# Patient Record
Sex: FEMALE | Race: WHITE | NOT HISPANIC OR LATINO | URBAN - METROPOLITAN AREA
[De-identification: names, ages, dates, MRNs, and addresses within clinical notes are randomized per-mention and may not be internally consistent; named-entity substitution may affect disease eponyms.]

---

## 2017-01-26 ENCOUNTER — COMMUNICATION - HEALTHEAST (OUTPATIENT)
Dept: CARDIOLOGY | Facility: CLINIC | Age: 72
End: 2017-01-26

## 2017-01-26 DIAGNOSIS — E78.5 HLD (HYPERLIPIDEMIA): ICD-10-CM

## 2017-02-22 ENCOUNTER — OFFICE VISIT - HEALTHEAST (OUTPATIENT)
Dept: INTERNAL MEDICINE | Facility: CLINIC | Age: 72
End: 2017-02-22

## 2017-02-22 DIAGNOSIS — M25.552 LATERAL PAIN OF LEFT HIP: ICD-10-CM

## 2017-02-22 DIAGNOSIS — G47.00 INSOMNIA: ICD-10-CM

## 2017-02-22 DIAGNOSIS — R32 INCONTINENCE: ICD-10-CM

## 2017-03-01 ENCOUNTER — AMBULATORY - HEALTHEAST (OUTPATIENT)
Dept: LAB | Facility: CLINIC | Age: 72
End: 2017-03-01

## 2017-03-01 ENCOUNTER — RECORDS - HEALTHEAST (OUTPATIENT)
Dept: GENERAL RADIOLOGY | Facility: CLINIC | Age: 72
End: 2017-03-01

## 2017-03-01 DIAGNOSIS — Z79.52 LONG TERM CURRENT USE OF SYSTEMIC STEROIDS: ICD-10-CM

## 2017-03-01 DIAGNOSIS — Z79.899 ENCOUNTER FOR LONG-TERM (CURRENT) USE OF OTHER MEDICATIONS: ICD-10-CM

## 2017-03-01 DIAGNOSIS — Z94.0 KIDNEY REPLACED BY TRANSPLANT: ICD-10-CM

## 2017-03-01 DIAGNOSIS — Z48.298 AFTERCARE FOLLOWING ORGAN TRANSPLANT: ICD-10-CM

## 2017-03-01 DIAGNOSIS — M25.552 PAIN IN LEFT HIP: ICD-10-CM

## 2017-03-02 ENCOUNTER — COMMUNICATION - HEALTHEAST (OUTPATIENT)
Dept: INTERNAL MEDICINE | Facility: CLINIC | Age: 72
End: 2017-03-02

## 2017-03-02 ENCOUNTER — AMBULATORY - HEALTHEAST (OUTPATIENT)
Dept: INTERNAL MEDICINE | Facility: CLINIC | Age: 72
End: 2017-03-02

## 2017-03-02 DIAGNOSIS — M53.3 SACRO-ILIAC PAIN: ICD-10-CM

## 2017-03-02 DIAGNOSIS — M47.816 DJD (DEGENERATIVE JOINT DISEASE), LUMBAR: ICD-10-CM

## 2017-03-07 ENCOUNTER — HOSPITAL ENCOUNTER (OUTPATIENT)
Dept: PHYSICAL MEDICINE AND REHAB | Facility: CLINIC | Age: 72
Discharge: HOME | End: 2017-03-07
Attending: INTERNAL MEDICINE

## 2017-03-07 DIAGNOSIS — M53.3 SACROILIAC JOINT DYSFUNCTION OF LEFT SIDE: ICD-10-CM

## 2017-03-07 DIAGNOSIS — M48.061 LUMBAR CANAL STENOSIS: ICD-10-CM

## 2017-03-07 DIAGNOSIS — M54.16 CHRONIC LEFT LUMBAR RADICULOPATHY: ICD-10-CM

## 2017-03-07 DIAGNOSIS — M46.1 SACROILIITIS (H): ICD-10-CM

## 2017-03-07 DIAGNOSIS — M54.50 LEFT LOW BACK PAIN: ICD-10-CM

## 2017-03-08 ENCOUNTER — AMBULATORY - HEALTHEAST (OUTPATIENT)
Dept: PODIATRY | Facility: CLINIC | Age: 72
End: 2017-03-08

## 2017-03-08 DIAGNOSIS — E11.9 TYPE 2 DIABETES MELLITUS WITHOUT COMPLICATION, WITH LONG-TERM CURRENT USE OF INSULIN (H): ICD-10-CM

## 2017-03-08 DIAGNOSIS — Z79.4 TYPE 2 DIABETES MELLITUS WITHOUT COMPLICATION, WITH LONG-TERM CURRENT USE OF INSULIN (H): ICD-10-CM

## 2017-03-08 DIAGNOSIS — L60.2 ONYCHAUXIS: ICD-10-CM

## 2017-03-08 DIAGNOSIS — B35.1 NAIL FUNGUS: ICD-10-CM

## 2017-03-10 ENCOUNTER — RECORDS - HEALTHEAST (OUTPATIENT)
Dept: ADMINISTRATIVE | Facility: OTHER | Age: 72
End: 2017-03-10

## 2017-03-29 ENCOUNTER — AMBULATORY - HEALTHEAST (OUTPATIENT)
Dept: CARDIOLOGY | Facility: CLINIC | Age: 72
End: 2017-03-29

## 2017-03-29 DIAGNOSIS — Z95.0 PACEMAKER: ICD-10-CM

## 2017-03-30 ENCOUNTER — OFFICE VISIT - HEALTHEAST (OUTPATIENT)
Dept: PHYSICAL THERAPY | Facility: REHABILITATION | Age: 72
End: 2017-03-30

## 2017-03-30 DIAGNOSIS — M62.81 GENERALIZED MUSCLE WEAKNESS: ICD-10-CM

## 2017-03-30 DIAGNOSIS — M79.18 LEFT BUTTOCK PAIN: ICD-10-CM

## 2017-04-03 ENCOUNTER — COMMUNICATION - HEALTHEAST (OUTPATIENT)
Dept: ADMINISTRATIVE | Facility: CLINIC | Age: 72
End: 2017-04-03

## 2017-04-03 DIAGNOSIS — E11.9 TYPE 2 DIABETES MELLITUS (H): ICD-10-CM

## 2017-04-04 ENCOUNTER — COMMUNICATION - HEALTHEAST (OUTPATIENT)
Dept: INTERNAL MEDICINE | Facility: CLINIC | Age: 72
End: 2017-04-04

## 2017-04-04 ENCOUNTER — AMBULATORY - HEALTHEAST (OUTPATIENT)
Dept: EDUCATION SERVICES | Facility: CLINIC | Age: 72
End: 2017-04-04

## 2017-04-04 DIAGNOSIS — I10 ESSENTIAL HYPERTENSION, BENIGN: ICD-10-CM

## 2017-04-05 ENCOUNTER — COMMUNICATION - HEALTHEAST (OUTPATIENT)
Dept: INTERNAL MEDICINE | Facility: CLINIC | Age: 72
End: 2017-04-05

## 2017-04-05 ENCOUNTER — COMMUNICATION - HEALTHEAST (OUTPATIENT)
Dept: EDUCATION SERVICES | Facility: CLINIC | Age: 72
End: 2017-04-05

## 2017-04-05 ENCOUNTER — OFFICE VISIT - HEALTHEAST (OUTPATIENT)
Dept: INTERNAL MEDICINE | Facility: CLINIC | Age: 72
End: 2017-04-05

## 2017-04-05 DIAGNOSIS — Z79.4 TYPE 2 DIABETES MELLITUS WITH STAGE 3 CHRONIC KIDNEY DISEASE, WITH LONG-TERM CURRENT USE OF INSULIN (H): ICD-10-CM

## 2017-04-05 DIAGNOSIS — Z79.899 POLYPHARMACY: ICD-10-CM

## 2017-04-05 DIAGNOSIS — T68.XXXA HYPOTHERMIA: ICD-10-CM

## 2017-04-05 DIAGNOSIS — N18.30 TYPE 2 DIABETES MELLITUS WITH STAGE 3 CHRONIC KIDNEY DISEASE, WITH LONG-TERM CURRENT USE OF INSULIN (H): ICD-10-CM

## 2017-04-05 DIAGNOSIS — E11.22 TYPE 2 DIABETES MELLITUS WITH STAGE 3 CHRONIC KIDNEY DISEASE, WITH LONG-TERM CURRENT USE OF INSULIN (H): ICD-10-CM

## 2017-04-06 ENCOUNTER — OFFICE VISIT - HEALTHEAST (OUTPATIENT)
Dept: PHYSICAL THERAPY | Facility: REHABILITATION | Age: 72
End: 2017-04-06

## 2017-04-06 DIAGNOSIS — M62.81 GENERALIZED MUSCLE WEAKNESS: ICD-10-CM

## 2017-04-06 DIAGNOSIS — M79.18 LEFT BUTTOCK PAIN: ICD-10-CM

## 2017-04-06 DIAGNOSIS — R29.3 ABNORMAL POSTURE: ICD-10-CM

## 2017-04-14 ENCOUNTER — AMBULATORY - HEALTHEAST (OUTPATIENT)
Dept: EDUCATION SERVICES | Facility: CLINIC | Age: 72
End: 2017-04-14

## 2017-04-17 ENCOUNTER — OFFICE VISIT - HEALTHEAST (OUTPATIENT)
Dept: PHYSICAL THERAPY | Facility: REHABILITATION | Age: 72
End: 2017-04-17

## 2017-04-17 DIAGNOSIS — M79.18 LEFT BUTTOCK PAIN: ICD-10-CM

## 2017-04-17 DIAGNOSIS — R29.3 ABNORMAL POSTURE: ICD-10-CM

## 2017-04-17 DIAGNOSIS — I50.22 CHRONIC SYSTOLIC CONGESTIVE HEART FAILURE (H): ICD-10-CM

## 2017-04-17 DIAGNOSIS — E11.9 TYPE 2 DIABETES MELLITUS (H): ICD-10-CM

## 2017-04-17 DIAGNOSIS — N18.6 END STAGE RENAL DISEASE (H): ICD-10-CM

## 2017-04-17 DIAGNOSIS — I10 ESSENTIAL HYPERTENSION, BENIGN: ICD-10-CM

## 2017-04-17 DIAGNOSIS — M62.81 GENERALIZED MUSCLE WEAKNESS: ICD-10-CM

## 2017-04-18 ENCOUNTER — AMBULATORY - HEALTHEAST (OUTPATIENT)
Dept: INTERNAL MEDICINE | Facility: CLINIC | Age: 72
End: 2017-04-18

## 2017-04-19 ENCOUNTER — OFFICE VISIT - HEALTHEAST (OUTPATIENT)
Dept: NURSING | Facility: CLINIC | Age: 72
End: 2017-04-19

## 2017-04-19 DIAGNOSIS — E78.2 MIXED HYPERLIPIDEMIA: ICD-10-CM

## 2017-04-19 DIAGNOSIS — Z79.4 TYPE 2 DIABETES MELLITUS WITHOUT COMPLICATION, WITH LONG-TERM CURRENT USE OF INSULIN (H): ICD-10-CM

## 2017-04-19 DIAGNOSIS — K21.00 REFLUX ESOPHAGITIS: ICD-10-CM

## 2017-04-19 DIAGNOSIS — Z94.0 KIDNEY REPLACED BY TRANSPLANT: ICD-10-CM

## 2017-04-19 DIAGNOSIS — E11.9 TYPE 2 DIABETES MELLITUS WITHOUT COMPLICATION, WITH LONG-TERM CURRENT USE OF INSULIN (H): ICD-10-CM

## 2017-04-19 DIAGNOSIS — G40.919 INTRACTABLE EPILEPSY WITHOUT STATUS EPILEPTICUS, UNSPECIFIED EPILEPSY TYPE (H): ICD-10-CM

## 2017-04-19 DIAGNOSIS — I10 ESSENTIAL HYPERTENSION WITH GOAL BLOOD PRESSURE LESS THAN 140/90: ICD-10-CM

## 2017-04-19 DIAGNOSIS — F34.1 DYSTHYMIC DISORDER: ICD-10-CM

## 2017-04-24 ENCOUNTER — OFFICE VISIT - HEALTHEAST (OUTPATIENT)
Dept: ENDOCRINOLOGY | Facility: CLINIC | Age: 72
End: 2017-04-24

## 2017-04-24 DIAGNOSIS — E11.9 DIABETES (H): ICD-10-CM

## 2017-04-24 LAB
CREAT SERPL-MCNC: 1.94 MG/DL (ref 0.6–1.1)
GFR SERPL CREATININE-BSD FRML MDRD: 25 ML/MIN/1.73M2
HBA1C MFR BLD: 9.3 % (ref 3.5–6)

## 2017-04-25 ENCOUNTER — AMBULATORY - HEALTHEAST (OUTPATIENT)
Dept: EDUCATION SERVICES | Facility: CLINIC | Age: 72
End: 2017-04-25

## 2017-05-01 ENCOUNTER — COMMUNICATION - HEALTHEAST (OUTPATIENT)
Dept: ENDOCRINOLOGY | Facility: CLINIC | Age: 72
End: 2017-05-01

## 2017-05-02 ENCOUNTER — OFFICE VISIT - HEALTHEAST (OUTPATIENT)
Dept: INTERNAL MEDICINE | Facility: CLINIC | Age: 72
End: 2017-05-02

## 2017-05-02 DIAGNOSIS — Z94.0 KIDNEY REPLACED BY TRANSPLANT: ICD-10-CM

## 2017-05-02 DIAGNOSIS — I10 ESSENTIAL HYPERTENSION: ICD-10-CM

## 2017-05-02 DIAGNOSIS — E78.5 HYPERLIPIDEMIA: ICD-10-CM

## 2017-05-02 DIAGNOSIS — I10 ESSENTIAL HYPERTENSION, BENIGN: ICD-10-CM

## 2017-05-02 DIAGNOSIS — E11.9 TYPE 2 DIABETES MELLITUS (H): ICD-10-CM

## 2017-05-10 ENCOUNTER — COMMUNICATION - HEALTHEAST (OUTPATIENT)
Dept: INTERNAL MEDICINE | Facility: CLINIC | Age: 72
End: 2017-05-10

## 2017-05-10 DIAGNOSIS — E11.9 DIABETES (H): ICD-10-CM

## 2017-05-10 DIAGNOSIS — F39 MOOD DISORDER (H): ICD-10-CM

## 2017-05-24 ENCOUNTER — COMMUNICATION - HEALTHEAST (OUTPATIENT)
Dept: ADMINISTRATIVE | Facility: CLINIC | Age: 72
End: 2017-05-24

## 2017-05-24 DIAGNOSIS — E11.22 TYPE 2 DIABETES MELLITUS WITH STAGE 3 CHRONIC KIDNEY DISEASE, WITH LONG-TERM CURRENT USE OF INSULIN (H): ICD-10-CM

## 2017-05-24 DIAGNOSIS — N18.30 TYPE 2 DIABETES MELLITUS WITH STAGE 3 CHRONIC KIDNEY DISEASE, WITH LONG-TERM CURRENT USE OF INSULIN (H): ICD-10-CM

## 2017-05-24 DIAGNOSIS — Z79.4 TYPE 2 DIABETES MELLITUS WITH STAGE 3 CHRONIC KIDNEY DISEASE, WITH LONG-TERM CURRENT USE OF INSULIN (H): ICD-10-CM

## 2017-05-30 ENCOUNTER — COMMUNICATION - HEALTHEAST (OUTPATIENT)
Dept: EDUCATION SERVICES | Facility: CLINIC | Age: 72
End: 2017-05-30

## 2017-06-05 ENCOUNTER — COMMUNICATION - HEALTHEAST (OUTPATIENT)
Dept: INTERNAL MEDICINE | Facility: CLINIC | Age: 72
End: 2017-06-05

## 2017-06-05 ENCOUNTER — COMMUNICATION - HEALTHEAST (OUTPATIENT)
Dept: EDUCATION SERVICES | Facility: CLINIC | Age: 72
End: 2017-06-05

## 2017-06-05 DIAGNOSIS — G47.00 INSOMNIA: ICD-10-CM

## 2017-06-07 ENCOUNTER — AMBULATORY - HEALTHEAST (OUTPATIENT)
Dept: PODIATRY | Facility: CLINIC | Age: 72
End: 2017-06-07

## 2017-06-07 DIAGNOSIS — E11.9 TYPE 2 DIABETES MELLITUS WITHOUT COMPLICATION, WITH LONG-TERM CURRENT USE OF INSULIN (H): ICD-10-CM

## 2017-06-07 DIAGNOSIS — Z79.4 TYPE 2 DIABETES MELLITUS WITHOUT COMPLICATION, WITH LONG-TERM CURRENT USE OF INSULIN (H): ICD-10-CM

## 2017-06-07 DIAGNOSIS — B35.1 NAIL FUNGUS: ICD-10-CM

## 2017-06-07 DIAGNOSIS — L60.2 ONYCHAUXIS: ICD-10-CM

## 2017-06-22 ENCOUNTER — RECORDS - HEALTHEAST (OUTPATIENT)
Dept: ADMINISTRATIVE | Facility: OTHER | Age: 72
End: 2017-06-22

## 2017-07-01 ENCOUNTER — COMMUNICATION - HEALTHEAST (OUTPATIENT)
Dept: INTERNAL MEDICINE | Facility: CLINIC | Age: 72
End: 2017-07-01

## 2017-07-01 DIAGNOSIS — I10 ESSENTIAL HYPERTENSION, BENIGN: ICD-10-CM

## 2017-07-01 DIAGNOSIS — E78.5 HYPERLIPIDEMIA: ICD-10-CM

## 2017-07-05 ENCOUNTER — AMBULATORY - HEALTHEAST (OUTPATIENT)
Dept: CARDIOLOGY | Facility: CLINIC | Age: 72
End: 2017-07-05

## 2017-07-05 DIAGNOSIS — Z95.0 PACEMAKER: ICD-10-CM

## 2017-07-06 LAB — HCC DEVICE COMMENTS: NORMAL

## 2017-08-23 ENCOUNTER — AMBULATORY - HEALTHEAST (OUTPATIENT)
Dept: PODIATRY | Facility: CLINIC | Age: 72
End: 2017-08-23

## 2017-08-23 DIAGNOSIS — L60.2 ONYCHAUXIS: ICD-10-CM

## 2017-08-23 DIAGNOSIS — B35.1 NAIL FUNGUS: ICD-10-CM

## 2017-08-23 DIAGNOSIS — E11.9 TYPE 2 DIABETES MELLITUS WITHOUT COMPLICATION (H): ICD-10-CM

## 2017-08-23 DIAGNOSIS — L84 TYLOMA: ICD-10-CM

## 2017-08-23 DIAGNOSIS — E11.9 TYPE 2 DIABETES MELLITUS WITHOUT COMPLICATION, WITHOUT LONG-TERM CURRENT USE OF INSULIN (H): ICD-10-CM

## 2017-09-26 ENCOUNTER — RECORDS - HEALTHEAST (OUTPATIENT)
Dept: GENERAL RADIOLOGY | Facility: CLINIC | Age: 72
End: 2017-09-26

## 2017-09-26 ENCOUNTER — OFFICE VISIT - HEALTHEAST (OUTPATIENT)
Dept: INTERNAL MEDICINE | Facility: CLINIC | Age: 72
End: 2017-09-26

## 2017-09-26 DIAGNOSIS — E11.9 TYPE 2 DIABETES MELLITUS (H): ICD-10-CM

## 2017-09-26 DIAGNOSIS — M19.049 HAND ARTHRITIS: ICD-10-CM

## 2017-09-26 DIAGNOSIS — I10 ESSENTIAL HYPERTENSION: ICD-10-CM

## 2017-09-26 DIAGNOSIS — E78.5 HYPERLIPIDEMIA: ICD-10-CM

## 2017-09-26 DIAGNOSIS — M25.511 RIGHT SHOULDER PAIN: ICD-10-CM

## 2017-09-26 DIAGNOSIS — M25.511 PAIN IN RIGHT SHOULDER: ICD-10-CM

## 2017-09-26 LAB
CHOLEST SERPL-MCNC: 193 MG/DL
FASTING STATUS PATIENT QL REPORTED: YES
HBA1C MFR BLD: 10.1 % (ref 3.5–6)
HDLC SERPL-MCNC: 56 MG/DL
LDLC SERPL CALC-MCNC: 78 MG/DL
TRIGL SERPL-MCNC: 297 MG/DL

## 2017-09-27 ENCOUNTER — AMBULATORY - HEALTHEAST (OUTPATIENT)
Dept: INTERNAL MEDICINE | Facility: CLINIC | Age: 72
End: 2017-09-27

## 2017-09-27 DIAGNOSIS — E11.9 TYPE 2 DIABETES MELLITUS (H): ICD-10-CM

## 2017-09-29 ENCOUNTER — COMMUNICATION - HEALTHEAST (OUTPATIENT)
Dept: TELEHEALTH | Facility: CLINIC | Age: 72
End: 2017-09-29

## 2017-10-03 ENCOUNTER — RECORDS - HEALTHEAST (OUTPATIENT)
Dept: ADMINISTRATIVE | Facility: OTHER | Age: 72
End: 2017-10-03

## 2017-10-03 ENCOUNTER — COMMUNICATION - HEALTHEAST (OUTPATIENT)
Dept: ADMINISTRATIVE | Facility: CLINIC | Age: 72
End: 2017-10-03

## 2017-10-03 ENCOUNTER — COMMUNICATION - HEALTHEAST (OUTPATIENT)
Dept: INTERNAL MEDICINE | Facility: CLINIC | Age: 72
End: 2017-10-03

## 2017-10-03 DIAGNOSIS — Z79.4 TYPE 2 DIABETES MELLITUS WITH STAGE 3 CHRONIC KIDNEY DISEASE, WITH LONG-TERM CURRENT USE OF INSULIN (H): ICD-10-CM

## 2017-10-03 DIAGNOSIS — N18.30 TYPE 2 DIABETES MELLITUS WITH STAGE 3 CHRONIC KIDNEY DISEASE, WITH LONG-TERM CURRENT USE OF INSULIN (H): ICD-10-CM

## 2017-10-03 DIAGNOSIS — E11.22 TYPE 2 DIABETES MELLITUS WITH STAGE 3 CHRONIC KIDNEY DISEASE, WITH LONG-TERM CURRENT USE OF INSULIN (H): ICD-10-CM

## 2017-10-03 DIAGNOSIS — T86.10 RENAL TRANSPLANT DISORDER: ICD-10-CM

## 2017-10-04 ENCOUNTER — COMMUNICATION - HEALTHEAST (OUTPATIENT)
Dept: ENDOCRINOLOGY | Facility: CLINIC | Age: 72
End: 2017-10-04

## 2017-10-04 DIAGNOSIS — Z79.4 TYPE 2 DIABETES MELLITUS WITH STAGE 3 CHRONIC KIDNEY DISEASE, WITH LONG-TERM CURRENT USE OF INSULIN (H): ICD-10-CM

## 2017-10-04 DIAGNOSIS — N18.30 TYPE 2 DIABETES MELLITUS WITH STAGE 3 CHRONIC KIDNEY DISEASE, WITH LONG-TERM CURRENT USE OF INSULIN (H): ICD-10-CM

## 2017-10-04 DIAGNOSIS — E11.22 TYPE 2 DIABETES MELLITUS WITH STAGE 3 CHRONIC KIDNEY DISEASE, WITH LONG-TERM CURRENT USE OF INSULIN (H): ICD-10-CM

## 2017-10-10 ENCOUNTER — AMBULATORY - HEALTHEAST (OUTPATIENT)
Dept: CARDIOLOGY | Facility: CLINIC | Age: 72
End: 2017-10-10

## 2017-10-10 DIAGNOSIS — Z95.0 PACEMAKER: ICD-10-CM

## 2017-10-11 ENCOUNTER — OFFICE VISIT - HEALTHEAST (OUTPATIENT)
Dept: NURSING | Facility: CLINIC | Age: 72
End: 2017-10-11

## 2017-10-11 DIAGNOSIS — E11.9 TYPE 2 DIABETES MELLITUS WITHOUT COMPLICATION, WITH LONG-TERM CURRENT USE OF INSULIN (H): ICD-10-CM

## 2017-10-11 DIAGNOSIS — E78.2 MIXED HYPERLIPIDEMIA: ICD-10-CM

## 2017-10-11 DIAGNOSIS — G40.909 NONINTRACTABLE EPILEPSY WITHOUT STATUS EPILEPTICUS, UNSPECIFIED EPILEPSY TYPE (H): ICD-10-CM

## 2017-10-11 DIAGNOSIS — Z79.4 TYPE 2 DIABETES MELLITUS WITHOUT COMPLICATION, WITH LONG-TERM CURRENT USE OF INSULIN (H): ICD-10-CM

## 2017-10-11 DIAGNOSIS — I25.10 ATHEROSCLEROSIS OF CORONARY ARTERY OF NATIVE HEART WITHOUT ANGINA PECTORIS, UNSPECIFIED VESSEL OR LESION TYPE: ICD-10-CM

## 2017-10-11 DIAGNOSIS — F34.1 DYSTHYMIC DISORDER: ICD-10-CM

## 2017-10-11 DIAGNOSIS — Z94.0 KIDNEY REPLACED BY TRANSPLANT: ICD-10-CM

## 2017-10-11 LAB — HCC DEVICE COMMENTS: NORMAL

## 2017-10-20 ENCOUNTER — COMMUNICATION - HEALTHEAST (OUTPATIENT)
Dept: INTERNAL MEDICINE | Facility: CLINIC | Age: 72
End: 2017-10-20

## 2017-10-20 ENCOUNTER — RECORDS - HEALTHEAST (OUTPATIENT)
Dept: ADMINISTRATIVE | Facility: OTHER | Age: 72
End: 2017-10-20

## 2017-10-24 ENCOUNTER — OFFICE VISIT - HEALTHEAST (OUTPATIENT)
Dept: FAMILY MEDICINE | Facility: CLINIC | Age: 72
End: 2017-10-24

## 2017-10-24 DIAGNOSIS — Z01.818 PRE-OP EXAM: ICD-10-CM

## 2017-10-24 DIAGNOSIS — G56.02 CARPAL TUNNEL SYNDROME ON LEFT: ICD-10-CM

## 2017-10-24 LAB
ATRIAL RATE - MUSE: 65 BPM
DIASTOLIC BLOOD PRESSURE - MUSE: NORMAL MMHG
HBA1C MFR BLD: 8.8 % (ref 3.5–6)
INTERPRETATION ECG - MUSE: NORMAL
P AXIS - MUSE: 90 DEGREES
PR INTERVAL - MUSE: 296 MS
QRS DURATION - MUSE: 152 MS
QT - MUSE: 466 MS
QTC - MUSE: 484 MS
R AXIS - MUSE: -72 DEGREES
SYSTOLIC BLOOD PRESSURE - MUSE: NORMAL MMHG
T AXIS - MUSE: 94 DEGREES
VENTRICULAR RATE- MUSE: 65 BPM

## 2017-10-24 ASSESSMENT — MIFFLIN-ST. JEOR: SCORE: 1502.85

## 2017-10-25 ENCOUNTER — COMMUNICATION - HEALTHEAST (OUTPATIENT)
Dept: FAMILY MEDICINE | Facility: CLINIC | Age: 72
End: 2017-10-25

## 2017-10-26 ENCOUNTER — TRANSFERRED RECORDS (OUTPATIENT)
Dept: HEALTH INFORMATION MANAGEMENT | Facility: CLINIC | Age: 72
End: 2017-10-26

## 2017-11-02 ENCOUNTER — RECORDS - HEALTHEAST (OUTPATIENT)
Dept: ADMINISTRATIVE | Facility: OTHER | Age: 72
End: 2017-11-02

## 2017-11-06 ENCOUNTER — RECORDS - HEALTHEAST (OUTPATIENT)
Dept: ADMINISTRATIVE | Facility: OTHER | Age: 72
End: 2017-11-06

## 2017-11-17 ENCOUNTER — RECORDS - HEALTHEAST (OUTPATIENT)
Dept: ADMINISTRATIVE | Facility: OTHER | Age: 72
End: 2017-11-17

## 2017-11-20 ENCOUNTER — COMMUNICATION - HEALTHEAST (OUTPATIENT)
Dept: INTERNAL MEDICINE | Facility: CLINIC | Age: 72
End: 2017-11-20

## 2017-11-21 ENCOUNTER — COMMUNICATION - HEALTHEAST (OUTPATIENT)
Dept: ENDOCRINOLOGY | Facility: CLINIC | Age: 72
End: 2017-11-21

## 2017-11-21 DIAGNOSIS — Z79.4 TYPE 2 DIABETES MELLITUS WITH STAGE 3 CHRONIC KIDNEY DISEASE, WITH LONG-TERM CURRENT USE OF INSULIN (H): ICD-10-CM

## 2017-11-21 DIAGNOSIS — N18.30 TYPE 2 DIABETES MELLITUS WITH STAGE 3 CHRONIC KIDNEY DISEASE, WITH LONG-TERM CURRENT USE OF INSULIN (H): ICD-10-CM

## 2017-11-21 DIAGNOSIS — E11.22 TYPE 2 DIABETES MELLITUS WITH STAGE 3 CHRONIC KIDNEY DISEASE, WITH LONG-TERM CURRENT USE OF INSULIN (H): ICD-10-CM

## 2017-11-28 ENCOUNTER — OFFICE VISIT - HEALTHEAST (OUTPATIENT)
Dept: INTERNAL MEDICINE | Facility: CLINIC | Age: 72
End: 2017-11-28

## 2017-11-28 DIAGNOSIS — E11.8 POORLY CONTROLLED TYPE 2 DIABETES MELLITUS WITH COMPLICATION (H): ICD-10-CM

## 2017-11-28 DIAGNOSIS — Z95.0 CARDIAC PACEMAKER IN SITU: ICD-10-CM

## 2017-11-28 DIAGNOSIS — Z94.0 KIDNEY REPLACED BY TRANSPLANT: ICD-10-CM

## 2017-11-28 DIAGNOSIS — G47.33 OSA ON CPAP: ICD-10-CM

## 2017-11-28 DIAGNOSIS — E11.65 POORLY CONTROLLED TYPE 2 DIABETES MELLITUS WITH COMPLICATION (H): ICD-10-CM

## 2017-11-28 DIAGNOSIS — E78.2 MIXED HYPERLIPIDEMIA: ICD-10-CM

## 2017-11-28 DIAGNOSIS — G56.01 CARPAL TUNNEL SYNDROME OF RIGHT WRIST: ICD-10-CM

## 2017-11-28 LAB — HBA1C MFR BLD: 8.3 % (ref 3.5–6)

## 2017-11-28 ASSESSMENT — MIFFLIN-ST. JEOR: SCORE: 1508.64

## 2017-11-29 ENCOUNTER — COMMUNICATION - HEALTHEAST (OUTPATIENT)
Dept: INTERNAL MEDICINE | Facility: CLINIC | Age: 72
End: 2017-11-29

## 2017-12-06 ENCOUNTER — OFFICE VISIT - HEALTHEAST (OUTPATIENT)
Dept: NURSING | Facility: CLINIC | Age: 72
End: 2017-12-06

## 2017-12-06 DIAGNOSIS — E11.9 TYPE 2 DIABETES MELLITUS WITHOUT COMPLICATION, WITH LONG-TERM CURRENT USE OF INSULIN (H): ICD-10-CM

## 2017-12-06 DIAGNOSIS — Z79.4 TYPE 2 DIABETES MELLITUS WITHOUT COMPLICATION, WITH LONG-TERM CURRENT USE OF INSULIN (H): ICD-10-CM

## 2017-12-06 DIAGNOSIS — G40.909 NONINTRACTABLE EPILEPSY WITHOUT STATUS EPILEPTICUS, UNSPECIFIED EPILEPSY TYPE (H): ICD-10-CM

## 2017-12-06 DIAGNOSIS — I25.10 ATHEROSCLEROSIS OF CORONARY ARTERY OF NATIVE HEART WITHOUT ANGINA PECTORIS, UNSPECIFIED VESSEL OR LESION TYPE: ICD-10-CM

## 2017-12-06 DIAGNOSIS — Z94.0 KIDNEY REPLACED BY TRANSPLANT: ICD-10-CM

## 2017-12-06 DIAGNOSIS — E78.2 MIXED HYPERLIPIDEMIA: ICD-10-CM

## 2017-12-06 DIAGNOSIS — F34.1 DYSTHYMIC DISORDER: ICD-10-CM

## 2017-12-11 ENCOUNTER — RECORDS - HEALTHEAST (OUTPATIENT)
Dept: ADMINISTRATIVE | Facility: OTHER | Age: 72
End: 2017-12-11

## 2017-12-12 ENCOUNTER — RECORDS - HEALTHEAST (OUTPATIENT)
Dept: ADMINISTRATIVE | Facility: OTHER | Age: 72
End: 2017-12-12

## 2017-12-12 LAB
LAB AP CHARGES (HE HISTORICAL CONVERSION): NORMAL
PATH REPORT.COMMENTS IMP SPEC: NORMAL
PATH REPORT.FINAL DX SPEC: NORMAL
PATH REPORT.GROSS SPEC: NORMAL
PATH REPORT.MICROSCOPIC SPEC OTHER STN: NORMAL
PATH REPORT.RELEVANT HX SPEC: NORMAL
RESULT FLAG (HE HISTORICAL CONVERSION): NORMAL

## 2017-12-17 ENCOUNTER — COMMUNICATION - HEALTHEAST (OUTPATIENT)
Dept: INTERNAL MEDICINE | Facility: CLINIC | Age: 72
End: 2017-12-17

## 2017-12-17 DIAGNOSIS — N18.30 TYPE 2 DIABETES MELLITUS WITH STAGE 3 CHRONIC KIDNEY DISEASE, WITH LONG-TERM CURRENT USE OF INSULIN (H): ICD-10-CM

## 2017-12-17 DIAGNOSIS — E11.22 TYPE 2 DIABETES MELLITUS WITH STAGE 3 CHRONIC KIDNEY DISEASE, WITH LONG-TERM CURRENT USE OF INSULIN (H): ICD-10-CM

## 2017-12-17 DIAGNOSIS — Z79.4 TYPE 2 DIABETES MELLITUS WITH STAGE 3 CHRONIC KIDNEY DISEASE, WITH LONG-TERM CURRENT USE OF INSULIN (H): ICD-10-CM

## 2017-12-22 ENCOUNTER — RECORDS - HEALTHEAST (OUTPATIENT)
Dept: ADMINISTRATIVE | Facility: OTHER | Age: 72
End: 2017-12-22

## 2018-01-02 ENCOUNTER — COMMUNICATION - HEALTHEAST (OUTPATIENT)
Dept: EDUCATION SERVICES | Facility: CLINIC | Age: 73
End: 2018-01-02

## 2018-01-02 DIAGNOSIS — N18.30 TYPE 2 DIABETES MELLITUS WITH STAGE 3 CHRONIC KIDNEY DISEASE, WITH LONG-TERM CURRENT USE OF INSULIN (H): ICD-10-CM

## 2018-01-02 DIAGNOSIS — Z79.4 TYPE 2 DIABETES MELLITUS WITH STAGE 3 CHRONIC KIDNEY DISEASE, WITH LONG-TERM CURRENT USE OF INSULIN (H): ICD-10-CM

## 2018-01-02 DIAGNOSIS — E11.22 TYPE 2 DIABETES MELLITUS WITH STAGE 3 CHRONIC KIDNEY DISEASE, WITH LONG-TERM CURRENT USE OF INSULIN (H): ICD-10-CM

## 2018-01-03 ENCOUNTER — OFFICE VISIT - HEALTHEAST (OUTPATIENT)
Dept: INTERNAL MEDICINE | Facility: CLINIC | Age: 73
End: 2018-01-03

## 2018-01-03 DIAGNOSIS — M54.50 LUMBAR PAIN WITH RADIATION DOWN LEFT LEG: ICD-10-CM

## 2018-01-03 DIAGNOSIS — M79.605 LUMBAR PAIN WITH RADIATION DOWN LEFT LEG: ICD-10-CM

## 2018-01-08 ENCOUNTER — OFFICE VISIT - HEALTHEAST (OUTPATIENT)
Dept: ENDOCRINOLOGY | Facility: CLINIC | Age: 73
End: 2018-01-08

## 2018-01-08 DIAGNOSIS — Z79.4 TYPE 2 DIABETES MELLITUS WITHOUT COMPLICATION, WITH LONG-TERM CURRENT USE OF INSULIN (H): ICD-10-CM

## 2018-01-08 DIAGNOSIS — E11.9 TYPE 2 DIABETES MELLITUS WITHOUT COMPLICATION, WITH LONG-TERM CURRENT USE OF INSULIN (H): ICD-10-CM

## 2018-01-08 ASSESSMENT — MIFFLIN-ST. JEOR: SCORE: 1502.74

## 2018-01-10 ENCOUNTER — AMBULATORY - HEALTHEAST (OUTPATIENT)
Dept: PODIATRY | Facility: CLINIC | Age: 73
End: 2018-01-10

## 2018-01-10 ENCOUNTER — COMMUNICATION - HEALTHEAST (OUTPATIENT)
Dept: PHYSICAL MEDICINE AND REHAB | Facility: CLINIC | Age: 73
End: 2018-01-10

## 2018-01-10 DIAGNOSIS — E11.9 TYPE 2 DIABETES MELLITUS WITHOUT COMPLICATION, WITHOUT LONG-TERM CURRENT USE OF INSULIN (H): ICD-10-CM

## 2018-01-10 DIAGNOSIS — L84 TYLOMA: ICD-10-CM

## 2018-01-10 DIAGNOSIS — B35.1 NAIL FUNGUS: ICD-10-CM

## 2018-01-10 DIAGNOSIS — L60.2 ONYCHAUXIS: ICD-10-CM

## 2018-01-12 ENCOUNTER — COMMUNICATION - HEALTHEAST (OUTPATIENT)
Dept: INTERNAL MEDICINE | Facility: CLINIC | Age: 73
End: 2018-01-12

## 2018-01-12 ENCOUNTER — OFFICE VISIT - HEALTHEAST (OUTPATIENT)
Dept: CARDIOLOGY | Facility: CLINIC | Age: 73
End: 2018-01-12

## 2018-01-12 ENCOUNTER — COMMUNICATION - HEALTHEAST (OUTPATIENT)
Dept: TELEHEALTH | Facility: CLINIC | Age: 73
End: 2018-01-12

## 2018-01-12 ENCOUNTER — HOSPITAL ENCOUNTER (OUTPATIENT)
Dept: PHYSICAL MEDICINE AND REHAB | Facility: CLINIC | Age: 73
Discharge: HOME | End: 2018-01-12
Attending: PHYSICIAN ASSISTANT

## 2018-01-12 ENCOUNTER — AMBULATORY - HEALTHEAST (OUTPATIENT)
Dept: CARDIOLOGY | Facility: CLINIC | Age: 73
End: 2018-01-12

## 2018-01-12 DIAGNOSIS — Z95.0 CARDIAC PACEMAKER IN SITU: ICD-10-CM

## 2018-01-12 DIAGNOSIS — M48.062 SPINAL STENOSIS OF LUMBAR REGION WITH NEUROGENIC CLAUDICATION: ICD-10-CM

## 2018-01-12 DIAGNOSIS — M54.50 LUMBALGIA: ICD-10-CM

## 2018-01-12 DIAGNOSIS — I25.10 CORONARY ARTERY DISEASE INVOLVING NATIVE CORONARY ARTERY OF NATIVE HEART WITHOUT ANGINA PECTORIS: ICD-10-CM

## 2018-01-12 DIAGNOSIS — M51.26 LUMBAR DISC HERNIATION: ICD-10-CM

## 2018-01-12 DIAGNOSIS — M79.18 MYOFASCIAL PAIN: ICD-10-CM

## 2018-01-12 DIAGNOSIS — I10 ESSENTIAL HYPERTENSION: ICD-10-CM

## 2018-01-12 DIAGNOSIS — M53.3 SI (SACROILIAC) PAIN: ICD-10-CM

## 2018-01-12 ASSESSMENT — MIFFLIN-ST. JEOR
SCORE: 1485.96
SCORE: 1489.13

## 2018-01-15 LAB — HCC DEVICE COMMENTS: NORMAL

## 2018-01-22 ENCOUNTER — HOSPITAL ENCOUNTER (OUTPATIENT)
Dept: PHYSICAL MEDICINE AND REHAB | Facility: CLINIC | Age: 73
Discharge: HOME | End: 2018-01-22
Attending: PHYSICIAN ASSISTANT

## 2018-01-22 DIAGNOSIS — M48.062 SPINAL STENOSIS OF LUMBAR REGION WITH NEUROGENIC CLAUDICATION: ICD-10-CM

## 2018-01-22 DIAGNOSIS — M53.3 SI (SACROILIAC) PAIN: ICD-10-CM

## 2018-01-22 DIAGNOSIS — M51.26 LUMBAR DISC HERNIATION: ICD-10-CM

## 2018-01-22 DIAGNOSIS — E11.9 TYPE 2 DIABETES MELLITUS WITHOUT COMPLICATION, WITH LONG-TERM CURRENT USE OF INSULIN (H): ICD-10-CM

## 2018-01-22 DIAGNOSIS — M79.18 MYOFASCIAL PAIN: ICD-10-CM

## 2018-01-22 DIAGNOSIS — M54.50 LUMBALGIA: ICD-10-CM

## 2018-01-22 DIAGNOSIS — Z79.4 TYPE 2 DIABETES MELLITUS WITHOUT COMPLICATION, WITH LONG-TERM CURRENT USE OF INSULIN (H): ICD-10-CM

## 2018-02-02 ENCOUNTER — RECORDS - HEALTHEAST (OUTPATIENT)
Dept: ADMINISTRATIVE | Facility: OTHER | Age: 73
End: 2018-02-02

## 2018-02-05 ENCOUNTER — HOSPITAL ENCOUNTER (OUTPATIENT)
Dept: PHYSICAL MEDICINE AND REHAB | Facility: CLINIC | Age: 73
Discharge: HOME | End: 2018-02-05
Attending: PHYSICIAN ASSISTANT

## 2018-02-05 DIAGNOSIS — M51.26 LUMBAR DISC HERNIATION: ICD-10-CM

## 2018-02-05 DIAGNOSIS — M53.3 SI (SACROILIAC) PAIN: ICD-10-CM

## 2018-02-05 DIAGNOSIS — M48.062 SPINAL STENOSIS OF LUMBAR REGION WITH NEUROGENIC CLAUDICATION: ICD-10-CM

## 2018-02-05 DIAGNOSIS — M79.18 MYOFASCIAL PAIN: ICD-10-CM

## 2018-02-05 DIAGNOSIS — M54.50 LUMBALGIA: ICD-10-CM

## 2018-02-05 ASSESSMENT — MIFFLIN-ST. JEOR: SCORE: 1490.49

## 2018-02-22 ENCOUNTER — RECORDS - HEALTHEAST (OUTPATIENT)
Dept: ADMINISTRATIVE | Facility: OTHER | Age: 73
End: 2018-02-22

## 2018-03-01 ENCOUNTER — OFFICE VISIT - HEALTHEAST (OUTPATIENT)
Dept: NEUROSURGERY | Facility: CLINIC | Age: 73
End: 2018-03-01

## 2018-03-01 DIAGNOSIS — M48.062 SPINAL STENOSIS, LUMBAR REGION, WITH NEUROGENIC CLAUDICATION: ICD-10-CM

## 2018-03-01 DIAGNOSIS — G95.9 MYELOPATHY (H): ICD-10-CM

## 2018-03-01 DIAGNOSIS — R32 URINARY INCONTINENCE: ICD-10-CM

## 2018-03-01 ASSESSMENT — MIFFLIN-ST. JEOR: SCORE: 1496.28

## 2018-04-10 ENCOUNTER — AMBULATORY - HEALTHEAST (OUTPATIENT)
Dept: CARDIOLOGY | Facility: CLINIC | Age: 73
End: 2018-04-10

## 2018-04-10 DIAGNOSIS — Z95.0 CARDIAC PACEMAKER IN SITU: ICD-10-CM

## 2018-04-11 ENCOUNTER — COMMUNICATION - HEALTHEAST (OUTPATIENT)
Dept: INTERNAL MEDICINE | Facility: CLINIC | Age: 73
End: 2018-04-11

## 2018-04-11 DIAGNOSIS — Z79.4 TYPE 2 DIABETES MELLITUS WITHOUT COMPLICATION, WITH LONG-TERM CURRENT USE OF INSULIN (H): ICD-10-CM

## 2018-04-11 DIAGNOSIS — E11.9 TYPE 2 DIABETES MELLITUS WITHOUT COMPLICATION, WITH LONG-TERM CURRENT USE OF INSULIN (H): ICD-10-CM

## 2018-04-11 DIAGNOSIS — E78.5 HYPERLIPIDEMIA: ICD-10-CM

## 2018-04-11 DIAGNOSIS — I10 ESSENTIAL HYPERTENSION, BENIGN: ICD-10-CM

## 2018-04-11 LAB — HCC DEVICE COMMENTS: NORMAL

## 2018-04-24 ENCOUNTER — RECORDS - HEALTHEAST (OUTPATIENT)
Dept: ADMINISTRATIVE | Facility: OTHER | Age: 73
End: 2018-04-24

## 2018-04-26 ENCOUNTER — OFFICE VISIT - HEALTHEAST (OUTPATIENT)
Dept: NEUROSURGERY | Facility: CLINIC | Age: 73
End: 2018-04-26

## 2018-04-26 DIAGNOSIS — G95.20 CERVICAL CORD COMPRESSION WITH MYELOPATHY (H): ICD-10-CM

## 2018-04-26 ASSESSMENT — MIFFLIN-ST. JEOR: SCORE: 1494.46

## 2018-04-27 ENCOUNTER — OFFICE VISIT - HEALTHEAST (OUTPATIENT)
Dept: NEUROSURGERY | Facility: CLINIC | Age: 73
End: 2018-04-27

## 2018-04-27 DIAGNOSIS — R53.1 WEAKNESS: ICD-10-CM

## 2018-04-27 ASSESSMENT — MIFFLIN-ST. JEOR: SCORE: 1494.46

## 2018-05-04 ENCOUNTER — RECORDS - HEALTHEAST (OUTPATIENT)
Dept: RADIOLOGY | Facility: CLINIC | Age: 73
End: 2018-05-04

## 2018-05-22 ENCOUNTER — COMMUNICATION - HEALTHEAST (OUTPATIENT)
Dept: INTERNAL MEDICINE | Facility: CLINIC | Age: 73
End: 2018-05-22

## 2018-05-22 DIAGNOSIS — F39 MOOD DISORDER (H): ICD-10-CM

## 2018-06-21 ENCOUNTER — AMBULATORY - HEALTHEAST (OUTPATIENT)
Dept: ENDOCRINOLOGY | Facility: CLINIC | Age: 73
End: 2018-06-21

## 2018-06-21 DIAGNOSIS — Z79.4 TYPE 2 DIABETES MELLITUS WITHOUT COMPLICATION, WITH LONG-TERM CURRENT USE OF INSULIN (H): ICD-10-CM

## 2018-06-21 DIAGNOSIS — E11.9 TYPE 2 DIABETES MELLITUS WITHOUT COMPLICATION, WITH LONG-TERM CURRENT USE OF INSULIN (H): ICD-10-CM

## 2018-07-06 ENCOUNTER — RECORDS - HEALTHEAST (OUTPATIENT)
Dept: ADMINISTRATIVE | Facility: OTHER | Age: 73
End: 2018-07-06

## 2018-07-10 ENCOUNTER — COMMUNICATION - HEALTHEAST (OUTPATIENT)
Dept: ADMINISTRATIVE | Facility: CLINIC | Age: 73
End: 2018-07-10

## 2018-07-12 ENCOUNTER — RECORDS - HEALTHEAST (OUTPATIENT)
Dept: ADMINISTRATIVE | Facility: OTHER | Age: 73
End: 2018-07-12

## 2018-07-13 ENCOUNTER — COMMUNICATION - HEALTHEAST (OUTPATIENT)
Dept: ENDOCRINOLOGY | Facility: CLINIC | Age: 73
End: 2018-07-13

## 2018-07-13 DIAGNOSIS — I10 ESSENTIAL HYPERTENSION, BENIGN: ICD-10-CM

## 2018-07-13 DIAGNOSIS — E78.5 HYPERLIPIDEMIA: ICD-10-CM

## 2018-07-13 DIAGNOSIS — Z79.4 TYPE 2 DIABETES MELLITUS WITHOUT COMPLICATION, WITH LONG-TERM CURRENT USE OF INSULIN (H): ICD-10-CM

## 2018-07-13 DIAGNOSIS — E11.9 TYPE 2 DIABETES MELLITUS WITHOUT COMPLICATION, WITH LONG-TERM CURRENT USE OF INSULIN (H): ICD-10-CM

## 2018-07-16 ENCOUNTER — AMBULATORY - HEALTHEAST (OUTPATIENT)
Dept: CARDIOLOGY | Facility: CLINIC | Age: 73
End: 2018-07-16

## 2018-07-16 DIAGNOSIS — Z95.0 CARDIAC PACEMAKER IN SITU: ICD-10-CM

## 2018-07-17 LAB
HCC DEVICE COMMENTS: NORMAL
HCC DEVICE IMPLANTING PROVIDER: NORMAL
HCC DEVICE MANUFACTURE: NORMAL
HCC DEVICE MODEL: NORMAL
HCC DEVICE SERIAL NUMBER: NORMAL
HCC DEVICE TYPE: NORMAL

## 2018-07-18 ENCOUNTER — OFFICE VISIT - HEALTHEAST (OUTPATIENT)
Dept: INTERNAL MEDICINE | Facility: CLINIC | Age: 73
End: 2018-07-18

## 2018-07-18 ENCOUNTER — COMMUNICATION - HEALTHEAST (OUTPATIENT)
Dept: INTERNAL MEDICINE | Facility: CLINIC | Age: 73
End: 2018-07-18

## 2018-07-18 DIAGNOSIS — G89.29 CHRONIC LEFT-SIDED LOW BACK PAIN WITHOUT SCIATICA: ICD-10-CM

## 2018-07-18 DIAGNOSIS — M54.50 CHRONIC LEFT-SIDED LOW BACK PAIN WITHOUT SCIATICA: ICD-10-CM

## 2018-07-18 DIAGNOSIS — Z94.0 KIDNEY REPLACED BY TRANSPLANT: ICD-10-CM

## 2018-07-18 DIAGNOSIS — I10 ESSENTIAL HYPERTENSION: ICD-10-CM

## 2018-07-18 DIAGNOSIS — E11.9 TYPE 2 DIABETES MELLITUS WITHOUT COMPLICATION, WITH LONG-TERM CURRENT USE OF INSULIN (H): ICD-10-CM

## 2018-07-18 DIAGNOSIS — I50.22 CHRONIC SYSTOLIC CONGESTIVE HEART FAILURE (H): ICD-10-CM

## 2018-07-18 DIAGNOSIS — Z79.4 TYPE 2 DIABETES MELLITUS WITHOUT COMPLICATION, WITH LONG-TERM CURRENT USE OF INSULIN (H): ICD-10-CM

## 2018-07-18 DIAGNOSIS — R27.0 ATAXIA: ICD-10-CM

## 2018-07-18 DIAGNOSIS — Z12.31 VISIT FOR SCREENING MAMMOGRAM: ICD-10-CM

## 2018-07-18 LAB — HBA1C MFR BLD: 6.8 % (ref 3.5–6)

## 2018-07-23 ENCOUNTER — HOSPITAL ENCOUNTER (OUTPATIENT)
Dept: MAMMOGRAPHY | Facility: CLINIC | Age: 73
Discharge: HOME | End: 2018-07-23
Attending: INTERNAL MEDICINE

## 2018-07-23 DIAGNOSIS — Z12.31 VISIT FOR SCREENING MAMMOGRAM: ICD-10-CM

## 2018-07-24 ENCOUNTER — COMMUNICATION - HEALTHEAST (OUTPATIENT)
Dept: INTERNAL MEDICINE | Facility: CLINIC | Age: 73
End: 2018-07-24

## 2018-07-25 ENCOUNTER — AMBULATORY - HEALTHEAST (OUTPATIENT)
Dept: LAB | Facility: CLINIC | Age: 73
End: 2018-07-25

## 2018-07-25 DIAGNOSIS — Z94.0 KIDNEY REPLACED BY TRANSPLANT: ICD-10-CM

## 2018-07-27 ENCOUNTER — AMBULATORY - HEALTHEAST (OUTPATIENT)
Dept: LAB | Facility: CLINIC | Age: 73
End: 2018-07-27

## 2018-07-27 DIAGNOSIS — Z94.0 KIDNEY REPLACED BY TRANSPLANT: ICD-10-CM

## 2018-07-27 LAB
ANION GAP SERPL CALCULATED.3IONS-SCNC: 10 MMOL/L (ref 5–18)
BASOPHILS # BLD AUTO: 0.1 THOU/UL (ref 0–0.2)
BASOPHILS NFR BLD AUTO: 1 % (ref 0–2)
BUN SERPL-MCNC: 25 MG/DL (ref 8–28)
CALCIUM SERPL-MCNC: 9.7 MG/DL (ref 8.5–10.5)
CHLORIDE BLD-SCNC: 110 MMOL/L (ref 98–107)
CO2 SERPL-SCNC: 25 MMOL/L (ref 22–31)
CREAT SERPL-MCNC: 1.72 MG/DL (ref 0.6–1.1)
EOSINOPHIL # BLD AUTO: 0.2 THOU/UL (ref 0–0.4)
EOSINOPHIL NFR BLD AUTO: 2 % (ref 0–6)
ERYTHROCYTE [DISTWIDTH] IN BLOOD BY AUTOMATED COUNT: 13.4 % (ref 11–14.5)
GFR SERPL CREATININE-BSD FRML MDRD: 29 ML/MIN/1.73M2
GLUCOSE BLD-MCNC: 64 MG/DL (ref 70–125)
HCT VFR BLD AUTO: 37.5 % (ref 35–47)
HGB BLD-MCNC: 12.1 G/DL (ref 12–16)
LYMPHOCYTES # BLD AUTO: 0.9 THOU/UL (ref 0.8–4.4)
LYMPHOCYTES NFR BLD AUTO: 10 % (ref 20–40)
MCH RBC QN AUTO: 28.7 PG (ref 27–34)
MCHC RBC AUTO-ENTMCNC: 32.3 G/DL (ref 32–36)
MCV RBC AUTO: 89 FL (ref 80–100)
MONOCYTES # BLD AUTO: 0.9 THOU/UL (ref 0–0.9)
MONOCYTES NFR BLD AUTO: 11 % (ref 2–10)
NEUTROPHILS # BLD AUTO: 6.7 THOU/UL (ref 2–7.7)
NEUTROPHILS NFR BLD AUTO: 76 % (ref 50–70)
PLATELET # BLD AUTO: 246 THOU/UL (ref 140–440)
PMV BLD AUTO: 8.2 FL (ref 7–10)
POTASSIUM BLD-SCNC: 4.4 MMOL/L (ref 3.5–5)
RBC # BLD AUTO: 4.21 MILL/UL (ref 3.8–5.4)
SODIUM SERPL-SCNC: 145 MMOL/L (ref 136–145)
WBC: 8.8 THOU/UL (ref 4–11)

## 2018-07-30 ENCOUNTER — COMMUNICATION - HEALTHEAST (OUTPATIENT)
Dept: INTERNAL MEDICINE | Facility: CLINIC | Age: 73
End: 2018-07-30

## 2018-08-16 ENCOUNTER — RECORDS - HEALTHEAST (OUTPATIENT)
Dept: ADMINISTRATIVE | Facility: OTHER | Age: 73
End: 2018-08-16

## 2018-08-23 ENCOUNTER — COMMUNICATION - HEALTHEAST (OUTPATIENT)
Dept: INTERNAL MEDICINE | Facility: CLINIC | Age: 73
End: 2018-08-23

## 2018-08-29 ENCOUNTER — OFFICE VISIT - HEALTHEAST (OUTPATIENT)
Dept: INTERNAL MEDICINE | Facility: CLINIC | Age: 73
End: 2018-08-29

## 2018-08-29 DIAGNOSIS — G40.909 NONINTRACTABLE EPILEPSY WITHOUT STATUS EPILEPTICUS, UNSPECIFIED EPILEPSY TYPE (H): ICD-10-CM

## 2018-08-29 DIAGNOSIS — E11.65 POORLY CONTROLLED TYPE 2 DIABETES MELLITUS WITH COMPLICATION (H): ICD-10-CM

## 2018-08-29 DIAGNOSIS — G47.33 OSA ON CPAP: ICD-10-CM

## 2018-08-29 DIAGNOSIS — Z94.0 KIDNEY REPLACED BY TRANSPLANT: ICD-10-CM

## 2018-08-29 DIAGNOSIS — E66.01 MORBID OBESITY (H): ICD-10-CM

## 2018-08-29 DIAGNOSIS — M19.90 OSTEOARTHRITIS: ICD-10-CM

## 2018-08-29 DIAGNOSIS — Z00.00 ROUTINE GENERAL MEDICAL EXAMINATION AT A HEALTH CARE FACILITY: ICD-10-CM

## 2018-08-29 DIAGNOSIS — D47.3 ESSENTIAL THROMBOCYTHEMIA (H): ICD-10-CM

## 2018-08-29 DIAGNOSIS — E11.8 POORLY CONTROLLED TYPE 2 DIABETES MELLITUS WITH COMPLICATION (H): ICD-10-CM

## 2018-08-29 LAB
ANION GAP SERPL CALCULATED.3IONS-SCNC: 13 MMOL/L (ref 5–18)
BASOPHILS # BLD AUTO: 0.1 THOU/UL (ref 0–0.2)
BASOPHILS NFR BLD AUTO: 1 % (ref 0–2)
BUN SERPL-MCNC: 27 MG/DL (ref 8–28)
CALCIUM SERPL-MCNC: 9.7 MG/DL (ref 8.5–10.5)
CHLORIDE BLD-SCNC: 105 MMOL/L (ref 98–107)
CHOLEST SERPL-MCNC: 146 MG/DL
CO2 SERPL-SCNC: 25 MMOL/L (ref 22–31)
CREAT SERPL-MCNC: 1.58 MG/DL (ref 0.6–1.1)
CREAT UR-MCNC: 59.1 MG/DL
EOSINOPHIL # BLD AUTO: 0.3 THOU/UL (ref 0–0.4)
EOSINOPHIL NFR BLD AUTO: 2 % (ref 0–6)
ERYTHROCYTE [DISTWIDTH] IN BLOOD BY AUTOMATED COUNT: 13.8 % (ref 11–14.5)
FASTING STATUS PATIENT QL REPORTED: YES
GFR SERPL CREATININE-BSD FRML MDRD: 32 ML/MIN/1.73M2
GLUCOSE BLD-MCNC: 89 MG/DL (ref 70–125)
HCT VFR BLD AUTO: 36.8 % (ref 35–47)
HDLC SERPL-MCNC: 45 MG/DL
HGB BLD-MCNC: 12.3 G/DL (ref 12–16)
LDLC SERPL CALC-MCNC: 72 MG/DL
LYMPHOCYTES # BLD AUTO: 0.7 THOU/UL (ref 0.8–4.4)
LYMPHOCYTES NFR BLD AUTO: 6 % (ref 20–40)
MCH RBC QN AUTO: 29.2 PG (ref 27–34)
MCHC RBC AUTO-ENTMCNC: 33.3 G/DL (ref 32–36)
MCV RBC AUTO: 88 FL (ref 80–100)
MICROALBUMIN UR-MCNC: >50 MG/DL (ref 0–1.99)
MICROALBUMIN/CREAT UR: ABNORMAL MG/G
MONOCYTES # BLD AUTO: 1 THOU/UL (ref 0–0.9)
MONOCYTES NFR BLD AUTO: 8 % (ref 2–10)
NEUTROPHILS # BLD AUTO: 11 THOU/UL (ref 2–7.7)
NEUTROPHILS NFR BLD AUTO: 84 % (ref 50–70)
PLATELET # BLD AUTO: 329 THOU/UL (ref 140–440)
PMV BLD AUTO: 7.7 FL (ref 7–10)
POTASSIUM BLD-SCNC: 4.6 MMOL/L (ref 3.5–5)
RBC # BLD AUTO: 4.2 MILL/UL (ref 3.8–5.4)
SODIUM SERPL-SCNC: 143 MMOL/L (ref 136–145)
TRIGL SERPL-MCNC: 143 MG/DL
WBC: 13.2 THOU/UL (ref 4–11)

## 2018-08-29 ASSESSMENT — MIFFLIN-ST. JEOR: SCORE: 1454.77

## 2018-09-19 ENCOUNTER — OFFICE VISIT - HEALTHEAST (OUTPATIENT)
Dept: PODIATRY | Facility: CLINIC | Age: 73
End: 2018-09-19

## 2018-09-19 DIAGNOSIS — L60.2 ONYCHAUXIS: ICD-10-CM

## 2018-09-19 DIAGNOSIS — B35.1 NAIL FUNGUS: ICD-10-CM

## 2018-09-19 DIAGNOSIS — E11.9 TYPE 2 DIABETES MELLITUS WITHOUT COMPLICATION, WITHOUT LONG-TERM CURRENT USE OF INSULIN (H): ICD-10-CM

## 2018-09-19 DIAGNOSIS — L84 TYLOMA: ICD-10-CM

## 2018-09-24 ENCOUNTER — COMMUNICATION - HEALTHEAST (OUTPATIENT)
Dept: INTERNAL MEDICINE | Facility: CLINIC | Age: 73
End: 2018-09-24

## 2018-09-28 ENCOUNTER — AMBULATORY - HEALTHEAST (OUTPATIENT)
Dept: LAB | Facility: CLINIC | Age: 73
End: 2018-09-28

## 2018-09-28 DIAGNOSIS — Z79.899 ENCOUNTER FOR LONG-TERM (CURRENT) USE OF MEDICATIONS: ICD-10-CM

## 2018-09-28 DIAGNOSIS — Z94.0 KIDNEY REPLACED BY TRANSPLANT: ICD-10-CM

## 2018-10-17 ENCOUNTER — COMMUNICATION - HEALTHEAST (OUTPATIENT)
Dept: INTERNAL MEDICINE | Facility: CLINIC | Age: 73
End: 2018-10-17

## 2018-10-17 ENCOUNTER — COMMUNICATION - HEALTHEAST (OUTPATIENT)
Dept: ENDOCRINOLOGY | Facility: CLINIC | Age: 73
End: 2018-10-17

## 2018-10-17 DIAGNOSIS — E78.5 HYPERLIPIDEMIA: ICD-10-CM

## 2018-10-17 DIAGNOSIS — Z79.4 TYPE 2 DIABETES MELLITUS WITHOUT COMPLICATION, WITH LONG-TERM CURRENT USE OF INSULIN (H): ICD-10-CM

## 2018-10-17 DIAGNOSIS — I10 ESSENTIAL HYPERTENSION, BENIGN: ICD-10-CM

## 2018-10-17 DIAGNOSIS — I10 ESSENTIAL HYPERTENSION: ICD-10-CM

## 2018-10-17 DIAGNOSIS — E11.9 TYPE 2 DIABETES MELLITUS WITHOUT COMPLICATION, WITH LONG-TERM CURRENT USE OF INSULIN (H): ICD-10-CM

## 2018-10-24 ENCOUNTER — AMBULATORY - HEALTHEAST (OUTPATIENT)
Dept: CARDIOLOGY | Facility: CLINIC | Age: 73
End: 2018-10-24

## 2018-10-24 DIAGNOSIS — Z95.0 CARDIAC PACEMAKER IN SITU: ICD-10-CM

## 2018-11-05 ENCOUNTER — OFFICE VISIT - HEALTHEAST (OUTPATIENT)
Dept: FAMILY MEDICINE | Facility: CLINIC | Age: 73
End: 2018-11-05

## 2018-11-05 DIAGNOSIS — L03.116 LEFT LEG CELLULITIS: ICD-10-CM

## 2018-11-05 DIAGNOSIS — E11.9 DIABETES MELLITUS (H): ICD-10-CM

## 2018-11-05 DIAGNOSIS — D84.9 IMMUNOSUPPRESSION (H): ICD-10-CM

## 2018-11-05 DIAGNOSIS — S80.812A LEG ABRASION, LEFT, INITIAL ENCOUNTER: ICD-10-CM

## 2018-11-07 ENCOUNTER — OFFICE VISIT - HEALTHEAST (OUTPATIENT)
Dept: INTERNAL MEDICINE | Facility: CLINIC | Age: 73
End: 2018-11-07

## 2018-11-07 DIAGNOSIS — Z51.89 VISIT FOR WOUND CHECK: ICD-10-CM

## 2018-11-14 ENCOUNTER — OFFICE VISIT - HEALTHEAST (OUTPATIENT)
Dept: INTERNAL MEDICINE | Facility: CLINIC | Age: 73
End: 2018-11-14

## 2018-11-14 ENCOUNTER — COMMUNICATION - HEALTHEAST (OUTPATIENT)
Dept: INTERNAL MEDICINE | Facility: CLINIC | Age: 73
End: 2018-11-14

## 2018-11-14 DIAGNOSIS — I10 ESSENTIAL HYPERTENSION: ICD-10-CM

## 2018-11-14 DIAGNOSIS — Z79.4 TYPE 2 DIABETES MELLITUS WITHOUT COMPLICATION, WITH LONG-TERM CURRENT USE OF INSULIN (H): ICD-10-CM

## 2018-11-14 DIAGNOSIS — E78.2 MIXED HYPERLIPIDEMIA: ICD-10-CM

## 2018-11-14 DIAGNOSIS — L03.116 CELLULITIS OF LEFT LOWER EXTREMITY: ICD-10-CM

## 2018-11-14 DIAGNOSIS — E11.9 TYPE 2 DIABETES MELLITUS WITHOUT COMPLICATION, WITH LONG-TERM CURRENT USE OF INSULIN (H): ICD-10-CM

## 2018-11-14 DIAGNOSIS — I65.23 CAROTID STENOSIS, BILATERAL: ICD-10-CM

## 2018-11-14 LAB
ERYTHROCYTE [SEDIMENTATION RATE] IN BLOOD BY WESTERGREN METHOD: 40 MM/HR (ref 0–20)
HBA1C MFR BLD: 6.6 % (ref 3.5–6)

## 2018-11-14 ASSESSMENT — MIFFLIN-ST. JEOR: SCORE: 1484.71

## 2018-11-16 ENCOUNTER — COMMUNICATION - HEALTHEAST (OUTPATIENT)
Dept: INTERNAL MEDICINE | Facility: CLINIC | Age: 73
End: 2018-11-16

## 2018-11-20 ENCOUNTER — RECORDS - HEALTHEAST (OUTPATIENT)
Dept: VASCULAR ULTRASOUND | Facility: CLINIC | Age: 73
End: 2018-11-20

## 2018-11-20 DIAGNOSIS — I65.23 OCCLUSION AND STENOSIS OF BILATERAL CAROTID ARTERIES: ICD-10-CM

## 2018-11-20 DIAGNOSIS — E78.2 MIXED HYPERLIPIDEMIA: ICD-10-CM

## 2018-11-27 ENCOUNTER — AMBULATORY - HEALTHEAST (OUTPATIENT)
Dept: NURSING | Facility: CLINIC | Age: 73
End: 2018-11-27

## 2018-12-06 ENCOUNTER — COMMUNICATION - HEALTHEAST (OUTPATIENT)
Dept: INTERNAL MEDICINE | Facility: CLINIC | Age: 73
End: 2018-12-06

## 2018-12-06 DIAGNOSIS — E78.5 HYPERLIPIDEMIA: ICD-10-CM

## 2018-12-06 DIAGNOSIS — Z79.4 TYPE 2 DIABETES MELLITUS WITHOUT COMPLICATION, WITH LONG-TERM CURRENT USE OF INSULIN (H): ICD-10-CM

## 2018-12-06 DIAGNOSIS — E11.9 TYPE 2 DIABETES MELLITUS WITHOUT COMPLICATION, WITH LONG-TERM CURRENT USE OF INSULIN (H): ICD-10-CM

## 2018-12-06 DIAGNOSIS — I10 ESSENTIAL HYPERTENSION, BENIGN: ICD-10-CM

## 2018-12-21 ENCOUNTER — COMMUNICATION - HEALTHEAST (OUTPATIENT)
Dept: INTERNAL MEDICINE | Facility: CLINIC | Age: 73
End: 2018-12-21

## 2019-01-01 ENCOUNTER — AMBULATORY - HEALTHEAST (OUTPATIENT)
Dept: CARDIOLOGY | Facility: CLINIC | Age: 74
End: 2019-01-01

## 2019-01-01 ENCOUNTER — AMBULATORY - HEALTHEAST (OUTPATIENT)
Dept: EDUCATION SERVICES | Facility: CLINIC | Age: 74
End: 2019-01-01

## 2019-01-01 ENCOUNTER — COMMUNICATION - HEALTHEAST (OUTPATIENT)
Dept: INTERNAL MEDICINE | Facility: CLINIC | Age: 74
End: 2019-01-01

## 2019-01-01 ENCOUNTER — RECORDS - HEALTHEAST (OUTPATIENT)
Dept: VASCULAR ULTRASOUND | Facility: CLINIC | Age: 74
End: 2019-01-01

## 2019-01-01 ENCOUNTER — COMMUNICATION - HEALTHEAST (OUTPATIENT)
Dept: CARDIOLOGY | Facility: CLINIC | Age: 74
End: 2019-01-01

## 2019-01-01 ENCOUNTER — RECORDS - HEALTHEAST (OUTPATIENT)
Dept: LAB | Facility: HOSPITAL | Age: 74
End: 2019-01-01

## 2019-01-01 ENCOUNTER — OFFICE VISIT - HEALTHEAST (OUTPATIENT)
Dept: CARDIOLOGY | Facility: CLINIC | Age: 74
End: 2019-01-01

## 2019-01-01 ENCOUNTER — OFFICE VISIT - HEALTHEAST (OUTPATIENT)
Dept: INTERNAL MEDICINE | Facility: CLINIC | Age: 74
End: 2019-01-01

## 2019-01-01 ENCOUNTER — AMBULATORY - HEALTHEAST (OUTPATIENT)
Dept: LAB | Facility: CLINIC | Age: 74
End: 2019-01-01

## 2019-01-01 ENCOUNTER — HOSPITAL ENCOUNTER (OUTPATIENT)
Dept: MAMMOGRAPHY | Facility: CLINIC | Age: 74
Discharge: HOME | End: 2019-11-29
Attending: INTERNAL MEDICINE

## 2019-01-01 ENCOUNTER — AMBULATORY - HEALTHEAST (OUTPATIENT)
Dept: ENDOCRINOLOGY | Facility: CLINIC | Age: 74
End: 2019-01-01

## 2019-01-01 ENCOUNTER — OFFICE VISIT - HEALTHEAST (OUTPATIENT)
Dept: VASCULAR SURGERY | Facility: CLINIC | Age: 74
End: 2019-01-01

## 2019-01-01 ENCOUNTER — COMMUNICATION - HEALTHEAST (OUTPATIENT)
Dept: NURSING | Facility: CLINIC | Age: 74
End: 2019-01-01

## 2019-01-01 ENCOUNTER — OFFICE VISIT - HEALTHEAST (OUTPATIENT)
Dept: ENDOCRINOLOGY | Facility: CLINIC | Age: 74
End: 2019-01-01

## 2019-01-01 ENCOUNTER — HOSPITAL ENCOUNTER (OUTPATIENT)
Dept: NEUROLOGY | Facility: HOSPITAL | Age: 74
Discharge: HOME | End: 2019-09-06
Attending: PSYCHIATRY & NEUROLOGY

## 2019-01-01 ENCOUNTER — RECORDS - HEALTHEAST (OUTPATIENT)
Dept: ADMINISTRATIVE | Facility: OTHER | Age: 74
End: 2019-01-01

## 2019-01-01 ENCOUNTER — COMMUNICATION - HEALTHEAST (OUTPATIENT)
Dept: SCHEDULING | Facility: CLINIC | Age: 74
End: 2019-01-01

## 2019-01-01 DIAGNOSIS — I10 ESSENTIAL HYPERTENSION, BENIGN: ICD-10-CM

## 2019-01-01 DIAGNOSIS — I50.32 CHRONIC HEART FAILURE WITH PRESERVED EJECTION FRACTION (H): ICD-10-CM

## 2019-01-01 DIAGNOSIS — Z79.4 TYPE 2 DIABETES MELLITUS WITHOUT COMPLICATION, WITH LONG-TERM CURRENT USE OF INSULIN (H): ICD-10-CM

## 2019-01-01 DIAGNOSIS — E11.9 TYPE 2 DIABETES MELLITUS WITHOUT COMPLICATION, WITH LONG-TERM CURRENT USE OF INSULIN (H): ICD-10-CM

## 2019-01-01 DIAGNOSIS — I10 ESSENTIAL HYPERTENSION: ICD-10-CM

## 2019-01-01 DIAGNOSIS — M25.511 ACUTE PAIN OF RIGHT SHOULDER: ICD-10-CM

## 2019-01-01 DIAGNOSIS — Z94.0 KIDNEY REPLACED BY TRANSPLANT: ICD-10-CM

## 2019-01-01 DIAGNOSIS — I25.84 CORONARY ARTERY DISEASE DUE TO CALCIFIED CORONARY LESION: ICD-10-CM

## 2019-01-01 DIAGNOSIS — F41.9 ANXIETY: ICD-10-CM

## 2019-01-01 DIAGNOSIS — I65.23 CAROTID STENOSIS, BILATERAL: ICD-10-CM

## 2019-01-01 DIAGNOSIS — F34.1 DYSTHYMIC DISORDER: ICD-10-CM

## 2019-01-01 DIAGNOSIS — Z87.81 HISTORY OF RIB FRACTURE: ICD-10-CM

## 2019-01-01 DIAGNOSIS — E11.65 TYPE 2 DIABETES MELLITUS WITH HYPERGLYCEMIA, WITH LONG-TERM CURRENT USE OF INSULIN (H): ICD-10-CM

## 2019-01-01 DIAGNOSIS — Z79.4 TYPE 2 DIABETES MELLITUS WITH HYPERGLYCEMIA, WITH LONG-TERM CURRENT USE OF INSULIN (H): ICD-10-CM

## 2019-01-01 DIAGNOSIS — Z87.898 HISTORY OF SEIZURE: ICD-10-CM

## 2019-01-01 DIAGNOSIS — Z12.31 VISIT FOR SCREENING MAMMOGRAM: ICD-10-CM

## 2019-01-01 DIAGNOSIS — G47.33 OSA ON CPAP: ICD-10-CM

## 2019-01-01 DIAGNOSIS — Z95.0 CARDIAC PACEMAKER IN SITU: ICD-10-CM

## 2019-01-01 DIAGNOSIS — N18.30 CHRONIC RENAL INSUFFICIENCY, STAGE 3 (MODERATE) (H): ICD-10-CM

## 2019-01-01 DIAGNOSIS — G89.29 OTHER CHRONIC PAIN: ICD-10-CM

## 2019-01-01 DIAGNOSIS — R56.9 SEIZURE (H): ICD-10-CM

## 2019-01-01 DIAGNOSIS — I73.9 PERIPHERAL VASCULAR DISEASE (H): ICD-10-CM

## 2019-01-01 DIAGNOSIS — L97.529 DIABETIC ULCER OF TOE OF LEFT FOOT ASSOCIATED WITH TYPE 2 DIABETES MELLITUS, UNSPECIFIED ULCER STAGE (H): ICD-10-CM

## 2019-01-01 DIAGNOSIS — E11.621 DIABETIC ULCER OF TOE OF LEFT FOOT ASSOCIATED WITH TYPE 2 DIABETES MELLITUS, UNSPECIFIED ULCER STAGE (H): ICD-10-CM

## 2019-01-01 DIAGNOSIS — I73.9 PERIPHERAL VASCULAR DISEASE, UNSPECIFIED (H): ICD-10-CM

## 2019-01-01 DIAGNOSIS — I25.10 CORONARY ARTERY DISEASE DUE TO CALCIFIED CORONARY LESION: ICD-10-CM

## 2019-01-01 DIAGNOSIS — E78.5 HYPERLIPIDEMIA: ICD-10-CM

## 2019-01-01 DIAGNOSIS — I50.32 CHRONIC DIASTOLIC CONGESTIVE HEART FAILURE (H): ICD-10-CM

## 2019-01-01 DIAGNOSIS — Z86.73 HISTORY OF STROKE: ICD-10-CM

## 2019-01-01 DIAGNOSIS — I44.2 ATRIOVENTRICULAR BLOCK, COMPLETE (H): ICD-10-CM

## 2019-01-01 DIAGNOSIS — R29.6 FALLS FREQUENTLY: ICD-10-CM

## 2019-01-01 DIAGNOSIS — G40.909 SEIZURE DISORDER (H): ICD-10-CM

## 2019-01-01 LAB
ANION GAP SERPL CALCULATED.3IONS-SCNC: 10 MMOL/L (ref 5–18)
ANION GAP SERPL CALCULATED.3IONS-SCNC: 10 MMOL/L (ref 5–18)
ANION GAP SERPL CALCULATED.3IONS-SCNC: 7 MMOL/L (ref 5–18)
ANION GAP SERPL CALCULATED.3IONS-SCNC: 9 MMOL/L (ref 5–18)
BNP SERPL-MCNC: 1525 PG/ML (ref 0–137)
BUN SERPL-MCNC: 29 MG/DL (ref 8–28)
BUN SERPL-MCNC: 34 MG/DL (ref 8–28)
BUN SERPL-MCNC: 41 MG/DL (ref 8–28)
BUN SERPL-MCNC: 41 MG/DL (ref 8–28)
CALCIUM SERPL-MCNC: 8.8 MG/DL (ref 8.5–10.5)
CALCIUM SERPL-MCNC: 8.8 MG/DL (ref 8.5–10.5)
CALCIUM SERPL-MCNC: 9 MG/DL (ref 8.5–10.5)
CALCIUM SERPL-MCNC: 9 MG/DL (ref 8.5–10.5)
CHLORIDE BLD-SCNC: 106 MMOL/L (ref 98–107)
CHLORIDE BLD-SCNC: 107 MMOL/L (ref 98–107)
CHLORIDE BLD-SCNC: 109 MMOL/L (ref 98–107)
CHLORIDE BLD-SCNC: 110 MMOL/L (ref 98–107)
CO2 SERPL-SCNC: 23 MMOL/L (ref 22–31)
CO2 SERPL-SCNC: 24 MMOL/L (ref 22–31)
CO2 SERPL-SCNC: 25 MMOL/L (ref 22–31)
CO2 SERPL-SCNC: 28 MMOL/L (ref 22–31)
CREAT SERPL-MCNC: 2.3 MG/DL (ref 0.6–1.1)
CREAT SERPL-MCNC: 2.54 MG/DL (ref 0.6–1.1)
CREAT SERPL-MCNC: 2.61 MG/DL (ref 0.6–1.1)
CREAT SERPL-MCNC: 2.96 MG/DL (ref 0.6–1.1)
GFR SERPL CREATININE-BSD FRML MDRD: 15 ML/MIN/1.73M2
GFR SERPL CREATININE-BSD FRML MDRD: 18 ML/MIN/1.73M2
GFR SERPL CREATININE-BSD FRML MDRD: 18 ML/MIN/1.73M2
GFR SERPL CREATININE-BSD FRML MDRD: 21 ML/MIN/1.73M2
GLUCOSE BLD-MCNC: 105 MG/DL (ref 70–125)
GLUCOSE BLD-MCNC: 112 MG/DL (ref 70–125)
GLUCOSE BLD-MCNC: 131 MG/DL (ref 70–125)
GLUCOSE BLD-MCNC: 99 MG/DL (ref 70–125)
HBA1C MFR BLD: 6.5 % (ref 3.5–6)
HCC DEVICE COMMENTS: NORMAL
HCC DEVICE COMMENTS: NORMAL
HCC DEVICE IMPLANTING PROVIDER: NORMAL
HCC DEVICE IMPLANTING PROVIDER: NORMAL
HCC DEVICE MANUFACTURE: NORMAL
HCC DEVICE MANUFACTURE: NORMAL
HCC DEVICE MODEL: NORMAL
HCC DEVICE MODEL: NORMAL
HCC DEVICE SERIAL NUMBER: NORMAL
HCC DEVICE SERIAL NUMBER: NORMAL
HCC DEVICE TYPE: NORMAL
HCC DEVICE TYPE: NORMAL
LDLC SERPL CALC-MCNC: 74 MG/DL
LEVETIRACETAM (KEPPRA): 67.4 UG/ML (ref 6–46)
LEVETIRACETAM (KEPPRA): 73.4 UG/ML (ref 6–46)
METHYLMALONATE SERPL-SCNC: 0.31 UMOL/L (ref 0–0.4)
POTASSIUM BLD-SCNC: 3.8 MMOL/L (ref 3.5–5)
POTASSIUM BLD-SCNC: 4.3 MMOL/L (ref 3.5–5)
POTASSIUM BLD-SCNC: 4.4 MMOL/L (ref 3.5–5)
POTASSIUM BLD-SCNC: 4.5 MMOL/L (ref 3.5–5)
SODIUM SERPL-SCNC: 141 MMOL/L (ref 136–145)
SODIUM SERPL-SCNC: 142 MMOL/L (ref 136–145)
SODIUM SERPL-SCNC: 142 MMOL/L (ref 136–145)
SODIUM SERPL-SCNC: 143 MMOL/L (ref 136–145)
TSH SERPL DL<=0.005 MIU/L-ACNC: 3.57 UIU/ML (ref 0.3–5)
VIT B1 PYROPHOSHATE BLD-SCNC: 41 NMOL/L (ref 70–180)
VIT B12 SERPL-MCNC: >2000 PG/ML (ref 213–816)

## 2019-01-01 ASSESSMENT — MIFFLIN-ST. JEOR
SCORE: 1336.84
SCORE: 1364.05
SCORE: 1327.76
SCORE: 1377.21
SCORE: 1350.44

## 2019-01-02 ENCOUNTER — OFFICE VISIT - HEALTHEAST (OUTPATIENT)
Dept: INTERNAL MEDICINE | Facility: CLINIC | Age: 74
End: 2019-01-02

## 2019-01-02 DIAGNOSIS — Z79.4 TYPE 2 DIABETES MELLITUS WITHOUT COMPLICATION, WITH LONG-TERM CURRENT USE OF INSULIN (H): ICD-10-CM

## 2019-01-02 DIAGNOSIS — N17.9 AKI (ACUTE KIDNEY INJURY) (H): ICD-10-CM

## 2019-01-02 DIAGNOSIS — E11.9 TYPE 2 DIABETES MELLITUS WITHOUT COMPLICATION, WITH LONG-TERM CURRENT USE OF INSULIN (H): ICD-10-CM

## 2019-01-02 DIAGNOSIS — J18.9 COMMUNITY ACQUIRED PNEUMONIA, UNSPECIFIED LATERALITY: ICD-10-CM

## 2019-01-02 DIAGNOSIS — I50.9 CONGESTIVE HEART FAILURE, UNSPECIFIED HF CHRONICITY, UNSPECIFIED HEART FAILURE TYPE (H): ICD-10-CM

## 2019-01-16 ENCOUNTER — OFFICE VISIT - HEALTHEAST (OUTPATIENT)
Dept: PHARMACY | Facility: CLINIC | Age: 74
End: 2019-01-16

## 2019-01-16 ENCOUNTER — AMBULATORY - HEALTHEAST (OUTPATIENT)
Dept: LAB | Facility: CLINIC | Age: 74
End: 2019-01-16

## 2019-01-16 DIAGNOSIS — N17.9 AKI (ACUTE KIDNEY INJURY) (H): ICD-10-CM

## 2019-01-16 DIAGNOSIS — I50.9 CONGESTIVE HEART FAILURE, UNSPECIFIED HF CHRONICITY, UNSPECIFIED HEART FAILURE TYPE (H): ICD-10-CM

## 2019-01-16 DIAGNOSIS — T86.10 RENAL TRANSPLANT DISORDER: ICD-10-CM

## 2019-01-16 DIAGNOSIS — G47.00 INSOMNIA: ICD-10-CM

## 2019-01-16 DIAGNOSIS — G40.909 NONINTRACTABLE EPILEPSY WITHOUT STATUS EPILEPTICUS, UNSPECIFIED EPILEPSY TYPE (H): ICD-10-CM

## 2019-01-16 DIAGNOSIS — Z79.4 TYPE 2 DIABETES MELLITUS WITHOUT COMPLICATION, WITH LONG-TERM CURRENT USE OF INSULIN (H): ICD-10-CM

## 2019-01-16 DIAGNOSIS — F34.1 DYSTHYMIC DISORDER: ICD-10-CM

## 2019-01-16 DIAGNOSIS — F39 MOOD DISORDER (H): ICD-10-CM

## 2019-01-16 DIAGNOSIS — E78.2 MIXED HYPERLIPIDEMIA: ICD-10-CM

## 2019-01-16 DIAGNOSIS — Z94.0 KIDNEY REPLACED BY TRANSPLANT: ICD-10-CM

## 2019-01-16 DIAGNOSIS — E11.9 TYPE 2 DIABETES MELLITUS WITHOUT COMPLICATION, WITH LONG-TERM CURRENT USE OF INSULIN (H): ICD-10-CM

## 2019-01-16 LAB
ANION GAP SERPL CALCULATED.3IONS-SCNC: 15 MMOL/L (ref 5–18)
BUN SERPL-MCNC: 58 MG/DL (ref 8–28)
CALCIUM SERPL-MCNC: 9.6 MG/DL (ref 8.5–10.5)
CHLORIDE BLD-SCNC: 99 MMOL/L (ref 98–107)
CO2 SERPL-SCNC: 23 MMOL/L (ref 22–31)
CREAT SERPL-MCNC: 3.1 MG/DL (ref 0.6–1.1)
GFR SERPL CREATININE-BSD FRML MDRD: 15 ML/MIN/1.73M2
GLUCOSE BLD-MCNC: 192 MG/DL (ref 70–125)
POTASSIUM BLD-SCNC: 4.5 MMOL/L (ref 3.5–5)
SODIUM SERPL-SCNC: 137 MMOL/L (ref 136–145)

## 2019-01-18 ENCOUNTER — OFFICE VISIT - HEALTHEAST (OUTPATIENT)
Dept: INTERNAL MEDICINE | Facility: CLINIC | Age: 74
End: 2019-01-18

## 2019-01-18 DIAGNOSIS — R10.13 EPIGASTRIC PAIN: ICD-10-CM

## 2019-01-21 ENCOUNTER — AMBULATORY - HEALTHEAST (OUTPATIENT)
Dept: LAB | Facility: CLINIC | Age: 74
End: 2019-01-21

## 2019-01-21 DIAGNOSIS — R10.13 EPIGASTRIC PAIN: ICD-10-CM

## 2019-01-22 ENCOUNTER — COMMUNICATION - HEALTHEAST (OUTPATIENT)
Dept: SCHEDULING | Facility: CLINIC | Age: 74
End: 2019-01-22

## 2019-01-22 ASSESSMENT — MIFFLIN-ST. JEOR: SCORE: 1374.94

## 2019-01-23 LAB
H PYLORI AG STL QL IA: NORMAL
REPORT STATUS: NORMAL
SPECIMEN DESCRIPTION: NORMAL

## 2019-01-23 ASSESSMENT — MIFFLIN-ST. JEOR: SCORE: 1374.03

## 2019-01-24 ENCOUNTER — ANESTHESIA - HEALTHEAST (OUTPATIENT)
Dept: SURGERY | Facility: CLINIC | Age: 74
End: 2019-01-24

## 2019-01-24 ASSESSMENT — MIFFLIN-ST. JEOR: SCORE: 1382.25

## 2019-01-25 ENCOUNTER — SURGERY - HEALTHEAST (OUTPATIENT)
Dept: SURGERY | Facility: CLINIC | Age: 74
End: 2019-01-25

## 2019-01-25 ASSESSMENT — MIFFLIN-ST. JEOR: SCORE: 1383.1

## 2019-01-26 ASSESSMENT — MIFFLIN-ST. JEOR: SCORE: 1379.47

## 2019-01-27 ASSESSMENT — MIFFLIN-ST. JEOR: SCORE: 1386.73

## 2019-01-28 ENCOUNTER — AMBULATORY - HEALTHEAST (OUTPATIENT)
Dept: VASCULAR SURGERY | Facility: CLINIC | Age: 74
End: 2019-01-28

## 2019-01-28 DIAGNOSIS — I73.9 PAD (PERIPHERAL ARTERY DISEASE) (H): ICD-10-CM

## 2019-01-28 DIAGNOSIS — E11.621 DIABETIC ULCER OF TOE OF LEFT FOOT ASSOCIATED WITH TYPE 2 DIABETES MELLITUS, UNSPECIFIED ULCER STAGE (H): ICD-10-CM

## 2019-01-28 DIAGNOSIS — L97.529 DIABETIC ULCER OF TOE OF LEFT FOOT ASSOCIATED WITH TYPE 2 DIABETES MELLITUS, UNSPECIFIED ULCER STAGE (H): ICD-10-CM

## 2019-01-28 ASSESSMENT — MIFFLIN-ST. JEOR: SCORE: 1373.12

## 2019-01-29 ASSESSMENT — MIFFLIN-ST. JEOR: SCORE: 1374.03

## 2019-01-30 ENCOUNTER — HOSPITAL ENCOUNTER (OUTPATIENT)
Dept: INTERVENTIONAL RADIOLOGY/VASCULAR | Facility: CLINIC | Age: 74
Discharge: HOME | End: 2019-01-30
Attending: SURGERY | Admitting: SURGERY

## 2019-01-30 DIAGNOSIS — L97.529 DIABETIC ULCER OF TOE OF LEFT FOOT ASSOCIATED WITH TYPE 2 DIABETES MELLITUS, UNSPECIFIED ULCER STAGE (H): ICD-10-CM

## 2019-01-30 DIAGNOSIS — I73.9 PAD (PERIPHERAL ARTERY DISEASE) (H): ICD-10-CM

## 2019-01-30 DIAGNOSIS — E11.621 DIABETIC ULCER OF TOE OF LEFT FOOT ASSOCIATED WITH TYPE 2 DIABETES MELLITUS, UNSPECIFIED ULCER STAGE (H): ICD-10-CM

## 2019-01-31 ASSESSMENT — MIFFLIN-ST. JEOR: SCORE: 1382.28

## 2019-02-01 ENCOUNTER — COMMUNICATION - HEALTHEAST (OUTPATIENT)
Dept: CARE COORDINATION | Facility: CLINIC | Age: 74
End: 2019-02-01

## 2019-02-01 ENCOUNTER — PATIENT OUTREACH (OUTPATIENT)
Dept: CARE COORDINATION | Facility: CLINIC | Age: 74
End: 2019-02-01

## 2019-02-01 ENCOUNTER — AMBULATORY - HEALTHEAST (OUTPATIENT)
Dept: CARDIOLOGY | Facility: CLINIC | Age: 74
End: 2019-02-01

## 2019-02-01 DIAGNOSIS — I50.9 CONGESTIVE HEART FAILURE (H): ICD-10-CM

## 2019-02-01 NOTE — PROGRESS NOTES
Clinic Care Coordination Contact    Clinic Care Coordination Contact  OUTREACH    Clinical Concerns:  Current Medical Concerns:  Doing much better.  Met with MTM and has other upcoming appointments.  Needs to have another surgery.  Not 100%, but doing well.    Patient/Caregiver understanding: No need for care coordination at this time.        Plan: No further outreach. Patient will call clinic if needs arise.    Ingrid Castaneda   [facility]  [email or web address]  [phone]          Clinic Care Coordination Contact    Situation: Patient chart reviewed by care coordinator.    Background: Routed call for post hospital. From chart review:  [facility] [place] Discharge Summary     Admit date: 1/22/2019  Discharge date: 1/31/2019  Patient YOB: 1945              Discharge Diagnoses  Principal Problem:    TIA (transient ischemic attack)     Small mobile echodensity on the posterior mitral valve annulus     Left great toe ulcer     PAD-Left posterior tibial artery is occluded.  Left anterior tibial artery is heavily diseased with multiple areas of tandem high-grade stenoses.        Active Problems:    Benign Essential Hypertension    History of seizure disorder    MARCELA (acute kidney injury) (H)-resolved    Diabetic ulcer of toe of left foot associated with type 2 diabetes mellitus, unspecified ulcer stage (H)    Atherosclerosis of native artery of left lower extremity with ulceration of other part of foot (H)       Assessment: Patient received RN call from [employer].     Plan/Recommendations: CC will call on 2-4-2019 to complete assessment.     Social Shira Walters  [facility]  [email or web address]  [phone]    Clinic Care Coordination Contact    Clinic Care Coordination Contact  OUTREACH    Referral Information:  Referral Source: IP Report    Primary Diagnosis: Cardiovascular - other    Chief Complaint   Patient presents with     Clinic Care Coordination -  Post Hospital     Discharged from [facility] 1/31     Chart Review Please        Universal Utilization: Appropriate utilization.   Working with cardiology, endocrinology, podiatry, vascular, and renal transplant and MTM.     Clinical Concerns:  Current Medical Concerns:  Feeling like her health has taken over her life. This hospitalization scared her and knocked her off her complacency.  Has had her sister with her for a month and she has been helpful. Been attending all her appointments and has podiatrist appointment today. Met with PCP this week.   Current Behavioral Concerns: apprehensive about doing all the treatments like weighing herself, setting up her meds, etc.   Worried that she will need more help  And does not want to move to care center.   Education Provided to patient: Reviewed role of care coordination.    Pain  Pain (GOAL):: No  Health Maintenance Reviewed: Due/Overdue       Medication Management:  Can set up her own medications if she has to.     Functional Status:  Dependent ADLs:: Independent  Dependent IADLs:: Transportation- now her sister has been driving her to [facility] as patient does not drive.  When her sister is at home in [place], she has a friend who drives her.  Her friend is caring for son with mental health issues and may not be available.  She has gotten [employer] certified, but has not used it yet.   Bed or wheelchair confined:: No  Mobility Status: Independent  Fallen 2 or more times in the past year?: No  Any fall with injury in the past year?: No    Living Situation:  Current living arrangement:: I live alone  Type of residence:: Town home- one level.    Diet/Exercise/Sleep:  Diet:: Diabetic diet  Inadequate nutrition (GOAL):: No  Food Insecurity: No  Tube Feeding: No  Exercise:: Currently not exercising  Inadequate activity/exercise (GOAL):: No  Significant changes in sleep pattern (GOAL): No    Transportation:  Transportation concerns (GOAL):: No  Transportation means::  Friend, [employer]     Psychosocial:  Mental health DX:: No  Mental health management concern (GOAL):: No  Informal Support system:: Family     Financial/Insurance:   Financial/Insurance concerns (GOAL):: No  [employer] for Seniors, no financial concerns identified.      Resources and Interventions:  Current Resources:        Supplies used at home:: Wound Care Supplies  Equipment Currently Used at Home: none    Advance Care Plan/Directive  Advanced Care Plans/Directives on file:: No    Referrals Placed: None       Patient/Caregiver understanding: Patient's sister is leaving on Saturday and she has been a great help while she recovers.  Patient's kids work during the day.  Patient feeling like her age is catching up to her and is worried about how things will go. She is trying to accept her new reality.  Agreed to have CC mail access letter and will call next week to see how she is doing after her sister leaves.      Plan: CC to call in one week and patient to call if needs arise prior to next outreach.  Ingrid Castaneda,   [facility]  [email or web address]  [phone]

## 2019-02-01 NOTE — LETTER
[place] CARE COORDINATION  [facility] [address] [place] [place] [address]  February 6, 2019    Odalys Miles  [address] [place] [address]      Dear Odalys,    I am a clinic care coordinator who works with Yen Patel at [phone]. I wanted to thank you for spending the time to talk with me.  I wanted to introduce myself and provide you with my contact information so that you can call me with questions or concerns about your health care. Below is a description of clinic care coordination and how I can further assist you.     The clinic care coordinator is a  who understand the health care system. The goal of clinic care coordination is to help you manage your health and improve access to the [facility] system in the most efficient manner. The  can assist you with financial, behavioral, psychosocial, chemical dependency, counseling, and/or psychiatric resources.    Please feel free to contact me at [phone], with any questions or concerns. We at [facility] are focused on providing you with the highest-quality healthcare experience possible and that all starts with you.     Sincerely,     Ingrid Castaneda

## 2019-02-04 ENCOUNTER — COMMUNICATION - HEALTHEAST (OUTPATIENT)
Dept: PHARMACY | Facility: CLINIC | Age: 74
End: 2019-02-04

## 2019-02-04 DIAGNOSIS — Z79.4 TYPE 2 DIABETES MELLITUS WITHOUT COMPLICATION, WITH LONG-TERM CURRENT USE OF INSULIN (H): ICD-10-CM

## 2019-02-04 DIAGNOSIS — E11.9 TYPE 2 DIABETES MELLITUS WITHOUT COMPLICATION, WITH LONG-TERM CURRENT USE OF INSULIN (H): ICD-10-CM

## 2019-02-04 DIAGNOSIS — G45.9 TIA (TRANSIENT ISCHEMIC ATTACK): ICD-10-CM

## 2019-02-04 DIAGNOSIS — I50.9 CONGESTIVE HEART FAILURE, UNSPECIFIED HF CHRONICITY, UNSPECIFIED HEART FAILURE TYPE (H): ICD-10-CM

## 2019-02-05 ENCOUNTER — OFFICE VISIT - HEALTHEAST (OUTPATIENT)
Dept: INTERNAL MEDICINE | Facility: CLINIC | Age: 74
End: 2019-02-05

## 2019-02-05 ENCOUNTER — OFFICE VISIT - HEALTHEAST (OUTPATIENT)
Dept: INFECTIOUS DISEASES | Facility: CLINIC | Age: 74
End: 2019-02-05

## 2019-02-05 ENCOUNTER — AMBULATORY - HEALTHEAST (OUTPATIENT)
Dept: LAB | Facility: CLINIC | Age: 74
End: 2019-02-05

## 2019-02-05 DIAGNOSIS — R93.1 ABNORMAL ECHOCARDIOGRAM: ICD-10-CM

## 2019-02-05 DIAGNOSIS — I70.245 ATHEROSCLEROSIS OF NATIVE ARTERY OF LEFT LOWER EXTREMITY WITH ULCERATION OF OTHER PART OF FOOT (H): ICD-10-CM

## 2019-02-05 DIAGNOSIS — Z78.0 MENOPAUSE: ICD-10-CM

## 2019-02-05 DIAGNOSIS — D84.9 IMMUNOSUPPRESSION (H): ICD-10-CM

## 2019-02-05 DIAGNOSIS — L97.509 TYPE 2 DIABETES MELLITUS WITH FOOT ULCER, WITH LONG-TERM CURRENT USE OF INSULIN (H): ICD-10-CM

## 2019-02-05 DIAGNOSIS — G45.9 TIA (TRANSIENT ISCHEMIC ATTACK): ICD-10-CM

## 2019-02-05 DIAGNOSIS — E11.621 DIABETIC ULCER OF TOE OF LEFT FOOT ASSOCIATED WITH TYPE 2 DIABETES MELLITUS, UNSPECIFIED ULCER STAGE (H): ICD-10-CM

## 2019-02-05 DIAGNOSIS — L97.529 DIABETIC ULCER OF TOE OF LEFT FOOT ASSOCIATED WITH TYPE 2 DIABETES MELLITUS, UNSPECIFIED ULCER STAGE (H): ICD-10-CM

## 2019-02-05 DIAGNOSIS — Z79.4 TYPE 2 DIABETES MELLITUS WITH FOOT ULCER, WITH LONG-TERM CURRENT USE OF INSULIN (H): ICD-10-CM

## 2019-02-05 DIAGNOSIS — E11.621 TYPE 2 DIABETES MELLITUS WITH FOOT ULCER, WITH LONG-TERM CURRENT USE OF INSULIN (H): ICD-10-CM

## 2019-02-05 DIAGNOSIS — Z94.0 KIDNEY REPLACED BY TRANSPLANT: ICD-10-CM

## 2019-02-05 LAB
ANION GAP SERPL CALCULATED.3IONS-SCNC: 14 MMOL/L (ref 5–18)
BASOPHILS # BLD AUTO: 0 THOU/UL (ref 0–0.2)
BASOPHILS NFR BLD AUTO: 0 % (ref 0–2)
BUN SERPL-MCNC: 53 MG/DL (ref 8–28)
C REACTIVE PROTEIN LHE: 1.2 MG/DL (ref 0–0.8)
CALCIUM SERPL-MCNC: 9 MG/DL (ref 8.5–10.5)
CHLORIDE BLD-SCNC: 103 MMOL/L (ref 98–107)
CO2 SERPL-SCNC: 22 MMOL/L (ref 22–31)
CREAT SERPL-MCNC: 2.78 MG/DL (ref 0.6–1.1)
EOSINOPHIL # BLD AUTO: 0.2 THOU/UL (ref 0–0.4)
EOSINOPHIL NFR BLD AUTO: 2 % (ref 0–6)
ERYTHROCYTE [DISTWIDTH] IN BLOOD BY AUTOMATED COUNT: 15.5 % (ref 11–14.5)
GFR SERPL CREATININE-BSD FRML MDRD: 17 ML/MIN/1.73M2
GLUCOSE BLD-MCNC: 169 MG/DL (ref 70–125)
HBA1C MFR BLD: 6.9 % (ref 3.5–6)
HCT VFR BLD AUTO: 30.3 % (ref 35–47)
HGB BLD-MCNC: 9.9 G/DL (ref 12–16)
LYMPHOCYTES # BLD AUTO: 0.6 THOU/UL (ref 0.8–4.4)
LYMPHOCYTES NFR BLD AUTO: 7 % (ref 20–40)
MCH RBC QN AUTO: 29.6 PG (ref 27–34)
MCHC RBC AUTO-ENTMCNC: 32.9 G/DL (ref 32–36)
MCV RBC AUTO: 90 FL (ref 80–100)
MONOCYTES # BLD AUTO: 0.5 THOU/UL (ref 0–0.9)
MONOCYTES NFR BLD AUTO: 6 % (ref 2–10)
NEUTROPHILS # BLD AUTO: 7.4 THOU/UL (ref 2–7.7)
NEUTROPHILS NFR BLD AUTO: 84 % (ref 50–70)
PLATELET # BLD AUTO: 374 THOU/UL (ref 140–440)
PMV BLD AUTO: 8.1 FL (ref 7–10)
POTASSIUM BLD-SCNC: 4 MMOL/L (ref 3.5–5)
RBC # BLD AUTO: 3.36 MILL/UL (ref 3.8–5.4)
SODIUM SERPL-SCNC: 139 MMOL/L (ref 136–145)
WBC: 8.8 THOU/UL (ref 4–11)

## 2019-02-05 ASSESSMENT — MIFFLIN-ST. JEOR: SCORE: 1384.92

## 2019-02-06 ENCOUNTER — AMBULATORY - HEALTHEAST (OUTPATIENT)
Dept: CARDIOLOGY | Facility: CLINIC | Age: 74
End: 2019-02-06

## 2019-02-06 ENCOUNTER — COMMUNICATION - HEALTHEAST (OUTPATIENT)
Dept: SCHEDULING | Facility: CLINIC | Age: 74
End: 2019-02-06

## 2019-02-06 ENCOUNTER — OFFICE VISIT - HEALTHEAST (OUTPATIENT)
Dept: PODIATRY | Facility: CLINIC | Age: 74
End: 2019-02-06

## 2019-02-06 ENCOUNTER — RECORDS - HEALTHEAST (OUTPATIENT)
Dept: ADMINISTRATIVE | Facility: OTHER | Age: 74
End: 2019-02-06

## 2019-02-06 DIAGNOSIS — L97.529 ULCER OF LEFT GREAT TOE DUE TO DIABETES MELLITUS (H): ICD-10-CM

## 2019-02-06 DIAGNOSIS — B35.1 NAIL FUNGUS: ICD-10-CM

## 2019-02-06 DIAGNOSIS — E11.49 TYPE II OR UNSPECIFIED TYPE DIABETES MELLITUS WITH NEUROLOGICAL MANIFESTATIONS, NOT STATED AS UNCONTROLLED(250.60) (H): ICD-10-CM

## 2019-02-06 DIAGNOSIS — E11.621 ULCER OF LEFT GREAT TOE DUE TO DIABETES MELLITUS (H): ICD-10-CM

## 2019-02-06 DIAGNOSIS — L60.2 ONYCHAUXIS: ICD-10-CM

## 2019-02-06 ASSESSMENT — ACTIVITIES OF DAILY LIVING (ADL): DEPENDENT_IADLS:: TRANSPORTATION

## 2019-02-07 ENCOUNTER — COMMUNICATION - HEALTHEAST (OUTPATIENT)
Dept: CARDIOLOGY | Facility: CLINIC | Age: 74
End: 2019-02-07

## 2019-02-08 ENCOUNTER — COMMUNICATION - HEALTHEAST (OUTPATIENT)
Dept: PODIATRY | Facility: CLINIC | Age: 74
End: 2019-02-08

## 2019-02-10 LAB
AEROBIC BLOOD CULTURE BOTTLE: NO GROWTH
AEROBIC BLOOD CULTURE BOTTLE: NO GROWTH
ANAEROBIC BLOOD CULTURE BOTTLE: NO GROWTH
ANAEROBIC BLOOD CULTURE BOTTLE: NO GROWTH

## 2019-02-11 ENCOUNTER — AMBULATORY - HEALTHEAST (OUTPATIENT)
Dept: CARDIOLOGY | Facility: CLINIC | Age: 74
End: 2019-02-11

## 2019-02-11 DIAGNOSIS — Z95.0 CARDIAC PACEMAKER IN SITU: ICD-10-CM

## 2019-02-11 ASSESSMENT — MIFFLIN-ST. JEOR: SCORE: 1390.14

## 2019-02-13 ENCOUNTER — OFFICE VISIT - HEALTHEAST (OUTPATIENT)
Dept: PHARMACY | Facility: CLINIC | Age: 74
End: 2019-02-13

## 2019-02-13 DIAGNOSIS — E78.2 MIXED HYPERLIPIDEMIA: ICD-10-CM

## 2019-02-13 DIAGNOSIS — G40.909 NONINTRACTABLE EPILEPSY WITHOUT STATUS EPILEPTICUS, UNSPECIFIED EPILEPSY TYPE (H): ICD-10-CM

## 2019-02-13 DIAGNOSIS — Z94.0 KIDNEY REPLACED BY TRANSPLANT: ICD-10-CM

## 2019-02-13 DIAGNOSIS — F34.1 DYSTHYMIC DISORDER: ICD-10-CM

## 2019-02-13 DIAGNOSIS — I50.9 CONGESTIVE HEART FAILURE, UNSPECIFIED HF CHRONICITY, UNSPECIFIED HEART FAILURE TYPE (H): ICD-10-CM

## 2019-02-13 DIAGNOSIS — Z79.4 TYPE 2 DIABETES MELLITUS WITHOUT COMPLICATION, WITH LONG-TERM CURRENT USE OF INSULIN (H): ICD-10-CM

## 2019-02-13 DIAGNOSIS — E11.9 TYPE 2 DIABETES MELLITUS WITHOUT COMPLICATION, WITH LONG-TERM CURRENT USE OF INSULIN (H): ICD-10-CM

## 2019-02-15 ENCOUNTER — COMMUNICATION - HEALTHEAST (OUTPATIENT)
Dept: SCHEDULING | Facility: CLINIC | Age: 74
End: 2019-02-15

## 2019-02-21 ENCOUNTER — COMMUNICATION - HEALTHEAST (OUTPATIENT)
Dept: CARE COORDINATION | Facility: CLINIC | Age: 74
End: 2019-02-21

## 2019-02-22 ENCOUNTER — OFFICE VISIT - HEALTHEAST (OUTPATIENT)
Dept: FAMILY MEDICINE | Facility: CLINIC | Age: 74
End: 2019-02-22

## 2019-02-22 DIAGNOSIS — R10.13 EPIGASTRIC PAIN: ICD-10-CM

## 2019-02-22 LAB
ALBUMIN SERPL-MCNC: 3.3 G/DL (ref 3.5–5)
ALP SERPL-CCNC: 65 U/L (ref 45–120)
ALT SERPL W P-5'-P-CCNC: 10 U/L (ref 0–45)
ANION GAP SERPL CALCULATED.3IONS-SCNC: 12 MMOL/L (ref 5–18)
AST SERPL W P-5'-P-CCNC: 9 U/L (ref 0–40)
BASOPHILS # BLD AUTO: 0 THOU/UL (ref 0–0.2)
BASOPHILS NFR BLD AUTO: 0 % (ref 0–2)
BILIRUB SERPL-MCNC: 0.5 MG/DL (ref 0–1)
BUN SERPL-MCNC: 32 MG/DL (ref 8–28)
CALCIUM SERPL-MCNC: 9.8 MG/DL (ref 8.5–10.5)
CHLORIDE BLD-SCNC: 102 MMOL/L (ref 98–107)
CO2 SERPL-SCNC: 24 MMOL/L (ref 22–31)
CREAT SERPL-MCNC: 2.13 MG/DL (ref 0.6–1.1)
EOSINOPHIL # BLD AUTO: 0.1 THOU/UL (ref 0–0.4)
EOSINOPHIL NFR BLD AUTO: 1 % (ref 0–6)
ERYTHROCYTE [DISTWIDTH] IN BLOOD BY AUTOMATED COUNT: 18.2 % (ref 11–14.5)
GFR SERPL CREATININE-BSD FRML MDRD: 23 ML/MIN/1.73M2
GLUCOSE BLD-MCNC: 335 MG/DL (ref 70–125)
HCT VFR BLD AUTO: 36.1 % (ref 35–47)
HGB BLD-MCNC: 11.4 G/DL (ref 12–16)
LIPASE SERPL-CCNC: 21 U/L (ref 0–52)
LYMPHOCYTES # BLD AUTO: 0.3 THOU/UL (ref 0.8–4.4)
LYMPHOCYTES NFR BLD AUTO: 3 % (ref 20–40)
MCH RBC QN AUTO: 29.2 PG (ref 27–34)
MCHC RBC AUTO-ENTMCNC: 31.6 G/DL (ref 32–36)
MCV RBC AUTO: 92 FL (ref 80–100)
MONOCYTES # BLD AUTO: 0.6 THOU/UL (ref 0–0.9)
MONOCYTES NFR BLD AUTO: 6 % (ref 2–10)
NEUTROPHILS # BLD AUTO: 9.4 THOU/UL (ref 2–7.7)
NEUTROPHILS NFR BLD AUTO: 90 % (ref 50–70)
PLATELET # BLD AUTO: 237 THOU/UL (ref 140–440)
PMV BLD AUTO: 11.3 FL (ref 8.5–12.5)
POTASSIUM BLD-SCNC: 4.6 MMOL/L (ref 3.5–5)
PROT SERPL-MCNC: 6.9 G/DL (ref 6–8)
RBC # BLD AUTO: 3.91 MILL/UL (ref 3.8–5.4)
SODIUM SERPL-SCNC: 138 MMOL/L (ref 136–145)
WBC: 10.4 THOU/UL (ref 4–11)

## 2019-02-26 ENCOUNTER — OFFICE VISIT - HEALTHEAST (OUTPATIENT)
Dept: INTERNAL MEDICINE | Facility: CLINIC | Age: 74
End: 2019-02-26

## 2019-02-26 ENCOUNTER — COMMUNICATION - HEALTHEAST (OUTPATIENT)
Dept: INTERNAL MEDICINE | Facility: CLINIC | Age: 74
End: 2019-02-26

## 2019-02-26 DIAGNOSIS — I70.245 ATHEROSCLEROSIS OF NATIVE ARTERY OF LEFT LOWER EXTREMITY WITH ULCERATION OF OTHER PART OF FOOT (H): ICD-10-CM

## 2019-02-26 DIAGNOSIS — Z79.4 TYPE 2 DIABETES MELLITUS WITH FOOT ULCER, WITH LONG-TERM CURRENT USE OF INSULIN (H): ICD-10-CM

## 2019-02-26 DIAGNOSIS — G47.33 OSA ON CPAP: ICD-10-CM

## 2019-02-26 DIAGNOSIS — Z94.0 KIDNEY REPLACED BY TRANSPLANT: ICD-10-CM

## 2019-02-26 DIAGNOSIS — L97.509 TYPE 2 DIABETES MELLITUS WITH FOOT ULCER, WITH LONG-TERM CURRENT USE OF INSULIN (H): ICD-10-CM

## 2019-02-26 DIAGNOSIS — I65.23 CAROTID STENOSIS, BILATERAL: ICD-10-CM

## 2019-02-26 DIAGNOSIS — E11.621 TYPE 2 DIABETES MELLITUS WITH FOOT ULCER, WITH LONG-TERM CURRENT USE OF INSULIN (H): ICD-10-CM

## 2019-02-26 DIAGNOSIS — R10.13 EPIGASTRIC PAIN: ICD-10-CM

## 2019-02-27 ENCOUNTER — RECORDS - HEALTHEAST (OUTPATIENT)
Dept: ADMINISTRATIVE | Facility: OTHER | Age: 74
End: 2019-02-27

## 2019-03-05 ENCOUNTER — AMBULATORY - HEALTHEAST (OUTPATIENT)
Dept: INTERNAL MEDICINE | Facility: CLINIC | Age: 74
End: 2019-03-05

## 2019-03-06 ENCOUNTER — RECORDS - HEALTHEAST (OUTPATIENT)
Dept: ADMINISTRATIVE | Facility: OTHER | Age: 74
End: 2019-03-06

## 2019-03-06 ENCOUNTER — COMMUNICATION - HEALTHEAST (OUTPATIENT)
Dept: INTERNAL MEDICINE | Facility: CLINIC | Age: 74
End: 2019-03-06

## 2019-03-06 ENCOUNTER — RECORDS - HEALTHEAST (OUTPATIENT)
Dept: HEALTH INFORMATION MANAGEMENT | Facility: CLINIC | Age: 74
End: 2019-03-06

## 2019-03-12 ENCOUNTER — HOSPITAL ENCOUNTER (OUTPATIENT)
Dept: MRI IMAGING | Facility: CLINIC | Age: 74
Discharge: HOME | End: 2019-03-12
Attending: PSYCHIATRY & NEUROLOGY

## 2019-03-12 ENCOUNTER — HOSPITAL ENCOUNTER (OUTPATIENT)
Dept: RADIOLOGY | Facility: CLINIC | Age: 74
Discharge: HOME | End: 2019-03-12
Attending: PSYCHIATRY & NEUROLOGY

## 2019-03-12 DIAGNOSIS — G45.9 BRAIN TIA: ICD-10-CM

## 2019-03-18 ENCOUNTER — OFFICE VISIT - HEALTHEAST (OUTPATIENT)
Dept: PHARMACY | Facility: CLINIC | Age: 74
End: 2019-03-18

## 2019-03-18 DIAGNOSIS — Z79.4 TYPE 2 DIABETES MELLITUS WITHOUT COMPLICATION, WITH LONG-TERM CURRENT USE OF INSULIN (H): ICD-10-CM

## 2019-03-18 DIAGNOSIS — I70.245 ATHEROSCLEROSIS OF NATIVE ARTERY OF LEFT LOWER EXTREMITY WITH ULCERATION OF OTHER PART OF FOOT (H): ICD-10-CM

## 2019-03-18 DIAGNOSIS — G40.909 NONINTRACTABLE EPILEPSY WITHOUT STATUS EPILEPTICUS, UNSPECIFIED EPILEPSY TYPE (H): ICD-10-CM

## 2019-03-18 DIAGNOSIS — F51.01 PRIMARY INSOMNIA: ICD-10-CM

## 2019-03-18 DIAGNOSIS — E11.9 TYPE 2 DIABETES MELLITUS WITHOUT COMPLICATION, WITH LONG-TERM CURRENT USE OF INSULIN (H): ICD-10-CM

## 2019-03-18 DIAGNOSIS — K21.00 REFLUX ESOPHAGITIS: ICD-10-CM

## 2019-03-18 DIAGNOSIS — Z94.0 KIDNEY REPLACED BY TRANSPLANT: ICD-10-CM

## 2019-03-18 DIAGNOSIS — E78.2 MIXED HYPERLIPIDEMIA: ICD-10-CM

## 2019-03-18 DIAGNOSIS — F34.1 DYSTHYMIC DISORDER: ICD-10-CM

## 2019-03-22 ENCOUNTER — OFFICE VISIT - HEALTHEAST (OUTPATIENT)
Dept: CARDIOLOGY | Facility: CLINIC | Age: 74
End: 2019-03-22

## 2019-03-22 DIAGNOSIS — I10 ESSENTIAL HYPERTENSION, BENIGN: ICD-10-CM

## 2019-03-22 DIAGNOSIS — I10 ESSENTIAL HYPERTENSION: ICD-10-CM

## 2019-03-22 DIAGNOSIS — I05.8 MITRAL VALVE MASS: ICD-10-CM

## 2019-03-22 DIAGNOSIS — I25.10 CORONARY ARTERY DISEASE INVOLVING NATIVE CORONARY ARTERY OF NATIVE HEART WITHOUT ANGINA PECTORIS: ICD-10-CM

## 2019-03-22 ASSESSMENT — MIFFLIN-ST. JEOR: SCORE: 1331.17

## 2019-03-26 ENCOUNTER — OFFICE VISIT - HEALTHEAST (OUTPATIENT)
Dept: INTERNAL MEDICINE | Facility: CLINIC | Age: 74
End: 2019-03-26

## 2019-03-26 DIAGNOSIS — Z79.4 TYPE 2 DIABETES MELLITUS WITH FOOT ULCER, WITH LONG-TERM CURRENT USE OF INSULIN (H): ICD-10-CM

## 2019-03-26 DIAGNOSIS — I10 ESSENTIAL HYPERTENSION, BENIGN: ICD-10-CM

## 2019-03-26 DIAGNOSIS — I65.23 CAROTID STENOSIS, BILATERAL: ICD-10-CM

## 2019-03-26 DIAGNOSIS — Z86.73 HISTORY OF STROKE: ICD-10-CM

## 2019-03-26 DIAGNOSIS — I10 ESSENTIAL HYPERTENSION: ICD-10-CM

## 2019-03-26 DIAGNOSIS — K29.70 GASTRITIS WITHOUT BLEEDING, UNSPECIFIED CHRONICITY, UNSPECIFIED GASTRITIS TYPE: ICD-10-CM

## 2019-03-26 DIAGNOSIS — G47.33 OSA ON CPAP: ICD-10-CM

## 2019-03-26 DIAGNOSIS — E11.621 TYPE 2 DIABETES MELLITUS WITH FOOT ULCER, WITH LONG-TERM CURRENT USE OF INSULIN (H): ICD-10-CM

## 2019-03-26 DIAGNOSIS — R26.81 UNSTEADY GAIT: ICD-10-CM

## 2019-03-26 DIAGNOSIS — I70.245 ATHEROSCLEROSIS OF NATIVE ARTERY OF LEFT LOWER EXTREMITY WITH ULCERATION OF OTHER PART OF FOOT (H): ICD-10-CM

## 2019-03-26 DIAGNOSIS — R10.13 EPIGASTRIC PAIN: ICD-10-CM

## 2019-03-26 DIAGNOSIS — L97.509 TYPE 2 DIABETES MELLITUS WITH FOOT ULCER, WITH LONG-TERM CURRENT USE OF INSULIN (H): ICD-10-CM

## 2019-03-26 DIAGNOSIS — Z94.0 KIDNEY REPLACED BY TRANSPLANT: ICD-10-CM

## 2019-03-27 ENCOUNTER — COMMUNICATION - HEALTHEAST (OUTPATIENT)
Dept: INTERNAL MEDICINE | Facility: CLINIC | Age: 74
End: 2019-03-27

## 2019-03-27 DIAGNOSIS — I73.9 PERIPHERAL VASCULAR DISEASE (H): ICD-10-CM

## 2019-03-29 ENCOUNTER — AMBULATORY - HEALTHEAST (OUTPATIENT)
Dept: VASCULAR SURGERY | Facility: CLINIC | Age: 74
End: 2019-03-29

## 2019-03-29 DIAGNOSIS — L97.529 DIABETIC ULCER OF TOE OF LEFT FOOT ASSOCIATED WITH TYPE 2 DIABETES MELLITUS, UNSPECIFIED ULCER STAGE (H): ICD-10-CM

## 2019-03-29 DIAGNOSIS — E11.621 DIABETIC ULCER OF TOE OF LEFT FOOT ASSOCIATED WITH TYPE 2 DIABETES MELLITUS, UNSPECIFIED ULCER STAGE (H): ICD-10-CM

## 2019-03-29 DIAGNOSIS — I73.9 PAD (PERIPHERAL ARTERY DISEASE) (H): ICD-10-CM

## 2019-04-01 ENCOUNTER — OFFICE VISIT - HEALTHEAST (OUTPATIENT)
Dept: PHYSICAL THERAPY | Facility: REHABILITATION | Age: 74
End: 2019-04-01

## 2019-04-01 DIAGNOSIS — R29.898 LEG WEAKNESS, BILATERAL: ICD-10-CM

## 2019-04-01 DIAGNOSIS — R29.898 BILATERAL ARM WEAKNESS: ICD-10-CM

## 2019-04-01 DIAGNOSIS — R26.89 POOR BALANCE: ICD-10-CM

## 2019-04-01 DIAGNOSIS — R53.81 PHYSICAL DECONDITIONING: ICD-10-CM

## 2019-04-05 ENCOUNTER — HOSPITAL ENCOUNTER (OUTPATIENT)
Dept: CARDIOLOGY | Facility: CLINIC | Age: 74
Discharge: HOME | End: 2019-04-05
Attending: INTERNAL MEDICINE

## 2019-04-05 DIAGNOSIS — I05.9 RHEUMATIC MITRAL VALVE DISEASE, UNSPECIFIED: ICD-10-CM

## 2019-04-05 DIAGNOSIS — I05.8 MITRAL VALVE MASS: ICD-10-CM

## 2019-04-05 ASSESSMENT — MIFFLIN-ST. JEOR: SCORE: 1333.44

## 2019-04-08 LAB
BSA FOR ECHO PROCEDURE: 1.97 M2
CV BLOOD PRESSURE: ABNORMAL MMHG
CV ECHO HEIGHT: 62.5 IN
CV ECHO WEIGHT: 195 LBS
DOP CALC MV VTI: 46.8 CM
ECHO EJECTION FRACTION ESTIMATED: 55 %
EJECTION FRACTION: 54 % (ref 55–75)
FRACTIONAL SHORTENING: 32.3 % (ref 28–44)
INTERVENTRICULAR SEPTUM IN END DIASTOLE: 1.64 CM (ref 0.6–0.9)
IVS/PW RATIO: 1.1
LEFT VENTRICLE DIASTOLIC VOLUME INDEX: 82.2 CM3/M2 (ref 29–61)
LEFT VENTRICLE DIASTOLIC VOLUME: 162 CM3 (ref 46–106)
LEFT VENTRICLE HEART RATE: 74 BPM
LEFT VENTRICLE MASS INDEX: 143.9 G/M2
LEFT VENTRICLE SYSTOLIC VOLUME INDEX: 37.6 CM3/M2 (ref 8–24)
LEFT VENTRICLE SYSTOLIC VOLUME: 74 CM3 (ref 14–42)
LEFT VENTRICULAR INTERNAL DIMENSION IN DIASTOLE: 4.37 CM (ref 3.8–5.2)
LEFT VENTRICULAR INTERNAL DIMENSION IN SYSTOLE: 2.96 CM (ref 2.2–3.5)
LEFT VENTRICULAR MASS: 283.6 G
LEFT VENTRICULAR POSTERIOR WALL IN END DIASTOLE: 1.5 CM (ref 0.6–0.9)
MITRAL VALVE E/A RATIO: 1
MITRAL VALVE MEAN INFLOW VELOCITY: 94.3 CM/S
MITRAL VALVE PEAK VELOCITY: 136 CM/S
MV DECELERATION TIME: 222 MS
MV MEAN GRADIENT: 4 MMHG
MV PEAK A VELOCITY: 119 CM/S
MV PEAK E VELOCITY: 116 CM/S
MV PEAK GRADIENT: 7.4 MMHG
NUC REST DIASTOLIC VOLUME INDEX: 3112 LBS
NUC REST SYSTOLIC VOLUME INDEX: 62.5 IN

## 2019-04-12 ENCOUNTER — OFFICE VISIT - HEALTHEAST (OUTPATIENT)
Dept: ENDOCRINOLOGY | Facility: CLINIC | Age: 74
End: 2019-04-12

## 2019-04-12 ENCOUNTER — AMBULATORY - HEALTHEAST (OUTPATIENT)
Dept: ENDOCRINOLOGY | Facility: CLINIC | Age: 74
End: 2019-04-12

## 2019-04-12 DIAGNOSIS — Z79.4 TYPE 2 DIABETES MELLITUS WITH HYPERGLYCEMIA, WITH LONG-TERM CURRENT USE OF INSULIN (H): ICD-10-CM

## 2019-04-12 DIAGNOSIS — Z79.4 TYPE 2 DIABETES MELLITUS WITHOUT COMPLICATION, WITH LONG-TERM CURRENT USE OF INSULIN (H): ICD-10-CM

## 2019-04-12 DIAGNOSIS — E11.9 TYPE 2 DIABETES MELLITUS WITHOUT COMPLICATION, WITH LONG-TERM CURRENT USE OF INSULIN (H): ICD-10-CM

## 2019-04-12 DIAGNOSIS — E11.65 TYPE 2 DIABETES MELLITUS WITH HYPERGLYCEMIA, WITH LONG-TERM CURRENT USE OF INSULIN (H): ICD-10-CM

## 2019-04-12 ASSESSMENT — MIFFLIN-ST. JEOR: SCORE: 1337.97

## 2019-04-15 ENCOUNTER — RECORDS - HEALTHEAST (OUTPATIENT)
Dept: VASCULAR ULTRASOUND | Facility: CLINIC | Age: 74
End: 2019-04-15

## 2019-04-15 ENCOUNTER — COMMUNICATION - HEALTHEAST (OUTPATIENT)
Dept: ENDOCRINOLOGY | Facility: CLINIC | Age: 74
End: 2019-04-15

## 2019-04-15 DIAGNOSIS — E11.621 TYPE 2 DIABETES MELLITUS WITH FOOT ULCER (CODE) (H): ICD-10-CM

## 2019-04-15 DIAGNOSIS — I73.9 PERIPHERAL VASCULAR DISEASE, UNSPECIFIED (H): ICD-10-CM

## 2019-04-15 DIAGNOSIS — L97.529 NON-PRESSURE CHRONIC ULCER OF OTHER PART OF LEFT FOOT WITH UNSPECIFIED SEVERITY (H): ICD-10-CM

## 2019-04-16 ENCOUNTER — OFFICE VISIT - HEALTHEAST (OUTPATIENT)
Dept: INTERNAL MEDICINE | Facility: CLINIC | Age: 74
End: 2019-04-16

## 2019-04-16 DIAGNOSIS — L97.509 TYPE 2 DIABETES MELLITUS WITH FOOT ULCER, WITH LONG-TERM CURRENT USE OF INSULIN (H): ICD-10-CM

## 2019-04-16 DIAGNOSIS — G47.33 OSA ON CPAP: ICD-10-CM

## 2019-04-16 DIAGNOSIS — I73.9 PERIPHERAL VASCULAR DISEASE (H): ICD-10-CM

## 2019-04-16 DIAGNOSIS — I70.245 ATHEROSCLEROSIS OF NATIVE ARTERY OF LEFT LOWER EXTREMITY WITH ULCERATION OF OTHER PART OF FOOT (H): ICD-10-CM

## 2019-04-16 DIAGNOSIS — E11.621 TYPE 2 DIABETES MELLITUS WITH FOOT ULCER, WITH LONG-TERM CURRENT USE OF INSULIN (H): ICD-10-CM

## 2019-04-16 DIAGNOSIS — K29.70 GASTRITIS WITHOUT BLEEDING, UNSPECIFIED CHRONICITY, UNSPECIFIED GASTRITIS TYPE: ICD-10-CM

## 2019-04-16 DIAGNOSIS — Z79.4 TYPE 2 DIABETES MELLITUS WITH FOOT ULCER, WITH LONG-TERM CURRENT USE OF INSULIN (H): ICD-10-CM

## 2019-04-16 DIAGNOSIS — Z94.0 KIDNEY REPLACED BY TRANSPLANT: ICD-10-CM

## 2019-04-17 ENCOUNTER — OFFICE VISIT - HEALTHEAST (OUTPATIENT)
Dept: PHYSICAL THERAPY | Facility: REHABILITATION | Age: 74
End: 2019-04-17

## 2019-04-17 DIAGNOSIS — R53.81 PHYSICAL DECONDITIONING: ICD-10-CM

## 2019-04-17 DIAGNOSIS — R26.89 POOR BALANCE: ICD-10-CM

## 2019-04-17 DIAGNOSIS — R29.898 LEG WEAKNESS, BILATERAL: ICD-10-CM

## 2019-04-17 DIAGNOSIS — R29.898 BILATERAL ARM WEAKNESS: ICD-10-CM

## 2019-04-18 ENCOUNTER — OFFICE VISIT - HEALTHEAST (OUTPATIENT)
Dept: VASCULAR SURGERY | Facility: CLINIC | Age: 74
End: 2019-04-18

## 2019-04-18 DIAGNOSIS — I73.9 PERIPHERAL VASCULAR DISEASE (H): ICD-10-CM

## 2019-04-18 ASSESSMENT — MIFFLIN-ST. JEOR: SCORE: 1331.17

## 2019-04-23 ENCOUNTER — OFFICE VISIT - HEALTHEAST (OUTPATIENT)
Dept: PHYSICAL THERAPY | Facility: REHABILITATION | Age: 74
End: 2019-04-23

## 2019-04-23 DIAGNOSIS — R29.898 LEG WEAKNESS, BILATERAL: ICD-10-CM

## 2019-04-23 DIAGNOSIS — R53.81 PHYSICAL DECONDITIONING: ICD-10-CM

## 2019-04-23 DIAGNOSIS — R26.89 POOR BALANCE: ICD-10-CM

## 2019-04-23 DIAGNOSIS — R29.898 BILATERAL ARM WEAKNESS: ICD-10-CM

## 2019-05-07 ENCOUNTER — OFFICE VISIT - HEALTHEAST (OUTPATIENT)
Dept: PHYSICAL THERAPY | Facility: REHABILITATION | Age: 74
End: 2019-05-07

## 2019-05-07 DIAGNOSIS — R53.81 PHYSICAL DECONDITIONING: ICD-10-CM

## 2019-05-07 DIAGNOSIS — R26.89 POOR BALANCE: ICD-10-CM

## 2019-05-07 DIAGNOSIS — R29.898 LEG WEAKNESS, BILATERAL: ICD-10-CM

## 2019-05-07 DIAGNOSIS — R29.898 BILATERAL ARM WEAKNESS: ICD-10-CM

## 2019-05-14 ENCOUNTER — COMMUNICATION - HEALTHEAST (OUTPATIENT)
Dept: INTERNAL MEDICINE | Facility: CLINIC | Age: 74
End: 2019-05-14

## 2019-05-14 ENCOUNTER — OFFICE VISIT - HEALTHEAST (OUTPATIENT)
Dept: INTERNAL MEDICINE | Facility: CLINIC | Age: 74
End: 2019-05-14

## 2019-05-14 ENCOUNTER — OFFICE VISIT - HEALTHEAST (OUTPATIENT)
Dept: PHYSICAL THERAPY | Facility: REHABILITATION | Age: 74
End: 2019-05-14

## 2019-05-14 DIAGNOSIS — I73.9 PERIPHERAL VASCULAR DISEASE (H): ICD-10-CM

## 2019-05-14 DIAGNOSIS — E11.621 TYPE 2 DIABETES MELLITUS WITH FOOT ULCER, WITH LONG-TERM CURRENT USE OF INSULIN (H): ICD-10-CM

## 2019-05-14 DIAGNOSIS — K29.70 GASTRITIS WITHOUT BLEEDING, UNSPECIFIED CHRONICITY, UNSPECIFIED GASTRITIS TYPE: ICD-10-CM

## 2019-05-14 DIAGNOSIS — I70.245 ATHEROSCLEROSIS OF NATIVE ARTERY OF LEFT LOWER EXTREMITY WITH ULCERATION OF OTHER PART OF FOOT (H): ICD-10-CM

## 2019-05-14 DIAGNOSIS — Z51.81 MEDICATION MONITORING ENCOUNTER: ICD-10-CM

## 2019-05-14 DIAGNOSIS — I10 ESSENTIAL HYPERTENSION: ICD-10-CM

## 2019-05-14 DIAGNOSIS — L97.509 TYPE 2 DIABETES MELLITUS WITH FOOT ULCER, WITH LONG-TERM CURRENT USE OF INSULIN (H): ICD-10-CM

## 2019-05-14 DIAGNOSIS — Z79.4 TYPE 2 DIABETES MELLITUS WITHOUT COMPLICATION, WITH LONG-TERM CURRENT USE OF INSULIN (H): ICD-10-CM

## 2019-05-14 DIAGNOSIS — E11.9 TYPE 2 DIABETES MELLITUS WITHOUT COMPLICATION, WITH LONG-TERM CURRENT USE OF INSULIN (H): ICD-10-CM

## 2019-05-14 DIAGNOSIS — Z79.4 TYPE 2 DIABETES MELLITUS WITH FOOT ULCER, WITH LONG-TERM CURRENT USE OF INSULIN (H): ICD-10-CM

## 2019-05-14 DIAGNOSIS — G47.33 OSA ON CPAP: ICD-10-CM

## 2019-05-14 DIAGNOSIS — R26.89 POOR BALANCE: ICD-10-CM

## 2019-05-14 DIAGNOSIS — R29.898 BILATERAL ARM WEAKNESS: ICD-10-CM

## 2019-05-14 DIAGNOSIS — Z94.0 KIDNEY REPLACED BY TRANSPLANT: ICD-10-CM

## 2019-05-14 DIAGNOSIS — R29.898 LEG WEAKNESS, BILATERAL: ICD-10-CM

## 2019-05-14 DIAGNOSIS — I65.23 CAROTID STENOSIS, BILATERAL: ICD-10-CM

## 2019-05-14 DIAGNOSIS — R53.81 PHYSICAL DECONDITIONING: ICD-10-CM

## 2019-05-14 LAB
ALBUMIN SERPL-MCNC: 3.2 G/DL (ref 3.5–5)
ALP SERPL-CCNC: 64 U/L (ref 45–120)
ALT SERPL W P-5'-P-CCNC: 13 U/L (ref 0–45)
ANION GAP SERPL CALCULATED.3IONS-SCNC: 13 MMOL/L (ref 5–18)
AST SERPL W P-5'-P-CCNC: 12 U/L (ref 0–40)
BILIRUB SERPL-MCNC: 0.3 MG/DL (ref 0–1)
BUN SERPL-MCNC: 52 MG/DL (ref 8–28)
CALCIUM SERPL-MCNC: 9.6 MG/DL (ref 8.5–10.5)
CHLORIDE BLD-SCNC: 100 MMOL/L (ref 98–107)
CHOLEST SERPL-MCNC: 159 MG/DL
CO2 SERPL-SCNC: 27 MMOL/L (ref 22–31)
CREAT SERPL-MCNC: 2.45 MG/DL (ref 0.6–1.1)
FASTING STATUS PATIENT QL REPORTED: YES
GFR SERPL CREATININE-BSD FRML MDRD: 19 ML/MIN/1.73M2
GLUCOSE BLD-MCNC: 165 MG/DL (ref 70–125)
HBA1C MFR BLD: 8.5 % (ref 3.5–6)
HDLC SERPL-MCNC: 48 MG/DL
LDLC SERPL CALC-MCNC: 76 MG/DL
POTASSIUM BLD-SCNC: 3.5 MMOL/L (ref 3.5–5)
PROT SERPL-MCNC: 6 G/DL (ref 6–8)
SODIUM SERPL-SCNC: 140 MMOL/L (ref 136–145)
TRIGL SERPL-MCNC: 175 MG/DL

## 2019-05-15 ENCOUNTER — COMMUNICATION - HEALTHEAST (OUTPATIENT)
Dept: INTERNAL MEDICINE | Facility: CLINIC | Age: 74
End: 2019-05-15

## 2019-05-15 DIAGNOSIS — F39 MOOD DISORDER (H): ICD-10-CM

## 2019-05-21 ENCOUNTER — OFFICE VISIT - HEALTHEAST (OUTPATIENT)
Dept: PHYSICAL THERAPY | Facility: REHABILITATION | Age: 74
End: 2019-05-21

## 2019-05-21 DIAGNOSIS — R29.898 BILATERAL ARM WEAKNESS: ICD-10-CM

## 2019-05-21 DIAGNOSIS — R53.81 PHYSICAL DECONDITIONING: ICD-10-CM

## 2019-05-21 DIAGNOSIS — R26.89 POOR BALANCE: ICD-10-CM

## 2019-05-21 DIAGNOSIS — R29.898 LEG WEAKNESS, BILATERAL: ICD-10-CM

## 2019-05-28 ENCOUNTER — OFFICE VISIT - HEALTHEAST (OUTPATIENT)
Dept: PHYSICAL THERAPY | Facility: REHABILITATION | Age: 74
End: 2019-05-28

## 2019-05-28 ENCOUNTER — OFFICE VISIT - HEALTHEAST (OUTPATIENT)
Dept: INTERNAL MEDICINE | Facility: CLINIC | Age: 74
End: 2019-05-28

## 2019-05-28 DIAGNOSIS — R29.898 LEG WEAKNESS, BILATERAL: ICD-10-CM

## 2019-05-28 DIAGNOSIS — R29.898 BILATERAL ARM WEAKNESS: ICD-10-CM

## 2019-05-28 DIAGNOSIS — R26.89 POOR BALANCE: ICD-10-CM

## 2019-05-28 DIAGNOSIS — E11.9 TYPE 2 DIABETES MELLITUS WITHOUT COMPLICATION, WITH LONG-TERM CURRENT USE OF INSULIN (H): ICD-10-CM

## 2019-05-28 DIAGNOSIS — R53.81 PHYSICAL DECONDITIONING: ICD-10-CM

## 2019-05-28 DIAGNOSIS — Z79.4 TYPE 2 DIABETES MELLITUS WITHOUT COMPLICATION, WITH LONG-TERM CURRENT USE OF INSULIN (H): ICD-10-CM

## 2019-05-29 ENCOUNTER — COMMUNICATION - HEALTHEAST (OUTPATIENT)
Dept: ADMINISTRATIVE | Facility: CLINIC | Age: 74
End: 2019-05-29

## 2019-05-29 ENCOUNTER — OFFICE VISIT - HEALTHEAST (OUTPATIENT)
Dept: PODIATRY | Facility: CLINIC | Age: 74
End: 2019-05-29

## 2019-05-29 DIAGNOSIS — E11.621 DIABETIC ULCER OF LEFT GREAT TOE (H): ICD-10-CM

## 2019-05-29 DIAGNOSIS — L97.529 DIABETIC ULCER OF LEFT GREAT TOE (H): ICD-10-CM

## 2019-05-29 ASSESSMENT — MIFFLIN-ST. JEOR: SCORE: 1362.92

## 2019-06-05 ENCOUNTER — AMBULATORY - HEALTHEAST (OUTPATIENT)
Dept: EDUCATION SERVICES | Facility: CLINIC | Age: 74
End: 2019-06-05

## 2019-06-05 ENCOUNTER — OFFICE VISIT - HEALTHEAST (OUTPATIENT)
Dept: PODIATRY | Facility: CLINIC | Age: 74
End: 2019-06-05

## 2019-06-05 ENCOUNTER — RECORDS - HEALTHEAST (OUTPATIENT)
Dept: ADMINISTRATIVE | Facility: OTHER | Age: 74
End: 2019-06-05

## 2019-06-05 DIAGNOSIS — E11.621 DIABETIC ULCER OF LEFT GREAT TOE (H): ICD-10-CM

## 2019-06-05 DIAGNOSIS — Z79.4 TYPE 2 DIABETES MELLITUS WITHOUT COMPLICATION, WITH LONG-TERM CURRENT USE OF INSULIN (H): ICD-10-CM

## 2019-06-05 DIAGNOSIS — E11.9 TYPE 2 DIABETES MELLITUS WITHOUT COMPLICATION, WITH LONG-TERM CURRENT USE OF INSULIN (H): ICD-10-CM

## 2019-06-05 DIAGNOSIS — L97.529 DIABETIC ULCER OF LEFT GREAT TOE (H): ICD-10-CM

## 2019-06-05 ASSESSMENT — MIFFLIN-ST. JEOR: SCORE: 1367.46

## 2019-06-07 ENCOUNTER — COMMUNICATION - HEALTHEAST (OUTPATIENT)
Dept: ADMINISTRATIVE | Facility: CLINIC | Age: 74
End: 2019-06-07

## 2019-06-07 ENCOUNTER — AMBULATORY - HEALTHEAST (OUTPATIENT)
Dept: CARDIOLOGY | Facility: CLINIC | Age: 74
End: 2019-06-07

## 2019-06-07 DIAGNOSIS — Z95.0 CARDIAC PACEMAKER IN SITU: ICD-10-CM

## 2019-06-08 ENCOUNTER — AMBULATORY - HEALTHEAST (OUTPATIENT)
Dept: PODIATRY | Facility: CLINIC | Age: 74
End: 2019-06-08

## 2019-06-08 DIAGNOSIS — L97.501 DIABETIC ULCER OF FOOT ASSOCIATED WITH DIABETES MELLITUS DUE TO UNDERLYING CONDITION, LIMITED TO BREAKDOWN OF SKIN, UNSPECIFIED LATERALITY, UNSPECIFIED PART OF FOOT (H): ICD-10-CM

## 2019-06-08 DIAGNOSIS — E08.621 DIABETIC ULCER OF FOOT ASSOCIATED WITH DIABETES MELLITUS DUE TO UNDERLYING CONDITION, LIMITED TO BREAKDOWN OF SKIN, UNSPECIFIED LATERALITY, UNSPECIFIED PART OF FOOT (H): ICD-10-CM

## 2019-06-10 ENCOUNTER — COMMUNICATION - HEALTHEAST (OUTPATIENT)
Dept: INTERNAL MEDICINE | Facility: CLINIC | Age: 74
End: 2019-06-10

## 2019-06-14 ENCOUNTER — AMBULATORY - HEALTHEAST (OUTPATIENT)
Dept: INTERNAL MEDICINE | Facility: CLINIC | Age: 74
End: 2019-06-14

## 2019-06-14 DIAGNOSIS — M54.50 LUMBAR BACK PAIN: ICD-10-CM

## 2019-06-19 ENCOUNTER — OFFICE VISIT - HEALTHEAST (OUTPATIENT)
Dept: PODIATRY | Facility: CLINIC | Age: 74
End: 2019-06-19

## 2019-06-19 DIAGNOSIS — E11.621 DIABETIC ULCER OF LEFT GREAT TOE (H): ICD-10-CM

## 2019-06-19 DIAGNOSIS — L97.529 DIABETIC ULCER OF LEFT GREAT TOE (H): ICD-10-CM

## 2019-06-19 ASSESSMENT — MIFFLIN-ST. JEOR: SCORE: 1342.05

## 2019-06-20 ENCOUNTER — AMBULATORY - HEALTHEAST (OUTPATIENT)
Dept: EDUCATION SERVICES | Facility: CLINIC | Age: 74
End: 2019-06-20

## 2019-06-20 DIAGNOSIS — E11.9 TYPE 2 DIABETES MELLITUS WITHOUT COMPLICATION, WITH LONG-TERM CURRENT USE OF INSULIN (H): ICD-10-CM

## 2019-06-20 DIAGNOSIS — Z79.4 TYPE 2 DIABETES MELLITUS WITHOUT COMPLICATION, WITH LONG-TERM CURRENT USE OF INSULIN (H): ICD-10-CM

## 2019-06-25 ENCOUNTER — AMBULATORY - HEALTHEAST (OUTPATIENT)
Dept: CARE COORDINATION | Facility: CLINIC | Age: 74
End: 2019-06-25

## 2019-07-03 ENCOUNTER — AMBULATORY - HEALTHEAST (OUTPATIENT)
Dept: EDUCATION SERVICES | Facility: CLINIC | Age: 74
End: 2019-07-03

## 2019-07-09 ENCOUNTER — COMMUNICATION - HEALTHEAST (OUTPATIENT)
Dept: SCHEDULING | Facility: CLINIC | Age: 74
End: 2019-07-09

## 2020-01-01 ENCOUNTER — COMMUNICATION - HEALTHEAST (OUTPATIENT)
Dept: CARE COORDINATION | Facility: CLINIC | Age: 75
End: 2020-01-01

## 2020-01-01 ENCOUNTER — COMMUNICATION - HEALTHEAST (OUTPATIENT)
Dept: INTERNAL MEDICINE | Facility: CLINIC | Age: 75
End: 2020-01-01

## 2020-01-01 ENCOUNTER — OFFICE VISIT - HEALTHEAST (OUTPATIENT)
Dept: GERIATRICS | Facility: CLINIC | Age: 75
End: 2020-01-01

## 2020-01-01 ENCOUNTER — OFFICE VISIT - HEALTHEAST (OUTPATIENT)
Dept: VASCULAR SURGERY | Facility: CLINIC | Age: 75
End: 2020-01-01

## 2020-01-01 ENCOUNTER — HOME CARE/HOSPICE - HEALTHEAST (OUTPATIENT)
Dept: HOME HEALTH SERVICES | Facility: HOME HEALTH | Age: 75
End: 2020-01-01

## 2020-01-01 ENCOUNTER — RECORDS - HEALTHEAST (OUTPATIENT)
Dept: ADMINISTRATIVE | Facility: OTHER | Age: 75
End: 2020-01-01

## 2020-01-01 ENCOUNTER — COMMUNICATION - HEALTHEAST (OUTPATIENT)
Dept: ENDOCRINOLOGY | Facility: CLINIC | Age: 75
End: 2020-01-01

## 2020-01-01 ENCOUNTER — COMMUNICATION - HEALTHEAST (OUTPATIENT)
Dept: VASCULAR SURGERY | Facility: CLINIC | Age: 75
End: 2020-01-01

## 2020-01-01 ENCOUNTER — AMBULATORY - HEALTHEAST (OUTPATIENT)
Dept: VASCULAR SURGERY | Facility: CLINIC | Age: 75
End: 2020-01-01

## 2020-01-01 ENCOUNTER — SURGERY - HEALTHEAST (OUTPATIENT)
Dept: SURGERY | Facility: CLINIC | Age: 75
End: 2020-01-01

## 2020-01-01 ENCOUNTER — OFFICE VISIT - HEALTHEAST (OUTPATIENT)
Dept: INFECTIOUS DISEASES | Facility: CLINIC | Age: 75
End: 2020-01-01

## 2020-01-01 ENCOUNTER — AMBULATORY - HEALTHEAST (OUTPATIENT)
Dept: INTERVENTIONAL RADIOLOGY/VASCULAR | Facility: CLINIC | Age: 75
End: 2020-01-01

## 2020-01-01 ENCOUNTER — DOCUMENTATION ONLY (OUTPATIENT)
Dept: OTHER | Facility: CLINIC | Age: 75
End: 2020-01-01

## 2020-01-01 ENCOUNTER — COMMUNICATION - HEALTHEAST (OUTPATIENT)
Dept: INFECTIOUS DISEASES | Facility: CLINIC | Age: 75
End: 2020-01-01

## 2020-01-01 ENCOUNTER — COMMUNICATION - HEALTHEAST (OUTPATIENT)
Dept: CARDIOLOGY | Facility: CLINIC | Age: 75
End: 2020-01-01

## 2020-01-01 ENCOUNTER — AMBULATORY - HEALTHEAST (OUTPATIENT)
Dept: GERIATRICS | Facility: CLINIC | Age: 75
End: 2020-01-01

## 2020-01-01 ENCOUNTER — SURGERY - HEALTHEAST (OUTPATIENT)
Dept: SURGERY | Facility: HOSPITAL | Age: 75
End: 2020-01-01

## 2020-01-01 ENCOUNTER — COMMUNICATION - HEALTHEAST (OUTPATIENT)
Dept: SCHEDULING | Facility: CLINIC | Age: 75
End: 2020-01-01

## 2020-01-01 ENCOUNTER — ANESTHESIA - HEALTHEAST (OUTPATIENT)
Dept: SURGERY | Facility: HOSPITAL | Age: 75
End: 2020-01-01

## 2020-01-01 ENCOUNTER — AMBULATORY - HEALTHEAST (OUTPATIENT)
Dept: CARDIOLOGY | Facility: CLINIC | Age: 75
End: 2020-01-01

## 2020-01-01 ENCOUNTER — OFFICE VISIT - HEALTHEAST (OUTPATIENT)
Dept: INTERNAL MEDICINE | Facility: CLINIC | Age: 75
End: 2020-01-01

## 2020-01-01 ENCOUNTER — AMBULATORY - HEALTHEAST (OUTPATIENT)
Dept: OTHER | Facility: CLINIC | Age: 75
End: 2020-01-01

## 2020-01-01 ENCOUNTER — HOSPITAL ENCOUNTER (OUTPATIENT)
Dept: CARDIOLOGY | Facility: CLINIC | Age: 75
Discharge: HOME | End: 2020-01-09
Attending: INTERNAL MEDICINE

## 2020-01-01 ENCOUNTER — COMMUNICATION - HEALTHEAST (OUTPATIENT)
Dept: NURSING | Facility: CLINIC | Age: 75
End: 2020-01-01

## 2020-01-01 ENCOUNTER — COMMUNICATION - HEALTHEAST (OUTPATIENT)
Dept: GERIATRICS | Facility: CLINIC | Age: 75
End: 2020-01-01

## 2020-01-01 ENCOUNTER — RECORDS - HEALTHEAST (OUTPATIENT)
Dept: LAB | Facility: CLINIC | Age: 75
End: 2020-01-01

## 2020-01-01 ENCOUNTER — SURGERY - HEALTHEAST (OUTPATIENT)
Dept: PODIATRY | Facility: CLINIC | Age: 75
End: 2020-01-01

## 2020-01-01 ENCOUNTER — AMBULATORY - HEALTHEAST (OUTPATIENT)
Dept: CARE COORDINATION | Facility: CLINIC | Age: 75
End: 2020-01-01

## 2020-01-01 ENCOUNTER — OFFICE VISIT - HEALTHEAST (OUTPATIENT)
Dept: ENDOCRINOLOGY | Facility: CLINIC | Age: 75
End: 2020-01-01

## 2020-01-01 ENCOUNTER — ANESTHESIA - HEALTHEAST (OUTPATIENT)
Dept: MEDSURG UNIT | Facility: HOSPITAL | Age: 75
End: 2020-01-01

## 2020-01-01 ENCOUNTER — EXTERNAL ORDER RESULTS (OUTPATIENT)
Dept: NURSING | Facility: CLINIC | Age: 75
End: 2020-01-01

## 2020-01-01 ENCOUNTER — HOSPITAL ENCOUNTER (OUTPATIENT)
Dept: CT IMAGING | Facility: CLINIC | Age: 75
Discharge: HOME | End: 2020-06-17
Attending: SURGERY

## 2020-01-01 ENCOUNTER — HOSPITAL ENCOUNTER (OUTPATIENT)
Dept: INTERVENTIONAL RADIOLOGY/VASCULAR | Facility: CLINIC | Age: 75
Discharge: HOME | End: 2020-06-23
Attending: SURGERY | Admitting: SURGERY

## 2020-01-01 ENCOUNTER — OFFICE VISIT - HEALTHEAST (OUTPATIENT)
Dept: CARDIOLOGY | Facility: CLINIC | Age: 75
End: 2020-01-01

## 2020-01-01 ENCOUNTER — ANESTHESIA - HEALTHEAST (OUTPATIENT)
Dept: SURGERY | Facility: CLINIC | Age: 75
End: 2020-01-01

## 2020-01-01 DIAGNOSIS — I50.33 ACUTE ON CHRONIC HEART FAILURE WITH PRESERVED EJECTION FRACTION (H): ICD-10-CM

## 2020-01-01 DIAGNOSIS — L97.519 DIABETIC ULCER OF TOE OF RIGHT FOOT ASSOCIATED WITH TYPE 2 DIABETES MELLITUS, UNSPECIFIED ULCER STAGE (H): ICD-10-CM

## 2020-01-01 DIAGNOSIS — G47.33 OSA ON CPAP: ICD-10-CM

## 2020-01-01 DIAGNOSIS — Z79.4 TYPE 2 DIABETES MELLITUS WITH DIABETIC POLYNEUROPATHY, WITH LONG-TERM CURRENT USE OF INSULIN (H): ICD-10-CM

## 2020-01-01 DIAGNOSIS — Z94.0 KIDNEY REPLACED BY TRANSPLANT: ICD-10-CM

## 2020-01-01 DIAGNOSIS — G40.909 SEIZURE DISORDER (H): ICD-10-CM

## 2020-01-01 DIAGNOSIS — D63.1 ANEMIA DUE TO STAGE 5 CHRONIC KIDNEY DISEASE, NOT ON CHRONIC DIALYSIS (H): ICD-10-CM

## 2020-01-01 DIAGNOSIS — I25.10 CORONARY ARTERY DISEASE DUE TO CALCIFIED CORONARY LESION: ICD-10-CM

## 2020-01-01 DIAGNOSIS — L97.519 ULCER OF RIGHT FOOT, UNSPECIFIED ULCER STAGE (H): ICD-10-CM

## 2020-01-01 DIAGNOSIS — I50.22 CHRONIC SYSTOLIC CONGESTIVE HEART FAILURE (H): ICD-10-CM

## 2020-01-01 DIAGNOSIS — E11.42 TYPE 2 DIABETES MELLITUS WITH DIABETIC POLYNEUROPATHY, WITH LONG-TERM CURRENT USE OF INSULIN (H): ICD-10-CM

## 2020-01-01 DIAGNOSIS — E66.01 MORBID OBESITY (H): ICD-10-CM

## 2020-01-01 DIAGNOSIS — I10 ESSENTIAL HYPERTENSION, BENIGN: ICD-10-CM

## 2020-01-01 DIAGNOSIS — N18.4 ACUTE RENAL FAILURE SUPERIMPOSED ON STAGE 4 CHRONIC KIDNEY DISEASE, UNSPECIFIED ACUTE RENAL FAILURE TYPE (H): ICD-10-CM

## 2020-01-01 DIAGNOSIS — L98.8 FISTULA: ICD-10-CM

## 2020-01-01 DIAGNOSIS — N17.9 ACUTE RENAL FAILURE SUPERIMPOSED ON STAGE 4 CHRONIC KIDNEY DISEASE, UNSPECIFIED ACUTE RENAL FAILURE TYPE (H): ICD-10-CM

## 2020-01-01 DIAGNOSIS — I25.84 CORONARY ARTERY DISEASE DUE TO CALCIFIED CORONARY LESION: ICD-10-CM

## 2020-01-01 DIAGNOSIS — Z94.0 STATUS POST KIDNEY TRANSPLANT: ICD-10-CM

## 2020-01-01 DIAGNOSIS — N18.6 END STAGE RENAL DISEASE (H): ICD-10-CM

## 2020-01-01 DIAGNOSIS — E11.621 DIABETIC ULCER OF TOE OF RIGHT FOOT ASSOCIATED WITH TYPE 2 DIABETES MELLITUS, UNSPECIFIED ULCER STAGE (H): ICD-10-CM

## 2020-01-01 DIAGNOSIS — I10 ESSENTIAL HYPERTENSION: ICD-10-CM

## 2020-01-01 DIAGNOSIS — I21.4 ACUTE NON-Q WAVE NON-ST ELEVATION MYOCARDIAL INFARCTION (NSTEMI) (H): ICD-10-CM

## 2020-01-01 DIAGNOSIS — M1A.00X0 CHRONIC GOUTY ARTHROPATHY: ICD-10-CM

## 2020-01-01 DIAGNOSIS — I73.9 PAD (PERIPHERAL ARTERY DISEASE) (H): ICD-10-CM

## 2020-01-01 DIAGNOSIS — E11.9 TYPE 2 DIABETES MELLITUS (H): ICD-10-CM

## 2020-01-01 DIAGNOSIS — S55.192S: ICD-10-CM

## 2020-01-01 DIAGNOSIS — Z95.0 CARDIAC PACEMAKER IN SITU: ICD-10-CM

## 2020-01-01 DIAGNOSIS — Z51.81 MEDICATION MONITORING ENCOUNTER: ICD-10-CM

## 2020-01-01 DIAGNOSIS — M86.9 OSTEOMYELITIS OF ANKLE AND FOOT (H): ICD-10-CM

## 2020-01-01 DIAGNOSIS — I73.9 PERIPHERAL VASCULAR DISEASE (H): ICD-10-CM

## 2020-01-01 DIAGNOSIS — I65.23 CAROTID STENOSIS, BILATERAL: ICD-10-CM

## 2020-01-01 DIAGNOSIS — N17.0 ACUTE RENAL FAILURE WITH ACUTE TUBULAR NECROSIS SUPERIMPOSED ON STAGE 4 CHRONIC KIDNEY DISEASE (H): ICD-10-CM

## 2020-01-01 DIAGNOSIS — F41.9 ANXIETY: ICD-10-CM

## 2020-01-01 DIAGNOSIS — N18.4 ANEMIA DUE TO STAGE 4 CHRONIC KIDNEY DISEASE (H): ICD-10-CM

## 2020-01-01 DIAGNOSIS — I50.21 ACUTE SYSTOLIC CONGESTIVE HEART FAILURE (H): ICD-10-CM

## 2020-01-01 DIAGNOSIS — Z79.4 TYPE 2 DIABETES MELLITUS WITHOUT COMPLICATION, WITH LONG-TERM CURRENT USE OF INSULIN (H): ICD-10-CM

## 2020-01-01 DIAGNOSIS — N18.4 ACUTE RENAL FAILURE WITH ACUTE TUBULAR NECROSIS SUPERIMPOSED ON STAGE 4 CHRONIC KIDNEY DISEASE (H): ICD-10-CM

## 2020-01-01 DIAGNOSIS — E11.9 TYPE 2 DIABETES MELLITUS WITHOUT COMPLICATION, WITH LONG-TERM CURRENT USE OF INSULIN (H): ICD-10-CM

## 2020-01-01 DIAGNOSIS — I50.31 ACUTE HEART FAILURE WITH PRESERVED EJECTION FRACTION (H): ICD-10-CM

## 2020-01-01 DIAGNOSIS — N18.4 CHRONIC RENAL INSUFFICIENCY, STAGE 4 (SEVERE) (H): ICD-10-CM

## 2020-01-01 DIAGNOSIS — D63.1 ANEMIA DUE TO STAGE 4 CHRONIC KIDNEY DISEASE (H): ICD-10-CM

## 2020-01-01 DIAGNOSIS — N18.5 ANEMIA DUE TO STAGE 5 CHRONIC KIDNEY DISEASE, NOT ON CHRONIC DIALYSIS (H): ICD-10-CM

## 2020-01-01 DIAGNOSIS — I50.32 CHRONIC HEART FAILURE WITH PRESERVED EJECTION FRACTION (H): ICD-10-CM

## 2020-01-01 DIAGNOSIS — Z87.39 HISTORY OF OSTEOMYELITIS: ICD-10-CM

## 2020-01-01 DIAGNOSIS — E11.52 DIABETIC WET GANGRENE OF THE FOOT (H): ICD-10-CM

## 2020-01-01 DIAGNOSIS — I50.1 ACUTE PULMONARY EDEMA WITH CONGESTIVE HEART FAILURE (H): ICD-10-CM

## 2020-01-01 DIAGNOSIS — Z11.59 ENCOUNTER FOR SCREENING FOR OTHER VIRAL DISEASES: ICD-10-CM

## 2020-01-01 DIAGNOSIS — I44.1 2ND DEGREE AV BLOCK: ICD-10-CM

## 2020-01-01 DIAGNOSIS — D50.9 IRON DEFICIENCY ANEMIA, UNSPECIFIED IRON DEFICIENCY ANEMIA TYPE: ICD-10-CM

## 2020-01-01 DIAGNOSIS — N18.30 CHRONIC RENAL INSUFFICIENCY, STAGE 3 (MODERATE) (H): ICD-10-CM

## 2020-01-01 DIAGNOSIS — I96 GANGRENE OF TOE OF RIGHT FOOT (H): ICD-10-CM

## 2020-01-01 DIAGNOSIS — D64.9 ANEMIA, UNSPECIFIED TYPE: ICD-10-CM

## 2020-01-01 LAB
25(OH)D3 SERPL-MCNC: 37.5 NG/ML (ref 30–80)
ACT BLD: 213 SECONDS (ref 105–167)
ACT BLD: 217 SECONDS (ref 105–167)
ALBUMIN SERPL-MCNC: 3.3 G/DL (ref 3.5–5)
ALP SERPL-CCNC: 47 U/L (ref 45–120)
ALT SERPL W P-5'-P-CCNC: <9 U/L (ref 0–45)
ANION GAP SERPL CALCULATED.3IONS-SCNC: 10 MMOL/L (ref 5–18)
ANION GAP SERPL CALCULATED.3IONS-SCNC: 11 MMOL/L (ref 5–18)
ANION GAP SERPL CALCULATED.3IONS-SCNC: 12 MMOL/L (ref 5–18)
ANION GAP SERPL CALCULATED.3IONS-SCNC: 13 MMOL/L (ref 5–18)
ANION GAP SERPL CALCULATED.3IONS-SCNC: 15 MMOL/L (ref 5–18)
ANION GAP SERPL CALCULATED.3IONS-SCNC: 16 MMOL/L (ref 5–18)
AORTIC ROOT: 3.1 CM
AORTIC VALVE MEAN VELOCITY: 137 CM/S
AST SERPL W P-5'-P-CCNC: 10 U/L (ref 0–40)
AV DIMENSIONLESS INDEX VTI: 0.5
AV MEAN GRADIENT: 8 MMHG
AV PEAK GRADIENT: 13.4 MMHG
AV VALVE AREA: 1.6 CM2
AV VELOCITY RATIO: 0.6
BILIRUB SERPL-MCNC: 0.7 MG/DL (ref 0–1)
BSA FOR ECHO PROCEDURE: 1.97 M2
BUN SERPL-MCNC: 113 MG/DL (ref 8–28)
BUN SERPL-MCNC: 56 MG/DL (ref 8–28)
BUN SERPL-MCNC: 61 MG/DL (ref 8–28)
BUN SERPL-MCNC: 62 MG/DL (ref 8–28)
BUN SERPL-MCNC: 82 MG/DL (ref 8–28)
BUN SERPL-MCNC: 96 MG/DL (ref 8–28)
CALCIUM SERPL-MCNC: 8.6 MG/DL (ref 8.5–10.5)
CALCIUM SERPL-MCNC: 8.7 MG/DL (ref 8.5–10.5)
CALCIUM SERPL-MCNC: 8.7 MG/DL (ref 8.5–10.5)
CALCIUM SERPL-MCNC: 8.8 MG/DL (ref 8.5–10.5)
CALCIUM SERPL-MCNC: 9 MG/DL (ref 8.5–10.5)
CALCIUM SERPL-MCNC: 9.1 MG/DL (ref 8.5–10.5)
CHLORIDE BLD-SCNC: 101 MMOL/L (ref 98–107)
CHLORIDE BLD-SCNC: 102 MMOL/L (ref 98–107)
CHLORIDE BLD-SCNC: 102 MMOL/L (ref 98–107)
CHLORIDE BLD-SCNC: 103 MMOL/L (ref 98–107)
CHLORIDE BLD-SCNC: 105 MMOL/L (ref 98–107)
CHLORIDE BLD-SCNC: 96 MMOL/L (ref 98–107)
CO2 SERPL-SCNC: 21 MMOL/L (ref 22–31)
CO2 SERPL-SCNC: 23 MMOL/L (ref 22–31)
CO2 SERPL-SCNC: 27 MMOL/L (ref 22–31)
CO2 SERPL-SCNC: 27 MMOL/L (ref 22–31)
CO2 SERPL-SCNC: 28 MMOL/L (ref 22–31)
CO2 SERPL-SCNC: 28 MMOL/L (ref 22–31)
COVID-19 VIRUS BY PCR (EXTERNAL RESULT): NEGATIVE
CREAT SERPL-MCNC: 2.19 MG/DL (ref 0.6–1.1)
CREAT SERPL-MCNC: 3.16 MG/DL (ref 0.6–1.1)
CREAT SERPL-MCNC: 3.21 MG/DL (ref 0.6–1.1)
CREAT SERPL-MCNC: 3.39 MG/DL (ref 0.6–1.1)
CREAT SERPL-MCNC: 3.4 MG/DL (ref 0.6–1.1)
CREAT SERPL-MCNC: 3.96 MG/DL (ref 0.6–1.1)
CREAT SERPL-MCNC: 4.02 MG/DL (ref 0.6–1.1)
CV BLOOD PRESSURE: ABNORMAL MMHG
CV ECHO HEIGHT: 62 IN
CV ECHO WEIGHT: 195 LBS
DOP CALC AO PEAK VEL: 183 CM/S
DOP CALC AO VTI: 48.3 CM
DOP CALC LVOT AREA: 3.14 CM2
DOP CALC LVOT DIAMETER: 2 CM
DOP CALC LVOT PEAK VEL: 102 CM/S
DOP CALC LVOT STROKE VOLUME: 78.2 CM3
DOP CALC MV VTI: 47.2 CM
DOP CALCLVOT PEAK VEL VTI: 24.9 CM
ECHO EJECTION FRACTION ESTIMATED: 55 %
EJECTION FRACTION: 52 % (ref 55–75)
ERYTHROCYTE [DISTWIDTH] IN BLOOD BY AUTOMATED COUNT: 16.8 % (ref 11–14.5)
ERYTHROCYTE [DISTWIDTH] IN BLOOD BY AUTOMATED COUNT: 16.9 % (ref 11–14.5)
ERYTHROCYTE [DISTWIDTH] IN BLOOD BY AUTOMATED COUNT: 19 % (ref 11–14.5)
FERRITIN SERPL-MCNC: 147 NG/ML (ref 10–130)
FRACTIONAL SHORTENING: 29.3 % (ref 28–44)
GFR SERPL CREATININE-BSD FRML MDRD: 11 ML/MIN/1.73M2
GFR SERPL CREATININE-BSD FRML MDRD: 11 ML/MIN/1.73M2
GFR SERPL CREATININE-BSD FRML MDRD: 13 ML/MIN/1.73M2
GFR SERPL CREATININE-BSD FRML MDRD: 13 ML/MIN/1.73M2
GFR SERPL CREATININE-BSD FRML MDRD: 14 ML/MIN/1.73M2
GFR SERPL CREATININE-BSD FRML MDRD: 14 ML/MIN/1.73M2
GFR SERPL CREATININE-BSD FRML MDRD: 22 ML/MIN/1.73M2
GLUCOSE BLD-MCNC: 118 MG/DL (ref 70–125)
GLUCOSE BLD-MCNC: 119 MG/DL (ref 70–125)
GLUCOSE BLD-MCNC: 127 MG/DL (ref 70–125)
GLUCOSE BLD-MCNC: 167 MG/DL (ref 70–125)
GLUCOSE BLD-MCNC: 84 MG/DL (ref 70–125)
GLUCOSE BLD-MCNC: 96 MG/DL (ref 70–125)
GLUCOSE BLD-MCNC: 97 MG/DL (ref 70–125)
HCC DEVICE COMMENTS: NORMAL
HCC DEVICE IMPLANTING PROVIDER: NORMAL
HCC DEVICE MANUFACTURE: NORMAL
HCC DEVICE MODEL: NORMAL
HCC DEVICE SERIAL NUMBER: NORMAL
HCC DEVICE TYPE: NORMAL
HCT VFR BLD AUTO: 28.3 % (ref 35–47)
HCT VFR BLD AUTO: 29.5 % (ref 35–47)
HCT VFR BLD AUTO: 30.1 % (ref 35–47)
HGB BLD-MCNC: 10 G/DL (ref 12–16)
HGB BLD-MCNC: 8.6 G/DL (ref 12–16)
HGB BLD-MCNC: 8.9 G/DL (ref 12–16)
HGB BLD-MCNC: 9.5 G/DL (ref 12–16)
INR PPP: 1.09 (ref 0.9–1.1)
INTERVENTRICULAR SEPTUM IN END DIASTOLE: 0.76 CM (ref 0.6–0.9)
IRON SATN MFR SERPL: 9 % (ref 20–50)
IRON SERPL-MCNC: 20 UG/DL (ref 42–175)
IVS/PW RATIO: 0.8
LA AREA 1: 25.5 CM2
LA AREA 2: 26.7 CM2
LEFT ATRIUM LENGTH: 6.78 CM
LEFT ATRIUM SIZE: 4.3 CM
LEFT ATRIUM TO AORTIC ROOT RATIO: 1.39 NO UNITS
LEFT ATRIUM VOLUME INDEX: 43.3 ML/M2
LEFT ATRIUM VOLUME: 85.4 ML
LEFT VENTRICLE DIASTOLIC VOLUME INDEX: 89.3 CM3/M2 (ref 29–61)
LEFT VENTRICLE DIASTOLIC VOLUME: 176 CM3 (ref 46–106)
LEFT VENTRICLE MASS INDEX: 91.5 G/M2
LEFT VENTRICLE SYSTOLIC VOLUME INDEX: 43.1 CM3/M2 (ref 8–24)
LEFT VENTRICLE SYSTOLIC VOLUME: 85 CM3 (ref 14–42)
LEFT VENTRICULAR INTERNAL DIMENSION IN DIASTOLE: 5.7 CM (ref 3.8–5.2)
LEFT VENTRICULAR INTERNAL DIMENSION IN SYSTOLE: 4.03 CM (ref 2.2–3.5)
LEFT VENTRICULAR MASS: 180.3 G
LEFT VENTRICULAR OUTFLOW TRACT MEAN GRADIENT: 2 MMHG
LEFT VENTRICULAR OUTFLOW TRACT MEAN VELOCITY: 70.9 CM/S
LEFT VENTRICULAR OUTFLOW TRACT PEAK GRADIENT: 4 MMHG
LEFT VENTRICULAR POSTERIOR WALL IN END DIASTOLE: 0.92 CM (ref 0.6–0.9)
LV STROKE VOLUME INDEX: 39.7 ML/M2
MCH RBC QN AUTO: 28.9 PG (ref 27–34)
MCH RBC QN AUTO: 30.1 PG (ref 27–34)
MCH RBC QN AUTO: 30.8 PG (ref 27–34)
MCHC RBC AUTO-ENTMCNC: 29.6 G/DL (ref 32–36)
MCHC RBC AUTO-ENTMCNC: 30.4 G/DL (ref 32–36)
MCHC RBC AUTO-ENTMCNC: 32.2 G/DL (ref 32–36)
MCV RBC AUTO: 96 FL (ref 80–100)
MCV RBC AUTO: 98 FL (ref 80–100)
MCV RBC AUTO: 99 FL (ref 80–100)
MITRAL VALVE E/A RATIO: 1.1
MITRAL VALVE MEAN INFLOW VELOCITY: 94 CM/S
MITRAL VALVE PEAK VELOCITY: 154 CM/S
MV AREA VTI: 1.66 CM2
MV AVERAGE E/E' RATIO: 24.9 CM/S
MV DECELERATION TIME: 233 MS
MV E'TISSUE VEL-LAT: 5.07 CM/S
MV E'TISSUE VEL-MED: 4.09 CM/S
MV LATERAL E/E' RATIO: 22.5
MV MEAN GRADIENT: 4 MMHG
MV MEDIAL E/E' RATIO: 27.9
MV PEAK A VELOCITY: 103 CM/S
MV PEAK E VELOCITY: 114 CM/S
MV PEAK GRADIENT: 9.5 MMHG
MV VALVE AREA BY CONTINUITY EQUATION: 1.7 CM2
NUC REST DIASTOLIC VOLUME INDEX: 3120 LBS
NUC REST SYSTOLIC VOLUME INDEX: 62 IN
PLATELET # BLD AUTO: 215 THOU/UL (ref 140–440)
PLATELET # BLD AUTO: 235 THOU/UL (ref 140–440)
PLATELET # BLD AUTO: 254 THOU/UL (ref 140–440)
PLATELET # BLD AUTO: 318 THOU/UL (ref 140–440)
PMV BLD AUTO: 11.7 FL (ref 8.5–12.5)
PMV BLD AUTO: 12.1 FL (ref 8.5–12.5)
PMV BLD AUTO: 8.6 FL (ref 7–10)
POTASSIUM BLD-SCNC: 3.8 MMOL/L (ref 3.5–5)
POTASSIUM BLD-SCNC: 3.9 MMOL/L (ref 3.5–5)
POTASSIUM BLD-SCNC: 4.9 MMOL/L (ref 3.5–5)
POTASSIUM BLD-SCNC: 5 MMOL/L (ref 3.5–5)
PROT SERPL-MCNC: 5.8 G/DL (ref 6–8)
RBC # BLD AUTO: 2.86 MILL/UL (ref 3.8–5.4)
RBC # BLD AUTO: 3.08 MILL/UL (ref 3.8–5.4)
RBC # BLD AUTO: 3.08 MILL/UL (ref 3.8–5.4)
SODIUM SERPL-SCNC: 136 MMOL/L (ref 136–145)
SODIUM SERPL-SCNC: 139 MMOL/L (ref 136–145)
SODIUM SERPL-SCNC: 140 MMOL/L (ref 136–145)
SODIUM SERPL-SCNC: 140 MMOL/L (ref 136–145)
SODIUM SERPL-SCNC: 141 MMOL/L (ref 136–145)
SODIUM SERPL-SCNC: 144 MMOL/L (ref 136–145)
TIBC SERPL-MCNC: 219 UG/DL (ref 313–563)
TRANSFERRIN SERPL-MCNC: 175 MG/DL (ref 212–360)
TRICUSPID REGURGITATION PEAK PRESSURE GRADIENT: 31.6 MMHG
TRICUSPID VALVE ANULAR PLANE SYSTOLIC EXCURSION: 2.3 CM
TRICUSPID VALVE PEAK REGURGITANT VELOCITY: 281 CM/S
WBC: 10 THOU/UL (ref 4–11)
WBC: 6.9 THOU/UL (ref 4–11)
WBC: 8.7 THOU/UL (ref 4–11)

## 2020-01-01 ASSESSMENT — MIFFLIN-ST. JEOR
SCORE: 1122.29
SCORE: 1271.97
SCORE: 1108.38
SCORE: 1241.58
SCORE: 1116.85
SCORE: 1244.76
SCORE: 1108.38
SCORE: 1081.92
SCORE: 1109.14
SCORE: 1090.09
SCORE: 1268.8
SCORE: 1246.12
SCORE: 1255.19
SCORE: 1288.3
SCORE: 1122.29
SCORE: 1332.3
SCORE: 1224.8
SCORE: 1288.25
SCORE: 1109.14
SCORE: 1336.84
SCORE: 1132.86
SCORE: 1282.4
SCORE: 1327.76
SCORE: 1368.59
SCORE: 1364.05
SCORE: 1118.66
SCORE: 1114.58
SCORE: 1273.25
SCORE: 1244.76
SCORE: 1249.75
SCORE: 1281.5

## 2020-01-01 ASSESSMENT — PATIENT HEALTH QUESTIONNAIRE - PHQ9: SUM OF ALL RESPONSES TO PHQ QUESTIONS 1-9: 12

## 2020-07-15 ENCOUNTER — COMMUNICATION - HEALTHEAST (OUTPATIENT)
Dept: INTERNAL MEDICINE | Facility: CLINIC | Age: 75
End: 2020-07-15

## 2020-07-17 ENCOUNTER — COMMUNICATION - HEALTHEAST (OUTPATIENT)
Dept: INTERNAL MEDICINE | Facility: CLINIC | Age: 75
End: 2020-07-17

## 2021-05-26 ENCOUNTER — RECORDS - HEALTHEAST (OUTPATIENT)
Dept: ADMINISTRATIVE | Facility: CLINIC | Age: 76
End: 2021-05-26

## 2021-05-26 ASSESSMENT — PATIENT HEALTH QUESTIONNAIRE - PHQ9: SUM OF ALL RESPONSES TO PHQ QUESTIONS 1-9: 12

## 2021-05-27 ENCOUNTER — RECORDS - HEALTHEAST (OUTPATIENT)
Dept: ADMINISTRATIVE | Facility: CLINIC | Age: 76
End: 2021-05-27

## 2021-05-27 VITALS — SYSTOLIC BLOOD PRESSURE: 115 MMHG | DIASTOLIC BLOOD PRESSURE: 53 MMHG

## 2021-05-27 NOTE — PROGRESS NOTES
"[facility] Daily Progress     Patient Name: Odalys Miles  Date: 4/17/2019  Visit #: 2  Referring provider: Mika Ha MD  Visit Diagnosis:     ICD-10-CM    1. Leg weakness, bilateral R29.898    2. Bilateral arm weakness R29.898    3. Poor balance R26.89    4. Physical deconditioning R53.81          Assessment:     HEP/POC compliance is  fair .  Patient demonstrates understanding/independence with home program.  Patient is benefitting from skilled physical therapy and is making steady progress toward functional goals.    Goal Status:  Pt. will demonstrate/verbalize independence in self-management of condition in : 6 weeks  Pt. will be able to walk : for community mobility;with less difficulty;in 6 weeks    Patient will increase : LEFS score;Recinos score;for improved quality of life;in 6 weeks  Pt will: be able to perform ADL's and other tasks with increased stamina and decreased LOB; in 6 weeks       Plan / Patient Education:     Continue with initial plan of care.  Progress with home program as tolerated.    Subjective:     Pain Rating: N/A     Pt reports that she started at the gym and it made her sore and it was too much.      She also was really sick for a week.  She is dealing with trying to eat a more balanced diet to help her feel better.      Pt did the exercises the first week, but less this last week.      Objective:     Pt tolerates the exercises with moderate cueing and adequate rest.    Pt with bilateral LE and UE weakness and decreased stamina   Pt with impaired balance and unsteady gait.        Current Exercises:  Exercise #1: Nu-step Warm-up  Comment #1: next time   Exercise #2: Seated LAQ Bx10 with 5\" hold   Comment #2: Seated H/S curl - orange Bx10   Exercise #3: Sit to Stand - cued and reviewed   Comment #3: Standing March Bx10 (min hand use)  Exercise #4: Standing hip ABD Bx10   Comment #4: Heel/toe raises   Exercise #5: Corner Balance  Comment #5: EC x30\"; Tandem R, L x30\" "   Exercise #6: Bicep curl to overhead press Bx10   Comment #6: Scaption raise to 90 - Bx10       Treatment Today     TREATMENT MINUTES COMMENTS   Evaluation     Self-care/ Home management     Manual therapy     Neuromuscular Re-education 4 See flow sheet    Therapeutic Activity     Therapeutic Exercises 24 See flow sheet    Gait training     Modality__________________                Total 28    Blank areas are intentional and mean the treatment did not include these items.       Lorie Neal, PT   4/17/2019

## 2021-05-27 NOTE — PATIENT INSTRUCTIONS - HE
Start Metamucil or Citrucel 1-2 scoops with glass of water daily.    Continue with regular breakfast.  Avoid low fiber carbohydrate type snacks.    Eat a sandwich or something light every lunch.  Okay to have yogurt.    Continue sucralfate and other medications as you are doing.    Continue with regular low level of exercise, with 5-10 minutes on the treadmill and some very light weights for stretching for 2-3 weeks until you can start increasing her level of activity.  Go to the gym 3 times a week or more, regularity is important with exercise.    Follow-up with me on May 14.

## 2021-05-27 NOTE — PROGRESS NOTES
CONSULT; Mika Ha MD referring; Peripheral vascular disease; USs done on 4/15/19. Poorly healing toe ulcer-LEFT, now a small callus. RF saw inpatient. KK did left angio with CO2 on 1/30/19 -could not heparinize; may need left anterior tibial artery angioplasty. Asa, statin. DM.

## 2021-05-27 NOTE — PROGRESS NOTES
[facility] Certification Request    April 1, 2019      Patient: Odalys Miles  MR Number: [id number]  YOB: 1945  Date of Visit: 4/1/2019      Dear Dr. Ha:   Thank you for this referral.   We are seeing Odalys Miles for Physical Therapy of upper and lower extremity weakness and impaired balance.    Medicare and/or Medicaid requires physician review and approval of the treatment plan. Please review the plan of care and verify that you agree with the therapy plan of care by co-signing this note.      Plan of Care  Authorization / Certification Number of Visits: 20 visits allowed; See cert   Communication with: Referral Source;Patient Caregiver  Patient Related Instruction: Nature of Condition;Treatment plan and rationale;Self Care instruction;Basis of treatment;Body mechanics;Posture;Expected outcome  Times per Week: 2  Number of Weeks: 6  Number of Visits: 12 - PRN   Discharge Planning: Pt will be discharged upon meeting goals or plateau of progress   Therapeutic Exercise: ROM;Stretching;Strengthening  Neuromuscular Reeducation: posture;balance/proprioception;core  Manual Therapy: soft tissue mobilization;myofascial release;joint mobilization;muscle energy  Modalities: cold pack;hot pack  Gait Training: for balance and speed      Goals:  Pt. will demonstrate/verbalize independence in self-management of condition in : 6 weeks  Pt. will be able to walk : for community mobility;with less difficulty;in 6 weeks    Patient will increase : LEFS score;Recinos score;for improved quality of life;in 6 weeks  Pt will: be able to perform ADL's and other tasks with increased stamina and decreased LOB; in 6 weeks         If you have any questions or concerns, please don't hesitate to call.    Sincerely,      Lorie Neal, PT        Physician recommendation:     ___ Follow therapist's recommendation        ___ Modify therapy      *Physician co-signature indicates they certify the need for these  services furnished within this plan and while under their care.      [facility]   Balance Initial Evaluation    Patient Name: Odalys Miles  Date of evaluation: 4/1/2019  Referral Diagnosis: Unsteady gait  Referring provider: Mika Ha MD  Visit Diagnosis:     ICD-10-CM    1. Leg weakness, bilateral R29.898    2. Bilateral arm weakness R29.898    3. Poor balance R26.89    4. Physical deconditioning R53.81        Assessment:      Pt. is appropriate for skilled PT intervention as outlined in the Plan of Care (POC).    Goals:  Pt. will demonstrate/verbalize independence in self-management of condition in : 6 weeks  Pt. will be able to walk : for community mobility;with less difficulty;in 6 weeks    Patient will increase : LEFS score;Recinos score;for improved quality of life;in 6 weeks  Pt will: be able to perform ADL's and other tasks with increased stamina and decreased LOB; in 6 weeks       Patient's expectations/goals are realistic.    Barriers to Learning or Achieving Goals:  No Barriers.       Plan / Patient Instructions:        Plan of Care:   Authorization / Certification Number of Visits: 20 visits allowed; See cert   Communication with: Referral Source;Patient Caregiver  Patient Related Instruction: Nature of Condition;Treatment plan and rationale;Self Care instruction;Basis of treatment;Body mechanics;Posture;Expected outcome  Times per Week: 2  Number of Weeks: 6  Number of Visits: 12 - PRN   Discharge Planning: Pt will be discharged upon meeting goals or plateau of progress   Therapeutic Exercise: ROM;Stretching;Strengthening  Neuromuscular Reeducation: posture;balance/proprioception;core  Manual Therapy: soft tissue mobilization;myofascial release;joint mobilization;muscle energy  Modalities: cold pack;hot pack  Gait Training: for balance and speed      Plan for next visit: Focus on UE and LE strengthening, balance and walking      Subjective:         Social information:   Living  Situation[facility]   Equipment Available: None and walker for wheel with seat   Pt comes in today without walker and reports that it has clothes on it so could not use.     History of Present Illness:    Odalys is a 73 y.o. female who presents to therapy today with complaints of UE and LE weakness with some balance deficits. Date of onset/duration of symptoms is 12/2018. Onset was gradual and was related to 3 hospitalizations in 3 months for heart issues. This occurred in December (8 days), January (4 days), February (8-9 days).  Symptoms are not improving. She denies history of similar symptoms. She describes their previous level of function as limited with medical complications.      Pt reports that she is medically complicated with h/o CHF, kidney transplant, h/o stroke, DM2     Pt reports that she has been having difficulty taking care of herself.     Pt likes to walk, but has not been doing this lately.      Pt reports that she falls a lot and this is related to a dizziness issue and was a prior problem.     Functional limitations are described as occurring with:   ascending and descending stairs or curbs  balance  performing routine daily activities  walking more than 1 block    Housework        Objective:      Note: Items left blank indicates the item was not performed or not indicated at the time of the evaluation.    Patient Outcome Measures :    Lower Extremity Functional Scale (_/80): 35     Scores range from 0-80, where a score of 80 represents maximum function. The minimal clinically important difference is a positive change of 9 points.  Recinos Balance Scale Total (calculated): 39    Balance scores range from 0-56, where a score of 41-56 ='s a low fall risk (independent); 21-40 ='s a medium fall risk (ambulatory with assistance); 0-20 ='s a high fall risk (wheelchair).  No Data Recorded    Balance Examination  1. Leg weakness, bilateral     2. Bilateral arm weakness     3. Poor balance     4. Physical  deconditioning       Involved side: Bilateral  Posture Observation:      General sitting posture is  fair.  Assistive Device:  None and Walker  Gait Observation:   speed and stability       Lower Extremity Strength:  Date: 19     LE strength/5 Right Left Right Left Right Left   Hip Flexion (L1-3) 4- 4-       Hip Extension (L5-S1)         Hip Abduction (L4-5) 4- 4-       Hip Adduction (L2-3) 4- 4-       Hip External Rotation         Hip Internal Rotation         Knee Extension (L3-4) 4 4       Knee Flexion 4 4       Ankle Dorsiflexion (L4-5) 5 5       Great Toe Extension (L5)         Ankle Plantar flexion (S1) 4 4       Abdominals        UE Strength  Date: 19     Shoulder/Elbow Strength (/5)  Manual Muscle Test (MMT) MMT MMT MMT    Right Left Right Left Right Left   Shoulder Flexion 4+ 4+       Supraspinatus 4+ 4+       Shoulder Abduction         Shoulder Extension         Shoulder External Rotation         Shoulder Internal Rotation         Elbow Flexion 4 4       Elbow Extension 4 4       Other:         Other:           Balance Assessment:    APTA sit < > stand:  5 in 30 seconds    Recinos Balance Scale Total (calculated): 39      Treatment Today     TREATMENT MINUTES COMMENTS   Evaluation 30    Self-care/ Home management     Manual therapy     Neuromuscular Re-education 10 See flow sheet    Therapeutic Activity     Therapeutic Exercises 20 Pathology, POC, HEP, etc   See flow sheet    Gait training     Modality__________________                Total 60     Blank areas are intentional and mean the treatment did not include these items.     PT Evaluation Code: (Please list factors)  Patient History/Comorbidities:   Patient Active Problem List   Diagnosis     Essential Thrombocytosis     Osteoarthritis     Thrombophlebitis Of Deep Vessels Of The Lower Extremity     Medullary Sponge Kidney Bilaterally     CULLEN on CPAP     Mixed hyperlipidemia     End Stage Renal Disease     Renal Transplant Recipient      Benign Essential Hypertension     Coronary Artery Stenosis     Chronic Reflux Esophagitis     Chronic Gout     Congestive Heart Failure     Ischemic Stroke     Type 2 diabetes mellitus (H)     Dysthymic Disorder     Diabetic Peripheral Neuropathy     Anemia     Sensorineural Hearing Loss     Hyperlipidemia     Ataxia     Cardiac pacemaker, dual, in situ     Adrenal insufficiency (H)     Nonintractable epilepsy without status epilepticus, unspecified epilepsy type (H)     Morbid obesity (H)     TIA (transient ischemic attack)     Diabetic ulcer of toe of left foot associated with type 2 diabetes mellitus, unspecified ulcer stage (H)     Atherosclerosis of native artery of left lower extremity with ulceration of other part of foot (H)     Examination: UE and LE weakness, decreased stamina, impaired balance and gait   Clinical Presentation: stable and uncomplicated   Clinical Decision Making: low     Patient History/  Comorbidities Examination  (body structures and functions, activity limitations, and/or participation restrictions) Clinical Presentation Clinical Decision Making (Complexity)   No documented Comorbidities or personal factors 1-2 Elements Stable and/or uncomplicated Low   1-2 documented comorbidities or personal factor 3 Elements Evolving clinical presentation with changing characteristics Moderate   3-4 documented comorbidities or personal factors 4 or more Unstable and unpredictable High                Lorie Neal  4/1/2019  11:37 AM

## 2021-05-27 NOTE — TELEPHONE ENCOUNTER
Left message order placed as requested.  Phone number provided to call and schedule appointment with  [facility] [phone].   [facility] will also reach out to patient to assist with scheduling.    I did leave my direct number if any assistance is needed in scheduling [phone]    Mariajose - specialty

## 2021-05-27 NOTE — TELEPHONE ENCOUNTER
Referral Request  Type of referral: Vascular surgery Apt   Who s requesting: Patient and Vascular Surgon - Alison Au MD   Why the request: Patient was recently Hospitalized and referred, Referral must be placed by PCP.   Have you been seen for this request: Yes  Does patient have a preference on a group/provider? [facility]   Okay to leave a detailed message?  Yes    Patient will need help with scheduling and setting up pre- op apt if needed.   Please advise

## 2021-05-27 NOTE — PATIENT INSTRUCTIONS - HE
Reduce dose of aspirin to 81 mg a day.    Continue hydralazine at 4 times a day.  If you have lightheaded dizzy spells, and/or blood pressures less than 110/60, I would have you reduce that to 3 times a day.    You will be contacted by physical therapy to set up an appointment here.    Stop ranitidine, as long as her stomach continues to feel better.    Eat at least 3 regular meals a day.  Eat Cheerios with milk, or healthy breakfast.  Avoid Ritz crackers.    Make an appoint with the vascular clinic to discuss your left leg arterial circulation issue.    Meet with endocrinology next month as planned regarding your diabetes.  If you have any low blood sugars less than 80, contact clinic right away to adjust your insulin.    Continue on sucralfate at this time as you are taking it.  As your stomach continues to improve, and your eating more regular meals during the day, you will likely be able to stop the sucralfate in the future.    See me on May 14 for follow-up appointment, as planned.

## 2021-05-27 NOTE — PROGRESS NOTES
VASCULAR SURGERY OUTPATIENT CONSULT OR VISIT   VASCULAR SURGEON: Alison Au MD    LOCATION:  [facility] [place]    Odalys Miles   Medical Record #:  [id number]  YOB: 1945  Age:  73 y.o.     Date of Service: 4/18/2019    PRIMARY CARE PROVIDER: Mika Ha MD      Reason for consultation: Follow-up of peripheral vascular disease with left first toe wound    IMPRESSION: Patient with a left first toe wound that has gone to essentially completely healed except for a small callus.  Toe pressures appear more than adequate to heal the wound.  Single vessel anterior tibial runoff and significant renal dysfunction which make her high risk for angiointervention.    RECOMMENDATION: Appears to be significantly improved and not warranting taking the risks of intervention.  Return visit in 3 months time to confirm complete healing.  She will make a follow-up herself with Dr. Castro who she knows from previous toe procedures and ensure that she gets optimal foot care.    HPI:  Odalys Miles is a 73 y.o. female who was seen today in follow-up for her left first toe wound.  We had met her in a very complex scenario at [facility].  She had had infection of her toe with a wound, but could only get a diagnostic angiogram to the extraordinary situation of possible bacterial endocarditis that might of caused a septic embolus giving her a TIA.  Neurology had felt that any sort of anticoagulation was completely contraindicated if this was in fact the case and so we could only perform a diagnostic angiogram without heparin.  This demonstrated in-line flow to the foot through an anterior tibial artery, but both posterior tibial and peroneal arteries were occluded.  The wound appeared to be manageable with antibiotics and adequate toe pressures were recognized.  Later on further testing led to infectious disease deciding that she did not in fact have endocarditis or septic emboli.  After that she was  started on antiplatelet therapy.    Her foot wound has essentially gone onto heal with only a small callus on the end of her first toe.  She is no longer on antibiotics and feels that things are progressing nicely.    She is to see Dr. Castro for her toenails but has not seen a podiatrist in quite a long time.  She would like to see him again if he can help with healing of her toe.    No other new health issues    PHH:    Past Medical History:   Diagnosis Date     Adrenal insufficiency (H)      Anemia      Anemia     Created by Conversion      Anxiety      Arthritis      Ataxia 5/12/2015     CAD (coronary artery disease)      Cardiac pacemaker, dual, in situ 12/7/2016    [employer] Revo MRI DOI: 12/15/2011 Dr. Aishwarya Treviño     Chronic Diarrhea Of Unknown Origin     Created by Conversion      Chronic Gout     Created by Conversion  Replacement Utility updated for latest IMO load     Chronic Reflux Esophagitis     Created by Conversion      Congestive Heart Failure     Created by Conversion  Replacement Utility updated for latest IMO load     Coronary Artery Stenosis     Created by Conversion [facility] Annotation: Feb 27 2011 11:37PM - Alina Zapien: s/p CABGx4  1/11  Replacement Utility updated for latest IMO load     CVA (cerebral vascular accident) (H)      Depression      Diabetic Peripheral Neuropathy     Created by Conversion      DM (diabetes mellitus) (H)      Dysthymic disorder     Created by Conversion      End Stage Renal Disease     Created by Conversion      Epilepsy (H)      ESRD (end stage renal disease) (H)      Essential Thrombocytosis     Created by Conversion      History of renal transplant      Hyperlipidemia      Hypertension      Hypertension     Created by Conversion  Replacement Utility updated for latest IMO load     Insomnia     Created by Conversion      Ischemic Stroke     Created by Conversion  Replacement Utility updated for latest IMO load     Medullary Sponge Kidney Bilaterally      Created by Conversion      Mixed hyperlipidemia     Created by Conversion      Morbid obesity (H) 8/29/2018     Neuropathy (H)      Nonintractable epilepsy without status epilepticus, unspecified epilepsy type (H) 8/29/2018     CULLEN on CPAP     Created by Conversion      Osteoarthritis     Created by Conversion  Replacement Utility updated for latest IMO load     Renal Transplant Recipient     Created by Conversion      Sensorineural hearing loss     Created by Conversion  Replacement Utility updated for latest IMO load     Thrombophlebitis Of Deep Vessels Of The Lower Extremity     Created by Conversion      Type 2 diabetes mellitus (H)     Created by Conversion      Vertigo         Past Surgical History:   Procedure Laterality Date     CARDIAC PACEMAKER PLACEMENT       HYSTERECTOMY      Age 28     IR EXTREMITY ANGIOGRAM LEFT  1/30/2019     NEPHRECTOMY TRANSPLANTED ORGAN  06/2011     OH APPENDECTOMY      Description: Appendectomy;  Recorded: 11/13/2007;     OH CABG, VEIN, SINGLE      Description: CABG (CABG);  Proc Date: 01/26/2011;  Comments: x 4     OH INS NEW/RPLC PRM PACEMAKER W/TRANSV ELTRD VENTR      Description: Permanent Pacemaker Placement;  Recorded: 08/01/2012;     OH TOTAL ABDOM HYSTERECTOMY      Description: Total Abdominal Hysterectomy;  Recorded: 11/13/2007;     OH TOTAL KNEE ARTHROPLASTY      Description: Total Knee Arthroplasty;  Recorded: 11/13/2007;       ALLERGIES:  Lisinopril; Mold/mildew; and Latex    MEDS:    Current Outpatient Medications:      alendronate (FOSAMAX) 35 MG tablet, Take 35 mg by mouth once a week. Weekly on Sundays. Take in the morning with a full glass of water, on an empty stomach, and do not take anything else by mouth or lie down for the next 30 min. Sundays   , Disp: , Rfl:      amLODIPine (NORVASC) 10 MG tablet, Take 1 tablet (10 mg) by mouth daily., Disp: 90 tablet, Rfl: 3     aspirin 81 MG EC tablet, Take 1 tablet (81 mg total) by mouth daily., Disp: , Rfl:       "azaTHIOprine (IMURAN) 50 mg tablet, Take 100 mg by mouth bedtime. , Disp: , Rfl:      bumetanide (BUMEX) 2 MG tablet, Take 0.5 tablets (1 mg total) by mouth daily., Disp: 20 tablet, Rfl: 0     carvedilol (COREG) 12.5 MG tablet, Take 1 tablet (12.5 mg) by mouth twice daily with meals., Disp: 180 tablet, Rfl: 3     cholecalciferol, vitamin D3, 2,000 unit Tab, Take 2,000 Units by mouth daily., Disp: , Rfl:      cyanocobalamin, vitamin B-12, 2,500 mcg Tab, Take 2,500 mcg by mouth every other day., Disp: , Rfl:      cycloSPORINE modified (GENGRAF) 25 MG capsule, Take 50 mg by mouth 2 (two) times a day. , Disp: , Rfl:      flash glucose scanning reader (FREESTYLE FELA 14 DAY READER) Misc, Use 1 application As Directed daily., Disp: 1 each, Rfl: 0     flash glucose sensor (FREESTYLE FELA 14 DAY SENSOR) Kit, Use 1 application As Directed every 14 (fourteen) days., Disp: 2 kit, Rfl: 11     hydrALAZINE (APRESOLINE) 25 MG tablet, Take 2 tablets (50 mg total) by mouth 4 (four) times a day., Disp: 270 tablet, Rfl: 2     hydrOXYzine pamoate (VISTARIL) 25 MG capsule, Take 1 capsule (25 mg total) by mouth at bedtime as needed for anxiety or other (insomnia)., Disp: 30 capsule, Rfl: 3     insulin NPH and regular human (NOVOLIN 70-30 FLEXPEN U-100) 100 unit/mL (70-30) pen, Inject 5 units SQ before breakfast and 10 units SQ before dinner. (Patient taking differently: Inject 10 units SQ before breakfast and 12 units SQ at bedtime   ), Disp: 5 adj dose pen, Rfl: 3     levETIRAcetam (KEPPRA) 500 MG tablet, One tablet in the morning. Two tablets in the afternoon (Patient taking differently: One tablet in the morning. Two tablets at bedtime   ), Disp: 270 tablet, Rfl: 3     pantoprazole (PROTONIX) 40 MG tablet, Take 40 mg by mouth 2 (two) times a day., Disp: , Rfl:      pen needle, diabetic (BD ULTRA-FINE TWILA PEN NEEDLES) 32 gauge x 5/32\" Ndle, Use 1 per day., Disp: 200 each, Rfl: 4     predniSONE (DELTASONE) 5 MG tablet, Take 5 mg by " "mouth daily., Disp: , Rfl:      rosuvastatin (CRESTOR) 5 MG tablet, Take 1 tablet (5 mg) by mouth at bedtime., Disp: 90 tablet, Rfl: 3     sucralfate (CARAFATE) 1 gram tablet, Take 1 g by mouth 4 (four) times a day before meals and at bedtime., Disp: , Rfl: 1     traZODone (DESYREL) 50 MG tablet, Take 3 tablets (150 mg total) by mouth at bedtime. (Patient taking differently: Take 100 mg by mouth at bedtime.    ), Disp: 180 tablet, Rfl: 1     venlafaxine (EFFEXOR-XR) 150 MG 24 hr capsule, Take 1 capsule (150 mg) by mouth daily., Disp: 90 capsule, Rfl: 3    SOCIAL HABITS:    Social History     Tobacco Use   Smoking Status Former Smoker     Packs/day: 1.50     Years: 20.00     Pack years: 30.00   Smokeless Tobacco Never Used       Social History     Substance and Sexual Activity   Alcohol Use Yes    Comment: occasional       Social History     Substance and Sexual Activity   Drug Use No       FAMILY HISTORY:    Family History   Problem Relation Age of Onset     Cancer Sister      Diabetes Sister      Breast cancer Sister 62     Cancer Father        REVIEW OF SYSTEMS:    A 12 point ROS was reviewed and except for what is listed in the HPI above, all others are negative    PE:  /62 (Patient Site: Right Arm, Patient Position: Sitting, Cuff Size: Adult Regular)   Pulse 64   Temp 98.5  F (36.9  C) (Oral)   Resp 12   Ht 5' 2.5\" (1.588 m)   Wt 194 lb (88 kg)   LMP 07/23/1974   BMI 34.92 kg/m    Wt Readings from Last 1 Encounters:   04/18/19 194 lb (88 kg)     Body mass index is 34.92 kg/m .    EXAM:  GENERAL: This is a well-developed 73 y.o. female who appears her stated age  EYES: Grossly normal.  MOUTH: Buccal mucosa normal   CARDIAC:  Not assessed  CHEST/LUNG:  Not Assessed  GASTROINTESINAL (ABDOMEN):Not Assessed   MUSCULOSKELETAL: Grossly normal and both lower extremities are intact.  HEME/LYMPH: No lymphedema  NEUROLOGIC: Focally intact, Alert and oriented x 3.   PSYCH: appropriate affect  INTEGUMENT: Left " first toe has a 4 mm x 3 mm callus without surrounding erythema    DIAGNOSTIC STUDIES:     Images:  Us Ankle Brachial Indices    Result Date: 4/15/2019      RESTING ANKLE-BRACHIAL INDICES (GLEN'S)  Indication: Status-post left angiogram. No intervention performed.  Previous: 1/30/19-Angiogram; 1/28/19         Right: mmHg Index   Brachial: 184  Ankle-(PT): 81 0.44 Ankle-(DP): NC -          Digit: 80 0.43              Left: mmHg Index    Brachial: -  Ankle-(PT): 196 1.07 Ankle-(DP): NC -         Digit:  91 0.49 Resting ankle-brachial index of 1.07 on the right. Toe Pressures of 91 mmHg and TBI of 0.49  Resting ankle-brachial index of 0.44 on the left. Toe Pressures of 80 mmHg and TBI of 0.43  WAVEFORMS: The right dorsalis pedis and posterior tibial arteries show monophasic waveforms. The left dorsalis pedis and posterior tibial arteries show monophasic waveforms.  Impression:  Right GLEN is  Normal with an GLEN of 1.07, but non-compressible AT suggestive of vessel calcification and Toe Pressures of 91 mmHg. Left GLEN is Abnormal with an GLEN of 0.44, but non-compressible AT suggestive of vessel calcification and Toe Pressures of 80 mmHg adequate for healing. Alex Santana MD     Us Arterial Leg Left    Result Date: 4/15/2019  Arterial Duplex Ultrasound Lower Extremity Artery Evaluation Indication: Status-post left angiogram. No intervention performed.  Previous: 1/30/19-Angiogram; 1/28/19 Technique: Duplex imaging is performed utilizing gray-scale, two-dimensional images, and color-flow imaging. Doppler waveform analysis and spectral Doppler imaging is also performed. LOWER EXTREMITY ARTERIAL DUPLEX EXAM WITH WAVEFORMS Right Leg:(cm/s) Location: Velocities Waveforms PTA:   0   - BOLA:   95  M DPA:   51  B Waveforms: T=Triphasic, M=Monophasic, B=Biphasic Left Leg:(cm/s) Location: Velocities Waveforms EIA:   148  T CFA:   116  T PFA:   94  T SFA Proximal:   148  B SFA Mid:   120  B SFA Distal:   137  B Popliteal Artery:   48   B PTA:   0   - BOLA:   76  M DPA:   49  M Waveforms: T=Triphasic, M=Monophasic, B=Biphasic Impression: Right Lower Extremity: Occluded right posterior tibial artery, moderate disease in the BOLA as well. Left Lower Extremity: Biphasic flow throughout the SFA and popliteal artery.  Occluded left posterior tibial artery, moderate disease in the BOLA noted. Alex Santana MD Reference: Category Normal 1-19% 20-49% 50-99% Occluded PSV <160 cm/sec without spectral broadening <160 cm/sec with spectral broadening Increased Increased Absent Flow Ratio N/A N/A < 2.0>2.0 N/A Post-Stenotic Turbulence No No No Yes N/A .vc      I personally reviewed the images and my interpretation is that her toe pressures are more than adequate for wound healing.    LABS:      Sodium   Date Value Ref Range Status   02/22/2019 138 136 - 145 mmol/L Final   02/05/2019 139 136 - 145 mmol/L Final   01/31/2019 137 136 - 145 mmol/L Final     Potassium   Date Value Ref Range Status   02/22/2019 4.6 3.5 - 5.0 mmol/L Final   02/05/2019 4.0 3.5 - 5.0 mmol/L Final   01/31/2019 3.8 3.5 - 5.0 mmol/L Final     Chloride   Date Value Ref Range Status   02/22/2019 102 98 - 107 mmol/L Final   02/05/2019 103 98 - 107 mmol/L Final   01/31/2019 101 98 - 107 mmol/L Final     BUN   Date Value Ref Range Status   02/22/2019 32 (H) 8 - 28 mg/dL Final   02/05/2019 53 (H) 8 - 28 mg/dL Final   01/31/2019 52 (H) 8 - 28 mg/dL Final     Creatinine   Date Value Ref Range Status   02/22/2019 2.13 (H) 0.60 - 1.10 mg/dL Final   02/05/2019 2.78 (H) 0.60 - 1.10 mg/dL Final   01/31/2019 2.23 (H) 0.60 - 1.10 mg/dL Final     Hemoglobin   Date Value Ref Range Status   02/22/2019 11.4 (L) 12.0 - 16.0 g/dL Final   02/05/2019 9.9 (L) 12.0 - 16.0 g/dL Final   01/30/2019 10.2 (L) 12.0 - 16.0 g/dL Final     Platelets   Date Value Ref Range Status   02/22/2019 237 140 - 440 thou/uL Final   02/05/2019 374 140 - 440 thou/uL Final   01/30/2019 326 140 - 440 thou/uL Final     BNP   Date Value Ref  Range Status   01/31/2011 952 (H) <110 pg/mL Final     Comment:                    Likely moderate to severe CHF   08/13/2010 882 (H) <107 pg/mL Final     Comment:                    Likely moderate to severe CHF     INR   Date Value Ref Range Status   01/30/2019 1.14 (H) 0.90 - 1.10 Final   01/22/2019 1.09 0.90 - 1.10 Final   04/01/2017 0.96 0.90 - 1.10 Final       Total time spent 25 minutes face to face with patient with more than 50% time spent in counseling and coordination of care.    Alison Au MD  VASCULAR SURGERY

## 2021-05-27 NOTE — PROGRESS NOTES
[facility]  ENDOCRINOLOGY    Diabetes Note 4/15/2019    Odalys Miles, 1945, [id number]          Reason for visit      1. Type 2 diabetes mellitus without complication, with long-term current use of insulin (H)        HPI     Odalys Miles is a very pleasant 73 y.o. old female who presents for follow up.  SUMMARY:  Odalys is here today as a DAVID from Dr Potts for DM 2. She has not been seen in Endo for over a year.  Her current A1c is 6.9 and up a bit from her previous of 6.6.  She has an extensive medical history and reports that she was in Hospital in December, January, and February. She is a Renal Transplant recipient 2/2 Medullary Sponge. She is being treated for CAD with pacer, Epilepsy, Gout, CULLEN with CPAP, PAD and PVD, to name a few. She reports that she has had recent issues with being able to eat (which was worked up by GI with EGD and treatment with Carafate). This enabled a 35 lb weight loss because of not being able to keep anything down.  She reports that this has resolved almost completely, with occasional episodes.  She is using a Freestyle Livier currently, and loves it.  She takes Novolin 70/30, 10 units in the morning and 12 units in the evening.  She is using this insulin because it is cost effective for her. She brings in glucose logs for the last three months.  She is testing consistently 4 times a day.  Her FBS are running between 91 and 158 over the last month.  Prandial readings are between 92 and 241, with the preponderance being in the low to mid 100s.  She mentions that she will be having a balloon angioplasty on her R leg next week for three occlusions.          Blood glucose data:  91 - 241    Past Medical History     Patient Active Problem List   Diagnosis     Essential Thrombocytosis     Osteoarthritis     Thrombophlebitis Of Deep Vessels Of The Lower Extremity     Medullary Sponge Kidney Bilaterally     CULLEN on CPAP     Mixed hyperlipidemia     End Stage Renal Disease     Renal  Transplant Recipient     Benign Essential Hypertension     Coronary Artery Stenosis     Chronic Reflux Esophagitis     Chronic Gout     (HFpEF) heart failure with preserved ejection fraction (H)     Ischemic Stroke     Type 2 diabetes mellitus (H)     Dysthymic Disorder     Diabetic Peripheral Neuropathy     Anemia     Sensorineural Hearing Loss     Hyperlipidemia     Ataxia     Cardiac pacemaker, dual, in situ     Adrenal insufficiency (H)     Nonintractable epilepsy without status epilepticus, unspecified epilepsy type (H)     Morbid obesity (H)     TIA (transient ischemic attack)     Diabetic ulcer of toe of left foot associated with type 2 diabetes mellitus, unspecified ulcer stage (H)     Atherosclerosis of native artery of left lower extremity with ulceration of other part of foot (H)     Arteriosclerotic vascular disease     Bacteremia     Chronic diarrhea     Closed displaced fracture of surgical neck of left humerus     Disorder of stomach     Diverticular disease of colon     Immunosuppressive management encounter following kidney transplant     Iron deficiency anemia     Major depressive disorder, recurrent episode, in partial or unspecified remission     Noninfectious gastroenteritis     Pain in joint, lower leg     Polyp of duodenum     PTSD (post-traumatic stress disorder)     Seizure disorder (H)        Family History       family history includes Breast cancer (age of onset: 62) in her sister; Cancer in her father and sister; Diabetes in her sister.    Social History      reports that she has quit smoking. She has a 30.00 pack-year smoking history. She has never used smokeless tobacco. She reports that she drinks alcohol. She reports that she does not use drugs.      Review of Systems     Patient has no polyuria or polydipsia, no chest pain, dyspnea or TIA's, no numbness, tingling or pain in extremities  Remainder negative except as noted in HPI.    Vital Signs     /76 (Patient Site: Right Arm,  "Patient Position: Sitting, Cuff Size: Adult Regular)   Pulse 78   Ht 5' 2.5\" (1.588 m)   Wt 195 lb 8 oz (88.7 kg)   LMP 07/23/1974   BMI 35.19 kg/m    Wt Readings from Last 3 Encounters:   04/12/19 195 lb 8 oz (88.7 kg)   04/05/19 194 lb 8 oz (88.2 kg)   03/26/19 194 lb 8 oz (88.2 kg)       Physical Exam     Constitutional:  Well developed, Well nourished, bright purple hair and a nose piercing  HENT:  Normocephalic,   Neck: Thyroid normal, No lymph nodes, Supple  Eyes:  PERRL, Conjunctiva pink  Respiratory:  Normal breath sounds, No respiratory distress  Cardiovascular:  Normal heart rate, Normal rhythm, significant murmur  GI:  Bowel sounds normal, Soft, No tenderness  Musculoskeletal:  No gross deformity or lesions, poor dorsalis pedis pulse R, absent L  Skin: No acanthosis nigricans, lipoatrophy or lipodystrophy  Neurologic:  Alert & oriented x 3, nonfocal  Psychiatric:  Affect, Mood, Insight appropriate  Diabetic foot exam: bilateral neuropathy, healed Ulcer to R great toe/callus in place    Assessment     1. Type 2 diabetes mellitus without complication, with long-term current use of insulin (H)        Plan     Odalys's control is currently quite good and no need for a change in her insulin dosages at present.  She will continue and f/u with me in 6 months, sooner if necessary. Time spent with pt today: 30 min with >50% spent in counseling and coordination of care.        [facility]  4/15/2019  10:06 AM      Lab Results     Hemoglobin A1c   Date Value Ref Range Status   02/05/2019 6.9 (H) 3.5 - 6.0 % Final   11/14/2018 6.6 (H) 3.5 - 6.0 % Final   07/18/2018 6.8 (H) 3.5 - 6.0 % Final   11/28/2017 8.3 (H) 3.5 - 6.0 % Final   10/24/2017 8.8 (H) 3.5 - 6.0 % Final     Creatinine   Date Value Ref Range Status   02/22/2019 2.13 (H) 0.60 - 1.10 mg/dL Final   02/05/2019 2.78 (H) 0.60 - 1.10 mg/dL Final   01/31/2019 2.23 (H) 0.60 - 1.10 mg/dL Final     Microalbumin, Random Urine   Date Value " Ref Range Status   08/29/2018 >50.00 (H) 0.00 - 1.99 mg/dL Final       Cholesterol   Date Value Ref Range Status   01/23/2019 129 <=199 mg/dL Final     HDL Cholesterol   Date Value Ref Range Status   01/23/2019 43 (L) >=50 mg/dL Final     LDL Calculated   Date Value Ref Range Status   01/23/2019 48 <=129 mg/dL Final     Triglycerides   Date Value Ref Range Status   01/23/2019 189 (H) <=149 mg/dL Final       Lab Results   Component Value Date    ALT 10 02/22/2019    AST 9 02/22/2019    ALKPHOS 65 02/22/2019    BILITOT 0.5 02/22/2019         Current Medications     Outpatient Medications Prior to Visit   Medication Sig Dispense Refill     alendronate (FOSAMAX) 35 MG tablet Take 35 mg by mouth once a week. Weekly on Sundays. Take in the morning with a full glass of water, on an empty stomach, and do not take anything else by mouth or lie down for the next 30 min.  Sundays             amLODIPine (NORVASC) 10 MG tablet Take 1 tablet (10 mg) by mouth daily. 90 tablet 3     aspirin 81 MG EC tablet Take 1 tablet (81 mg total) by mouth daily.       azaTHIOprine (IMURAN) 50 mg tablet Take 100 mg by mouth bedtime.        bumetanide (BUMEX) 2 MG tablet Take 0.5 tablets (1 mg total) by mouth daily. 20 tablet 0     carvedilol (COREG) 12.5 MG tablet Take 1 tablet (12.5 mg) by mouth twice daily with meals. 180 tablet 3     cholecalciferol, vitamin D3, 2,000 unit Tab Take 2,000 Units by mouth daily.       cyanocobalamin, vitamin B-12, 2,500 mcg Tab Take 2,500 mcg by mouth every other day.       cycloSPORINE modified (GENGRAF) 25 MG capsule Take 50 mg by mouth 2 (two) times a day.        flash glucose scanning reader (FREESTYLE FELA 14 DAY READER) Misc Use 1 application As Directed daily. 1 each 0     flash glucose sensor (FREESTYLE FELA 14 DAY SENSOR) Kit Use 1 application As Directed every 14 (fourteen) days. 2 kit 11     hydrALAZINE (APRESOLINE) 25 MG tablet Take 2 tablets (50 mg total) by mouth 4 (four) times a day. 270 tablet  "2     hydrOXYzine pamoate (VISTARIL) 25 MG capsule Take 1 capsule (25 mg total) by mouth at bedtime as needed for anxiety or other (insomnia). 30 capsule 3     insulin NPH and regular human (NOVOLIN 70-30 FLEXPEN U-100) 100 unit/mL (70-30) pen Inject 5 units SQ before breakfast and 10 units SQ before dinner. (Patient taking differently: Inject 10 units SQ before breakfast and 12 units SQ at bedtime      ) 5 adj dose pen 3     levETIRAcetam (KEPPRA) 500 MG tablet One tablet in the morning. Two tablets in the afternoon (Patient taking differently: One tablet in the morning. Two tablets at bedtime      ) 270 tablet 3     pantoprazole (PROTONIX) 40 MG tablet Take 40 mg by mouth 2 (two) times a day.       pen needle, diabetic (O[employer] ULTRA-FINE TWILA PEN NEEDLES) 32 gauge x 5/32\" Ndle Use 1 per day. 200 each 4     predniSONE (DELTASONE) 5 MG tablet Take 5 mg by mouth daily.       rosuvastatin (CRESTOR) 5 MG tablet Take 1 tablet (5 mg) by mouth at bedtime. 90 tablet 3     sucralfate (CARAFATE) 1 gram tablet Take 1 g by mouth 4 (four) times a day before meals and at bedtime.  1     traZODone (DESYREL) 50 MG tablet Take 3 tablets (150 mg total) by mouth at bedtime. (Patient taking differently: Take 100 mg by mouth at bedtime.       ) 180 tablet 1     venlafaxine (EFFEXOR-XR) 150 MG 24 hr capsule Take 1 capsule (150 mg) by mouth daily. 90 capsule 3     No facility-administered medications prior to visit.            "

## 2021-05-27 NOTE — PROGRESS NOTES
[facility] clinic Follow Up Note    Odalys Miles   73 y.o. female    Date of Visit: 4/16/2019    Chief Complaint   Patient presents with     Abdominal Pain     Epigastric pain getting worse since December.     Emesis     One episode two weeks ago with diarrhea and abdominal pain.     Back Pain     Increased with exercise.     Subjective, extensive review of previous records including labs from March 29.  I also reviewed the GLEN ultrasound test from April 15.  Odalys is here with her friend, [place].    Patient is here for following up on her gastritis and intermittent dyspepsia symptoms.    Also discussion of her chronic low back pain.    She has chronic obesity and inactivity.  Her friend started having her go to [employer] once.  She felt good on that day and did the treadmill and some light weights, but felt her back get more stiff over the next week as she did not go back to the gym.  No radicular type pain.  She is planning to go back to the gym now to do a lighter level of exercise.    She does not take NSAIDs or other pain medication.    Status post renal transplant with renal insufficiency and creatinine stable at 2.2 on March 29.    She has peripheral vascular disease and yesterday GLEN did show 0.44 on the left blood pressure of 80 stated likely adequate blood flow for healing.  The right was 1.07.  She sees the vascular clinic on April 18.  Her left toe ulcer continues to heal slowly, still a callus.    No falls.  No seizures on Keppra.    Previous left occipital and left cerebellar stroke.    No chest pain or chest pressure.  Coronary bypass in 2011.  Negative stress test December 2018.  Ejection fraction 55%.  Saw cardiology last month and no changes.    Bilateral carotid artery stenosis January 2019 ultrasound.  50-69%.  No high-grade intracerebral stenosis seen on MRA March 2019.    Still compliant with CPAP for sleep apnea.    Still on Crestor, does not tolerate higher doses.  LDL 65 and  HDL 60 4 August 2018.    Quit smoking in 1984.    She denies orthostasis or lightheaded dizzy spells or increased edema.  She was having borderline lower blood pressures but denies orthostasis now.  Still on hydralazine 4 times a day, also on Bumex Coreg and amlodipine the same.    Last month she did reduce the aspirin down to 81 mg a day.    March 2019 EGD did show moderate gastritis but normal duodenum.  No ulcer and negative H. pylori.    She has a past history of not eating all day and then eating carbohydrate rich foods in the evening with causing intermittent nausea and occasional emesis of food.  She had an episode yesterday where she did not eat all day except for some chips and snacks and a piece of cake in the afternoon.  She then ate some macaroni and cheese in the evening and felt nauseated, that passed fairly quickly.    She has been trying to eat breakfast more often including toast or a bagel with peanut butter in the morning    She still taking the Carafate at least 3 times a day.    She would consider eating yogurt more often.  She is trying to eat a sandwich for lunch.  Her friend [place] is trying to get her to eat more regularly.    She was feeling better with the gastritis symptoms until last week she had a more severe episode but she admits to not eating regularly still.    No chest pain or exertional nausea reported.    Chronic depression still stable on Effexor and trazodone.    I did review the diabetes note from endocrinology from April 12.  Continues on same 7030 insulin with 10 units in the morning and 12 units in the evening.  Hemoglobin A1c was 6.9% in February.  Average blood sugars are around 180 now.  No low blood sugars.  Her continuous glucometer is working.    She does have moderate foot neuropathy.  Endocrinology will see her again in October.    I did review the cardiology note from March.  January echo showed a mild mitral valve density but was not felt to be a vegetation by  cardiology.  No fever or infectious symptoms.    PMHx:    Past Medical History:   Diagnosis Date     Adrenal insufficiency (H)      Anemia      Anemia     Created by Conversion      Anxiety      Arthritis      Ataxia 5/12/2015     CAD (coronary artery disease)      Cardiac pacemaker, dual, in situ 12/7/2016    [employer] Revo MRI DOI: 12/15/2011 Dr. Aishwarya Treviño     Chronic Diarrhea Of Unknown Origin     Created by Conversion      Chronic Gout     Created by Conversion  Replacement Utility updated for latest IMO load     Chronic Reflux Esophagitis     Created by Conversion      Congestive Heart Failure     Created by Conversion  Replacement Utility updated for latest IMO load     Coronary Artery Stenosis     Created by Conversion [facility] Annotation: Feb 27 2011 11:37PM - Alina Zapien: s/p CABGx4  1/11  Replacement Utility updated for latest IMO load     CVA (cerebral vascular accident) (H)      Depression      Diabetic Peripheral Neuropathy     Created by Conversion      DM (diabetes mellitus) (H)      Dysthymic disorder     Created by Conversion      End Stage Renal Disease     Created by Conversion      Epilepsy (H)      ESRD (end stage renal disease) (H)      Essential Thrombocytosis     Created by Conversion      History of renal transplant      Hyperlipidemia      Hypertension      Hypertension     Created by Conversion  Replacement Utility updated for latest IMO load     Insomnia     Created by Conversion      Ischemic Stroke     Created by Conversion  Replacement Utility updated for latest IMO load     Medullary Sponge Kidney Bilaterally     Created by Conversion      Mixed hyperlipidemia     Created by Conversion      Morbid obesity (H) 8/29/2018     Neuropathy (H)      Nonintractable epilepsy without status epilepticus, unspecified epilepsy type (H) 8/29/2018     CULLEN on CPAP     Created by Conversion      Osteoarthritis     Created by Conversion  Replacement Utility updated for latest IMO load      Renal Transplant Recipient     Created by Conversion      Sensorineural hearing loss     Created by Conversion  Replacement Utility updated for latest IMO load     Thrombophlebitis Of Deep Vessels Of The Lower Extremity     Created by Conversion      Type 2 diabetes mellitus (H)     Created by Conversion      Vertigo      PSHx:    Past Surgical History:   Procedure Laterality Date     CARDIAC PACEMAKER PLACEMENT       HYSTERECTOMY      Age 28     IR EXTREMITY ANGIOGRAM LEFT  1/30/2019     NEPHRECTOMY TRANSPLANTED ORGAN  06/2011     MA APPENDECTOMY      Description: Appendectomy;  Recorded: 11/13/2007;     MA CABG, VEIN, SINGLE      Description: CABG (CABG);  Proc Date: 01/26/2011;  Comments: x 4     MA INS NEW/RPLC PRM PACEMAKER W/TRANSV ELTRD VENTR      Description: Permanent Pacemaker Placement;  Recorded: 08/01/2012;     MA TOTAL ABDOM HYSTERECTOMY      Description: Total Abdominal Hysterectomy;  Recorded: 11/13/2007;     MA TOTAL KNEE ARTHROPLASTY      Description: Total Knee Arthroplasty;  Recorded: 11/13/2007;     Immunizations:   Immunization History   Administered Date(s) Administered     DT (pediatric) 01/01/1992     Influenza [id number], 01/25/2010     Influenza high dose, seasonal 10/12/2015, 09/28/2016, 09/26/2017, 08/29/2018     Influenza, inj, historic,unspecified 10/23/2007, 10/27/2008, 11/09/2009     Pneumo Conj 13-V (2010&after) 10/12/2015     Pneumo Polysac 23-V 10/01/1996, 10/27/2008     Td,adult,historic,unspecified 08/08/2002     Tdap 06/30/2016, 11/05/2018     ZOSTER, LIVE 07/03/2012       ROS A comprehensive review of systems was performed and was otherwise negative    Medications, allergies, and problem list were reviewed and updated    Exam  /58 (Patient Site: Right Arm, Patient Position: Sitting, Cuff Size: Adult Large)   Pulse 60   Resp 16   Wt 194 lb 12.8 oz (88.4 kg)   LMP 07/23/1974   BMI 35.06 kg/m    Alert and oriented.  No jaundice.  Able to clamp on the exam table.  Abdomen  with some mild epigastric tenderness but no guarding or rebound.  No right upper quadrant tenderness.  Heart is regular with 1 out of 6 systolic murmur.  Lungs are clear.  No ankle edema.    Assessment/Plan  1. Gastritis without bleeding, unspecified chronicity, unspecified gastritis type  Improved but still intermittent symptoms.  I stressed the importance of regular healthy meals with higher fiber diet and avoid simple carbohydrates without fiber.    Continue on the Carafate and Protonix twice daily.    I did recommend she start Metamucil or Citrucel on a daily basis now.    Continue to improve her diet and eat on a more regular basis.    Follow-up if symptoms worsen.    Continue on the lower dose aspirin 81 mg because of the gastritis seen on EGD last month.    2. Peripheral vascular disease (H)  Severe.  Recent GLEN reviewed.  Sees vascular clinic on April 18.    3. Type 2 diabetes mellitus with foot ulcer, with long-term current use of insulin (H)  Controlled, borderline high blood sugars.  Will be increasing her activity and continues to improve diet.    Follow up with endocrinology in October.    Plan to recheck hemoglobin A1c this summer.    Continue on current 70/30 insulin.  Continuous glucometer.  Report any low blood sugars immediately.    4. Atherosclerosis of native artery of left lower extremity with ulceration of other part of foot (H)  Currently asymptomatic.  Continue medical management.  She does not tolerate higher doses of Crestor, currently 5 mg a day.  Cholesterol well controlled in August.    Aspirin 81 mg    History of stroke with carotid artery stenosis, same medical management    5. Renal Transplant Recipient  Managed by transplant team.  Renal insufficiency stable March 29 labs    6. CULLEN on CPAP  Compliant with CPAP.  Continue to work on weight loss.    Chronic low back pain with DJD and deconditioning with obesity.    I stressed the importance of going to the gym on a regular basis but to  not overstrained herself.  We discussed starting with a gentle treadmill for 5-10 minutes and working up from there.  Very light weights with stretching but do not overstrained.    Depression with anxiety, Effexor and trazodone.  Stable.    Sees neurology for seizure management.  No recent recurrence.  Continue Keppra.    Return in 1 month (on 5/14/2019) for Recheck.   Patient Instructions   Start Metamucil or Citrucel 1-2 scoops with glass of water daily.    Continue with regular breakfast.  Avoid low fiber carbohydrate type snacks.    Eat a sandwich or something light every lunch.  Okay to have yogurt.    Continue sucralfate and other medications as you are doing.    Continue with regular low level of exercise, with 5-10 minutes on the treadmill and some very light weights for stretching for 2-3 weeks until you can start increasing her level of activity.  Go to the gym 3 times a week or more, regularity is important with exercise.    Follow-up with me on May 14.    Mika Ha MD  I spent a total time with patient of over 25 minutes and over 50% coord care.  Time all face to face.      Current Outpatient Medications   Medication Sig Dispense Refill     alendronate (FOSAMAX) 35 MG tablet Take 35 mg by mouth once a week. Weekly on Sundays. Take in the morning with a full glass of water, on an empty stomach, and do not take anything else by mouth or lie down for the next 30 min.  Sundays             amLODIPine (NORVASC) 10 MG tablet Take 1 tablet (10 mg) by mouth daily. 90 tablet 3     aspirin 81 MG EC tablet Take 1 tablet (81 mg total) by mouth daily.       azaTHIOprine (IMURAN) 50 mg tablet Take 100 mg by mouth bedtime.        bumetanide (BUMEX) 2 MG tablet Take 0.5 tablets (1 mg total) by mouth daily. 20 tablet 0     carvedilol (COREG) 12.5 MG tablet Take 1 tablet (12.5 mg) by mouth twice daily with meals. 180 tablet 3     cholecalciferol, vitamin D3, 2,000 unit Tab Take 2,000 Units by mouth daily.        "cyanocobalamin, vitamin B-12, 2,500 mcg Tab Take 2,500 mcg by mouth every other day.       cycloSPORINE modified (GENGRAF) 25 MG capsule Take 50 mg by mouth 2 (two) times a day.        flash glucose scanning reader (FREESTYLE FELA 14 DAY READER) Misc Use 1 application As Directed daily. 1 each 0     flash glucose sensor (FREESTYLE FELA 14 DAY SENSOR) Kit Use 1 application As Directed every 14 (fourteen) days. 2 kit 11     hydrOXYzine pamoate (VISTARIL) 25 MG capsule Take 1 capsule (25 mg total) by mouth at bedtime as needed for anxiety or other (insomnia). 30 capsule 3     insulin NPH and regular human (NOVOLIN 70-30 FLEXPEN U-100) 100 unit/mL (70-30) pen Inject 5 units SQ before breakfast and 10 units SQ before dinner. (Patient taking differently: Inject 10 units SQ before breakfast and 12 units SQ at bedtime      ) 5 adj dose pen 3     levETIRAcetam (KEPPRA) 500 MG tablet One tablet in the morning. Two tablets in the afternoon (Patient taking differently: One tablet in the morning. Two tablets at bedtime      ) 270 tablet 3     pantoprazole (PROTONIX) 40 MG tablet Take 40 mg by mouth 2 (two) times a day.       pen needle, diabetic (BD ULTRA-FINE TWILA PEN NEEDLES) 32 gauge x 5/32\" Ndle Use 1 per day. 200 each 4     predniSONE (DELTASONE) 5 MG tablet Take 5 mg by mouth daily.       rosuvastatin (CRESTOR) 5 MG tablet Take 1 tablet (5 mg) by mouth at bedtime. 90 tablet 3     sucralfate (CARAFATE) 1 gram tablet Take 1 g by mouth 4 (four) times a day before meals and at bedtime.  1     traZODone (DESYREL) 50 MG tablet Take 3 tablets (150 mg total) by mouth at bedtime. (Patient taking differently: Take 100 mg by mouth at bedtime.       ) 180 tablet 1     venlafaxine (EFFEXOR-XR) 150 MG 24 hr capsule Take 1 capsule (150 mg) by mouth daily. 90 capsule 3     hydrALAZINE (APRESOLINE) 25 MG tablet Take 2 tablets (50 mg total) by mouth 4 (four) times a day. (Patient not taking: Reported on 4/16/2019      ) 270 tablet 2 "     No current facility-administered medications for this visit.      Allergies   Allergen Reactions     Lisinopril Cough     Resolved after discontinuation     Mold/Mildew      Latex Rash     Social History     Tobacco Use     Smoking status: Former Smoker     Packs/day: 1.50     Years: 20.00     Pack years: 30.00     Smokeless tobacco: Never Used   Substance Use Topics     Alcohol use: Yes     Comment: occasional     Drug use: No

## 2021-05-27 NOTE — PROGRESS NOTES
[facility] clinic Follow Up Note    Odalys Miles   73 y.o. female    Date of Visit: 3/26/2019    Chief Complaint   Patient presents with     Follow-up     Continue discussion of medical issues from last visit on 02/26/19.     Establish Care     Subjective  Odalys is here with her friend, [place].    Patient is here to establish care and review multiple medical issues.    She had also recently seen me in February for significant epigastric pain consistent with gastritis.    She had been eating only one meal a day complaining of epigastric discomfort and nausea with weight loss.  She had been less active, significant deconditioning and global weakness over the winter.    She underwent an EGD earlier this month that showed moderate gastritis.  There was a hyperplastic gastric polyp.  Normal duodenum.  No ulcer.  H. pylori was negative.    She was started on Carafate and increase to 3 times a day.  Patient did confirm that she is careful about taking that at least an hour away from her other medications.    She started to try to eat something for breakfast.  Currently she is just eating crackers, however.    She did not start the Metamucil.    She is on Protonix twice daily and Zantac once a day now.    Her GI symptoms have improved considerably, now just mild dyspepsia and nausea.  She is eating much better.  Weight stabilized.    No blood in stool or melena.  No constipation or diarrhea.    She did speak with the pharmacist about her diabetes.  She is now on 7030 insulin 10 units in the morning and 12 units in the evening.  February 2019 hemoglobin A1c 6.9%.  She denies any low blood sugars.    She will be seeing endocrinology on April 12.  She is having problems with her continuous glucometer but is planning to set that up again.  Her blood sugar this morning was 128.  Blood sugars generally between 110 and 190.  No blood sugars above 200 in the last week.    He does have a slow healing ulcer on her left toe.   She saw vascular clinic in February but did not proceed with stenting at that time.  Angiogram did show severe multivessel disease to the left leg.  There is no longer an ulcer just a callus now, but slow healing.  She has an appointment to see them soon.  No leg claudication.    She does have some moderate foot neuropathy.  No ulcers on the bottom of her feet.    Coronary artery disease with a bypass in 2011.  December 2018 nuclear stress test negative for ischemia and ejection fraction 55%.  Saw cardiology on March 22.  Her blood pressure was borderline low at that time and he had discussed going down on hydralazine to 3 times a day instead of 4 times a day.  Apparently patient did not remember that and still is taking it 4 times a day.    She checks her blood pressure daily and blood pressures are running 117/ 7/59.  She denies any orthostasis type dizziness.    Still on amlodipine 10 mg a day, Bumex 1 mg a day, carvedilol 12.5 mg twice daily, in addition to the hydralazine 50 mg 4 times daily.    No lower extremity edema.    Patient is status post renal transplant.  End-stage renal failure secondary to medullary sponge kidney.  She is on Imuran, cyclosporine and daily prednisone.  Will be seeing her transplant team soon.    Chronic renal insufficiency with a stable creatinine last month of 2.1.  Normal liver test last month.  Hemoglobin stable 11.4.    Quit smoking in 1984.  No new cough.  No swallowing difficulty.    Sleep apnea, compliant with CPAP.    No palpitations or history of arrhythmia.    He does have a past history of stroke.  I reviewed the MRI of her brain from earlier this month with patient.  Old left occipital stroke and old left cerebellar stroke.  No acute infarct.    She did see neurology recently.  She continues on the same Keppra.  No recent seizure in the past 4 years.    She does have mild to moderate left internal carotid artery stenosis seen on the MRA.  Previous carotid ultrasound  in January this year did show bilateral 50-69% carotid artery stenosis.  There is no high-grade intra-cerebral arterial stenosis on MRA, just mild irregularities in the arteries.    Patient is on a full dose aspirin 325 mg a day.  Patient does wish to reduce the dose of aspirin down to 81 mg a day because of her gastritis.    Patient continues on Crestor 5 mg a day.  She has not tolerated higher doses in the past.    August 2018 LDL 65.  HDL 64.    January 2019 OBDULIO showed a questionable mitral valve vegetation.  Saw cardiology earlier this month.  Infectious disease previously had not thought that patient had infectious endocarditis.  Previous blood cultures were negative.  There is been no fever or infectious symptoms recently.  Cardiology did see patient earlier this month and mentioned consideration for repeat echo this fall.    Compliant with CPAP for sleep apnea.    PMHx:    Past Medical History:   Diagnosis Date     Adrenal insufficiency (H)      Anemia      Anemia     Created by Conversion      Anxiety      Arthritis      Ataxia 5/12/2015     CAD (coronary artery disease)      Cardiac pacemaker, dual, in situ 12/7/2016    [employer] Revo MRI DOI: 12/15/2011 Dr. Aishwarya Treviño     Chronic Diarrhea Of Unknown Origin     Created by Conversion      Chronic Gout     Created by Conversion  Replacement Utility updated for latest IMO load     Chronic Reflux Esophagitis     Created by Conversion      Congestive Heart Failure     Created by Conversion  Replacement Utility updated for latest IMO load     Coronary Artery Stenosis     Created by Conversion [facility] Annotation: Feb 27 2011 11:37PM - Alina Zapien: s/p CABGx4  1/11  Replacement Utility updated for latest IMO load     CVA (cerebral vascular accident) (H)      Depression      Diabetic Peripheral Neuropathy     Created by Conversion      DM (diabetes mellitus) (H)      Dysthymic disorder     Created by Conversion      End Stage Renal Disease     Created  by Conversion      Epilepsy (H)      ESRD (end stage renal disease) (H)      Essential Thrombocytosis     Created by Conversion      History of renal transplant      Hyperlipidemia      Hypertension      Hypertension     Created by Conversion  Replacement Utility updated for latest IMO load     Insomnia     Created by Conversion      Ischemic Stroke     Created by Conversion  Replacement Utility updated for latest IMO load     Medullary Sponge Kidney Bilaterally     Created by Conversion      Mixed hyperlipidemia     Created by Conversion      Morbid obesity (H) 8/29/2018     Neuropathy (H)      Nonintractable epilepsy without status epilepticus, unspecified epilepsy type (H) 8/29/2018     CULLEN on CPAP     Created by Conversion      Osteoarthritis     Created by Conversion  Replacement Utility updated for latest IMO load     Renal Transplant Recipient     Created by Conversion      Sensorineural hearing loss     Created by Conversion  Replacement Utility updated for latest IMO load     Thrombophlebitis Of Deep Vessels Of The Lower Extremity     Created by Conversion      Type 2 diabetes mellitus (H)     Created by Conversion      Vertigo      PSHx:    Past Surgical History:   Procedure Laterality Date     CARDIAC PACEMAKER PLACEMENT       HYSTERECTOMY      Age 28     IR EXTREMITY ANGIOGRAM LEFT  1/30/2019     NEPHRECTOMY TRANSPLANTED ORGAN  06/2011     CO APPENDECTOMY      Description: Appendectomy;  Recorded: 11/13/2007;     CO CABG, VEIN, SINGLE      Description: CABG (CABG);  Proc Date: 01/26/2011;  Comments: x 4     CO INS NEW/RPLC PRM PACEMAKER W/TRANSV ELTRD VENTR      Description: Permanent Pacemaker Placement;  Recorded: 08/01/2012;     CO TOTAL ABDOM HYSTERECTOMY      Description: Total Abdominal Hysterectomy;  Recorded: 11/13/2007;     CO TOTAL KNEE ARTHROPLASTY      Description: Total Knee Arthroplasty;  Recorded: 11/13/2007;     Immunizations:   Immunization History   Administered Date(s) Administered      DT (pediatric) 01/01/1992     Influenza [id number], 01/25/2010     Influenza high dose, seasonal 10/12/2015, 09/28/2016, 09/26/2017, 08/29/2018     Influenza, inj, historic,unspecified 10/23/2007, 10/27/2008, 11/09/2009     Pneumo Conj 13-V (2010&after) 10/12/2015     Pneumo Polysac 23-V 10/01/1996, 10/27/2008     Td,adult,historic,unspecified 08/08/2002     Tdap 06/30/2016, 11/05/2018     ZOSTER, LIVE 07/03/2012       ROS A comprehensive review of systems was performed and was otherwise negative    Medications, allergies, and problem list were reviewed and updated    Exam  /60 (Patient Site: Right Arm, Patient Position: Sitting, Cuff Size: Adult Regular)   Pulse 60   Resp 16   Wt 194 lb 8 oz (88.2 kg)   LMP 07/23/1974   BMI 35.01 kg/m    Alert and oriented x3.  Moderate overweight.  Mildly unsteady gait but was able to clamp on the exam table.  Pupils and irises equal and reactive and no jaundice.  Some mild postnasal drip pharyngitis.  No thrush or diffuse pharyngitis.  Remaining teeth in adequate condition.  No JVD.  No cervical or supraclavicular adenopathy.  Lungs are clear to auscultation, no wheezing or crackles.  Just mildly reduced respiratory excursion with her obesity and small stature.    Heart is regular with a slight 1 out of 6 to 2 out of 6 systolic murmur at the right upper sternal border but otherwise I did not hear a murmur.  No gallop or rub.    No ankle edema.    Abdomen shows some very minimal epigastric tenderness but otherwise nontender.  Moderately overweight.    Assessment/Plan  1. Gastritis without bleeding, unspecified chronicity, unspecified gastritis type  Significant improvement.  EGD consistent with moderate gastritis, likely from bile reflux and not eating meals on a regular basis.    Continue the Protonix 40 mg twice daily.  With her symptoms better, she can discontinue the Zantac.    Continue Carafate 3 times a day at this time.  Take at least 1 hour apart from other  medications.  As her symptoms improve with time, plan to wean off of the Carafate or use as needed.    I stressed the importance of regular eating and not skipping meals.    2. Type 2 diabetes mellitus with foot ulcer, with long-term current use of insulin (H)  Improving blood sugars.  Hemoglobin A1c well controlled last month.  Some fluctuations in her blood sugars with not eating regular meals, tends to eat a large meal in the evening.    She is been working with a nutritionist on her insulin dosing and meal planning.    Patient states she seen diabetic educator and does not want to speak with them again about meal planning.    Sees endocrinology on April 12.    Continue the 7030 insulin 10 units in the morning and 12 units in the evening.  Patient was told of any low blood sugars occur, to contact clinic immediately for insulin dose adjustment.    I did discuss improvements in breakfast food items, instead of the crackers she plans to eat Cheerios with milk.  She declined referral to diabetic educator to discuss diet.    3. Renal Transplant Recipient  Management per her renal transplant team.    Creatinine was stable on check last month  Patient is on Fosamax 35 mg a week for osteoporosis prevention with her chronic prednisone.    4. Atherosclerosis of native artery of left lower extremity with ulceration of other part of foot (H)  Asymptomatic since bypass in 2011.    With her recent gastritis issue patient does wish to lower her dose of aspirin.  I did discuss that lower dose of aspirin may not reduce risk of stroke and heart attack as much as a full dose of aspirin.  About a low-dose of aspirin may reduce her gastritis symptoms.    Patient does choose to lower the dose of aspirin to 81 mg a day.  - aspirin 81 MG EC tablet; Take 1 tablet (81 mg total) by mouth daily.    She does not tolerate more than 5 mg of Crestor, but cholesterol was well controlled previously.    Increase regular walking activity.  Patient  plans to start some physical therapy with her recent deconditioning.    6-month follow-up with cardiology plan.    Patient did have a questionable mitral valve finding, with concern over vegetation at the time.  No evidence of endocarditis.  Cardiology did see patient earlier this month, possibly consider repeat cardiac echo this fall.    5. CULLEN on CPAP  Compliant with CPAP    6. Hypertension  Well-controlled on current medications.  Patient prefers to continue the hydralazine at 4 times a day.  See patient instructions, in case of low blood pressures can go to 3 times a day.    7. Carotid stenosis, bilateral  Medical management same as her coronary artery disease above.    Carotid artery stenosis appears stable on MRA that was done earlier this month.  Consider recheck carotid ultrasound in 1 year.    Peripheral vascular disease, severe in the left leg with poorly healing toe ulcer, now a small callus.  I did examine the foot today, there is no secondary infection it looks like the thickened area of skin is going to fall off.  It is slowly healing and normally.  She is going to follow-up in the vascular clinic with consideration for stenting of the lower extremity.    8. Epigastric pain  Significantly better.  Consistent with gastritis, plan as above    9. Benign Essential Hypertension  As above  - hydrALAZINE (APRESOLINE) 25 MG tablet; Take 2 tablets (50 mg total) by mouth 4 (four) times a day.  Dispense: 270 tablet; Refill: 2    10. Unsteady gait  Global deconditioning.  - Ambulatory referral to Adult PT- Internal    11. History of stroke  See MRI findings which were reviewed with patient, as above.  - Ambulatory referral to Adult PT- Internal    History of seizure but no recurrence for over 4 years.  Just saw neurology recently.  Continue on current Keppra.    History of depression, controlled and stable on Effexor and trazodone.  She was given hydroxyzine to use at night.    Return in 7 weeks (on 5/14/2019) for  Recheck.   Patient Instructions   Reduce dose of aspirin to 81 mg a day.    Continue hydralazine at 4 times a day.  If you have lightheaded dizzy spells, and/or blood pressures less than 110/60, I would have you reduce that to 3 times a day.    You will be contacted by physical therapy to set up an appointment here.    Stop ranitidine, as long as her stomach continues to feel better.    Eat at least 3 regular meals a day.  Eat Cheerios with milk, or healthy breakfast.  Avoid Ritz crackers.    Make an appoint with the vascular clinic to discuss your left leg arterial circulation issue.    Meet with endocrinology next month as planned regarding your diabetes.  If you have any low blood sugars less than 80, contact clinic right away to adjust your insulin.    Continue on sucralfate at this time as you are taking it.  As your stomach continues to improve, and your eating more regular meals during the day, you will likely be able to stop the sucralfate in the future.    See me on May 14 for follow-up appointment, as planned.    Mika Ha MD  I spent a total time with patient of over 40 minutes and over 50% coord care.  Time all face to face.  Extensive review of medical record.  Review of multiple radiologic testing and previous lab work.    Current Outpatient Medications   Medication Sig Dispense Refill     alendronate (FOSAMAX) 35 MG tablet Take 35 mg by mouth once a week. Weekly on Sundays. Take in the morning with a full glass of water, on an empty stomach, and do not take anything else by mouth or lie down for the next 30 min.  Sundays             amLODIPine (NORVASC) 10 MG tablet Take 1 tablet (10 mg) by mouth daily. 90 tablet 3     aspirin 81 MG EC tablet Take 1 tablet (81 mg total) by mouth daily.       azaTHIOprine (IMURAN) 50 mg tablet Take 100 mg by mouth bedtime.        bumetanide (BUMEX) 2 MG tablet Take 0.5 tablets (1 mg total) by mouth daily. 20 tablet 0     carvedilol (COREG) 12.5 MG tablet Take 1  "tablet (12.5 mg) by mouth twice daily with meals. 180 tablet 3     cholecalciferol, vitamin D3, 2,000 unit Tab Take 2,000 Units by mouth daily.       cyanocobalamin, vitamin B-12, 2,500 mcg Tab Take 2,500 mcg by mouth every other day.       cycloSPORINE modified (GENGRAF) 25 MG capsule Take 50 mg by mouth 2 (two) times a day.        flash glucose scanning reader (FREESTYLE FELA 14 DAY READER) Misc Use 1 application As Directed daily. 1 each 0     flash glucose sensor (FREESTYLE FELA 14 DAY SENSOR) Kit Use 1 application As Directed every 14 (fourteen) days. 2 kit 11     hydrALAZINE (APRESOLINE) 25 MG tablet Take 2 tablets (50 mg total) by mouth 4 (four) times a day. 270 tablet 2     hydrOXYzine pamoate (VISTARIL) 25 MG capsule Take 1 capsule (25 mg total) by mouth at bedtime as needed for anxiety or other (insomnia). 30 capsule 3     insulin NPH and regular human (NOVOLIN 70-30 FLEXPEN U-100) 100 unit/mL (70-30) pen Inject 5 units SQ before breakfast and 10 units SQ before dinner. 5 adj dose pen 3     levETIRAcetam (KEPPRA) 500 MG tablet One tablet in the morning. Two tablets in the afternoon (Patient taking differently: One tablet in the morning. Two tablets at bedtime      ) 270 tablet 3     pantoprazole (PROTONIX) 40 MG tablet Take 40 mg by mouth 2 (two) times a day.       pen needle, diabetic (BD ULTRA-FINE TWILA PEN NEEDLES) 32 gauge x 5/32\" Ndle Use 1 per day. 200 each 4     predniSONE (DELTASONE) 5 MG tablet Take 5 mg by mouth daily.       rosuvastatin (CRESTOR) 5 MG tablet Take 1 tablet (5 mg) by mouth at bedtime. 90 tablet 3     sucralfate (CARAFATE) 1 gram tablet Take 1 g by mouth 4 (four) times a day before meals and at bedtime.  1     traZODone (DESYREL) 50 MG tablet Take 3 tablets (150 mg total) by mouth at bedtime. (Patient taking differently: Take 100 mg by mouth at bedtime.       ) 180 tablet 1     venlafaxine (EFFEXOR-XR) 150 MG 24 hr capsule Take 1 capsule (150 mg) by mouth daily. 90 capsule 3 "     No current facility-administered medications for this visit.      Allergies   Allergen Reactions     Lisinopril Cough     Resolved after discontinuation     Mold/Mildew      Latex Rash     Social History     Tobacco Use     Smoking status: Former Smoker     Packs/day: 1.50     Years: 20.00     Pack years: 30.00     Smokeless tobacco: Never Used   Substance Use Topics     Alcohol use: Yes     Comment: occasional     Drug use: No

## 2021-05-28 ENCOUNTER — RECORDS - HEALTHEAST (OUTPATIENT)
Dept: ADMINISTRATIVE | Facility: CLINIC | Age: 76
End: 2021-05-28

## 2021-05-28 NOTE — PROGRESS NOTES
[facility] clinic Follow Up Note    Odalys Miles   73 y.o. female    Date of Visit: 5/14/2019    Chief Complaint   Patient presents with     Diabetes     Fasting - Concerns regarding high blood sugars     Subjective  Odalys is here with her friend, [place].  Here for discussion of diabetes with significant increase in her blood sugars since 1 May.    Also follow-up on multiple medical problems.    Extensive past medical history outlined in my April 16, 2019 note.    Status post renal transplant with chronic renal insufficiency and stable creatinine at 2.3 in March.    She is on Imuran, Gengraf and 5 mg of prednisone.  Stable dose of prednisone.  She has not changed her immunosuppression.  She has her appointment with the transplant team coming up later this month.    No lower extremity edema.    She has been having problems with recent gastritis and had an EGD in March with moderate gastritis.  Negative H. pylori and no ulcer.  That is significantly better with improved diet.  On Protonix twice daily and Carafate 2-3 times a day.  She is trying to eat breakfast more eating better meals.    She is also going to [employer] 3 times a week and using the recumbent bike and treadmill and doing some stretching.  She is improving with her exertional ability there.    No chest pain or chest pressure with exercise.  Past history of coronary bypass in 2011.  December 2018 stress test negative with ejection fraction 55%.  Saw cardiology in March, no changes in routine follow-up at that time.    Continues on low-dose aspirin and Crestor 5 mg.  She has not tolerated higher doses of Crestor.  But in January her cholesterol is well controlled with an LDL of 48 and HDL 43.    Hypertension has been well controlled and no orthostasis.  Continues on same medication.    No recurrent seizures on Keppra.  Status post left occipital and left cerebellar stroke.  She follows with neurology for her seizure disorder.    New issue  with hyperglycemia since early in the month.  I reviewed her blood sugars in detail and they were quite well controlled in April, running in the low to mid 100s.  A fairly sharp transition point in early May where her blood sugar started going up into the 1  range.    She states this was the time where she got a new prescription from her pharmacy with her 70/30 insulin pens.  She states they were the same pens and she did not change the dosing.  She still on 10 units in the morning and 12 units with supper.    She is still using her continuous glucometer.  Her hemoglobin A1c in February was 6.9%.    I did review the endocrinology note from April.  Plan was for a 3-month follow-up, but apparently with scheduling issues she does not have an appointment until October.    He did double check her continuous glucometer with fingersticks, fingersticks are running somewhat lower but still high.    She does have slow healing wound on her left first toe, that is almost healed except for a small scab.  She saw the vascular clinic, I reviewed the note today from April 18, given a 3-month follow-up with wound care clinic, no surgery planned.  Has an appointment with podiatry on May 29.    I did review the GLEN from April 2019 with left leg 0.44.  Right leg 1.07.  Blood flow was felt to be adequate on the left leg and no surgery planned.    She quit smoking back in 1984.    Compliant with CPAP, that still working well.    Chronic anxiety and depression well-controlled with Effexor  mg a day plus trazodone.  PHQ 9 today with a score of 3.    PMHx:    Past Medical History:   Diagnosis Date     Adrenal insufficiency (H)      Anemia      Anemia     Created by Conversion      Anxiety      Arthritis      Ataxia 5/12/2015     CAD (coronary artery disease)      Cardiac pacemaker, dual, in situ 12/7/2016    [employer] Revo MRI DOI: 12/15/2011 Dr. Aishwarya Trveiño     Chronic Diarrhea Of Unknown Origin     Created by Conversion       Chronic Gout     Created by Conversion  Replacement Utility updated for latest IMO load     Chronic Reflux Esophagitis     Created by Conversion      Congestive Heart Failure     Created by Conversion  Replacement Utility updated for latest IMO load     Coronary Artery Stenosis     Created by Conversion [facility] Annotation: Feb 27 2011 11:37PM - Alina Zapien: s/p CABGx4  1/11  Replacement Utility updated for latest IMO load     CVA (cerebral vascular accident) (H)      Depression      Diabetic Peripheral Neuropathy     Created by Conversion      DM (diabetes mellitus) (H)      Dysthymic disorder     Created by Conversion      End Stage Renal Disease     Created by Conversion      Epilepsy (H)      ESRD (end stage renal disease) (H)      Essential Thrombocytosis     Created by Conversion      History of renal transplant      Hyperlipidemia      Hypertension      Hypertension     Created by Conversion  Replacement Utility updated for latest IMO load     Insomnia     Created by Conversion      Ischemic Stroke     Created by Conversion  Replacement Utility updated for latest IMO load     Medullary Sponge Kidney Bilaterally     Created by Conversion      Mixed hyperlipidemia     Created by Conversion      Morbid obesity (H) 8/29/2018     Neuropathy (H)      Nonintractable epilepsy without status epilepticus, unspecified epilepsy type (H) 8/29/2018     CULLEN on CPAP     Created by Conversion      Osteoarthritis     Created by Conversion  Replacement Utility updated for latest IMO load     Renal Transplant Recipient     Created by Conversion      Sensorineural hearing loss     Created by Conversion  Replacement Utility updated for latest IMO load     Thrombophlebitis Of Deep Vessels Of The Lower Extremity     Created by Conversion      Type 2 diabetes mellitus (H)     Created by Conversion      Vertigo      PSHx:    Past Surgical History:   Procedure Laterality Date     CARDIAC PACEMAKER PLACEMENT       HYSTERECTOMY       Age 28     IR EXTREMITY ANGIOGRAM LEFT  1/30/2019     NEPHRECTOMY TRANSPLANTED ORGAN  06/2011     MD APPENDECTOMY      Description: Appendectomy;  Recorded: 11/13/2007;     MD CABG, VEIN, SINGLE      Description: CABG (CABG);  Proc Date: 01/26/2011;  Comments: x 4     MD INS NEW/RPLC PRM PACEMAKER W/TRANSV ELTRD VENTR      Description: Permanent Pacemaker Placement;  Recorded: 08/01/2012;     MD TOTAL ABDOM HYSTERECTOMY      Description: Total Abdominal Hysterectomy;  Recorded: 11/13/2007;     MD TOTAL KNEE ARTHROPLASTY      Description: Total Knee Arthroplasty;  Recorded: 11/13/2007;     Immunizations:   Immunization History   Administered Date(s) Administered     DT (pediatric) 01/01/1992     Influenza [id number], 01/25/2010     Influenza high dose, seasonal 10/12/2015, 09/28/2016, 09/26/2017, 08/29/2018     Influenza, inj, historic,unspecified 10/23/2007, 10/27/2008, 11/09/2009     Pneumo Conj 13-V (2010&after) 10/12/2015     Pneumo Polysac 23-V 10/01/1996, 10/27/2008     Td,adult,historic,unspecified 08/08/2002     Tdap 06/30/2016, 11/05/2018     ZOSTER, LIVE 07/03/2012       ROS A comprehensive review of systems was performed and was otherwise negative    Medications, allergies, and problem list were reviewed and updated    Exam  /60 (Patient Site: Right Arm, Patient Position: Sitting, Cuff Size: Adult Large)   Pulse 64   Wt 192 lb 4.8 oz (87.2 kg)   LMP 07/23/1974   BMI 34.61 kg/m    She appears well.  Alert and oriented x3 in no jaundice.  Lungs are clear.  Heart is regular without murmur.  No ankle edema.  Abdomen is nontender.  I did examine the injection sites and I did not feel any hard scar or keloid that she is been injecting into.    Assessment/Plan  1. Type 2 diabetes mellitus with foot ulcer, with long-term current use of insulin (H)  Unclear etiology for sudden significant increase in blood sugars.  She denies changes in diet and exercise, if anything she is doing more exercise  recently.    She did change pens at that time, I told her to return home to double check that she has the correct pen.  She can use a different pen out of the box.    Increase insulin dose slightly to 12 units in the morning and 14 units with supper.    See the diabetic educator as soon as possible to review her glucometer and diet.    Recheck hemoglobin A1c today.    I emphasized importance of rotating her injection sites for insulin.    She has a follow-up appoint with endocrinology in October.    She denies any change in her prednisone or immune suppression medications.  - Ambulatory referral to Diabetes Education Program (CDE)  - Glycosylated Hemoglobin A1c    2. Gastritis without bleeding, unspecified chronicity, unspecified gastritis type  Significantly better with improved diet.  Continue Protonix twice daily and Carafate.  EGD in March.    We had discussed taking a daily Citrucel or sugar-free Metamucil in the long run to replace Carafate, in the past.  We did not get a chance to discuss this today.    3. Peripheral vascular disease (H)  Toe ulcer is slowly healing.  Continue medical management.  Surgical intervention not planned.  Follow-up with the vascular clinic July 18    4. Atherosclerosis of native artery of left lower extremity with ulceration of other part of foot (H)  Asymptomatic.  Continue medical management with aspirin and Crestor.  She has not tolerated higher doses of Crestor, but her cholesterol levels were well controlled in January.  Rechecking today.  - Lipid [place]    5. Carotid stenosis, bilateral, with history of stroke  Asymptomatic and medical management as above    6. Renal Transplant Recipient  Follow-up with her renal transplant team in May.  Immunosuppression managed by the renal transplant team.    7. CULLEN on CPAP  Compliant with CPAP    8. Hypertension  Borderline high systolic.  Continue on current medications at this time.  Follow-up with transplant team later this  month.    9. Medication monitoring encounter    - Comprehensive Metabolic Panel    10. Type 2 diabetes mellitus without complication, with long-term current use of insulin (H)  As above  - insulin NPH and regular human (NOVOLIN 70-30 FLEXPEN U-100) 100 unit/mL (70-30) pen; Inject 12 units SQ before breakfast and 14 units SQ before dinner.  Dispense: 5 adj dose pen; Refill: 3    History of seizure disorder on Keppra, routine follow-up with neurology    History of dysthymia, controlled on Effexor and trazodone.  PHQ 9 score of 3.    Return in about 2 weeks (around 5/28/2019) for Recheck.   Patient Instructions   Because of your increased blood sugars, see the diabetic educator soon to review your glucometer and insulin dosing.    Rotate the sites where you inject insulin to avoid injecting in the same place every time.    Increase your insulin slightly to 12 units in the morning and 14 units with supper.    Switch to a different insulin pen at this time.    Continue regular walking exercise.    Follow-up in 2 to 3 weeks with the nurse practitioner.  Follow-up with me in July.    Mika Ha MD        Current Outpatient Medications   Medication Sig Dispense Refill     alendronate (FOSAMAX) 35 MG tablet Take 35 mg by mouth once a week. Weekly on Sundays. Take in the morning with a full glass of water, on an empty stomach, and do not take anything else by mouth or lie down for the next 30 min.  Sundays             amLODIPine (NORVASC) 10 MG tablet Take 1 tablet (10 mg) by mouth daily. 90 tablet 3     aspirin 81 MG EC tablet Take 1 tablet (81 mg total) by mouth daily.       azaTHIOprine (IMURAN) 50 mg tablet Take 100 mg by mouth bedtime.        bumetanide (BUMEX) 2 MG tablet Take 0.5 tablets (1 mg total) by mouth daily. 20 tablet 0     cholecalciferol, vitamin D3, 2,000 unit Tab Take 2,000 Units by mouth daily.       cyanocobalamin, vitamin B-12, 2,500 mcg Tab Take 2,500 mcg by mouth every other day.       cycloSPORINE  "modified (GENGRAF) 25 MG capsule Take 50 mg by mouth 2 (two) times a day.        flash glucose scanning reader (FREESTYLE FELA 14 DAY READER) Misc Use 1 application As Directed daily. 1 each 0     flash glucose sensor (FREESTYLE FELA 14 DAY SENSOR) Kit Use 1 application As Directed every 14 (fourteen) days. 2 kit 11     hydrALAZINE (APRESOLINE) 25 MG tablet Take 2 tablets (50 mg total) by mouth 4 (four) times a day. 270 tablet 2     hydrOXYzine pamoate (VISTARIL) 25 MG capsule Take 1 capsule (25 mg total) by mouth at bedtime as needed for anxiety or other (insomnia). 30 capsule 3     insulin NPH and regular human (NOVOLIN 70-30 FLEXPEN U-100) 100 unit/mL (70-30) pen Inject 12 units SQ before breakfast and 14 units SQ before dinner. 5 adj dose pen 3     levETIRAcetam (KEPPRA) 500 MG tablet One tablet in the morning. Two tablets in the afternoon (Patient taking differently: One tablet in the morning. Two tablets at bedtime      ) 270 tablet 3     pantoprazole (PROTONIX) 40 MG tablet Take 40 mg by mouth 2 (two) times a day.       pen needle, diabetic (BD ULTRA-FINE TWILA PEN NEEDLES) 32 gauge x 5/32\" Ndle Use 1 per day. 200 each 4     predniSONE (DELTASONE) 5 MG tablet Take 5 mg by mouth daily.       rosuvastatin (CRESTOR) 5 MG tablet Take 1 tablet (5 mg) by mouth at bedtime. 90 tablet 3     sucralfate (CARAFATE) 1 gram tablet Take 1 g by mouth 4 (four) times a day before meals and at bedtime.  1     traZODone (DESYREL) 50 MG tablet Take 3 tablets (150 mg total) by mouth at bedtime. (Patient taking differently: Take 100 mg by mouth at bedtime.       ) 180 tablet 1     venlafaxine (EFFEXOR-XR) 150 MG 24 hr capsule Take 1 capsule (150 mg) by mouth daily. 90 capsule 3     carvedilol (COREG) 12.5 MG tablet Take 1 tablet (12.5 mg) by mouth twice daily with meals. 180 tablet 3     No current facility-administered medications for this visit.      Allergies   Allergen Reactions     Lisinopril Cough     Resolved after " discontinuation     Mold/Mildew      Latex Rash     Social History     Tobacco Use     Smoking status: Former Smoker     Packs/day: 1.50     Years: 20.00     Pack years: 30.00     Smokeless tobacco: Never Used   Substance Use Topics     Alcohol use: Yes     Comment: occasional     Drug use: No

## 2021-05-28 NOTE — PROGRESS NOTES
[facility] Daily Progress     Patient Name: Odalys Miles  Date: 4/23/2019  Visit #: 3  Referring provider: Mika Ha MD  Visit Diagnosis:     ICD-10-CM    1. Leg weakness, bilateral R29.898    2. Bilateral arm weakness R29.898    3. Poor balance R26.89    4. Physical deconditioning R53.81          Assessment:     Pt challenged and fatigued with exercise tasks today.  Pt able to recover with rest.      HEP/POC compliance is  fair .  Patient demonstrates understanding/independence with home program.  Patient is benefitting from skilled physical therapy and is making steady progress toward functional goals.    Goal Status:  Pt. will demonstrate/verbalize independence in self-management of condition in : 6 weeks  Pt. will be able to walk : for community mobility;with less difficulty;in 6 weeks    Patient will increase : LEFS score;Recinos score;for improved quality of life;in 6 weeks  Pt will: be able to perform ADL's and other tasks with increased stamina and decreased LOB; in 6 weeks       Plan / Patient Education:     Continue with initial plan of care.  Progress with home program as tolerated.    Subjective:     Pain Rating: N/A     Pt reports that she has continued to go to the gym and she is doing the treadmill for 10 minutes and another bike type of machine for 8 minutes as well as her PT exercises.  She is feeling pretty sore from all of this activity.        Feeling off balanced lately.       Objective:     Pt tolerates the exercises with moderate cueing and adequate rest.    Pt with bilateral LE and UE weakness and decreased stamina.  This is being addressed in her HEP.      Pt with impaired balance and unsteady gait.  CGA - Min A with balance tasks today.        Current Exercises:  Exercise #1: Nu-step Warm-up  Comment #1: x5 min RL: 3-4   Exercise #2: Seated LAQ   Comment #2: Seated H/S curl - orange Bx15   Exercise #3: Sit to Stand (sitting on airex in chair) x 12   Comment #3: Standing  "March   Exercise #4: Standing hip ABD - seated with orange band Bx15   Comment #4: Heel/toe raises Bx20  Exercise #5: Corner Balance  Comment #5: EC x30\"; Tandem R, L x30\"; airex x30\"; line walk - fwd tandem, back walk, side steps, high knees 2x20 feet   Exercise #6: Bicep curl to overhead press Bx15 - stick with 1#  Comment #6: Scaption raise to 90 - Bx15 - stick with 1#      Treatment Today     TREATMENT MINUTES COMMENTS   Evaluation     Self-care/ Home management     Manual therapy     Neuromuscular Re-education 12 See flow sheet    Therapeutic Activity     Therapeutic Exercises 18 See flow sheet    Gait training     Modality__________________                Total 30    Blank areas are intentional and mean the treatment did not include these items.       Lorie Neal, PT   4/23/2019    "

## 2021-05-28 NOTE — PROGRESS NOTES
[facility] Daily Progress     Patient Name: Odalys Miles  Date: 5/14/2019  Visit #: 5  Referring provider: Mika Ha MD  Visit Diagnosis:     ICD-10-CM    1. Leg weakness, bilateral R29.898    2. Bilateral arm weakness R29.898    3. Poor balance R26.89    4. Physical deconditioning R53.81          Assessment:     Pt progressing well with skilled PT and demonstrates increasing strength, stamina, and balance.  PT still with some deficits due to long term deconditioning. Pt is demonstrating steady improvements.      HEP/POC compliance is  good .  Patient demonstrates understanding/independence with home program.  Patient is benefitting from skilled physical therapy and is making steady progress toward functional goals.    Goal Status:  Pt. will demonstrate/verbalize independence in self-management of condition in : 6 weeks;Progressing toward  Pt. will be able to walk : for community mobility;with less difficulty;in 6 weeks;Progressing toward    Patient will increase : LEFS score;Recinos score;for improved quality of life;in 6 weeks;Progressing toward  Pt will: be able to perform ADL's and other tasks with increased stamina and decreased LOB; in 6 weeks; Progressing toward       Plan / Patient Education:     Continue with initial plan of care.  Progress with home program as tolerated.    Subjective:     Pain Rating: N/A     Pt reports that she is now up to walking on the treadmill for 11 minutes and doing the bike for 11 minutes.   This is an improvement from before.      She feels like sometimes she feels better and stronger and other times she really feels her deficits.         Feeling off balanced lately. although her dizziness has been a long term thing.      She also has chronic low back pain that continues to be sore this past week.       Pt reports that she is consistent with her HEP exercises and she goes to the gym     Objective:     Pt tolerates the exercises with moderate cueing and adequate  "rest.    Pt slowly improving her bilateral LE and UE strength and increasing her stamina.  This is being addressed in her HEP.      Pt with improving balance and gait.  CGA  with balance tasks today.    Current Exercises:  Exercise #1: Nu-step Warm-up  Comment #1: x4 min RL: 5  Exercise #2: Seated LAQ Bx10 with 10\" hold   Comment #2: Seated H/S curl - orange   Exercise #3: Sit to Stand - slightly raised table 2x10   Comment #3: Standing March - orange Bx15   Exercise #4: Standing hip ABD - seated with orange band Bx15   Comment #4: Heel/toe raises   Exercise #5: Corner Balance  Comment #5: Airex: ball bounce and catch x90\" each; ball pass R, L up/down x10 each; NBOS x30\";  EX x30\"; tandem R, L x30\"  Exercise #6: Bicep curl to overhead press B 2x10- 3#B  Comment #6: Scaption raise to 90 - B 2x15 - stick with 1#      Treatment Today     TREATMENT MINUTES COMMENTS   Evaluation     Self-care/ Home management     Manual therapy     Neuromuscular Re-education 8 See flow sheet    Therapeutic Activity     Therapeutic Exercises 22 See flow sheet    Gait training     Modality__________________                Total 30    Blank areas are intentional and mean the treatment did not include these items.       Lorie Neal, PT   5/14/2019  "

## 2021-05-28 NOTE — PATIENT INSTRUCTIONS - HE
Because of your increased blood sugars, see the diabetic educator soon to review your glucometer and insulin dosing.    Rotate the sites where you inject insulin to avoid injecting in the same place every time.    Increase your insulin slightly to 12 units in the morning and 14 units with supper.    Switch to a different insulin pen at this time.    Continue regular walking exercise.    Follow-up in 2 to 3 weeks with the nurse practitioner.  Follow-up with me in July.

## 2021-05-28 NOTE — TELEPHONE ENCOUNTER
Refill Approved    Rx renewed per Medication Renewal Policy. Medication was last renewed on 1/16/19.    Essie Melton, [facility] Triage/Med Refill 5/16/2019     Requested Prescriptions   Pending Prescriptions Disp Refills     venlafaxine (EFFEXOR-XR) 150 MG 24 hr capsule [Pharmacy Med Name: Venlafaxine HCl  MG Oral Capsule Extended Release 24 Hour] 90 capsule 3     Sig: Take 1 capsule (150 mg) by mouth daily.       Venlafaxine/Desvenlafaxine Refill Protocol Passed - 5/15/2019  3:36 PM        Passed - LFT or AST or ALT in last year     Albumin   Date Value Ref Range Status   05/14/2019 3.2 (L) 3.5 - 5.0 g/dL Final     Bilirubin, Total   Date Value Ref Range Status   05/14/2019 0.3 0.0 - 1.0 mg/dL Final     Bilirubin, Direct   Date Value Ref Range Status   05/12/2015 0.3 <=0.5 mg/dL Final     Alkaline Phosphatase   Date Value Ref Range Status   05/14/2019 64 45 - 120 U/L Final     AST   Date Value Ref Range Status   05/14/2019 12 0 - 40 U/L Final     ALT   Date Value Ref Range Status   05/14/2019 13 0 - 45 U/L Final     Protein, Total   Date Value Ref Range Status   05/14/2019 6.0 6.0 - 8.0 g/dL Final                Passed - Fasting lipid cascade in last year     Cholesterol   Date Value Ref Range Status   05/14/2019 159 <=199 mg/dL Final     Triglycerides   Date Value Ref Range Status   05/14/2019 175 (H) <=149 mg/dL Final     HDL Cholesterol   Date Value Ref Range Status   05/14/2019 48 (L) >=50 mg/dL Final     LDL Calculated   Date Value Ref Range Status   05/14/2019 76 <=129 mg/dL Final     Patient Fasting > 8hrs?   Date Value Ref Range Status   05/14/2019 Yes  Final             Passed - PCP or prescribing provider visit in last year     Last office visit with prescriber/PCP: 2/5/2019 Jamila Lechuga MD OR same dept: 5/14/2019 Mika Ha MD OR same specialty: 5/14/2019 Mika Ha MD  Last physical: 8/29/2018 Last MTM visit: Visit date not found   Next visit within 3 mo: Visit date not found   Next physical within 3 mo: Visit date not found  Prescriber OR PCP: Jamila Lechuga MD  Last diagnosis associated with med order: 1. Mood disorder (H)  - venlafaxine (EFFEXOR-XR) 150 MG 24 hr capsule [Pharmacy Med Name: Venlafaxine HCl  MG Oral Capsule Extended Release 24 Hour]; Take 1 capsule (150 mg) by mouth daily.  Dispense: 90 capsule; Refill: 3    If protocol passes may refill for 12 months if within 3 months of last provider visit (or a total of 15 months).             Passed - Blood Pressure in last year     BP Readings from Last 1 Encounters:   05/14/19 150/60

## 2021-05-28 NOTE — PROGRESS NOTES
[facility] Daily Progress     Patient Name: Odalys Miles  Date: 5/7/2019  Visit #: 4  Referring provider: Mika Ha MD  Visit Diagnosis:     ICD-10-CM    1. Leg weakness, bilateral R29.898    2. Bilateral arm weakness R29.898    3. Poor balance R26.89    4. Physical deconditioning R53.81          Assessment:     Pt progressing well with skilled PT and demonstrates increasing strength, stamina, and balance.  PT still with some deficits due to long term deconditioning. Pt is demonstrating steady improvements.      HEP/POC compliance is  good .  Patient demonstrates understanding/independence with home program.  Patient is benefitting from skilled physical therapy and is making steady progress toward functional goals.    Goal Status:  Pt. will demonstrate/verbalize independence in self-management of condition in : 6 weeks;Progressing toward  Pt. will be able to walk : for community mobility;with less difficulty;in 6 weeks;Progressing toward    Patient will increase : LEFS score;Recinos score;for improved quality of life;in 6 weeks;Progressing toward  Pt will: be able to perform ADL's and other tasks with increased stamina and decreased LOB; in 6 weeks; Progressing toward       Plan / Patient Education:     Continue with initial plan of care.  Progress with home program as tolerated.    Subjective:     Pain Rating: N/A     Pt reports that she typically is pretty fatigues for approximately one hour after her PT exercises.  Pt continues to go to the gym 3x per week.  Pt has been consistent with her HEP exercises.       Feeling off balanced lately. although her dizziness has been a long term thing.  She also has chronic low back pain that is sore today.       Objective:     Pt tolerates the exercises with moderate cueing and adequate rest.    Pt slowly improving her bilateral LE and UE strength and increasing her stamina.  This is being addressed in her HEP.      Pt with improving balance and gait.  CGA   "with balance tasks today.    Current Exercises:  Exercise #1: Nu-step Warm-up  Comment #1: x4 min RL: 5  Exercise #2: Seated LAQ Bx10 with 10\" hold   Comment #2: Seated H/S curl - orange   Exercise #3: Sit to Stand - slightly raised table 2x10   Comment #3: Standing March - orange Bx15   Exercise #4: Standing hip ABD - seated with orange band Bx15   Comment #4: Heel/toe raises   Exercise #5: Corner Balance  Comment #5: Airex: ball bounce and catch x90\" each; ball pass R, L up/down x10 each; NBOS x30\";  EX x30\"; tandem R, L x30\"  Exercise #6: Bicep curl to overhead press B 2x10- 3#B  Comment #6: Scaption raise to 90 - B 2x15 - stick with 1#      Treatment Today     TREATMENT MINUTES COMMENTS   Evaluation     Self-care/ Home management     Manual therapy     Neuromuscular Re-education 12 See flow sheet    Therapeutic Activity     Therapeutic Exercises 18 See flow sheet    Gait training     Modality__________________                Total 30    Blank areas are intentional and mean the treatment did not include these items.       Lorie Neal, PT   5/7/2019  "

## 2021-05-29 ENCOUNTER — RECORDS - HEALTHEAST (OUTPATIENT)
Dept: ADMINISTRATIVE | Facility: CLINIC | Age: 76
End: 2021-05-29

## 2021-05-29 NOTE — TELEPHONE ENCOUNTER
Patient is calling about the second round of Keflex, writer sees from encounter states she should take another round of this, however the RX was not placed.  If she should take another round please send to [facility] pharmacy in [place].

## 2021-05-29 NOTE — PROGRESS NOTES
[facility] Discharge Summary     Patient Name: Odalys Miles  Date: 5/28/2019  Visit #: 8   Referring provider: Mika Ha MD  Visit Diagnosis:     ICD-10-CM    1. Leg weakness, bilateral R29.898    2. Bilateral arm weakness R29.898    3. Poor balance R26.89    4. Physical deconditioning R53.81          Assessment:     HEP/POC compliance is  good .  Patient demonstrates understanding/independence with home program.  Patient is ready for independent sx management and HEP.      Goal Status:  Pt. will demonstrate/verbalize independence in self-management of condition in : 6 weeks;Met  Pt. will be able to walk : for community mobility;with less difficulty;in 6 weeks;Met    Patient will increase : LEFS score;Recinos score;for improved quality of life;in 6 weeks;Met  Pt will: be able to perform ADL's and other tasks with increased stamina and decreased LOB; in 6 weeks; Met      Plan / Patient Education:     Discharge to independent HEP.      Subjective:     Pain Rating: N/A     Pt reports that she is feeling improvements in her strength and balance.  She reports that overall she is feeling much better.      Pt reports that she is now up to walking on the treadmill for 12 minutes and doing the bike for 12 minutes.   This is an improvement from before and she feels more confident in her walking and stability.      Pt reports that she is consistent with her HEP exercises and she goes to the gym.  Pt notes that she is getting less sore from this.      Pt reports that she is feeling up to 75% better and improved.      Objective:     Pt has progressed well with increased strength, balance, stamina and function.      Lower Extremity Strength:  Date: 04/01/19 05/28/19    LE strength/5 Right Left Right Left   Hip Flexion (L1-3) 4- 4-  4+  4+   Hip Extension (L5-S1)           Hip Abduction (L4-5) 4- 4- 5   5   Hip Adduction (L2-3) 4- 4-  5  5   Hip External Rotation           Hip Internal Rotation           Knee  Extension (L3-4) 4 4  5  5   Knee Flexion 4 4  4+  4+   Ankle Dorsiflexion (L4-5) 5 5  5  5   Great Toe Extension (L5)           Ankle Plantar flexion (S1) 4 4  5 5    Abdominals          UE Strength  Date: 04/01/19 05/28/19    Shoulder/Elbow Strength (/5)  Manual Muscle Test (MMT) MMT MMT    Right Left Right Left   Shoulder Flexion 4+ 4+  5  5   Supraspinatus 4+ 4+  5  5   Shoulder Abduction      5  5   Shoulder Extension           Shoulder External Rotation           Shoulder Internal Rotation           Elbow Flexion 4 4  5  5   Elbow Extension 4 4  5  5   Other:           Other:             Balance:  Recinos Balance Scale Total (calculated): 50  Eval score was 39/56     APTA score: 11  Eval score was 5 in 30 seconds       Current Exercises:  Exercise #1: Nu-step Warm-up  Comment #1: x3 min RL: 5  Exercise #2: Seated LAQ   Comment #2: Seated H/S curl - standing Bx15   Exercise #3: Sit to Stand x30 sec = 11 reps   Comment #3: Standing March -- seated Bx15   Exercise #4: Standing hip ABD   Comment #4: Heel/toe raises; supine LTR Bx10; supine bridge Bx15  Exercise #5: Balance:  Comment #5: Recinos Assessment x10 min   Exercise #6: Bicep curl to overhead press   Comment #6: Scaption raise to 90       Treatment Today     TREATMENT MINUTES COMMENTS   Evaluation     Self-care/ Home management     Manual therapy     Neuromuscular Re-education 15 See flow sheet   Reassessment of balance    Therapeutic Activity     Therapeutic Exercises 15 See flow sheet   MMT, reassessment of HEP etc.    Gait training     Modality__________________                Total 30    Blank areas are intentional and mean the treatment did not include these items.       Lorie Neal, PT   5/28/2019

## 2021-05-29 NOTE — PROGRESS NOTES
"Assessment: Odalys is here with her friend [place] today for diabetes education.  Stated she was in and out of the hospital over 3 months.  Of time earlier this year and ever since then her blood sugars have been \"out of whack\".  Stated she feels like they are starting to get back to normal over the last month or so.  She did have an A1c increase from 6.9% in February to 8.5% in May.    She is currently taking Novolin 70/30 insulin 13 units in the morning when she wakes up and 15 units before supper.  Stated she has been rotating her sites since her last discussion with Dr. Ha.  She does use the same meal for both injections daily and we talked about using a new needle for every injection.  She did not bring her pen with her today, will bring to next appointment and we will review administration technique.    She is using a freestyle av sensor system.  Stated she does have trouble getting her sensors on occasionally and does use her One Touch Verio meter as needed.  Her av was downloaded today, last 14 days of data was reviewed.  Over the last 14 days her average has been 167.  The highest time of her day is between 2 PM and 4 PM when her average blood sugar over the last 14 days was 211.  The lowest time of her day is between midnight and 2 AM when her average blood sugar over the last 14 days with 142.  Her target range is set at .  She is in target 46% of the time, and above target 54% of the time.  Lowest blood sugar reading noted to be approximately 100.  She does not have much variation day-to-day.  She does have a definite pattern, her blood sugars are pretty consistent me in the 140s to 150s until about 2:00 in the afternoon when her blood sugars rise above 200.    Odalys does go to the health club to 4 times a week.  She also spends much of her day out going to doctor's appointments and running errands with her friends do.    Odalys does not eat breakfast.  Stated she feels nauseous in the morning. "  She drinks ice water or unsweetened ice tea until about noon.  Stated she does eat lunch and dinner most days, some days she does skip lunch and snack instead.  Although Odalys states she likes salad, stated salad usually causes her to have stomach upset and diarrhea for 2 to 3 days afterward.  Dinner consists of a meat of some type with a starch and vegetable daily.  Her vegetable some days is peas or corn.    All additional questions and concerns addressed.    Plan: Odalys's blood sugars are still above target.  Will increase her morning dose to 15 units.  Also increase her second dose of insulin to 17 units.  Given the increase in her blood sugars approximately 2:00 in the afternoon she will try to take her second dose at this time.  She will follow-up with me in 2 weeks.    Subjective and Objective:      Odalys Miles is referred by Dr. Radha Ha for Diabetes Education.     Lab Results   Component Value Date    HGBA1C 8.5 (H) 05/14/2019       Follow up:   CDE (certified diabetic educator)      Education:     Monitoring   Meter (per above goals): Assessed and Discussed  Monitoring: Assessed and Discussed  BG goals: Assessed and Discussed    Nutrition Management  Nutrition Management: Assessed and Discussed  Weight: Assessed and Discussed  Portions/Balance: Assessed and Discussed  Carb ID/Count: Not addressed  Label Reading: Not addressed  Heart Healthy Fats: Assessed and Discussed  Menu Planning: Assessed and Discussed  Dining Out: Assessed and Discussed  Physical Activity: Assessed and Discussed  Medications: Assessed and Discussed  Orals: Assessed  Injected Medications: Assessed and Discussed   Storage/Exp:Assessed and Discussed   Site Rotation: Assessed and Discussed   Sites Assessed: no    Diabetes Disease Process: Assessed and Discussed    Acute Complications: Prevent, Detect, Treat:  Hypoglycemia: Assessed and Discussed  Hyperglycemia: Assessed and Discussed  Sick Days: Not addressed  Driving: Not  addressed    Chronic Complications  Foot Care:Not addressed  Skin Care: Not addressed  Eye: Not addressed  ABC: Not addressed  Teeth:Not addressed  Goal Setting and Problem Solving: Assessed and Discussed  Barriers: Assessed and Discussed  Psychosocial Adjustments: Assessed and Discussed      Time spent with the patient: 60 minutes for diabetes education and counseling.   Previous Education: yes  Visit Type:DSMT  Hours Remaining: DSMT 1 and MNT 2      Maira Freeman  6/5/2019

## 2021-05-29 NOTE — PROGRESS NOTES
[facility] Daily Progress     Patient Name: Odalys Miles  Date: 5/21/2019  Visit #: 7  Referring provider: Mika Ha MD  Visit Diagnosis:     ICD-10-CM    1. Leg weakness, bilateral R29.898    2. Bilateral arm weakness R29.898    3. Poor balance R26.89    4. Physical deconditioning R53.81          Assessment:     Pt progressing well with skilled PT and demonstrates increasing strength, stamina, and balance.  PT still with some deficits due to long term deconditioning. Pt is demonstrating steady improvements.      HEP/POC compliance is  good .  Patient demonstrates understanding/independence with home program.  Patient is benefitting from skilled physical therapy and is making steady progress toward functional goals.    Goal Status:  Pt. will demonstrate/verbalize independence in self-management of condition in : 6 weeks;Progressing toward  Pt. will be able to walk : for community mobility;with less difficulty;in 6 weeks;Progressing toward    Patient will increase : LEFS score;Recinos score;for improved quality of life;in 6 weeks;Progressing toward  Pt will: be able to perform ADL's and other tasks with increased stamina and decreased LOB; in 6 weeks; Progressing toward       Plan / Patient Education:     Continue with initial plan of care.  Progress with home program as tolerated.     Continue x 1 more and then independence.     Subjective:     Pain Rating: N/A     Pt reports that she is feeling improvements in her strength and balance.  People are noticing that the patient is walking better.     Pt reports that she is now up to walking on the treadmill for 12 minutes and doing the bike for 12 minutes.   This is an improvement from before.      Pt reports that she is consistent with her HEP exercises and she goes to the gym.  Pt notes that she is getting less sore from this.      Objective:     Pt tolerates the exercises with moderate cueing and adequate rest.    Pt slowly improving her bilateral  "LE and UE strength and increasing her stamina.  This is being addressed in her HEP.      Pt with improving balance and gait.  CGA- min A with balance tasks today.  Pt is able to tolerate higher level balance tasks, but tends to lose her balance backwards.     Current Exercises:  Exercise #1: Nu-step Warm-up  Comment #1: x5 min RL: 5  Exercise #2: Seated LAQ   Comment #2: Seated H/S curl - orange   Exercise #3: Sit to Stand - slightly raised table 2x10   Comment #3: Standing March -- SLR flex R, L x15 in supine   Exercise #4: Standing hip ABD - supine with orange band Bx20   Comment #4: Heel/toe raises; supine LTR Bx10; supine bridge Bx15  Exercise #5: Corner Balance  Comment #5: line: fwd tandem, back, crossovers, cones R, L, B  - 2x30 feet each; NBOS EC x60\"; tandem R, L x30\"   Exercise #6: Bicep curl to overhead press; chest press - supine with cane +5# Bx20; tricep ext with stock +5# Bx20   Comment #6: Scaption raise to 90 - B 2x15 - stick with 1#      Treatment Today     TREATMENT MINUTES COMMENTS   Evaluation     Self-care/ Home management     Manual therapy     Neuromuscular Re-education 15 See flow sheet    Therapeutic Activity     Therapeutic Exercises 15 See flow sheet    Gait training     Modality__________________                Total 30    Blank areas are intentional and mean the treatment did not include these items.       Lorie Neal, PT   5/21/2019  "

## 2021-05-29 NOTE — PROGRESS NOTES
FOOT AND ANKLE SURGERY/PODIATRY Progress Note        ASSESSMENT:   Diabetic ulcer left great toe        HPI: Odalys Miles was seen again today for continued evaluation and treatment of a small wound on the tip of her left great toe.  She has been cleaning the wound with Betadine solution and applying topical antibiotic.  She completed a full course of Keflex.  Patient stated she has not noticed any increase in redness, pain, drainage of bleeding.  .      Past Medical History:   Diagnosis Date     Adrenal insufficiency (H)      Anemia      Anemia     Created by Conversion      Anxiety      Arthritis      Ataxia 5/12/2015     CAD (coronary artery disease)      Cardiac pacemaker, dual, in situ 12/7/2016    [employer]tronic Revo MRI DOI: 12/15/2011 Dr. Aishwarya Treviño     Chronic Diarrhea Of Unknown Origin     Created by Conversion      Chronic Gout     Created by Conversion  Replacement Utility updated for latest IMO load     Chronic Reflux Esophagitis     Created by Conversion      Congestive Heart Failure     Created by Conversion  Replacement Utility updated for latest IMO load     Coronary Artery Stenosis     Created by Conversion [facility] Annotation: Feb 27 2011 11:37PM - Alina Zapien: s/p CABGx4  1/11  Replacement Utility updated for latest IMO load     CVA (cerebral vascular accident) (H)      Depression      Diabetic Peripheral Neuropathy     Created by Conversion      DM (diabetes mellitus) (H)      Dysthymic disorder     Created by Conversion      End Stage Renal Disease     Created by Conversion      Epilepsy (H)      ESRD (end stage renal disease) (H)      Essential Thrombocytosis     Created by Conversion      History of renal transplant      Hyperlipidemia      Hypertension      Hypertension     Created by Conversion  Replacement Utility updated for latest IMO load     Insomnia     Created by Conversion      Ischemic Stroke     Created by Conversion  Replacement Utility updated for latest IMO load      Medullary Sponge Kidney Bilaterally     Created by Conversion      Mixed hyperlipidemia     Created by Conversion      Morbid obesity (H) 8/29/2018     Neuropathy (H)      Nonintractable epilepsy without status epilepticus, unspecified epilepsy type (H) 8/29/2018     CULLEN on CPAP     Created by Conversion      Osteoarthritis     Created by Conversion  Replacement Utility updated for latest IMO load     Renal Transplant Recipient     Created by Conversion      Sensorineural hearing loss     Created by Conversion  Replacement Utility updated for latest IMO load     Thrombophlebitis Of Deep Vessels Of The Lower Extremity     Created by Conversion      Type 2 diabetes mellitus (H)     Created by Conversion      Vertigo        Past Surgical History:   Procedure Laterality Date     CARDIAC PACEMAKER PLACEMENT       HYSTERECTOMY      Age 28     IR EXTREMITY ANGIOGRAM LEFT  1/30/2019     NEPHRECTOMY TRANSPLANTED ORGAN  06/2011     MD APPENDECTOMY      Description: Appendectomy;  Recorded: 11/13/2007;     MD CABG, VEIN, SINGLE      Description: CABG (CABG);  Proc Date: 01/26/2011;  Comments: x 4     MD INS NEW/RPLC PRM PACEMAKER W/TRANSV ELTRD VENTR      Description: Permanent Pacemaker Placement;  Recorded: 08/01/2012;     MD TOTAL ABDOM HYSTERECTOMY      Description: Total Abdominal Hysterectomy;  Recorded: 11/13/2007;     MD TOTAL KNEE ARTHROPLASTY      Description: Total Knee Arthroplasty;  Recorded: 11/13/2007;       Allergies   Allergen Reactions     Lisinopril Cough     Resolved after discontinuation     Mold/Mildew      Latex Rash         Current Outpatient Medications:      alendronate (FOSAMAX) 35 MG tablet, Take 35 mg by mouth once a week. Weekly on Sundays. Take in the morning with a full glass of water, on an empty stomach, and do not take anything else by mouth or lie down for the next 30 min. Sundays   , Disp: , Rfl:      amLODIPine (NORVASC) 10 MG tablet, Take 1 tablet (10 mg) by mouth daily., Disp: 90 tablet,  Rfl: 3     aspirin 81 MG EC tablet, Take 1 tablet (81 mg total) by mouth daily., Disp: , Rfl:      azaTHIOprine (IMURAN) 50 mg tablet, Take 100 mg by mouth bedtime. , Disp: , Rfl:      bumetanide (BUMEX) 2 MG tablet, Take 0.5 tablets (1 mg total) by mouth daily., Disp: 20 tablet, Rfl: 0     carvedilol (COREG) 12.5 MG tablet, Take 1 tablet (12.5 mg) by mouth twice daily with meals., Disp: 180 tablet, Rfl: 3     cephalexin (KEFLEX) 500 MG capsule, Take 1 capsule (500 mg total) by mouth 2 (two) times a day for 10 days., Disp: 20 capsule, Rfl: 0     cephalexin (KEFLEX) 500 MG capsule, Take 500 mg by mouth., Disp: , Rfl:      cholecalciferol, vitamin D3, 2,000 unit Tab, Take 2,000 Units by mouth daily., Disp: , Rfl:      cyanocobalamin, vitamin B-12, 2,500 mcg Tab, Take 2,500 mcg by mouth every other day., Disp: , Rfl:      cycloSPORINE modified (GENGRAF) 25 MG capsule, Take 50 mg by mouth 2 (two) times a day. , Disp: , Rfl:      flash glucose scanning reader (FREESTYLE FELA 14 DAY READER) Misc, Use 1 application As Directed daily., Disp: 1 each, Rfl: 0     flash glucose sensor (FREESTYLE FELA 14 DAY SENSOR) Kit, Use 1 application As Directed every 14 (fourteen) days., Disp: 2 kit, Rfl: 11     hydrALAZINE (APRESOLINE) 25 MG tablet, Take 2 tablets (50 mg total) by mouth 4 (four) times a day., Disp: 270 tablet, Rfl: 2     hydrOXYzine pamoate (VISTARIL) 25 MG capsule, Take 1 capsule (25 mg total) by mouth at bedtime as needed for anxiety or other (insomnia)., Disp: 30 capsule, Rfl: 3     insulin NPH and regular human (NOVOLIN 70-30 FLEXPEN U-100) 100 unit/mL (70-30) pen, Inject 12 units SQ before breakfast and 14 units SQ before dinner., Disp: 5 adj dose pen, Rfl: 3     levETIRAcetam (KEPPRA) 500 MG tablet, One tablet in the morning. Two tablets in the afternoon (Patient taking differently: One tablet in the morning. Two tablets at bedtime   ), Disp: 270 tablet, Rfl: 3     pantoprazole (PROTONIX) 40 MG tablet, Take 40 mg  "by mouth 2 (two) times a day., Disp: , Rfl:      pen needle, diabetic (BD ULTRA-FINE TWILA PEN NEEDLES) 32 gauge x 5/32\" Ndle, Use 1 per day., Disp: 200 each, Rfl: 4     predniSONE (DELTASONE) 5 MG tablet, Take 5 mg by mouth daily., Disp: , Rfl:      rosuvastatin (CRESTOR) 5 MG tablet, Take 1 tablet (5 mg) by mouth at bedtime., Disp: 90 tablet, Rfl: 3     sucralfate (CARAFATE) 1 gram tablet, Take 1 g by mouth 4 (four) times a day before meals and at bedtime., Disp: , Rfl: 1     traZODone (DESYREL) 50 MG tablet, Take 3 tablets (150 mg total) by mouth at bedtime. (Patient taking differently: Take 100 mg by mouth at bedtime.    ), Disp: 180 tablet, Rfl: 1     venlafaxine (EFFEXOR-XR) 150 MG 24 hr capsule, Take 1 capsule (150 mg) by mouth daily., Disp: 90 capsule, Rfl: 3     venlafaxine (EFFEXOR-XR) 150 MG 24 hr capsule, Take 1 capsule (150 mg) by mouth daily., Disp: 90 capsule, Rfl: 3    Family History   Problem Relation Age of Onset     Cancer Sister      Diabetes Sister      Breast cancer Sister 62     Cancer Father        Social History     Socioeconomic History     Marital status:      Spouse name: Not on file     Number of children: Not on file     Years of education: Not on file     Highest education level: Not on file   Occupational History     Not on file   Social Needs     Financial resource strain: Not on file     Food insecurity:     Worry: Not on file     Inability: Not on file     Transportation needs:     Medical: Not on file     Non-medical: Not on file   Tobacco Use     Smoking status: Former Smoker     Packs/day: 1.50     Years: 20.00     Pack years: 30.00     Smokeless tobacco: Never Used   Substance and Sexual Activity     Alcohol use: Yes     Comment: occasional     Drug use: No     Sexual activity: Not on file   Lifestyle     Physical activity:     Days per week: Not on file     Minutes per session: Not on file     Stress: Not on file   Relationships     Social connections:     Talks on " phone: Not on file     Gets together: Not on file     Attends Islam service: Not on file     Active member of club or organization: Not on file     Attends meetings of clubs or organizations: Not on file     Relationship status: Not on file     Intimate partner violence:     Fear of current or ex partner: Not on file     Emotionally abused: Not on file     Physically abused: Not on file     Forced sexual activity: Not on file   Other Topics Concern     Not on file   Social History Narrative     Not on file       10 point Review of Systems is negative     Vitals:    06/05/19 1028   Pulse: 71   SpO2: 93%       BMI= Body mass index is 36.36 kg/m .    OBJECTIVE:  General appearance: Patient is alert and fully cooperative with history & exam.  No sign of distress is noted during the visit.  Vascular: Dorsalis pedis and posterior tibial pulses are palpable. There is no pedal hair growth bilaterally.  CFT < 3 sec from anterior tibial surface to distal digits bilaterally. There is no appreciable edema noted.  Dermatologic: There is a small round necrotic lesion at the distal tip of the left great toe.  Mild serosanguineous drainage noted.  Mild erythema noted.  No edema no cellulitis noted.  Turgor and texture are within normal limits. No coloration or temperature changes.  Neurologic: All epicritic and proprioceptive sensations are grossly diminished bilaterally.  Musculoskeletal: All active and passive ankle, subtalar, midtarsal, and 1st MPJ range of motion are grossly intact without pain or crepitus, with the exception of none. Manual muscle strength is within normal limits bilaterally. All dorsiflexors, plantarflexors, invertors, evertors are intact bilaterally.  No tenderness present to left great toe on palpation.  No tenderness to left great toe with range of motion. Calf is soft/non-tender without warmth/induration        Imaging:     No results found.         TREATMENT:  Debrided the ulcer left great toe and  applied a sterile dressing.  I recommended the patient clean the wound daily with Betadine solution apply bacitracin and cover the area with a bandage.  She is to return to clinic in 2 weeks for follow-up visit.  The patient was placed on Keflex 500 once again.  I informed the patient that if she does not respond I will recommend an x-ray and a possible MRI to rule out osteomyelitis.  The patient was placed in a postop shoe today.            Rakan Castro; GIANCARLO  [facility] Foot & Ankle Surgery/Podiatry

## 2021-05-29 NOTE — PROGRESS NOTES
FOOT AND ANKLE SURGERY/PODIATRY Progress Note        ASSESSMENT:   Diabetic ulcer left great toe    HPI: Odalys Miles was seen again today complaining of a small wound on the tip of her left great toe.  She has had this problem for several weeks.  She stated she injured the toe several weeks ago.  At that time she has had a small opening at the tip of the toe.  She initially had some redness as well.  She has some significant drainage but this has slightly improved.  She has very little discomfort due to neuropathy.    Past Medical History:   Diagnosis Date     Adrenal insufficiency (H)      Anemia      Anemia     Created by Conversion      Anxiety      Arthritis      Ataxia 5/12/2015     CAD (coronary artery disease)      Cardiac pacemaker, dual, in situ 12/7/2016    Medtronic Revo MRI DOI: 12/15/2011 Dr. Aishwarya Treviño     Chronic Diarrhea Of Unknown Origin     Created by Conversion      Chronic Gout     Created by Conversion  Replacement Utility updated for latest IMO load     Chronic Reflux Esophagitis     Created by Conversion      Congestive Heart Failure     Created by Conversion  Replacement Utility updated for latest IMO load     Coronary Artery Stenosis     Created by Conversion [facility] Annotation: Feb 27 2011 11:37PM - Alina Zapien: s/p CABGx4  1/11  Replacement Utility updated for latest IMO load     CVA (cerebral vascular accident) (H)      Depression      Diabetic Peripheral Neuropathy     Created by Conversion      DM (diabetes mellitus) (H)      Dysthymic disorder     Created by Conversion      End Stage Renal Disease     Created by Conversion      Epilepsy (H)      ESRD (end stage renal disease) (H)      Essential Thrombocytosis     Created by Conversion      History of renal transplant      Hyperlipidemia      Hypertension      Hypertension     Created by Conversion  Replacement Utility updated for latest IMO load     Insomnia     Created by Conversion      Ischemic Stroke     Created by  Conversion  Replacement Utility updated for latest IMO load     Medullary Sponge Kidney Bilaterally     Created by Conversion      Mixed hyperlipidemia     Created by Conversion      Morbid obesity (H) 8/29/2018     Neuropathy (H)      Nonintractable epilepsy without status epilepticus, unspecified epilepsy type (H) 8/29/2018     CULLEN on CPAP     Created by Conversion      Osteoarthritis     Created by Conversion  Replacement Utility updated for latest IMO load     Renal Transplant Recipient     Created by Conversion      Sensorineural hearing loss     Created by Conversion  Replacement Utility updated for latest IMO load     Thrombophlebitis Of Deep Vessels Of The Lower Extremity     Created by Conversion      Type 2 diabetes mellitus (H)     Created by Conversion      Vertigo        Past Surgical History:   Procedure Laterality Date     CARDIAC PACEMAKER PLACEMENT       HYSTERECTOMY      Age 28     IR EXTREMITY ANGIOGRAM LEFT  1/30/2019     NEPHRECTOMY TRANSPLANTED ORGAN  06/2011     RI APPENDECTOMY      Description: Appendectomy;  Recorded: 11/13/2007;     RI CABG, VEIN, SINGLE      Description: CABG (CABG);  Proc Date: 01/26/2011;  Comments: x 4     RI INS NEW/RPLC PRM PACEMAKER W/TRANSV ELTRD VENTR      Description: Permanent Pacemaker Placement;  Recorded: 08/01/2012;     RI TOTAL ABDOM HYSTERECTOMY      Description: Total Abdominal Hysterectomy;  Recorded: 11/13/2007;     RI TOTAL KNEE ARTHROPLASTY      Description: Total Knee Arthroplasty;  Recorded: 11/13/2007;       Allergies   Allergen Reactions     Lisinopril Cough     Resolved after discontinuation     Mold/Mildew      Latex Rash         Current Outpatient Medications:      alendronate (FOSAMAX) 35 MG tablet, Take 35 mg by mouth once a week. Weekly on Sundays. Take in the morning with a full glass of water, on an empty stomach, and do not take anything else by mouth or lie down for the next 30 min. Sundays   , Disp: , Rfl:      amLODIPine (NORVASC) 10 MG  "tablet, Take 1 tablet (10 mg) by mouth daily., Disp: 90 tablet, Rfl: 3     aspirin 81 MG EC tablet, Take 1 tablet (81 mg total) by mouth daily., Disp: , Rfl:      azaTHIOprine (IMURAN) 50 mg tablet, Take 100 mg by mouth bedtime. , Disp: , Rfl:      bumetanide (BUMEX) 2 MG tablet, Take 0.5 tablets (1 mg total) by mouth daily., Disp: 20 tablet, Rfl: 0     carvedilol (COREG) 12.5 MG tablet, Take 1 tablet (12.5 mg) by mouth twice daily with meals., Disp: 180 tablet, Rfl: 3     cholecalciferol, vitamin D3, 2,000 unit Tab, Take 2,000 Units by mouth daily., Disp: , Rfl:      cyanocobalamin, vitamin B-12, 2,500 mcg Tab, Take 2,500 mcg by mouth every other day., Disp: , Rfl:      cycloSPORINE modified (GENGRAF) 25 MG capsule, Take 50 mg by mouth 2 (two) times a day. , Disp: , Rfl:      flash glucose scanning reader (FREESTYLE FELA 14 DAY READER) Misc, Use 1 application As Directed daily., Disp: 1 each, Rfl: 0     flash glucose sensor (FREESTYLE FELA 14 DAY SENSOR) Kit, Use 1 application As Directed every 14 (fourteen) days., Disp: 2 kit, Rfl: 11     hydrALAZINE (APRESOLINE) 25 MG tablet, Take 2 tablets (50 mg total) by mouth 4 (four) times a day., Disp: 270 tablet, Rfl: 2     hydrOXYzine pamoate (VISTARIL) 25 MG capsule, Take 1 capsule (25 mg total) by mouth at bedtime as needed for anxiety or other (insomnia)., Disp: 30 capsule, Rfl: 3     insulin NPH and regular human (NOVOLIN 70-30 FLEXPEN U-100) 100 unit/mL (70-30) pen, Inject 12 units SQ before breakfast and 14 units SQ before dinner., Disp: 5 adj dose pen, Rfl: 3     levETIRAcetam (KEPPRA) 500 MG tablet, One tablet in the morning. Two tablets in the afternoon (Patient taking differently: One tablet in the morning. Two tablets at bedtime   ), Disp: 270 tablet, Rfl: 3     pantoprazole (PROTONIX) 40 MG tablet, Take 40 mg by mouth 2 (two) times a day., Disp: , Rfl:      pen needle, diabetic (BD ULTRA-FINE TWILA PEN NEEDLES) 32 gauge x 5/32\" Ndle, Use 1 per day., Disp: 200 " each, Rfl: 4     predniSONE (DELTASONE) 5 MG tablet, Take 5 mg by mouth daily., Disp: , Rfl:      rosuvastatin (CRESTOR) 5 MG tablet, Take 1 tablet (5 mg) by mouth at bedtime., Disp: 90 tablet, Rfl: 3     sucralfate (CARAFATE) 1 gram tablet, Take 1 g by mouth 4 (four) times a day before meals and at bedtime., Disp: , Rfl: 1     traZODone (DESYREL) 50 MG tablet, Take 3 tablets (150 mg total) by mouth at bedtime. (Patient taking differently: Take 100 mg by mouth at bedtime.    ), Disp: 180 tablet, Rfl: 1     venlafaxine (EFFEXOR-XR) 150 MG 24 hr capsule, Take 1 capsule (150 mg) by mouth daily., Disp: 90 capsule, Rfl: 3     venlafaxine (EFFEXOR-XR) 150 MG 24 hr capsule, Take 1 capsule (150 mg) by mouth daily., Disp: 90 capsule, Rfl: 3    Family History   Problem Relation Age of Onset     Cancer Sister      Diabetes Sister      Breast cancer Sister 62     Cancer Father        Social History     Socioeconomic History     Marital status:      Spouse name: Not on file     Number of children: Not on file     Years of education: Not on file     Highest education level: Not on file   Occupational History     Not on file   Social Needs     Financial resource strain: Not on file     Food insecurity:     Worry: Not on file     Inability: Not on file     Transportation needs:     Medical: Not on file     Non-medical: Not on file   Tobacco Use     Smoking status: Former Smoker     Packs/day: 1.50     Years: 20.00     Pack years: 30.00     Smokeless tobacco: Never Used   Substance and Sexual Activity     Alcohol use: Yes     Comment: occasional     Drug use: No     Sexual activity: Not on file   Lifestyle     Physical activity:     Days per week: Not on file     Minutes per session: Not on file     Stress: Not on file   Relationships     Social connections:     Talks on phone: Not on file     Gets together: Not on file     Attends [employer] service: Not on file     Active member of club or organization: Not on file      Attends meetings of clubs or organizations: Not on file     Relationship status: Not on file     Intimate partner violence:     Fear of current or ex partner: Not on file     Emotionally abused: Not on file     Physically abused: Not on file     Forced sexual activity: Not on file   Other Topics Concern     Not on file   Social History Narrative     Not on file       10 point Review of Systems is negative       Vitals:    05/29/19 1008   BP: 169/82   Pulse: 69   Temp: 98.5  F (36.9  C)       BMI= Body mass index is 36.18 kg/m .    OBJECTIVE:  General appearance: Patient is alert and fully cooperative with history & exam.  No sign of distress is noted during the visit.  Vascular: Dorsalis pedis and posterior tibial pulses are palpable. There is no pedal hair growth bilaterally.  CFT < 3 sec from anterior tibial surface to distal digits bilaterally. There is no appreciable edema noted.  Dermatologic: There is a small round necrotic lesion at the distal tip of the left great toe.  Mild serosanguineous drainage noted.  Mild erythema noted.  No edema no cellulitis noted.  Turgor and texture are within normal limits. No coloration or temperature changes.  Neurologic: All epicritic and proprioceptive sensations are grossly diminished bilaterally.  Musculoskeletal: All active and passive ankle, subtalar, midtarsal, and 1st MPJ range of motion are grossly intact without pain or crepitus, with the exception of none. Manual muscle strength is within normal limits bilaterally. All dorsiflexors, plantarflexors, invertors, evertors are intact bilaterally.  No tenderness present to left great toe on palpation.  No tenderness to left great toe with range of motion. Calf is soft/non-tender without warmth/induration    Imaging:     No results found.         TREATMENT:  Debrided the ulcer left great toe and applied a sterile dressing.  I recommended the patient clean the wound daily with Betadine solution apply bacitracin and cover  the area with a bandage.  She is to return to clinic in 1 week for follow-up visit.  The patient was placed on Keflex 500 mg 1 tab twice daily x10 days.        Rakan Castro; GIANCARLO  [facility] Foot & Ankle Surgery/Podiatry

## 2021-05-29 NOTE — PATIENT INSTRUCTIONS - HE
Increase morning insulin dose to 13 units.  Increase evening dose to 15 units.  Blood sugars should never be less than 80.    Bring freestyle av sensor data to diabetic educator appointment next week.    Follow-up with Dr. Callejas for scheduled visit in July.

## 2021-05-29 NOTE — TELEPHONE ENCOUNTER
Left a message for the patient to call back or check her [employer] account to be informed of the following:    The patient had an elevated BP of 169/82 at her podiatry appointment on 05/29/19. Please let the patient know that we would like her to return to have her blood pressure rechecked this month. Please offer an appointment with the nurse practitioner, Rakan العلي. (Note: Dr. Ha out of the office this month. No changes to scheduled appoint with Dr. Ha in July.)    Sania [facility]  12:58 PM  6/10/2019

## 2021-05-29 NOTE — PROGRESS NOTES
Assessment: Odalys is here alone today for her follow-up after insulin adjustments.  Stated she is doing well taking her insulin at 2:00 in the afternoon rather than before supper.  Stated she took this late only 1 day in the last 2 weeks.    She is currently taking 15 units of NovoLog 70/30 in the morning and 17 units about 2 PM.    She is wearing a freestyle av sensor and this was downloaded today and compared to her reports from 2 weeks ago.  Her overall readings have improved.  Her average glucose has come down from 167-151 with her time in target improved from 46% 2 weeks ago to 52%.  She does have 4% of her time below target over the last 2 weeks stated she thinks she knows why she had a lower blood sugar, thinks it is because she ate breakfast in the morning.    She is is still concerned about how high her fasting readings are in the morning.  States she currently is having readings 150-170 when she would like to see more numbers below 130.  After reviewing how her next insulin works, decided to increase the dose she is taking at 2 PM from 17 units to 19 units.  This hopefully will decrease her fasting blood sugars in the morning as well as her readings between 2 and 4 PM when she tends to trend high.    Plan: Odalys will make the adjustments to her insulin dose as noted above and follow-up with me in 2 weeks.  Her appointment has already been scheduled.    Subjective and Objective:      Odalys Miles is referred by Eren العلي for Diabetes Education.     Lab Results   Component Value Date    HGBA1C 8.5 (H) 05/14/2019       Goals        Lifestyle      Medication      New Goals of 04/14/17:    Take toujeo or novolin N at the correct prescribed times.  Take novolog or novolin R before meals or for correction of BGs.          Nutrition (pt-stated)      04/14/17:    She will start to cut down on carbs in her meals so she can use less insulin and lose weight.            Follow up:   CDE (certified diabetic  educator)      Education:     Monitoring   Meter (per above goals): Assessed and Discussed  Monitoring: Assessed and Discussed  BG goals: Assessed and Discussed    Nutrition Management  Nutrition Management: Assessed and Discussed  Weight: Assessed and Discussed  Portions/Balance: Assessed and Discussed  Carb ID/Count: Assessed and Discussed  Label Reading: Assessed and Discussed  Heart Healthy Fats: Assessed and Discussed  Menu Planning: Assessed and Discussed  Dining Out: Assessed and Discussed  Physical Activity: Not addressed  Medications: Assessed and Discussed  Orals: Assessed  Injected Medications: Assessed and Discussed   Storage/Exp:Assessed and Discussed   Site Rotation: Assessed and Discussed   Sites Assessed: no    Diabetes Disease Process: Assessed and Discussed    Acute Complications: Prevent, Detect, Treat:  Hypoglycemia: Assessed and Discussed  Hyperglycemia: Assessed and Discussed  Sick Days: Assessed and Discussed  Driving: Not addressed    Chronic Complications  Foot Care:Not addressed  Skin Care: Not addressed  Eye: Not addressed  ABC: Not addressed  Teeth:Not addressed  Goal Setting and Problem Solving: Assessed and Discussed  Barriers: Assessed and Discussed  Psychosocial Adjustments: Assessed and Discussed      Time spent with the patient: 30 minutes for diabetes education and counseling.   Previous Education: yes  Visit Type:DSMT  Hours Remaining: DSMT 0.5 and MNT 2      Maira Freeman  6/20/2019

## 2021-05-29 NOTE — PROGRESS NOTES
FOOT AND ANKLE SURGERY/PODIATRY Progress Note        ASSESSMENT:   Diabetic ulcer left great toe        HPI: Odalys Miles was seen again today for continued evaluation and treatment of a small wound on the tip of her left great toe.  She has been cleaning the wound with Betadine solution and applying topical antibiotic.  She completed a full course of Keflex.  Patient stated she has not noticed any increase in redness, pain, drainage of bleeding.          .      Past Medical History:   Diagnosis Date     Adrenal insufficiency (H)      Anemia      Anemia     Created by Conversion      Anxiety      Arthritis      Ataxia 5/12/2015     CAD (coronary artery disease)      Cardiac pacemaker, dual, in situ 12/7/2016    [employer] Revo MRI DOI: 12/15/2011 Dr. Aishwarya Treviño     Chronic Diarrhea Of Unknown Origin     Created by Conversion      Chronic Gout     Created by Conversion  Replacement Utility updated for latest IMO load     Chronic Reflux Esophagitis     Created by Conversion      Congestive Heart Failure     Created by Conversion  Replacement Utility updated for latest IMO load     Coronary Artery Stenosis     Created by Conversion [facility] Annotation: Feb 27 2011 11:37PM - Alina Zapien: s/p CABGx4  1/11  Replacement Utility updated for latest IMO load     CVA (cerebral vascular accident) (H)      Depression      Diabetic Peripheral Neuropathy     Created by Conversion      DM (diabetes mellitus) (H)      Dysthymic disorder     Created by Conversion      End Stage Renal Disease     Created by Conversion      Epilepsy (H)      ESRD (end stage renal disease) (H)      Essential Thrombocytosis     Created by Conversion      History of renal transplant      Hyperlipidemia      Hypertension      Hypertension     Created by Conversion  Replacement Utility updated for latest IMO load     Insomnia     Created by Conversion      Ischemic Stroke     Created by Conversion  Replacement Utility updated for latest IMO  load     Medullary Sponge Kidney Bilaterally     Created by Conversion      Mixed hyperlipidemia     Created by Conversion      Morbid obesity (H) 8/29/2018     Neuropathy (H)      Nonintractable epilepsy without status epilepticus, unspecified epilepsy type (H) 8/29/2018     CULLEN on CPAP     Created by Conversion      Osteoarthritis     Created by Conversion  Replacement Utility updated for latest IMO load     Renal Transplant Recipient     Created by Conversion      Sensorineural hearing loss     Created by Conversion  Replacement Utility updated for latest IMO load     Thrombophlebitis Of Deep Vessels Of The Lower Extremity     Created by Conversion      Type 2 diabetes mellitus (H)     Created by Conversion      Vertigo        Past Surgical History:   Procedure Laterality Date     CARDIAC PACEMAKER PLACEMENT       HYSTERECTOMY      Age 28     IR EXTREMITY ANGIOGRAM LEFT  1/30/2019     NEPHRECTOMY TRANSPLANTED ORGAN  06/2011     WA APPENDECTOMY      Description: Appendectomy;  Recorded: 11/13/2007;     WA CABG, VEIN, SINGLE      Description: CABG (CABG);  Proc Date: 01/26/2011;  Comments: x 4     WA INS NEW/RPLC PRM PACEMAKER W/TRANSV ELTRD VENTR      Description: Permanent Pacemaker Placement;  Recorded: 08/01/2012;     WA TOTAL ABDOM HYSTERECTOMY      Description: Total Abdominal Hysterectomy;  Recorded: 11/13/2007;     WA TOTAL KNEE ARTHROPLASTY      Description: Total Knee Arthroplasty;  Recorded: 11/13/2007;       Allergies   Allergen Reactions     Lisinopril Cough     Resolved after discontinuation     Mold/Mildew      Latex Rash         Current Outpatient Medications:      alendronate (FOSAMAX) 35 MG tablet, Take 35 mg by mouth once a week. Weekly on Sundays. Take in the morning with a full glass of water, on an empty stomach, and do not take anything else by mouth or lie down for the next 30 min. Sundays   , Disp: , Rfl:      amLODIPine (NORVASC) 10 MG tablet, Take 1 tablet (10 mg) by mouth daily., Disp:  "90 tablet, Rfl: 3     aspirin 81 MG EC tablet, Take 1 tablet (81 mg total) by mouth daily., Disp: , Rfl:      azaTHIOprine (IMURAN) 50 mg tablet, Take 100 mg by mouth bedtime. , Disp: , Rfl:      bumetanide (BUMEX) 2 MG tablet, Take 0.5 tablets (1 mg total) by mouth daily., Disp: 20 tablet, Rfl: 0     carvedilol (COREG) 12.5 MG tablet, Take 1 tablet (12.5 mg) by mouth twice daily with meals., Disp: 180 tablet, Rfl: 3     cholecalciferol, vitamin D3, 2,000 unit Tab, Take 2,000 Units by mouth daily., Disp: , Rfl:      cyanocobalamin, vitamin B-12, 2,500 mcg Tab, Take 2,500 mcg by mouth every other day., Disp: , Rfl:      cycloSPORINE modified (GENGRAF) 25 MG capsule, Take 50 mg by mouth 2 (two) times a day. , Disp: , Rfl:      flash glucose scanning reader (FREESTYLE FELA 14 DAY READER) Misc, Use 1 application As Directed daily., Disp: 1 each, Rfl: 0     flash glucose sensor (FREESTYLE FELA 14 DAY SENSOR) Kit, Use 1 application As Directed every 14 (fourteen) days., Disp: 2 kit, Rfl: 11     hydrALAZINE (APRESOLINE) 25 MG tablet, Take 2 tablets (50 mg total) by mouth 4 (four) times a day., Disp: 270 tablet, Rfl: 2     hydrOXYzine pamoate (VISTARIL) 25 MG capsule, Take 1 capsule (25 mg total) by mouth at bedtime as needed for anxiety or other (insomnia)., Disp: 30 capsule, Rfl: 3     insulin NPH and regular human (NOVOLIN 70-30 FLEXPEN U-100) 100 unit/mL (70-30) pen, Inject 15 units SQ before breakfast and 17 units SQ about 2pm., Disp: 5 adj dose pen, Rfl: 3     levETIRAcetam (KEPPRA) 500 MG tablet, One tablet in the morning. Two tablets in the afternoon (Patient taking differently: One tablet in the morning. Two tablets at bedtime   ), Disp: 270 tablet, Rfl: 3     pantoprazole (PROTONIX) 40 MG tablet, Take 40 mg by mouth 2 (two) times a day., Disp: , Rfl:      pen needle, diabetic (BD ULTRA-FINE TWILA PEN NEEDLES) 32 gauge x 5/32\" Ndle, Use 1 per day., Disp: 200 each, Rfl: 4     predniSONE (DELTASONE) 5 MG tablet, " Take 5 mg by mouth daily., Disp: , Rfl:      rosuvastatin (CRESTOR) 5 MG tablet, Take 1 tablet (5 mg) by mouth at bedtime., Disp: 90 tablet, Rfl: 3     sucralfate (CARAFATE) 1 gram tablet, Take 1 g by mouth 4 (four) times a day before meals and at bedtime., Disp: , Rfl: 1     traZODone (DESYREL) 50 MG tablet, Take 3 tablets (150 mg total) by mouth at bedtime. (Patient taking differently: Take 100 mg by mouth at bedtime.    ), Disp: 180 tablet, Rfl: 1     venlafaxine (EFFEXOR-XR) 150 MG 24 hr capsule, Take 1 capsule (150 mg) by mouth daily., Disp: 90 capsule, Rfl: 3     venlafaxine (EFFEXOR-XR) 150 MG 24 hr capsule, Take 1 capsule (150 mg) by mouth daily., Disp: 90 capsule, Rfl: 3    Family History   Problem Relation Age of Onset     Cancer Sister      Diabetes Sister      Breast cancer Sister 62     Cancer Father        Social History     Socioeconomic History     Marital status:      Spouse name: Not on file     Number of children: Not on file     Years of education: Not on file     Highest education level: Not on file   Occupational History     Not on file   Social Needs     Financial resource strain: Not on file     Food insecurity:     Worry: Not on file     Inability: Not on file     Transportation needs:     Medical: Not on file     Non-medical: Not on file   Tobacco Use     Smoking status: Former Smoker     Packs/day: 1.50     Years: 20.00     Pack years: 30.00     Smokeless tobacco: Never Used   Substance and Sexual Activity     Alcohol use: Yes     Comment: occasional     Drug use: No     Sexual activity: Not on file   Lifestyle     Physical activity:     Days per week: Not on file     Minutes per session: Not on file     Stress: Not on file   Relationships     Social connections:     Talks on phone: Not on file     Gets together: Not on file     Attends Jewish service: Not on file     Active member of club or organization: Not on file     Attends meetings of clubs or organizations: Not on file      Relationship status: Not on file     Intimate partner violence:     Fear of current or ex partner: Not on file     Emotionally abused: Not on file     Physically abused: Not on file     Forced sexual activity: Not on file   Other Topics Concern     Not on file   Social History Narrative     Not on file       10 point Review of Systems is negative     Vitals:    06/19/19 1049   BP: 130/76   Pulse: 74   Resp: 20   Temp: 98.3  F (36.8  C)       BMI= Body mass index is 35.35 kg/m .    OBJECTIVE:  General appearance: Patient is alert and fully cooperative with history & exam.  No sign of distress is noted during the visit.  Vascular: Dorsalis pedis and posterior tibial pulses are palpable. There is no pedal hair growth bilaterally.  CFT < 3 sec from anterior tibial surface to distal digits bilaterally. There is no appreciable edema noted.  Dermatologic: There is a small round well-healed lesion at the distal tip of the left great toe.  Mild serosanguineous drainage noted.  Mild erythema noted.  No edema no cellulitis noted.  Turgor and texture are within normal limits. No coloration or temperature changes.  Neurologic: All epicritic and proprioceptive sensations are grossly diminished bilaterally.  Musculoskeletal: All active and passive ankle, subtalar, midtarsal, and 1st MPJ range of motion are grossly intact without pain or crepitus, with the exception of none. Manual muscle strength is within normal limits bilaterally. All dorsiflexors, plantarflexors, invertors, evertors are intact bilaterally.  No tenderness present to left great toe on palpation.  No tenderness to left great toe with range of motion. Calf is soft/non-tender without warmth/induration        Imaging:     No results found.         TREATMENT:  I informed the patient she is well healed and may now begin to return to normal activities.  She is to return to the clinic as needed.        Rakan Castro; GIANCARLO  [facility] Foot & Ankle Surgery/Podiatry

## 2021-05-29 NOTE — PROGRESS NOTES
Clinic Note    Assessment:     Assessment and Plan:  1. Type 2 diabetes mellitus without complication, with long-term current use of insulin (H)  2 weeks ago, she had her 70/30 insulin dose increased from 10 units to 12 units in the morning, and 12 units to 14 units in the evening.  We reviewed her log of blood sugars this morning and they are dramatically improved.  I think that there is still some room for improvement and so I will increase her insulin dose to 13 units in the morning, and 15 units in the evening.  She will follow-up with the diabetes educator next week for recheck of her freestyle av sensor data.       Patient Instructions   Increase morning insulin dose to 13 units.  Increase evening dose to 15 units.  Blood sugars should never be less than 80.    Bring freestyle av sensor data to diabetic educator appointment next week.    Follow-up with Dr. Callejas for scheduled visit in July.    Return in about 2 months (around 7/28/2019).         Subjective:      Patient comes to the clinic for follow-up of her type 2 diabetes.    She was seen by Dr. Mika Ha on 5/14, approximately 2 weeks ago.  At that time, she reported having acutely elevated blood sugars; some in the 180-300 range.  She was using 70/30 insulin pens, injecting 10 units in the morning, and 12 units with supper.    Dr. Callejas had her increase her insulin to 12 units in the morning, and 14 units in the evening.  She was told to follow-up with me in 2 weeks for review of her blood sugars.    Today, patient states that she is tolerated the increase well.  No hypoglycemic events.  She has not made any significant changes in her diet.  She brings a log with her today.  Most of these measurements are between 140 and 180 while fasting in the morning.  There are not any postprandials listed.  Evening sugars seem to be within a similar range.  There are a few scattered measurements in the 230s to 240s range.  Overall, there has been some  significant improvement.    She has follow-up scheduled with diabetes educator in 1 week to review freestyle av sensor data.    The following portions of the patient's history were reviewed and updated as appropriate: Allergies, medications, problem list, prior note.     Review of Systems:    Review is otherwise negative except for what is mentioned above.     Social Hx:    Social History     Tobacco Use   Smoking Status Former Smoker     Packs/day: 1.50     Years: 20.00     Pack years: 30.00   Smokeless Tobacco Never Used         Objective:     Vitals:    05/28/19 1129   BP: 112/62   Pulse: 66   SpO2: 96%   Weight: 201 lb 1.6 oz (91.2 kg)       Exam:    General: No apparent distress. Calm. Alert and Oriented X3. Pt behavior is appropriate.      Patient Active Problem List   Diagnosis     Essential Thrombocytosis     Osteoarthritis     Thrombophlebitis Of Deep Vessels Of The Lower Extremity     Medullary Sponge Kidney Bilaterally     CULLEN on CPAP     Mixed hyperlipidemia     End Stage Renal Disease     Renal Transplant Recipient     Benign Essential Hypertension     Coronary Artery Stenosis     Chronic Reflux Esophagitis     Chronic Gout     (HFpEF) heart failure with preserved ejection fraction (H)     Ischemic Stroke     Type 2 diabetes mellitus (H)     Dysthymic Disorder     Diabetic Peripheral Neuropathy     Anemia     Sensorineural Hearing Loss     Hyperlipidemia     Ataxia     Cardiac pacemaker, dual, in situ     Adrenal insufficiency (H)     Nonintractable epilepsy without status epilepticus, unspecified epilepsy type (H)     Morbid obesity (H)     TIA (transient ischemic attack)     Diabetic ulcer of toe of left foot associated with type 2 diabetes mellitus, unspecified ulcer stage (H)     Atherosclerosis of native artery of left lower extremity with ulceration of other part of foot (H)     Arteriosclerotic vascular disease     Bacteremia     Chronic diarrhea     Closed displaced fracture of surgical neck  of left humerus     Disorder of stomach     Diverticular disease of colon     Immunosuppressive management encounter following kidney transplant     Iron deficiency anemia     Major depressive disorder, recurrent episode, in partial or unspecified remission     Noninfectious gastroenteritis     Pain in joint, lower leg     Polyp of duodenum     PTSD (post-traumatic stress disorder)     Seizure disorder (H)     Current Outpatient Medications   Medication Sig Dispense Refill     alendronate (FOSAMAX) 35 MG tablet Take 35 mg by mouth once a week. Weekly on Sundays. Take in the morning with a full glass of water, on an empty stomach, and do not take anything else by mouth or lie down for the next 30 min.  Sundays             amLODIPine (NORVASC) 10 MG tablet Take 1 tablet (10 mg) by mouth daily. 90 tablet 3     aspirin 81 MG EC tablet Take 1 tablet (81 mg total) by mouth daily.       azaTHIOprine (IMURAN) 50 mg tablet Take 100 mg by mouth bedtime.        bumetanide (BUMEX) 2 MG tablet Take 0.5 tablets (1 mg total) by mouth daily. 20 tablet 0     carvedilol (COREG) 12.5 MG tablet Take 1 tablet (12.5 mg) by mouth twice daily with meals. 180 tablet 3     cholecalciferol, vitamin D3, 2,000 unit Tab Take 2,000 Units by mouth daily.       cyanocobalamin, vitamin B-12, 2,500 mcg Tab Take 2,500 mcg by mouth every other day.       cycloSPORINE modified (GENGRAF) 25 MG capsule Take 50 mg by mouth 2 (two) times a day.        flash glucose scanning reader (FREESTYLE FELA 14 DAY READER) Misc Use 1 application As Directed daily. 1 each 0     flash glucose sensor (FREESTYLE FELA 14 DAY SENSOR) Kit Use 1 application As Directed every 14 (fourteen) days. 2 kit 11     hydrALAZINE (APRESOLINE) 25 MG tablet Take 2 tablets (50 mg total) by mouth 4 (four) times a day. 270 tablet 2     hydrOXYzine pamoate (VISTARIL) 25 MG capsule Take 1 capsule (25 mg total) by mouth at bedtime as needed for anxiety or other (insomnia). 30 capsule 3      "insulin NPH and regular human (NOVOLIN 70-30 FLEXPEN U-100) 100 unit/mL (70-30) pen Inject 12 units SQ before breakfast and 14 units SQ before dinner. 5 adj dose pen 3     levETIRAcetam (KEPPRA) 500 MG tablet One tablet in the morning. Two tablets in the afternoon (Patient taking differently: One tablet in the morning. Two tablets at bedtime      ) 270 tablet 3     pantoprazole (PROTONIX) 40 MG tablet Take 40 mg by mouth 2 (two) times a day.       pen needle, diabetic (BD ULTRA-FINE TWILA PEN NEEDLES) 32 gauge x 5/32\" Ndle Use 1 per day. 200 each 4     predniSONE (DELTASONE) 5 MG tablet Take 5 mg by mouth daily.       rosuvastatin (CRESTOR) 5 MG tablet Take 1 tablet (5 mg) by mouth at bedtime. 90 tablet 3     sucralfate (CARAFATE) 1 gram tablet Take 1 g by mouth 4 (four) times a day before meals and at bedtime.  1     traZODone (DESYREL) 50 MG tablet Take 3 tablets (150 mg total) by mouth at bedtime. (Patient taking differently: Take 100 mg by mouth at bedtime.       ) 180 tablet 1     venlafaxine (EFFEXOR-XR) 150 MG 24 hr capsule Take 1 capsule (150 mg) by mouth daily. 90 capsule 3     venlafaxine (EFFEXOR-XR) 150 MG 24 hr capsule Take 1 capsule (150 mg) by mouth daily. 90 capsule 3     No current facility-administered medications for this visit.            Rakan العلي (Rob), LUISANA    5/28/2019           "

## 2021-05-30 ENCOUNTER — RECORDS - HEALTHEAST (OUTPATIENT)
Dept: ADMINISTRATIVE | Facility: CLINIC | Age: 76
End: 2021-05-30

## 2021-05-30 VITALS — WEIGHT: 229.5 LBS | BODY MASS INDEX: 41.98 KG/M2

## 2021-05-30 VITALS — WEIGHT: 234 LBS | BODY MASS INDEX: 42.8 KG/M2

## 2021-05-30 VITALS — WEIGHT: 231.19 LBS | BODY MASS INDEX: 42.28 KG/M2

## 2021-05-30 VITALS — BODY MASS INDEX: 42.46 KG/M2 | WEIGHT: 232.13 LBS

## 2021-05-30 VITALS — BODY MASS INDEX: 43.53 KG/M2 | WEIGHT: 238 LBS

## 2021-05-30 VITALS — WEIGHT: 236 LBS | BODY MASS INDEX: 43.16 KG/M2

## 2021-05-30 VITALS — WEIGHT: 237 LBS | BODY MASS INDEX: 43.35 KG/M2

## 2021-05-30 NOTE — TELEPHONE ENCOUNTER
CC:  Rib pain x 1 week        > Fell from standing about a week ago    > Carpeting over cement slab    > Not getting better   > Yes bruising noted   > No shortness of breath but is painful to breathe   > Pain can be as high as a 8 or 9 at times       A/P:   > Triaged to ED for eval per protocol      Yes - has someone that can drive her          Brandon GRAHAM Whiting RN   Triage and Medication Refills        Reason for Disposition    SEVERE chest pain    Protocols used: CHEST INJURY-A-AH

## 2021-05-30 NOTE — PROGRESS NOTES
[facility] clinic Follow Up Note    Odalys Miles   73 y.o. female    Date of Visit: 7/30/2019    Chief Complaint   Patient presents with     Follow-up     Diabetes recheck     Subjective  Odalys is here with her friend, [place].    Patient is here for following up on diabetes, as well as a recent fall with right rib fractures.    Status post renal transplant on immunosuppression including prednisone 5 mg a day.  She gets seen regularly by her transplant team and I did review the labs that were done recently on July 9 with a creatinine down to 1.89, lower than her usual baseline creatinine around 2.  Potassium was normal at 4.1.  On July 17 her hemoglobin was coming up at 11.9.    I did review the ER note from July 9, patient had fallen on July 1 and suffered right chest wall pain.  Rib fractures on the seventh eighth and ninth were noted.  A small right pleural effusion.    Patient had run out of her Bumex at that time, but was given a 20-day refill and restarted the Bumex 1 mg a day.    She is not currently on any pain medication and she denies significant pain from the rib pain.  Denies increasing shortness of breath.    She stopped going to [employer] 3 times a week but does plan to return to walking exercise.    Denies lightheaded dizzy spells, also on hydralazine and amlodipine and carvedilol for hypertension.  Blood pressures usually running in the 120s over 60s for her at home.    No fevers or new cough.    I did review the chest CT scan from July 7, 2019 and there was 2 nodules of 2 and 3 mm.  No previous lung nodule work-up.  Last chest CT scan was 2012, but I do not have report available.    She is compliant with CPAP.  Denies worsening daytime sleepiness.    No flare of her chronic anxiety and still on Effexor and trazodone.    She is still on the Fosamax for osteoporosis.    Past history of left occipital left cerebellar strokes.  She does have moderate bilateral carotid artery stenosis of  50-69% on January 2019 carotid ultrasound.    No new neurologic event.    No recurrent seizures.  She is on Keppra, she has not seen a neurologist in over 2 years.    Coronary artery disease with bypass in 2011.  December 2018- stress test.  Saw cardiology in March of this year no changes with 1 year follow-up.    Cholesterol is well controlled in May with an LDL of 76 and a 38.  On Crestor 5 mg.  She has not tolerated higher doses of Crestor.    On aspirin 81 mg a day.    No blood in stool or melena.    She does have history of gastritis, seen on EGD in March 2019.  But no H. pylori or ulcer.  Protonix twice daily and Carafate does continue.    Diabetes type 2 was not controlled in May.  Unclear if she was injecting into calluses.  She had increase in blood sugars after changing to a new 7030 pen.    She did see the diabetic educator.  She has been increasing her insulin dosing.  It was 10 units / 12 units, increase gradually and now is 15 units in the morning and 17 units with supper.    She had one low blood sugar episode 3 weeks ago when she skipped a meal but otherwise is not having low blood sugars.    Blood sugars with her freestyle av are generally 85-1 59.    She had a past history of a left first toe ulcer but that is healed except for a callus, she was seen in wound care clinic, I did review the note on July 18.  Follow-up as needed.    I did review the GLEN from April 2019 that showed a decreased GLEN of 0.44 in her left leg with flow was felt to be adequate for healing, and it has healed.    I did review cardiac echo from January 2019, essentially normal echo.  Normal ejection fraction.    PMHx:    Past Medical History:   Diagnosis Date     Adrenal insufficiency (H)      Anemia      Anemia     Created by Conversion      Anxiety      Arthritis      Ataxia 5/12/2015     CAD (coronary artery disease)      Cardiac pacemaker, dual, in situ 12/7/2016    [employer] Revo MRI DOI: 12/15/2011 Dr. Aishwarya Treviño      Chronic Diarrhea Of Unknown Origin     Created by Conversion      Chronic Gout     Created by Conversion  Replacement Utility updated for latest IMO load     Chronic Reflux Esophagitis     Created by Conversion      Congestive Heart Failure     Created by Conversion  Replacement Utility updated for latest IMO load     Coronary Artery Stenosis     Created by Conversion [facility] Annotation: Feb 27 2011 11:37PM - Alina Zapien: s/p CABGx4  1/11  Replacement Utility updated for latest IMO load     CVA (cerebral vascular accident) (H)      Depression      Diabetic Peripheral Neuropathy     Created by Conversion      DM (diabetes mellitus) (H)      Dysthymic disorder     Created by Conversion      End Stage Renal Disease     Created by Conversion      Epilepsy (H)      ESRD (end stage renal disease) (H)      Essential Thrombocytosis     Created by Conversion      History of renal transplant      Hyperlipidemia      Hypertension      Hypertension     Created by Conversion  Replacement Utility updated for latest IMO load     Insomnia     Created by Conversion      Ischemic Stroke     Created by Conversion  Replacement Utility updated for latest IMO load     Medullary Sponge Kidney Bilaterally     Created by Conversion      Mixed hyperlipidemia     Created by Conversion      Morbid obesity (H) 8/29/2018     Neuropathy      Nonintractable epilepsy without status epilepticus, unspecified epilepsy type (H) 8/29/2018     CULLEN on CPAP     Created by Conversion      Osteoarthritis     Created by Conversion  Replacement Utility updated for latest IMO load     Renal Transplant Recipient     Created by Conversion      Sensorineural hearing loss     Created by Conversion  Replacement Utility updated for latest IMO load     Thrombophlebitis Of Deep Vessels Of The Lower Extremity     Created by Conversion      Type 2 diabetes mellitus (H)     Created by Conversion      Vertigo      PSHx:    Past Surgical History:   Procedure  Laterality Date     CARDIAC PACEMAKER PLACEMENT       HYSTERECTOMY      Age 28     IR EXTREMITY ANGIOGRAM LEFT  1/30/2019     NEPHRECTOMY TRANSPLANTED ORGAN  06/2011     TX APPENDECTOMY      Description: Appendectomy;  Recorded: 11/13/2007;     TX CABG, VEIN, SINGLE      Description: CABG (CABG);  Proc Date: 01/26/2011;  Comments: x 4     TX INS NEW/RPLC PRM PACEMAKER W/TRANSV ELTRD VENTR      Description: Permanent Pacemaker Placement;  Recorded: 08/01/2012;     TX TOTAL ABDOM HYSTERECTOMY      Description: Total Abdominal Hysterectomy;  Recorded: 11/13/2007;     TX TOTAL KNEE ARTHROPLASTY      Description: Total Knee Arthroplasty;  Recorded: 11/13/2007;     Immunizations:   Immunization History   Administered Date(s) Administered     DT (pediatric) 01/01/1992     Influenza [id number], 01/25/2010     Influenza high dose, seasonal 10/12/2015, 09/28/2016, 09/26/2017, 08/29/2018     Influenza, inj, historic,unspecified 10/23/2007, 10/27/2008, 11/09/2009     Pneumo Conj 13-V (2010&after) 10/12/2015     Pneumo Polysac 23-V 10/01/1996, 10/27/2008     Td,adult,historic,unspecified 08/08/2002     Tdap 06/30/2016, 11/05/2018     ZOSTER, LIVE 07/03/2012       ROS A comprehensive review of systems was performed and was otherwise negative    Medications, allergies, and problem list were reviewed and updated    Exam  /60 (Patient Site: Right Arm, Patient Position: Sitting, Cuff Size: Adult Large)   Pulse 88   Wt 197 lb (89.4 kg)   LMP 07/23/1974   BMI 36.03 kg/m    PHQ 9 score of 3, she feels well.  Alert and oriented with good mood and affect.  No evidence of head trauma.  No jaundice.  Lungs are clear to auscultation with good respiratory excursion and no dullness.  No crepitus.  Minimal discomfort on her right chest wall.  Heart is regular, no murmur.  Abdomen obese but nontender.  No ankle edema.    Blood pressure rechecked by me was 124/60    Assessment/Plan  1. Type 2 diabetes mellitus without complication, with  long-term current use of insulin (H)  Improved control, now appears adequately controlled.  Just one rare low blood sugar.    Patient was told to contact clinic immediately if further low blood sugars.    Follow-up with diabetic educator next month.  Follow-up with me in September.  Plan hemoglobin A1c at next visit.    Has endocrinology appointment on October 18    2. Peripheral vascular disease (H)  Left toe ulcer healed.  Patient was reminded to walk on a daily basis.    3. Renal Transplant Recipient  Follow-up next month with her renal transplant team as planned.    She was given a Bumex, but she was told to manage that diuretic through her transplant team.    Imuran Gengraf and prednisone to managed by her transplant team.  - bumetanide (BUMEX) 1 MG tablet; Take 1 tablet (1 mg total) by mouth daily.  Dispense: 90 tablet; Refill: 1    4. CULLEN on CPAP  Compliant with CPAP    5. History of stroke  With history of bilateral carotid artery stenosis.  Crestor and aspirin.    6. Carotid stenosis, bilateral  As above    7. Coronary artery disease due to calcified coronary lesion  A symptom medic.  As above.  Does not tolerate higher doses of statins.    8. History of seizure  No recurrence and will continue on current Keppra.  Refer back to her neurologist, should have yearly follow-up with neurology.  - Ambulatory referral to Neurology    9. History of rib fracture  Healing well.  With her osteoporosis, I will have her hold the Fosamax for 1 month to improve fracture remodeling.  Restart Fosamax after 1 month    10. Anxiety  Controlled.  Continue Effexor and trazodone    11. Benign Essential Hypertension  Controlled  - bumetanide (BUMEX) 1 MG tablet; Take 1 tablet (1 mg total) by mouth daily.  Dispense: 90 tablet; Refill: 1    12. Chronic renal insufficiency, stage 3 (moderate) (H)  Stable.  Manage kidney labs and medication management through transplant clinic  - bumetanide (BUMEX) 1 MG tablet; Take 1 tablet (1 mg  total) by mouth daily.  Dispense: 90 tablet; Refill: 1    Pulmonary nodule, this was discussed with patient.  I suspect benign finding but with immunosuppression I would recommend a 6 to 12-month follow-up CT scan.  She quit smoking in 1984.    History of gastritis, well controlled on Protonix and Carafate currently.    Return in about 2 months (around 9/30/2019) for Recheck.   Patient Instructions   Continue on current medications.  Your Bumex has been refilled at same dose.  Discussed long-term plan for that prescription with your kidney transplant team.    Continue on current insulin.  If you have any further low blood sugars, contact clinic to discuss reducing insulin dosing.    Do not take alendronate for 1 month, then restarted.  This will allow your ribs to heal better.    Plan a repeat chest CT scan in 6 to 12 months to follow-up on the lung nodules.    Resume regular walking type exercise.    Follow-up in 2 months.    A referral has been placed to neurology, for long-term follow-up plan on your seizure medication.    Mika Ha MD        Current Outpatient Medications   Medication Sig Dispense Refill     alendronate (FOSAMAX) 35 MG tablet Take 35 mg by mouth once a week. Weekly on Sundays. Take in the morning with a full glass of water, on an empty stomach, and do not take anything else by mouth or lie down for the next 30 min.  Sundays             amLODIPine (NORVASC) 10 MG tablet Take 1 tablet (10 mg) by mouth daily. 90 tablet 3     aspirin 81 MG EC tablet Take 1 tablet (81 mg total) by mouth daily.       azaTHIOprine (IMURAN) 50 mg tablet Take 100 mg by mouth bedtime.        bumetanide (BUMEX) 1 MG tablet Take 1 tablet (1 mg total) by mouth daily. 90 tablet 1     carvedilol (COREG) 12.5 MG tablet Take 1 tablet (12.5 mg) by mouth twice daily with meals. 180 tablet 3     cholecalciferol, vitamin D3, 2,000 unit Tab Take 2,000 Units by mouth daily.       cyanocobalamin, vitamin B-12, 2,500 mcg Tab Take  "2,500 mcg by mouth every other day.       cycloSPORINE modified (GENGRAF) 25 MG capsule Take 50 mg by mouth 2 (two) times a day.        flash glucose scanning reader (FREESTYLE FELA 14 DAY READER) Misc Use 1 application As Directed daily. 1 each 0     flash glucose sensor (FREESTYLE FELA 14 DAY SENSOR) Kit Use 1 application As Directed every 14 (fourteen) days. 2 kit 11     hydrOXYzine pamoate (VISTARIL) 25 MG capsule Take 1 capsule (25 mg total) by mouth at bedtime as needed for anxiety or other (insomnia). 30 capsule 3     insulin NPH and regular human (NOVOLIN 70-30 FLEXPEN U-100) 100 unit/mL (70-30) pen Inject 15 Units under the skin daily before breakfast.       insulin NPH and regular human (NOVOLIN 70-30 FLEXPEN U-100) 100 unit/mL (70-30) pen Inject 17 Units under the skin every evening.       levETIRAcetam (KEPPRA) 500 MG tablet Take 500 mg by mouth daily. Take in the morning.       levETIRAcetam (KEPPRA) 500 MG tablet Take 1,000 mg by mouth at bedtime.       pantoprazole (PROTONIX) 40 MG tablet Take 40 mg by mouth 2 (two) times a day.       pen needle, diabetic (BD ULTRA-FINE TWILA PEN NEEDLES) 32 gauge x 5/32\" Ndle Use 1 per day. 200 each 4     predniSONE (DELTASONE) 5 MG tablet Take 5 mg by mouth daily.       rosuvastatin (CRESTOR) 5 MG tablet Take 1 tablet (5 mg) by mouth at bedtime. 90 tablet 3     traZODone (DESYREL) 50 MG tablet Take 100 mg by mouth at bedtime.       venlafaxine (EFFEXOR-XR) 150 MG 24 hr capsule Take 1 capsule (150 mg) by mouth daily. 90 capsule 3     No current facility-administered medications for this visit.      Allergies   Allergen Reactions     Lisinopril Cough     Resolved after discontinuation     Mold/Mildew      Latex Rash     Social History     Tobacco Use     Smoking status: Former Smoker     Packs/day: 1.50     Years: 20.00     Pack years: 30.00     Smokeless tobacco: Never Used   Substance Use Topics     Alcohol use: Yes     Comment: occasional     Drug use: No "

## 2021-05-30 NOTE — PROGRESS NOTES
Pt on the [employer] program has not completed a survey in several months. Due to lack of participation, she will be dis-enrolled.    Gisel Bustillo RN Care Manager, Population Health

## 2021-05-30 NOTE — PROGRESS NOTES
3 month f/u peripheral vascular disease with left first toe wound. Wound appears to be healed over now, pt stated it has been for the last month. US done prior to appointment.

## 2021-05-30 NOTE — PATIENT INSTRUCTIONS - HE
Continue on current medications.  Your Bumex has been refilled at same dose.  Discussed long-term plan for that prescription with your kidney transplant team.    Continue on current insulin.  If you have any further low blood sugars, contact clinic to discuss reducing insulin dosing.    Do not take alendronate for 1 month, then restarted.  This will allow your ribs to heal better.    Plan a repeat chest CT scan in 6 to 12 months to follow-up on the lung nodules.    Resume regular walking type exercise.    Follow-up in 2 months.    A referral has been placed to neurology, for long-term follow-up plan on your seizure medication.

## 2021-05-30 NOTE — PATIENT INSTRUCTIONS - HE
Your wound is healed. Continue to monitor area and contact clinic if area shows signs of reopening or you have other concerns related to your wound [phone].

## 2021-05-30 NOTE — PROGRESS NOTES
VASCULAR SURGERY OUTPATIENT CONSULT OR VISIT   VASCULAR SURGEON: Alison Au MD    LOCATION:  [facility]    Odalys Miles   Medical Record #:  [id number]  YOB: 1945  Age:  73 y.o.     Date of Service: 7/18/2019    PRIMARY CARE PROVIDER: Mika Ha MD      Reason for consultation: Evaluation for left first toe wound    IMPRESSION: Patient with complex presentation in the hospital with concomittent stroke and first toe wound.  On her last visit she was completely healed and had toe pressures of 90.  I wanted her to come back just to make sure that things stayed stable.  They have.  No foot wound and really doing very well overall.  Toe pressures remain in the normal range bilaterally    RECOMMENDATION: Doing very well from a peripheral vascular standpoint.  Return visit on a as needed basis only.    HPI:  Odalys Miles is a 73 y.o. female who was seen today in follow-up for her peripheral vascular supply and left first toe wound.  I saw her last in clinic 3 months ago and at that time her foot wound had essentially completely healed up.  Toe pressures have been normal.  I felt that angiointervention which would be high risk was not warranted and wanted to see her back in 3 months time.  She was seen and released from podiatry care by Dr. Castro.  Doing very well from a foot standpoint without recurrence of wounds.  Overall doing very well from the stroke standpoint as well.    On statin and aspirin therapy.    No other new health issues.    PHH:    Past Medical History:   Diagnosis Date     Adrenal insufficiency (H)      Anemia      Anemia     Created by Conversion      Anxiety      Arthritis      Ataxia 5/12/2015     CAD (coronary artery disease)      Cardiac pacemaker, dual, in situ 12/7/2016    [employer] Revo MRI DOI: 12/15/2011 Dr. Aishwarya Treviño     Chronic Diarrhea Of Unknown Origin     Created by Conversion      Chronic Gout     Created by Conversion  Replacement Utility  updated for latest IMO load     Chronic Reflux Esophagitis     Created by Conversion      Congestive Heart Failure     Created by Conversion  Replacement Utility updated for latest IMO load     Coronary Artery Stenosis     Created by Conversion [facility] Annotation: Feb 27 2011 11:37PM - Alina Zapien: s/p CABGx4  1/11  Replacement Utility updated for latest IMO load     CVA (cerebral vascular accident) (H)      Depression      Diabetic Peripheral Neuropathy     Created by Conversion      DM (diabetes mellitus) (H)      Dysthymic disorder     Created by Conversion      End Stage Renal Disease     Created by Conversion      Epilepsy (H)      ESRD (end stage renal disease) (H)      Essential Thrombocytosis     Created by Conversion      History of renal transplant      Hyperlipidemia      Hypertension      Hypertension     Created by Conversion  Replacement Utility updated for latest IMO load     Insomnia     Created by Conversion      Ischemic Stroke     Created by Conversion  Replacement Utility updated for latest IMO load     Medullary Sponge Kidney Bilaterally     Created by Conversion      Mixed hyperlipidemia     Created by Conversion      Morbid obesity (H) 8/29/2018     Neuropathy      Nonintractable epilepsy without status epilepticus, unspecified epilepsy type (H) 8/29/2018     CULLEN on CPAP     Created by Conversion      Osteoarthritis     Created by Conversion  Replacement Utility updated for latest IMO load     Renal Transplant Recipient     Created by Conversion      Sensorineural hearing loss     Created by Conversion  Replacement Utility updated for latest IMO load     Thrombophlebitis Of Deep Vessels Of The Lower Extremity     Created by Conversion      Type 2 diabetes mellitus (H)     Created by Conversion      Vertigo         Past Surgical History:   Procedure Laterality Date     CARDIAC PACEMAKER PLACEMENT       HYSTERECTOMY      Age 28     IR EXTREMITY ANGIOGRAM LEFT  1/30/2019      NEPHRECTOMY TRANSPLANTED ORGAN  06/2011     SC APPENDECTOMY      Description: Appendectomy;  Recorded: 11/13/2007;     SC CABG, VEIN, SINGLE      Description: CABG (CABG);  Proc Date: 01/26/2011;  Comments: x 4     SC INS NEW/RPLC PRM PACEMAKER W/TRANSV ELTRD VENTR      Description: Permanent Pacemaker Placement;  Recorded: 08/01/2012;     SC TOTAL ABDOM HYSTERECTOMY      Description: Total Abdominal Hysterectomy;  Recorded: 11/13/2007;     SC TOTAL KNEE ARTHROPLASTY      Description: Total Knee Arthroplasty;  Recorded: 11/13/2007;       ALLERGIES:  Lisinopril; Mold/mildew; and Latex    MEDS:    Current Outpatient Medications:      alendronate (FOSAMAX) 35 MG tablet, Take 35 mg by mouth once a week. Weekly on Sundays. Take in the morning with a full glass of water, on an empty stomach, and do not take anything else by mouth or lie down for the next 30 min. Sundays   , Disp: , Rfl:      amLODIPine (NORVASC) 10 MG tablet, Take 1 tablet (10 mg) by mouth daily., Disp: 90 tablet, Rfl: 3     aspirin 81 MG EC tablet, Take 1 tablet (81 mg total) by mouth daily., Disp: , Rfl:      azaTHIOprine (IMURAN) 50 mg tablet, Take 100 mg by mouth bedtime. , Disp: , Rfl:      bumetanide (BUMEX) 2 MG tablet, Take 0.5 tablets (1 mg total) by mouth daily., Disp: 20 tablet, Rfl: 0     carvedilol (COREG) 12.5 MG tablet, Take 1 tablet (12.5 mg) by mouth twice daily with meals., Disp: 180 tablet, Rfl: 3     cholecalciferol, vitamin D3, 2,000 unit Tab, Take 2,000 Units by mouth daily., Disp: , Rfl:      cyanocobalamin, vitamin B-12, 2,500 mcg Tab, Take 2,500 mcg by mouth every other day., Disp: , Rfl:      cycloSPORINE modified (GENGRAF) 25 MG capsule, Take 50 mg by mouth 2 (two) times a day. , Disp: , Rfl:      flash glucose scanning reader (FREESTYLE FELA 14 DAY READER) Misc, Use 1 application As Directed daily., Disp: 1 each, Rfl: 0     flash glucose sensor (FREESTYLE FELA 14 DAY SENSOR) Kit, Use 1 application As Directed every 14  "(fourteen) days., Disp: 2 kit, Rfl: 11     furosemide (LASIX) 40 MG tablet, Take 1 tablet (40 mg total) by mouth daily., Disp: 7 tablet, Rfl: 0     hydrOXYzine pamoate (VISTARIL) 25 MG capsule, Take 1 capsule (25 mg total) by mouth at bedtime as needed for anxiety or other (insomnia)., Disp: 30 capsule, Rfl: 3     insulin NPH and regular human (NOVOLIN 70-30 FLEXPEN U-100) 100 unit/mL (70-30) pen, Inject 15 Units under the skin daily before breakfast., Disp: , Rfl:      insulin NPH and regular human (NOVOLIN 70-30 FLEXPEN U-100) 100 unit/mL (70-30) pen, Inject 17 Units under the skin every evening., Disp: , Rfl:      levETIRAcetam (KEPPRA) 500 MG tablet, Take 500 mg by mouth daily. Take in the morning., Disp: , Rfl:      levETIRAcetam (KEPPRA) 500 MG tablet, Take 1,000 mg by mouth at bedtime., Disp: , Rfl:      pantoprazole (PROTONIX) 40 MG tablet, Take 40 mg by mouth 2 (two) times a day., Disp: , Rfl:      pen needle, diabetic (BD ULTRA-FINE TWILA PEN NEEDLES) 32 gauge x 5/32\" Ndle, Use 1 per day., Disp: 200 each, Rfl: 4     predniSONE (DELTASONE) 5 MG tablet, Take 5 mg by mouth daily., Disp: , Rfl:      rosuvastatin (CRESTOR) 5 MG tablet, Take 1 tablet (5 mg) by mouth at bedtime., Disp: 90 tablet, Rfl: 3     traZODone (DESYREL) 50 MG tablet, Take 100 mg by mouth at bedtime., Disp: , Rfl:      venlafaxine (EFFEXOR-XR) 150 MG 24 hr capsule, Take 1 capsule (150 mg) by mouth daily., Disp: 90 capsule, Rfl: 3    SOCIAL HABITS:    Social History     Tobacco Use   Smoking Status Former Smoker     Packs/day: 1.50     Years: 20.00     Pack years: 30.00   Smokeless Tobacco Never Used       Social History     Substance and Sexual Activity   Alcohol Use Yes    Comment: occasional       Social History     Substance and Sexual Activity   Drug Use No       FAMILY HISTORY:    Family History   Problem Relation Age of Onset     Cancer Sister      Diabetes Sister      Breast cancer Sister 62     Cancer Father        REVIEW OF SYSTEMS: " "   A 12 point ROS was reviewed and except for what is listed in the HPI above, all others are negative    PE:  /74   Pulse 68   Temp 97.8  F (36.6  C) (Oral)   Resp 16   Ht 5' 2\" (1.575 m)   Wt 197 lb (89.4 kg)   LMP 07/23/1974   BMI 36.03 kg/m    Wt Readings from Last 1 Encounters:   07/18/19 197 lb (89.4 kg)     Body mass index is 36.03 kg/m .    EXAM:  GENERAL: This is a well-developed 73 y.o. female who appears her stated age  EYES: Grossly normal.  MOUTH: Buccal mucosa normal   MUSCULOSKELETAL: Grossly normal and both lower extremities are intact.  HEME/LYMPH: No lymphedema  NEUROLOGIC: Focally intact, Alert and oriented x 3.   PSYCH: appropriate affect  INTEGUMENT: No open lesions or ulcers.  Left first toe has a 1 mm x 1 mm callus. no open wounds.        DIAGNOSTIC STUDIES:     Images:  Xr Chest 2 Views    Result Date: 7/9/2019  EXAM: XR CHEST 2 VIEWS LOCATION: [facility] DATE/TIME: 7/9/2019 2:36 PM INDICATION: mechanical fall, right-sided rib pain x 1 week COMPARISON: 03/12/2019 FINDINGS: There is trace blunting right costophrenic angle that is new and may represent a minimal right pleural effusion. No pneumothorax. Exam otherwise stable with lungs clear and borderline cardiomegaly. Previous CABG. Scoliosis and osteopenia. Old left humeral head fracture.    Ct Chest Without Contrast    Result Date: 7/9/2019  EXAM: CT CHEST WO CONTRAST LOCATION: [facility] DATE/TIME: 7/9/2019 3:06 PM INDICATION: recent fall, right rib pain, new pleural effusion. COMPARISON: None. TECHNIQUE: Helical images were obtained through the chest. Multiplanar reformats were obtained. Dose reduction techniques were used. CONTRAST: None. FINDINGS: LUNGS AND PLEURA: Right middle lobe 3 and 2 mm nodules (series 3, images 62 and 81). Right middle lobe 3 mm subpleural nodule apices 73). Trace right pleural effusion. No pneumothorax. MEDIASTINUM: No adenopathy or hematoma. LIMITED UPPER ABDOMEN: Pancreatic and " renal atrophy. Visceral atherosclerosis. MUSCULOSKELETAL: Right seventh, eighth and ninth lateral rib nondisplaced fractures. Mild right chest wall contusion. Sternal wires. Thoracolumbar scoliosis.     CONCLUSION: 1.  Trace right pleural effusion. 2.  Nondisplaced right seventh eighth and ninth rib fractures. 3.  Mild right chest wall contusion. 4.  Right middle lobe 2 and 3 mm nodules. Recommend follow-up per Fleischner Society guidelines. REFERENCE: Guidelines for Management of Incidental Pulmonary Nodules Detected on CT Images: From the Fleischner Society 2017. Guidelines apply to incidental nodules in patients who are 35 years or older. Guidelines do not apply to lung cancer screening, patients with immunosuppression, or patients with known primary cancer. MULTIPLE NODULES Nodule size <6 mm Low-risk patients: No follow-up needed. High-risk patients: Optional follow-up at 12 months.       I personally reviewed the images and my interpretation is that her ABIs, are noncompressible but her toe pressures are completely normal bilaterally.    LABS:      Sodium   Date Value Ref Range Status   07/09/2019 142 136 - 145 mmol/L Final   05/14/2019 140 136 - 145 mmol/L Final   02/22/2019 138 136 - 145 mmol/L Final     Potassium   Date Value Ref Range Status   07/09/2019 4.3 3.5 - 5.0 mmol/L Final   05/14/2019 3.5 3.5 - 5.0 mmol/L Final   02/22/2019 4.6 3.5 - 5.0 mmol/L Final     Chloride   Date Value Ref Range Status   07/09/2019 108 (H) 98 - 107 mmol/L Final   05/14/2019 100 98 - 107 mmol/L Final   02/22/2019 102 98 - 107 mmol/L Final     BUN   Date Value Ref Range Status   07/09/2019 35 (H) 8 - 28 mg/dL Final   05/14/2019 52 (H) 8 - 28 mg/dL Final   02/22/2019 32 (H) 8 - 28 mg/dL Final     Creatinine   Date Value Ref Range Status   07/09/2019 1.94 (H) 0.60 - 1.10 mg/dL Final   05/14/2019 2.45 (H) 0.60 - 1.10 mg/dL Final   02/22/2019 2.13 (H) 0.60 - 1.10 mg/dL Final     Hemoglobin   Date Value Ref Range Status    07/09/2019 11.0 (L) 12.0 - 16.0 g/dL Final   02/22/2019 11.4 (L) 12.0 - 16.0 g/dL Final   02/05/2019 9.9 (L) 12.0 - 16.0 g/dL Final     Platelets   Date Value Ref Range Status   07/09/2019 278 140 - 440 thou/uL Final   02/22/2019 237 140 - 440 thou/uL Final   02/05/2019 374 140 - 440 thou/uL Final     BNP   Date Value Ref Range Status   01/31/2011 952 (H) <110 pg/mL Final     Comment:                    Likely moderate to severe CHF   08/13/2010 882 (H) <107 pg/mL Final     Comment:                    Likely moderate to severe CHF     INR   Date Value Ref Range Status   01/30/2019 1.14 (H) 0.90 - 1.10 Final   01/22/2019 1.09 0.90 - 1.10 Final   04/01/2017 0.96 0.90 - 1.10 Final         Alison Au MD  VASCULAR SURGERY

## 2021-05-31 ENCOUNTER — RECORDS - HEALTHEAST (OUTPATIENT)
Dept: ADMINISTRATIVE | Facility: CLINIC | Age: 76
End: 2021-05-31

## 2021-05-31 VITALS — WEIGHT: 227 LBS | BODY MASS INDEX: 41.19 KG/M2

## 2021-05-31 VITALS — WEIGHT: 229 LBS | HEIGHT: 62 IN | BODY MASS INDEX: 42.14 KG/M2

## 2021-05-31 VITALS — BODY MASS INDEX: 41.7 KG/M2 | WEIGHT: 228 LBS

## 2021-05-31 VITALS — HEIGHT: 62 IN | WEIGHT: 232.7 LBS | BODY MASS INDEX: 42.82 KG/M2

## 2021-05-31 VITALS — HEIGHT: 62 IN | WEIGHT: 230 LBS | BODY MASS INDEX: 42.33 KG/M2

## 2021-05-31 VITALS — BODY MASS INDEX: 42.99 KG/M2 | WEIGHT: 233.6 LBS | HEIGHT: 62 IN

## 2021-05-31 VITALS — HEIGHT: 62 IN | WEIGHT: 229.7 LBS | BODY MASS INDEX: 42.27 KG/M2

## 2021-05-31 VITALS — WEIGHT: 230.4 LBS | HEIGHT: 63 IN | BODY MASS INDEX: 40.82 KG/M2

## 2021-05-31 VITALS — WEIGHT: 234 LBS | BODY MASS INDEX: 43.06 KG/M2 | HEIGHT: 62 IN

## 2021-05-31 NOTE — PROGRESS NOTES
Assessment: Odalys is here alone today for her follow-up on Diabetes.    She is currently taking Novolin 70/30 15 units between 8-9am and 17 units between 5:30-6p.  She switched the time of her second dose from 2pm and this has improved her fasting readings.  They are now in a range she is happy with.    Odalys uses the [employer] system and this was downloaded today and the last 14 days of data was reviewed.  Average glucose-140  Time in range ()-58%  Time below-6%  She had 3 episodes of low glucose, which we reviewed.  She had 2 in one afternoon.  She had cheerios for lunch, with no protein and had a difficult time rebounding.  Discussed the role protein plays in glucose control.  There was 3 days when her glucose was higher than usual.  Stated she was at a retreat that Thursday-Sunday making cards and was not making the best choices.    All additional questions and concerns addressed today.      Plan: Odalys will continue with her current regimen, it is working well for her.  Follow-up for repeat A1c and call if having hypoglycemia regularly.  She is out of education hours for this year, recommended she follow-up as needed.    Subjective and Objective:      Odalys Miles is referred by Dr. Radha aH for Diabetes Education.     Lab Results   Component Value Date    HGBA1C 8.5 (H) 05/14/2019     Follow up:   Primary care visit      Education:     Monitoring   Meter (per above goals): Assessed and Discussed  Monitoring: Assessed and Discussed  BG goals: Assessed and Discussed    Nutrition Management  Nutrition Management: Assessed and Discussed  Weight: Assessed and Discussed  Portions/Balance: Assessed and Discussed  Carb ID/Count: Not addressed  Label Reading: Not addressed  Heart Healthy Fats: Not addressed  Menu Planning: Assessed and Discussed  Dining Out: Assessed and Discussed  Physical Activity: Assessed and Discussed  Medications: Assessed and Discussed  Orals: Assessed and Discussed  Injected  Medications: Assessed and Discussed   Storage/Exp:Assessed and Discussed   Site Rotation: Assessed and Discussed   Sites Assessed: no    Diabetes Disease Process: Assessed and Discussed    Acute Complications: Prevent, Detect, Treat:  Hypoglycemia: Assessed and Discussed  Hyperglycemia: Assessed and Discussed  Sick Days: Not addressed  Driving: Not addressed    Chronic Complications  Foot Care:Not addressed  Skin Care: Not addressed  Eye: Not addressed  ABC: Not addressed  Teeth:Not addressed  Goal Setting and Problem Solving: Assessed and Discussed  Barriers: Assessed and Discussed  Psychosocial Adjustments: Assessed and Discussed      Time spent with the patient: 30 minutes for diabetes education and counseling.   Previous Education: yes  Visit Type:DSMT  Hours Remaining: DSMT 0 and MNT 2      Maira Freeman  8/7/2019

## 2021-06-01 VITALS
WEIGHT: 230 LBS | BODY MASS INDEX: 40.75 KG/M2 | WEIGHT: 230 LBS | HEIGHT: 63 IN | HEIGHT: 63 IN | BODY MASS INDEX: 40.75 KG/M2

## 2021-06-01 VITALS — HEIGHT: 62 IN | WEIGHT: 223 LBS | BODY MASS INDEX: 41.04 KG/M2

## 2021-06-01 VITALS — WEIGHT: 228.6 LBS | BODY MASS INDEX: 41.15 KG/M2

## 2021-06-01 NOTE — TELEPHONE ENCOUNTER
Refill Approved    Rx renewed per Medication Renewal Policy. Medication was last renewed on 10/18/18.    Nighat Akbar, [facility] Triage/Med Refill 9/13/2019     Requested Prescriptions   Pending Prescriptions Disp Refills     rosuvastatin (CRESTOR) 5 MG tablet 90 tablet 3     Sig: Take 1 tablet (5 mg) by mouth at bedtime.       Statins Refill Protocol (Hmg CoA Reductase Inhibitors) Passed - 9/13/2019 10:08 PM        Passed - PCP or prescribing provider visit in past 12 months      Last office visit with prescriber/PCP: 7/30/2019 Mika Ha MD OR same dept: 7/30/2019 Mika Ha MD OR same specialty: 7/30/2019 Mika Ha MD  Last physical: Visit date not found Last MTM visit: Visit date not found   Next visit within 3 mo: Visit date not found  Next physical within 3 mo: Visit date not found  Prescriber OR PCP: Mika Ha MD  Last diagnosis associated with med order: 1. Hyperlipidemia  - rosuvastatin (CRESTOR) 5 MG tablet; Take 1 tablet (5 mg) by mouth at bedtime.  Dispense: 90 tablet; Refill: 3    2. Benign Essential Hypertension  - rosuvastatin (CRESTOR) 5 MG tablet; Take 1 tablet (5 mg) by mouth at bedtime.  Dispense: 90 tablet; Refill: 3    3. Type 2 diabetes mellitus without complication, with long-term current use of insulin (H)  - rosuvastatin (CRESTOR) 5 MG tablet; Take 1 tablet (5 mg) by mouth at bedtime.  Dispense: 90 tablet; Refill: 3    If protocol passes may refill for 12 months if within 3 months of last provider visit (or a total of 15 months).

## 2021-06-01 NOTE — PROGRESS NOTES
[facility] clinic Follow Up Note    Odalys Miles   74 y.o. female    Date of Visit: 10/2/2019    Chief Complaint   Patient presents with     Follow-up     Diabetes     Subjective  Odalys is here with her friend [place].    Patient is a renal transplant patient, sees regularly at the transplant clinic.  On Imuran, Gengraf and 5 mg of prednisone.    Renal insufficiency has been stable with a creatinine of 1.9 on August 17, 2019 labs which were reviewed by me today.  Potassium level normal and hemoglobin level story goal at 11.7.  TSH normal and magnesium level normal.    She is seen next week by the transplant clinic.    She has not had any fever or chills or new infectious type symptoms.    No new cough or shortness of breath.    July 2019 chest CT scan did show a 2 mm and 3 mm nodule which was new.    She had fallen in July and had a right seventh and eighth rib fractures, those are all healed no pain now.    She quit smoking in 1984.    She denies any increasing shortness of breath.    Diabetes type 2 has been controlled in the past.  In May of this year hemoglobin A1c was up to 8.5%, but previous to that was 6.9%.    She is increased her insulin, now on 15 units in the morning and 17 units with supper of her 70/30 insulin.    She sees endocrinology in October 18.  She states she has a lab appointment prior to that planned.    She does have the freestyle av.  She denies any low blood sugar events.    She states her blood sugars are averaging 130-140 with highs up to 160.  No low blood sugars.    No foot sores.  Previous left great toe ulcer healed.  No claudication, history of peripheral vascular disease with GLEN of 0.44 in the left in April.    Bilateral 50-69% carotid artery stenosis seen in January 2019 ultrasound.  Medical management as planned.  History of left cerebellar stroke and left occipital stroke in the past.    She does not tolerate higher doses of Crestor but continues on 5 mg.  She is on a  "low-dose aspirin 81 mg.  No epigastric pain or bleeding issues.    Coronary artery disease with a bypass in 2011.  December 2018 stress test negative.  Seen by cardiology in March and no changes given a 1 year follow-up.    May 2019 labs reviewed with LDL 76 and HDL 48.    History of seizure disorder and I did review the EEG she just had last month.  There was still some sharp activity in the left temporal lobe seen.  I did review the head CT scan from August 2019 which was no changes.    I did review the lab work from September 6 with a Keppra level high at 67.    He does have some moderate sedation and sleepiness at times.    She still has not made an appoint with neurology for follow-up.    Still on Keppra 500 mg in the morning and thousand milligrams in the evening.    No recent recurrent seizures.    Chronic anxiety and insomnia, lifelong.  Takes Effexor.  He states that stable.    Obstructive sleep apnea, but compliant with CPAP, but still does not sleep well despite that with chronic fatigue.    No palpitations or history of atrial fibrillation.    No new falls since July.    No swallowing difficulty or new GI upset.  She takes Protonix.  On Fosamax.  EGD in March 2019 with history of gastritis.    She does have a new complaint of a small \"fatty tumor\" that she has in her perineal region that has some discomfort when she sits at times.  Denies that it it is a cyst or infection.  Denies any redness.  Is been a chronic issue and she states is been removed in the past.  She states she is talked with her transplant team about this in the past but is been recommended not to have surgery.    She does have chronic urinary incontinence and does wear adult diapers.    PMHx:    Past Medical History:   Diagnosis Date     Adrenal insufficiency (H)      Anemia      Anemia     Created by Conversion      Anxiety      Arthritis      Ataxia 5/12/2015     CAD (coronary artery disease)      Cardiac pacemaker, dual, in situ " 12/7/2016    [employer] Revo MRI DOI: 12/15/2011 Dr. Aishwarya Treviño     Chronic Diarrhea Of Unknown Origin     Created by Conversion      Chronic Gout     Created by Conversion  Replacement Utility updated for latest IMO load     Chronic Reflux Esophagitis     Created by Conversion      Congestive Heart Failure     Created by Conversion  Replacement Utility updated for latest IMO load     Coronary Artery Stenosis     Created by Conversion [facility] Annotation: Feb 27 2011 11:37PM - Alina Zapien: s/p CABGx4  1/11  Replacement Utility updated for latest IMO load     CVA (cerebral vascular accident) (H)      Depression      Diabetic Peripheral Neuropathy     Created by Conversion      DM (diabetes mellitus) (H)      Dysthymic disorder     Created by Conversion      End Stage Renal Disease     Created by Conversion      Epilepsy (H)      ESRD (end stage renal disease) (H)      Essential Thrombocytosis     Created by Conversion      History of renal transplant      Hyperlipidemia      Hypertension      Hypertension     Created by Conversion  Replacement Utility updated for latest IMO load     Insomnia     Created by Conversion      Ischemic Stroke     Created by Conversion  Replacement Utility updated for latest IMO load     Medullary Sponge Kidney Bilaterally     Created by Conversion      Mixed hyperlipidemia     Created by Conversion      Morbid obesity (H) 8/29/2018     Neuropathy      Nonintractable epilepsy without status epilepticus, unspecified epilepsy type (H) 8/29/2018     CULLEN on CPAP     Created by Conversion      Osteoarthritis     Created by Conversion  Replacement Utility updated for latest IMO load     Renal Transplant Recipient     Created by Conversion      Sensorineural hearing loss     Created by Conversion  Replacement Utility updated for latest IMO load     Thrombophlebitis Of Deep Vessels Of The Lower Extremity     Created by Conversion      Type 2 diabetes mellitus (H)     Created by  Conversion      Vertigo      PSHx:    Past Surgical History:   Procedure Laterality Date     CARDIAC PACEMAKER PLACEMENT       HYSTERECTOMY      Age 28     IR EXTREMITY ANGIOGRAM LEFT  1/30/2019     NEPHRECTOMY TRANSPLANTED ORGAN  06/2011     NH APPENDECTOMY      Description: Appendectomy;  Recorded: 11/13/2007;     NH CABG, VEIN, SINGLE      Description: CABG (CABG);  Proc Date: 01/26/2011;  Comments: x 4     NH INS NEW/RPLC PRM PACEMAKER W/TRANSV ELTRD VENTR      Description: Permanent Pacemaker Placement;  Recorded: 08/01/2012;     NH TOTAL ABDOM HYSTERECTOMY      Description: Total Abdominal Hysterectomy;  Recorded: 11/13/2007;     NH TOTAL KNEE ARTHROPLASTY      Description: Total Knee Arthroplasty;  Recorded: 11/13/2007;     Immunizations:   Immunization History   Administered Date(s) Administered     DT (pediatric) 01/01/1992     Influenza [id number], 01/25/2010     Influenza high dose,seasonal,PF, 65+ yrs 10/12/2015, 09/28/2016, 09/26/2017, 08/29/2018     Influenza, inj, historic,unspecified 10/23/2007, 10/27/2008, 11/09/2009     Pneumo Conj 13-V (2010&after) 10/12/2015     Pneumo Polysac 23-V 10/01/1996, 10/27/2008     Td,adult,historic,unspecified 08/08/2002     Tdap 06/30/2016, 11/05/2018     ZOSTER, LIVE 07/03/2012       ROS A comprehensive review of systems was performed and was otherwise negative    Medications, allergies, and problem list were reviewed and updated    Exam  /62 (Patient Site: Right Arm, Patient Position: Sitting, Cuff Size: Adult Large)   Pulse 64   Wt 201 lb (91.2 kg)   LMP 07/23/1974   BMI 36.76 kg/m    Moderately obese.  Alert and oriented x3.  Able to clamp on the exam table.  Lungs are clear to auscultation without crackles or wheezing.  Heart is regular she does have a 2 out of 6 systolic murmur right upper sternal border, I did not notice that last time the patient states she has been told she has a murmur.    I did review the echo from April 2019 and there was a mobile  echodensity on the mitral valve, but noted to be no change compared to January 2019 echo.  Moderate mitral annular calcification.  No aortic stenosis noted.  Ejection fraction 55%.    Abdomen is without significant tenderness, no hepatomegaly.  No ankle edema.  No new skin rash.    Assessment/Plan  1. Type 2 diabetes mellitus without complication, with long-term current use of insulin (H)  Improved control, now appears well controlled and no hypoglycemia.    Continue current insulin.  She was told to contact clinic immediately if any low blood sugars.    See endocrinology in October 18 as planned.  Hemoglobin A1c and additional labs will be ordered by endocrinology, per their orders.    Need to document her most recent diabetic eye exam.    2. Renal Transplant Recipient  Stable.  Moderate renal insufficiency.  Managed by the renal transplant team.  Continue immunosuppression, managed by the renal transplant team.  Has an appointment next week with a renal transplant team.    Flu shot next week after evaluation by renal transplant team.    3. CULLEN on CPAP  Compliant with CPAP    History of stroke.    4. History of seizure  She did have sharp activity in her left temporal lobe on EEG.  She did have a high Keppra level.  No recurrent seizure.  Moderate sedation.  Had CT scan stable in August.    Patient was reminded to make appoint with her neurology to discuss her seizure medication treatment plan.  Continue on current Keppra at this time.    5. Carotid stenosis, bilateral  Asymptomatic, but has had a history of stroke.  Continue medical management with Crestor and aspirin.  Does not tolerate higher doses of Crestor.    Peripheral vascular disease with history of toe ulcer, no recurrent ulcer.  No leg claudication.  Continue medical management.    6. Coronary artery disease due to calcified coronary lesion  Medical management as above.  Asymptomatic.    History of chronic anxiety, stable.  Continue  "Effexor.    Essential hypertension, controlled with amlodipine 10 mg a day, carvedilol 12.5 mg twice daily and Bumex 1 mg a day.    Heart murmur, suggestive of the mitral valve calcification flow murmur.  No infectious symptoms and it was noted to be stable on the April echo.  No further follow-up on that plan.    Lung nodules noted on July 2019 chest CT scan.  There were small nodules.  Distant history of smoking, but on immunosuppression.    She can discuss timing of repeat chest CT scan with her transplant team in clinic next week.    Consider a repeat chest CT scan in 6 to 12 months, sooner if any new cough or infectious symptoms, given her immunosuppression.    History of gastritis, controlled on Protonix.    Takes Fosamax with her chronic prednisone use.    Has mammogram scheduled later this month    Description of a \"fatty tumor\" in her perineal region that causes some discomfort when sitting.  He states there is no infection concern.  She did not want to have it examined today.  I gave her strong warnings on getting it evaluated right away if there is any increasing pain or redness or infectious signs or drainage from that area.  She would not be a good candidate for any surgical procedure.  It is fairly mildly symptomatic and she states relatively stable.  She can wear padding when she sits.  Avoid irritation in that area.    Return in about 6 months (around 4/2/2020) for Annual physical.   Patient Instructions   See neurology for your seizure medication management.  Your Keppra level was high but there were some positive findings on your EEG.  Discussed your seizure treatment plan with neurology at appointment later this year.  Continue on current dose of Keppra at this time.    Get your flu shot at the transplant clinic.    Endocrinology appointment later this month as planned, with hemoglobin A1c as ordered by your endocrinologist.    Follow-up with me as needed, or in 6 months for an adult wellness " visit physical.        Mika aH MD        Current Outpatient Medications   Medication Sig Dispense Refill     alendronate (FOSAMAX) 35 MG tablet Take 35 mg by mouth once a week. Weekly on Sundays. Take in the morning with a full glass of water, on an empty stomach, and do not take anything else by mouth or lie down for the next 30 min.  Sundays             amLODIPine (NORVASC) 10 MG tablet Take 1 tablet (10 mg) by mouth daily. 90 tablet 3     aspirin 81 MG EC tablet Take 1 tablet (81 mg total) by mouth daily.       azaTHIOprine (IMURAN) 50 mg tablet Take 100 mg by mouth bedtime.        bumetanide (BUMEX) 1 MG tablet Take 1 tablet (1 mg total) by mouth daily. 90 tablet 1     carvedilol (COREG) 12.5 MG tablet Take 1 tablet (12.5 mg) by mouth twice daily with meals. 180 tablet 3     cholecalciferol, vitamin D3, 2,000 unit Tab Take 2,000 Units by mouth daily.       cyanocobalamin, vitamin B-12, 2,500 mcg Tab Take 2,500 mcg by mouth every other day.       cycloSPORINE modified (GENGRAF) 25 MG capsule Take 50 mg by mouth 2 (two) times a day.        flash glucose scanning reader (FREESTYLE FELA 14 DAY READER) Misc Use 1 application As Directed daily. 1 each 0     flash glucose sensor (FREESTYLE FELA 14 DAY SENSOR) Kit Use 1 application As Directed every 14 (fourteen) days. 2 kit 11     hydrOXYzine pamoate (VISTARIL) 25 MG capsule Take 1 capsule (25 mg total) by mouth at bedtime as needed for anxiety or other (insomnia). 30 capsule 3     insulin NPH and regular human (NOVOLIN 70-30 FLEXPEN U-100) 100 unit/mL (70-30) pen Inject 15 Units under the skin daily before breakfast.       insulin NPH and regular human (NOVOLIN 70-30 FLEXPEN U-100) 100 unit/mL (70-30) pen Inject 17 Units under the skin every evening.       levETIRAcetam (KEPPRA) 500 MG tablet Take 500 mg by mouth daily. Take in the morning.       levETIRAcetam (KEPPRA) 500 MG tablet Take 1,000 mg by mouth at bedtime.       meclizine (ANTIVERT) 25 mg tablet  "Take 1 tablet (25 mg total) by mouth 3 (three) times a day as needed for dizziness. 30 tablet 0     pantoprazole (PROTONIX) 40 MG tablet Take 40 mg by mouth 2 (two) times a day.       pen needle, diabetic (BD ULTRA-FINE TWILA PEN NEEDLES) 32 gauge x 5/32\" Ndle Use 1 per day. 200 each 4     predniSONE (DELTASONE) 5 MG tablet Take 5 mg by mouth daily.       rosuvastatin (CRESTOR) 5 MG tablet Take 1 tablet (5 mg) by mouth at bedtime. 90 tablet 3     traZODone (DESYREL) 50 MG tablet Take 100 mg by mouth at bedtime.       venlafaxine (EFFEXOR-XR) 150 MG 24 hr capsule Take 1 capsule (150 mg) by mouth daily. 90 capsule 3     No current facility-administered medications for this visit.      Allergies   Allergen Reactions     Lisinopril Cough     Resolved after discontinuation     Mold/Mildew      Latex Rash     Social History     Tobacco Use     Smoking status: Former Smoker     Packs/day: 1.50     Years: 20.00     Pack years: 30.00     Smokeless tobacco: Never Used   Substance Use Topics     Alcohol use: Yes     Comment: occasional     Drug use: No           "

## 2021-06-01 NOTE — PROGRESS NOTES
Bedside EEG was performed that included photic stimulation; hyperventilation was deferred. The patient was awake and asleep throughout the recording.  EEG #.  EEG [id number] system was used for this recording.

## 2021-06-01 NOTE — PATIENT INSTRUCTIONS - HE
See neurology for your seizure medication management.  Your Keppra level was high but there were some positive findings on your EEG.  Discussed your seizure treatment plan with neurology at appointment later this year.  Continue on current dose of Keppra at this time.    Get your flu shot at the transplant clinic.    Endocrinology appointment later this month as planned, with hemoglobin A1c as ordered by your endocrinologist.    Follow-up with me as needed, or in 6 months for an adult wellness visit physical.

## 2021-06-01 NOTE — TELEPHONE ENCOUNTER
Refill Approved    Rx renewed per Medication Renewal Policy. Medication was last renewed on 10/18/18.    Nighat Akbar, [facility] Triage/Med Refill 9/13/2019     Requested Prescriptions   Pending Prescriptions Disp Refills     carvedilol (COREG) 12.5 MG tablet 180 tablet 3     Sig: Take 1 tablet (12.5 mg) by mouth twice daily with meals.       Beta-Blockers Refill Protocol Passed - 9/13/2019  9:43 PM        Passed - PCP or prescribing provider visit in past 12 months or next 3 months     Last office visit with prescriber/PCP: 7/30/2019 Mika Ha MD OR same dept: 7/30/2019 iMka Ha MD OR same specialty: 7/30/2019 Mika Ha MD  Last physical: Visit date not found Last MTM visit: Visit date not found   Next visit within 3 mo: Visit date not found  Next physical within 3 mo: Visit date not found  Prescriber OR PCP: Mika Ha MD  Last diagnosis associated with med order: 1. Hypertension  - carvedilol (COREG) 12.5 MG tablet; Take 1 tablet (12.5 mg) by mouth twice daily with meals.  Dispense: 180 tablet; Refill: 3    If protocol passes may refill for 12 months if within 3 months of last provider visit (or a total of 15 months).             Passed - Blood pressure filed in past 12 months     BP Readings from Last 1 Encounters:   08/17/19 166/90

## 2021-06-02 ENCOUNTER — RECORDS - HEALTHEAST (OUTPATIENT)
Dept: ADMINISTRATIVE | Facility: CLINIC | Age: 76
End: 2021-06-02

## 2021-06-02 VITALS — WEIGHT: 202.4 LBS | BODY MASS INDEX: 36.43 KG/M2

## 2021-06-02 VITALS — HEIGHT: 63 IN | BODY MASS INDEX: 35.79 KG/M2 | WEIGHT: 202 LBS

## 2021-06-02 VITALS — HEIGHT: 63 IN | BODY MASS INDEX: 34.46 KG/M2 | WEIGHT: 194.5 LBS

## 2021-06-02 VITALS — WEIGHT: 192.3 LBS | BODY MASS INDEX: 34.61 KG/M2

## 2021-06-02 VITALS — WEIGHT: 201.1 LBS | BODY MASS INDEX: 36.2 KG/M2

## 2021-06-02 VITALS — HEIGHT: 62 IN | WEIGHT: 207.6 LBS | BODY MASS INDEX: 38.2 KG/M2

## 2021-06-02 VITALS — WEIGHT: 194 LBS | BODY MASS INDEX: 34.38 KG/M2 | HEIGHT: 63 IN

## 2021-06-02 VITALS — HEIGHT: 63 IN | WEIGHT: 195.5 LBS | BODY MASS INDEX: 34.64 KG/M2

## 2021-06-02 VITALS — HEIGHT: 62 IN | BODY MASS INDEX: 38.09 KG/M2 | WEIGHT: 207.02 LBS

## 2021-06-02 VITALS — BODY MASS INDEX: 38.65 KG/M2 | WEIGHT: 211.3 LBS

## 2021-06-02 VITALS — BODY MASS INDEX: 42.25 KG/M2 | HEIGHT: 62 IN | WEIGHT: 229.6 LBS

## 2021-06-02 VITALS — BODY MASS INDEX: 37.7 KG/M2 | WEIGHT: 206.1 LBS

## 2021-06-02 VITALS — BODY MASS INDEX: 36.68 KG/M2 | HEIGHT: 63 IN | WEIGHT: 207 LBS

## 2021-06-02 VITALS — WEIGHT: 194.8 LBS | BODY MASS INDEX: 35.06 KG/M2

## 2021-06-02 VITALS — BODY MASS INDEX: 35.28 KG/M2 | WEIGHT: 196 LBS

## 2021-06-02 VITALS — HEIGHT: 63 IN | WEIGHT: 201 LBS | BODY MASS INDEX: 35.61 KG/M2

## 2021-06-02 VITALS — WEIGHT: 193 LBS | BODY MASS INDEX: 34.74 KG/M2

## 2021-06-02 VITALS — BODY MASS INDEX: 41.9 KG/M2 | WEIGHT: 229.1 LBS

## 2021-06-02 VITALS — BODY MASS INDEX: 37.92 KG/M2 | WEIGHT: 207.3 LBS

## 2021-06-02 VITALS — BODY MASS INDEX: 35.01 KG/M2 | WEIGHT: 194.5 LBS

## 2021-06-02 VITALS — WEIGHT: 229.1 LBS | BODY MASS INDEX: 41.9 KG/M2

## 2021-06-02 VITALS — WEIGHT: 205.7 LBS | BODY MASS INDEX: 37.62 KG/M2

## 2021-06-02 NOTE — PROGRESS NOTES
[facility]  ENDOCRINOLOGY    Diabetes Note 10/21/2019    Odalys Miles, 1945, [id number]          Reason for visit      1. Type 2 diabetes mellitus without complication, with long-term current use of insulin (H)        HPI     Odalys Miles is a very pleasant 74 y.o. old female who presents for follow up.  SUMMARY:  Odalys returns today in f/u for DM 2.  Her current A1c is 6.5 and down considerably from her last at 8.5.  She is feeling well and is very happy about her work. She has been a diabetic for 20 years.  She continues using Novolin 70/30 insulin mix. She is taking 15 units in the morning and 17 units in the evening. She reports no hypoglycemia as a regular event, but will occasionally have readings in the 80s.  She also reports very occasional readings above 200.  She is currently using the Freestyle Livier.       Past Medical History     Patient Active Problem List   Diagnosis     Essential Thrombocytosis     Osteoarthritis     Thrombophlebitis Of Deep Vessels Of The Lower Extremity     Medullary Sponge Kidney Bilaterally     CULLEN on CPAP     Mixed hyperlipidemia     End Stage Renal Disease     Renal Transplant Recipient     Benign Essential Hypertension     Coronary artery disease due to calcified coronary lesion     Chronic Reflux Esophagitis     Chronic Gout     (HFpEF) heart failure with preserved ejection fraction (H)     Ischemic Stroke     Type 2 diabetes mellitus (H)     Dysthymic Disorder     Diabetic Peripheral Neuropathy     Anemia     Sensorineural Hearing Loss     Hyperlipidemia     Ataxia     Cardiac pacemaker, dual, in situ     Adrenal insufficiency (H)     Nonintractable epilepsy without status epilepticus, unspecified epilepsy type (H)     Morbid obesity (H)     TIA (transient ischemic attack)     Atherosclerosis of native artery of left lower extremity with ulceration of other part of foot (H)     Arteriosclerotic vascular disease     Bacteremia     Chronic diarrhea     Closed  "displaced fracture of surgical neck of left humerus     Disorder of stomach     Diverticular disease of colon     Immunosuppressive management encounter following kidney transplant     Iron deficiency anemia     Major depressive disorder, recurrent episode, in partial or unspecified remission     Noninfectious gastroenteritis     Pain in joint, lower leg     Polyp of duodenum     PTSD (post-traumatic stress disorder)     Seizure disorder (H)     History of seizure     Anxiety        Family History       family history includes Breast cancer (age of onset: 62) in her sister; Cancer in her father and sister; Diabetes in her sister.    Social History      reports that she has quit smoking. She has a 30.00 pack-year smoking history. She has never used smokeless tobacco. She reports current alcohol use. She reports that she does not use drugs.      Review of Systems     Patient has no polyuria or polydipsia, no chest pain, dyspnea or TIA's, no numbness, tingling or pain in extremities  Remainder negative except as noted in HPI.    Vital Signs     /68 (Patient Site: Right Arm, Patient Position: Sitting, Cuff Size: Adult Regular)   Pulse 78   Ht 5' 2\" (1.575 m)   Wt 205 lb 14.4 oz (93.4 kg)   LMP 07/23/1974   BMI 37.66 kg/m    Wt Readings from Last 3 Encounters:   10/18/19 205 lb 14.4 oz (93.4 kg)   10/02/19 201 lb (91.2 kg)   08/17/19 197 lb (89.4 kg)       Physical Exam     Constitutional:  Well developed, Well nourished  HENT:  Normocephalic,   Neck: Thyroid normal, No lymph nodes, Supple  Eyes:  PERRL, Conjunctiva pink  Respiratory:  Normal breath sounds, No respiratory distress  Cardiovascular:  Normal heart rate, Normal rhythm, No murmurs  GI:  Bowel sounds normal, Soft, No tenderness  Musculoskeletal:  No gross deformity or lesions, normal dorsalis pedis pulses  Skin: No acanthosis nigricans, lipoatrophy or lipodystrophy  Neurologic:  Alert & oriented x 3, nonfocal  Psychiatric:  Affect, Mood, Insight " appropriate  Diabetic foot exam: no ulcers, charcot's or high risk calluses, unable to feel monofilament         Assessment     1. Type 2 diabetes mellitus without complication, with long-term current use of insulin (H)        Plan     Pt's control has improved remarkably. She is paying close attention to her numbers and how what she eats affects them. She will continue on current dosages and will f/u with me in 6 months. Time spent with pt today: 25 min with >50% spent in counseling and coordination of care.        [facility]  10/21/2019  3:03 PM      Lab Results     Hemoglobin A1c   Date Value Ref Range Status   10/11/2019 6.5 (H) 3.5 - 6.0 % Final   05/14/2019 8.5 (H) 3.5 - 6.0 % Final   02/05/2019 6.9 (H) 3.5 - 6.0 % Final   11/14/2018 6.6 (H) 3.5 - 6.0 % Final   07/18/2018 6.8 (H) 3.5 - 6.0 % Final     Creatinine   Date Value Ref Range Status   10/11/2019 2.30 (H) 0.60 - 1.10 mg/dL Final   08/17/2019 1.96 (H) 0.60 - 1.10 mg/dL Final   07/09/2019 1.94 (H) 0.60 - 1.10 mg/dL Final     Microalbumin, Random Urine   Date Value Ref Range Status   08/29/2018 >50.00 (H) 0.00 - 1.99 mg/dL Final       Cholesterol   Date Value Ref Range Status   05/14/2019 159 <=199 mg/dL Final     HDL Cholesterol   Date Value Ref Range Status   05/14/2019 48 (L) >=50 mg/dL Final     LDL Calculated   Date Value Ref Range Status   05/14/2019 76 <=129 mg/dL Final     Triglycerides   Date Value Ref Range Status   05/14/2019 175 (H) <=149 mg/dL Final       Lab Results   Component Value Date    ALT 13 05/14/2019    AST 12 05/14/2019    ALKPHOS 64 05/14/2019    BILITOT 0.3 05/14/2019         Current Medications     Outpatient Medications Prior to Visit   Medication Sig Dispense Refill     alendronate (FOSAMAX) 35 MG tablet Take 35 mg by mouth once a week. Weekly on Sundays. Take in the morning with a full glass of water, on an empty stomach, and do not take anything else by mouth or lie down for the next 30  "min.  Sundays             amLODIPine (NORVASC) 10 MG tablet Take 1 tablet (10 mg) by mouth daily. 90 tablet 3     aspirin 81 MG EC tablet Take 1 tablet (81 mg total) by mouth daily.       azaTHIOprine (IMURAN) 50 mg tablet Take 100 mg by mouth bedtime.        bumetanide (BUMEX) 1 MG tablet Take 1 tablet (1 mg total) by mouth daily. 90 tablet 1     carvedilol (COREG) 12.5 MG tablet Take 1 tablet (12.5 mg) by mouth twice daily with meals. 180 tablet 3     cholecalciferol, vitamin D3, 2,000 unit Tab Take 2,000 Units by mouth daily.       cyanocobalamin, vitamin B-12, 2,500 mcg Tab Take 2,500 mcg by mouth every other day.       cycloSPORINE modified (GENGRAF) 25 MG capsule Take 50 mg by mouth 2 (two) times a day.        flash glucose scanning reader ([employer]STYLE FELA 14 DAY READER) Misc Use 1 application As Directed daily. 1 each 0     flash glucose sensor (FREESTYLE FELA 14 DAY SENSOR) Kit Use 1 application As Directed every 14 (fourteen) days. 2 kit 11     hydrOXYzine pamoate (VISTARIL) 25 MG capsule Take 1 capsule (25 mg total) by mouth at bedtime as needed for anxiety or other (insomnia). 30 capsule 3     insulin NPH and regular human (NOVOLIN 70-30 FLEXPEN U-100) 100 unit/mL (70-30) pen Inject under the skin daily before breakfast. Inject 15 units with breakfast; 17 units at dinnertime             levETIRAcetam (KEPPRA) 500 MG tablet Take 500 mg by mouth daily. Take 1 tab in am; 1 tab in pm             pantoprazole (PROTONIX) 40 MG tablet Take 40 mg by mouth 2 (two) times a day.       pen needle, diabetic (BD ULTRA-FINE TWILA PEN NEEDLES) 32 gauge x 5/32\" Ndle Use 1 per day. 200 each 4     predniSONE (DELTASONE) 5 MG tablet Take 5 mg by mouth daily.       rosuvastatin (CRESTOR) 5 MG tablet Take 1 tablet (5 mg) by mouth at bedtime. 90 tablet 3     traZODone (DESYREL) 50 MG tablet Take 100 mg by mouth at bedtime.       venlafaxine (EFFEXOR-XR) 150 MG 24 hr capsule Take 1 capsule (150 mg) by mouth daily. 90 capsule 3 "     insulin NPH and regular human (NOVOLIN 70-30 FLEXPEN U-100) 100 unit/mL (70-30) pen Inject 17 Units under the skin every evening.       insulin NPH and regular human (NOVOLIN 70-30 FLEXPEN U-100) 100 unit/mL (70-30) pen Take 15 units for breakfast and 17 units before supper. 15 mL 3     levETIRAcetam (KEPPRA) 500 MG tablet Take 1,000 mg by mouth at bedtime.       meclizine (ANTIVERT) 25 mg tablet Take 1 tablet (25 mg total) by mouth 3 (three) times a day as needed for dizziness. 30 tablet 0     No facility-administered medications prior to visit.

## 2021-06-02 NOTE — TELEPHONE ENCOUNTER
Message left informing patient that she needs to go to the emergency room now, and to please call back to confirm that she will do so and that symptoms have not worsened.       Sania Kay, [facility]  2:21 PM  10/28/2019

## 2021-06-02 NOTE — TELEPHONE ENCOUNTER
RN cannot approve Refill Request    RN can NOT refill this medication Protocol failed and NO refill given.         Essie Melton, [facility] Triage/Med Refill 10/3/2019    Requested Prescriptions   Pending Prescriptions Disp Refills     NOVOLIN 70-30 FLEXPEN U-100 100 unit/mL (70-30) pen [Pharmacy Med Name: [facility]ION NOVOLIN 70/30 FLEXPEN]  3     Sig: INJECT 5 UNITS SUBCUTANEOUSLY BEFORE BREAKFAST AND 10 UNITS BEFORE DINNER       Insulin/GLP-1 Refill Protocol Failed - 10/2/2019  5:27 PM        Failed - Microalbumin in last year     Microalbumin, Random Urine   Date Value Ref Range Status   08/29/2018 >50.00 (H) 0.00 - 1.99 mg/dL Final                  Passed - Visit with PCP or prescribing provider visit in last 6 months     Last office visit with prescriber/PCP: Visit date not found OR same dept: 10/2/2019 Mika Ha MD OR same specialty: 10/2/2019 Mika Ha MD Last physical: Visit date not found Last MTM visit: Visit date not found     Next appt within 3 mo: Visit date not found  Next physical within 3 mo: Visit date not found  Prescriber OR PCP: Jamila Lechuga MD  Last diagnosis associated with med order: 1. Type 2 diabetes mellitus without complication, with long-term current use of insulin (H)  - NOVOLIN 70-30 FLEXPEN U-100 100 unit/mL (70-30) pen [Pharmacy Med Name: [facility] NOVOLIN 70/30 FLEXPEN]; INJECT 5 UNITS SUBCUTANEOUSLY BEFORE BREAKFAST AND 10 UNITS BEFORE DINNER; Refill: 3    If protocol passes may refill for 6 months if within 3 months of last provider visit (or a total of 9 months).              Passed - A1C in last 6 months     Hemoglobin A1c   Date Value Ref Range Status   05/14/2019 8.5 (H) 3.5 - 6.0 % Final               Passed - Blood pressure in last year     BP Readings from Last 1 Encounters:   10/02/19 124/62             Passed - Creatinine done in last year     Creatinine   Date Value Ref Range Status   08/17/2019 1.96 (H) 0.60 - 1.10 mg/dL Final

## 2021-06-02 NOTE — PATIENT INSTRUCTIONS - HE
Odalys Miles,    It was a pleasure to see you today at the [facility].     My recommendations after this visit include:    See dr Escamilla or CHF NP in 2 weeks  bumex daily    RICARDO Way MD, FACC, MATT

## 2021-06-02 NOTE — TELEPHONE ENCOUNTER
"Patient calling today. She states she just came back from traveling on a short gettaway, and she states she has increased shortness of breath with her CHF, along with fluid retention. She reports   Wheezing slightly, and states, \"I look as if I'm 9 months pregnant\".   Patient reports, no resriction of diet, or fluid.  She reports, with any activity, \"I get extremely shortness of breath\"    Luzmaria Becerra RN  [facility] Triage/refill nurse        Reason for Disposition    Wheezing started suddenly after medicine, an allergic food, or bee sting    Slow, shallow and weak breathing    Difficulty breathing    Wheezing is present    Protocols used: BREATHING DIFFICULTY-A-OH, COUGH-A-OH      "

## 2021-06-02 NOTE — TELEPHONE ENCOUNTER
Patient needs to go to emergency room now.  She is a renal transplant patient and has coronary artery disease.

## 2021-06-03 ENCOUNTER — RECORDS - HEALTHEAST (OUTPATIENT)
Dept: ADMINISTRATIVE | Facility: CLINIC | Age: 76
End: 2021-06-03

## 2021-06-03 VITALS
DIASTOLIC BLOOD PRESSURE: 66 MMHG | SYSTOLIC BLOOD PRESSURE: 142 MMHG | RESPIRATION RATE: 16 BRPM | BODY MASS INDEX: 36.8 KG/M2 | WEIGHT: 200 LBS | HEART RATE: 64 BPM | HEIGHT: 62 IN

## 2021-06-03 VITALS
SYSTOLIC BLOOD PRESSURE: 122 MMHG | WEIGHT: 204 LBS | DIASTOLIC BLOOD PRESSURE: 70 MMHG | BODY MASS INDEX: 37.31 KG/M2 | HEART RATE: 66 BPM

## 2021-06-03 VITALS
HEIGHT: 62 IN | HEART RATE: 78 BPM | SYSTOLIC BLOOD PRESSURE: 118 MMHG | BODY MASS INDEX: 37.89 KG/M2 | DIASTOLIC BLOOD PRESSURE: 68 MMHG | WEIGHT: 205.9 LBS

## 2021-06-03 VITALS
HEART RATE: 64 BPM | WEIGHT: 201 LBS | BODY MASS INDEX: 36.76 KG/M2 | SYSTOLIC BLOOD PRESSURE: 124 MMHG | DIASTOLIC BLOOD PRESSURE: 62 MMHG

## 2021-06-03 VITALS — BODY MASS INDEX: 34.8 KG/M2 | HEIGHT: 63 IN | WEIGHT: 196.4 LBS

## 2021-06-03 VITALS
BODY MASS INDEX: 37.36 KG/M2 | SYSTOLIC BLOOD PRESSURE: 144 MMHG | RESPIRATION RATE: 16 BRPM | WEIGHT: 203 LBS | HEART RATE: 62 BPM | DIASTOLIC BLOOD PRESSURE: 60 MMHG | HEIGHT: 62 IN

## 2021-06-03 VITALS
HEART RATE: 60 BPM | WEIGHT: 195 LBS | RESPIRATION RATE: 16 BRPM | SYSTOLIC BLOOD PRESSURE: 148 MMHG | BODY MASS INDEX: 35.88 KG/M2 | HEIGHT: 62 IN | DIASTOLIC BLOOD PRESSURE: 76 MMHG

## 2021-06-03 VITALS — WEIGHT: 199.4 LBS | BODY MASS INDEX: 35.89 KG/M2

## 2021-06-03 VITALS — WEIGHT: 197 LBS | BODY MASS INDEX: 36.03 KG/M2

## 2021-06-03 VITALS — WEIGHT: 199.5 LBS | BODY MASS INDEX: 36.49 KG/M2

## 2021-06-03 VITALS — WEIGHT: 197 LBS | BODY MASS INDEX: 36.25 KG/M2 | HEIGHT: 62 IN

## 2021-06-03 VITALS — WEIGHT: 194 LBS | BODY MASS INDEX: 34.92 KG/M2

## 2021-06-03 NOTE — PROGRESS NOTES
Patient and her friend, [place], seen in clinic for HF education s/p recent ED visit 10/28/19.  Reviewed HF Binder that includes the  HF Sx Awareness & Action plan  handout and  A Stronger Pump  booklet and Weight log booklet highlighting :  __X_patient s type of heart failure _X__Na management in diet  __X_importance of daily wts  _X__Fluid Guidelines, if applicable  __X_medication review and importance of compliance     Instructed patient and her friend, [place],  in signs and sx of heart failure, reiterated when to call clinic - reviewed HF hotline # [phone] and after hours call # [phone].  Majority of time was spent reviewing: how to start a low sodium diet. [place] is very supportive and willing to help with reading labels and grocery shopping for low sodium food options.  Patient verbalized understanding of HF discussion.  Plan for f/u with continued HF education reviewed with Char in 3 months and with Dr. Way in 6 weeks.

## 2021-06-03 NOTE — PATIENT INSTRUCTIONS - HE
Odalys Miles,    It was a pleasure to see you today at [facility].     My recommendations after this visit include:  - Please follow up with Dr Escamilla in 6 weeks  - Please follow up with Char Negron in 3 months  - I have checked labs and will contact you with results on MyChart  - I have increased your carvedilol to 18.75 mg twice a day (1.5 tablets) twice a day. Please call the clinic if you have any increased lightheadedness or dizziness with this medication change ([phone]).       Char Negron CNP      What is the [facility] Failure [facility]?     The [facility] Program is a heart failure specialty clinic within Heart Care.  You will work with your cardiologist, nurse practitioner, and nurses to carefully adjust medications and learn how to live with heart failure.  The Heart Failure Program will help you:      Better understand your chronic heart condition    Feel better and avoid hospital stays    Monitoring for Symptoms      Call the Heart Failure Phone Line ([phone]) if you have any of these symptoms:     Increased shortness of breath/shortness of breath at rest    Waking up at night with difficulty breathing    Unable to lie down for sleep due to symptoms or needing to sit upright for sleep    Weight gain of 2 pounds a day for 2 days in a row OR 5 pounds in 1 week    Increased swelling in your ankles or legs    Dizziness or lightheadedness    Medications       Take your medications as prescribed    Bring all your medications in their original bottles to every appointment    Avoid non-steroidal anti-inflammatory medications (Advil, Aleve, Ibuprofen, Naprosyn, Naproxen, Celebrex)    Do not stop taking your medications or begin taking over-the-counter or herbal medications without first talking to your doctor or nurse practitioner    Diet and Lifestyle       Limit sodium/salt to 2000 mg daily   o Read food labels for sodium content  o Do  not add salt when cooking or add salt at the table    Weigh yourself every day and record in your daily weight log   o Call if you gain 2 pounds a day for 2 days in a row OR 5 pounds in 1 week  o Bring daily weight log to every appointment    Stay active, pace yourself, listen to your body, and rest when tired    Elevate your legs if they are swollen. Ask about using compression/support stockings    Stop smoking    Lose weight if you are overweight    Avoid drinking alcohol or limit amount    Stay updated on your immunizations including flu and pneumonia vaccines

## 2021-06-03 NOTE — TELEPHONE ENCOUNTER
Rec'd call from patient on HF # stating her wt is up approx 3# from Friday, has noted increased RUFFIN and abd distention - patient unable to report specific wts [place].  Patient denied increased Na intake over the weekend, dizziness, MICHAEL, CP - confirmed patient takes Bumex 1mg daily and had increased Coreg to 18.75mg two times a day per Char's 11-12-19 recommendations.    Informed patient that update would be forwarded to Char GIBSON CNP for recommendations - patient verblaized understanding and confirmed f/u with Dr. Escamilla on 12-27-19.    Please review patient update - any new orders?  mg

## 2021-06-03 NOTE — TELEPHONE ENCOUNTER
Please increase bumex to 1 mg in the morning and 0.5 mg in the afternoon for 3 days then continue 1 mg daily thereafter. If no improvements please have her call us back. Thank you

## 2021-06-03 NOTE — TELEPHONE ENCOUNTER
Medication Question or Clarification  Who is calling: [place] [facility] [place]  What medication are you calling about? (include dose and sig)   FREESTYLE FELA 14 DAY SENSOR) Kit  1 application, Miscellaneous, Every 14 days         Summary: Use 1 application As Directed every 14 (fourteen) days., Starting Wed 1/16/2019, Normal        Who prescribed the medication?: Dr Jamila Lechuga  What is your question/concern?: The pharmacy states patient is completely out of supply.  Also can the quantity be changed to 6 kits which is 90 day supply?  Pharmacy: [facility] to leave a detailed message?: No  Site CMT - Please call the pharmacy to obtain any additional needed information.

## 2021-06-03 NOTE — TELEPHONE ENCOUNTER
Phone call to patient - informed her of Char's recommendations - patient verbalized understanding understanding that she should take an additional 0.5mg (half tab) of Bumex in pm x 3 days and call if sx do not improve.  mg

## 2021-06-03 NOTE — TELEPHONE ENCOUNTER
Medication Request  Medication name:   levETIRAcetam (KEPPRA) 500 MG tablet        Sig - Route: Take 500 mg by mouth daily. Take 1 tab in am; 1 tab in pm    - Oral    Class: Historical Med        Pharmacy Name and Location: [facility]  Reason for request: Fax request received from pharmacy states: the sig they have states take one tablet in the morning and two tablets in the afternoon.  Please clarify and send new Rx to pharmacy.    Okay to leave a detailed message: yes

## 2021-06-04 VITALS
RESPIRATION RATE: 18 BRPM | TEMPERATURE: 97 F | BODY MASS INDEX: 37.79 KG/M2 | HEART RATE: 64 BPM | SYSTOLIC BLOOD PRESSURE: 167 MMHG | OXYGEN SATURATION: 90 % | DIASTOLIC BLOOD PRESSURE: 67 MMHG | WEIGHT: 200 LBS

## 2021-06-04 VITALS
WEIGHT: 197 LBS | SYSTOLIC BLOOD PRESSURE: 146 MMHG | OXYGEN SATURATION: 93 % | BODY MASS INDEX: 33.63 KG/M2 | HEIGHT: 64 IN | DIASTOLIC BLOOD PRESSURE: 53 MMHG | RESPIRATION RATE: 20 BRPM | HEART RATE: 72 BPM | TEMPERATURE: 98 F

## 2021-06-04 VITALS — WEIGHT: 152.03 LBS | HEIGHT: 62 IN | BODY MASS INDEX: 27.98 KG/M2

## 2021-06-04 VITALS
OXYGEN SATURATION: 91 % | TEMPERATURE: 97 F | BODY MASS INDEX: 32.61 KG/M2 | RESPIRATION RATE: 16 BRPM | DIASTOLIC BLOOD PRESSURE: 68 MMHG | HEART RATE: 63 BPM | WEIGHT: 190 LBS | SYSTOLIC BLOOD PRESSURE: 128 MMHG

## 2021-06-04 VITALS
DIASTOLIC BLOOD PRESSURE: 46 MMHG | WEIGHT: 183.6 LBS | HEART RATE: 64 BPM | OXYGEN SATURATION: 97 % | SYSTOLIC BLOOD PRESSURE: 74 MMHG | BODY MASS INDEX: 31.51 KG/M2

## 2021-06-04 VITALS
OXYGEN SATURATION: 92 % | BODY MASS INDEX: 33.46 KG/M2 | HEART RATE: 69 BPM | HEIGHT: 64 IN | TEMPERATURE: 98 F | RESPIRATION RATE: 18 BRPM | WEIGHT: 196 LBS | DIASTOLIC BLOOD PRESSURE: 72 MMHG | SYSTOLIC BLOOD PRESSURE: 136 MMHG

## 2021-06-04 VITALS
HEART RATE: 76 BPM | TEMPERATURE: 98.1 F | DIASTOLIC BLOOD PRESSURE: 50 MMHG | BODY MASS INDEX: 28.17 KG/M2 | WEIGHT: 154 LBS | SYSTOLIC BLOOD PRESSURE: 105 MMHG

## 2021-06-04 VITALS — BODY MASS INDEX: 32.72 KG/M2 | HEIGHT: 62 IN | WEIGHT: 177.8 LBS

## 2021-06-04 VITALS
SYSTOLIC BLOOD PRESSURE: 106 MMHG | WEIGHT: 186.2 LBS | DIASTOLIC BLOOD PRESSURE: 44 MMHG | BODY MASS INDEX: 31.96 KG/M2 | HEART RATE: 60 BPM

## 2021-06-04 VITALS — BODY MASS INDEX: 35.88 KG/M2 | HEIGHT: 62 IN | WEIGHT: 195 LBS

## 2021-06-04 VITALS
DIASTOLIC BLOOD PRESSURE: 60 MMHG | HEART RATE: 68 BPM | RESPIRATION RATE: 16 BRPM | SYSTOLIC BLOOD PRESSURE: 126 MMHG | BODY MASS INDEX: 36.25 KG/M2 | WEIGHT: 197 LBS | HEIGHT: 62 IN

## 2021-06-04 VITALS
HEART RATE: 61 BPM | OXYGEN SATURATION: 90 % | DIASTOLIC BLOOD PRESSURE: 56 MMHG | SYSTOLIC BLOOD PRESSURE: 144 MMHG | RESPIRATION RATE: 20 BRPM | HEIGHT: 64 IN | TEMPERATURE: 97 F | WEIGHT: 189 LBS | BODY MASS INDEX: 32.27 KG/M2

## 2021-06-04 VITALS — HEIGHT: 61 IN | BODY MASS INDEX: 28.77 KG/M2 | WEIGHT: 152.4 LBS

## 2021-06-04 NOTE — PROGRESS NOTES
Clinic Note    Assessment:     Assessment and Plan:    1. Dysthymic disorder  Her PHQ 9 is 18 today.  She wants to hold off on increasing her venlafaxine.  We will re-enroll her in counseling/therapy.  Referral is in.  She will follow-up with her PCP for recheck.    2. Other chronic pain  She has chronic musculoskeletal pain.  Using Tylenol as needed.  She wants to see a specialist at the pain clinic for more comprehensive evaluation and treatment plan.  She has done physical therapy in the past.    3. Acute pain of right shoulder  She fell 3 weeks ago with her right arm stretched out in front of her.  She has had pain in her right shoulder that seems to be worsening.  She cannot raise her right arm above 90 degrees.  Referral to orthopedics is in.  I told her she may need an MRI.  I also told her that she is not a good surgical candidate.    4. Falls frequently  We had a kristina discussion about assisted living today given her recent issues with falls.  She told me that she is falling almost 3 times per week on average.  Referral to care guide is in to help her discussed resources available to her in this regard.  - Ambulatory referral to Care Management (Primary Care)       Patient Instructions   Referral to counseling services is in.  I recommend [facility].  Please call [phone].    If your depression remains a problem, we could consider increasing your venlafaxine medication.    Referral to pain clinic is in for your chronic musculoskeletal pain.  Call [phone].    Referral to orthopedics is in for your right shoulder pain.  Call [facility] at [phone].  With your history of trauma and worsening pain, I suspect that you may need an MRI.  The provider will discuss this with you, if warranted.    Referral to care management is in regarding your frequent falls and struggles with safety at home.  It sounds like you may need to start considering assisted living type facility.  Care management will  help you with resources.  Check with your daughter as well.    Follow-up with Dr. Mika Ha in February for review of these issues in more.                  Return in about 2 months (around 2/16/2020).         Subjective:      Patient comes to clinic today for a variety of issues.    Depression: She uses venlafaxine 150 mg daily for dysthymia.  She had been seeing a counselor for many years but had to discontinue that service due to insurance issues.  She has a variety of comorbidities, which will be outlined in this note-these contribute to her overall feelings of sadness and hopelessness.  She denies suicidal or homicidal ideation but says that she has had thoughts of hurting herself in the distant past.  No reported history of suicide attempts.  She is feeling bad about herself, and says that her symptoms have been especially bad over the last few months.  Probably seasonal in nature.  She has been struggling at home in regards to recent falls.  She is falling more often.  She is falling about 3 times per week.  She does not feel especially safe at home, and feels bad that she has to ask for so much help.  Her PHQ 9 is 18 today.    Chronic musculoskeletal pain: She had a fall 3 weeks ago in which she injured her right shoulder.  She lost her balance and fell backwards into a wall with her right arm stretched out in front of her.  She has a hard time lifting the right arm past 90 degrees and says that the pain is getting worse.  She has aches/pains all over her body, with chronic low back pain, and chronic lower extremity joint pain.  She walks with a walker.  Uses Tylenol as needed.  Stays away from NSAIDs.  She is inquiring about referral to the pain clinic for comprehensive evaluation and treatment plan.    The following portions of the patient's history were reviewed and updated as appropriate: Allergies, medications, problem list, prior note.     Review of Systems:    Review is otherwise negative except for  what is mentioned above.     Social Hx:    Social History     Tobacco Use   Smoking Status Former Smoker     Packs/day: 1.50     Years: 20.00     Pack years: 30.00   Smokeless Tobacco Never Used         Objective:     Vitals:    12/16/19 1624   BP: 122/70   Pulse: 66   Weight: 204 lb (92.5 kg)       Exam:    General: Mild distress.  Tearful. Alert and Oriented X3. Pt behavior is appropriate.  Patient is accompanied by 2 friends.  Walks with a walker.  She has an EEG in place on her head  Musculoskeletal: She has pain with palpation to her right shoulder joint globally with inability to raise it past 90 degrees.  Neurologic: Interactive, alert, no focal findings, CNs intact.   Skin: Warm, dry. Normal hair pattern. Free of lesions. Normal skin turgor.       Patient Active Problem List   Diagnosis     Essential Thrombocytosis     Osteoarthritis     Thrombophlebitis Of Deep Vessels Of The Lower Extremity     Medullary Sponge Kidney Bilaterally     CULLEN on CPAP     Mixed hyperlipidemia     End Stage Renal Disease     Renal Transplant Recipient     Benign Essential Hypertension     Coronary artery disease due to calcified coronary lesion     Chronic Reflux Esophagitis     Chronic Gout     (HFpEF) heart failure with preserved ejection fraction (H)     Ischemic Stroke     Type 2 diabetes mellitus (H)     Dysthymic Disorder     Diabetic Peripheral Neuropathy     Anemia     Sensorineural Hearing Loss     Hyperlipidemia     Ataxia     Cardiac pacemaker, dual, in situ     Adrenal insufficiency (H)     Nonintractable epilepsy without status epilepticus, unspecified epilepsy type (H)     Morbid obesity (H)     TIA (transient ischemic attack)     Atherosclerosis of native artery of left lower extremity with ulceration of other part of foot (H)     Arteriosclerotic vascular disease     Bacteremia     Chronic diarrhea     Closed displaced fracture of surgical neck of left humerus     Disorder of stomach     Diverticular disease of  colon     Immunosuppressive management encounter following kidney transplant     Iron deficiency anemia     Major depressive disorder, recurrent episode, in partial or unspecified remission     Noninfectious gastroenteritis     Pain in joint, lower leg     Polyp of duodenum     PTSD (post-traumatic stress disorder)     Seizure disorder (H)     History of seizure     Anxiety     Current Outpatient Medications   Medication Sig Dispense Refill     alendronate (FOSAMAX) 35 MG tablet Take 35 mg by mouth once a week. Weekly on Sundays. Take in the morning with a full glass of water, on an empty stomach, and do not take anything else by mouth or lie down for the next 30 min.  Sundays             amLODIPine (NORVASC) 10 MG tablet Take 1 tablet (10 mg total) by mouth daily. 90 tablet 3     aspirin 81 MG EC tablet Take 1 tablet (81 mg total) by mouth daily.       azaTHIOprine (IMURAN) 50 mg tablet Take 100 mg by mouth bedtime.        bumetanide (BUMEX) 1 MG tablet Take 0.5 tablets (0.5 mg total) by mouth daily. 90 tablet 1     carvedilol (COREG) 12.5 MG tablet Take 1.5 tablets (18.75 mg total) by mouth 2 (two) times a day with meals. 180 tablet 3     cholecalciferol, vitamin D3, 2,000 unit Tab Take 2,000 Units by mouth daily.       cyanocobalamin, vitamin B-12, 2,500 mcg Tab Take 2,500 mcg by mouth every other day.       cycloSPORINE modified (GENGRAF) 25 MG capsule Take 50 mg by mouth 2 (two) times a day.        flash glucose scanning reader (FREESTYLE FELA 14 DAY READER) Misc Use 1 application As Directed daily. 1 each 0     flash glucose sensor (FREESTYLE FELA 14 DAY SENSOR) Kit Use 1 application As Directed every 14 (fourteen) days. 6 kit 3     hydrOXYzine pamoate (VISTARIL) 25 MG capsule Take 1 capsule (25 mg total) by mouth at bedtime as needed for anxiety or other (insomnia). 30 capsule 3     insulin NPH and regular human (NOVOLIN 70-30 FLEXPEN U-100) 100 unit/mL (70-30) pen Inject under the skin daily before  "breakfast. Inject 15 units with breakfast; 17 units at dinnertime             levETIRAcetam (KEPPRA) 500 MG tablet Take 1 tablet (500 mg total) by mouth daily. Take 1 tab in am; 2 tab in pm 90 tablet 4     pantoprazole (PROTONIX) 40 MG tablet Take 40 mg by mouth 2 (two) times a day.       pen needle, diabetic (BD ULTRA-FINE TWILA PEN NEEDLES) 32 gauge x 5/32\" Ndle Use 1 per day. 200 each 4     predniSONE (DELTASONE) 5 MG tablet Take 5 mg by mouth daily.       rosuvastatin (CRESTOR) 5 MG tablet Take 1 tablet (5 mg) by mouth at bedtime. 90 tablet 3     traZODone (DESYREL) 50 MG tablet Take 100 mg by mouth at bedtime.       venlafaxine (EFFEXOR-XR) 150 MG 24 hr capsule Take 1 capsule (150 mg) by mouth daily. 90 capsule 3     No current facility-administered medications for this visit.          Rakan العلي (Rob), LUISANA    12/16/2019           "

## 2021-06-04 NOTE — PROGRESS NOTES
In clinic device check with Device RN and Dr. Escamilla.  Please see link for full device report.  Patient was informed of results and next follow up during today's visit.

## 2021-06-04 NOTE — PATIENT INSTRUCTIONS - HE
Referral to counseling services is in.  I recommend [facility].  Please call [phone].    If your depression remains a problem, we could consider increasing your venlafaxine medication.    Referral to pain clinic is in for your chronic musculoskeletal pain.  Call [phone].    Referral to orthopedics is in for your right shoulder pain.  Call [place] at [phone].  With your history of trauma and worsening pain, I suspect that you may need an MRI.  The provider will discuss this with you, if warranted.    Referral to care management is in regarding your frequent falls and struggles with safety at home.  It sounds like you may need to start considering assisted living type facility.  Care management will help you with resources.  Check with your daughter as well.    Follow-up with Dr. Mika Ha in February for review of these issues in more.

## 2021-06-04 NOTE — TELEPHONE ENCOUNTER
Does not sound like heart issues. Please have her follow up with her PMD and keep appt with Dr Escamilla. Thank you

## 2021-06-04 NOTE — PROGRESS NOTES
Community Health Worker called and left a message for the patient.  If the patient is returning my call, please transfer the patient to [facility] at ext. [phone].   Patient has been mailed a unreachable letter and was provided with CHW contact information if they are interested in accessing Clinic Care Coordination.  Order for Care Management has been closed, no further outreach will be done at this time and patient can be re-referred.

## 2021-06-04 NOTE — TELEPHONE ENCOUNTER
"Incoming call on Heart Failure line. Patient calling in and not sure if what is going on is her heart or what.    Patient with clear speech and able to make full sentences during discussion over the phone. Pt reports that she, \"so tired that I could cry\". Pt reports that she has a lack of stamina and lack of appetite. Denies chest pain or palpitations. Her weights are stable right on the rosa at 204 lbs. She hasn't gained, nor lost. No breathing trouble. Denies cough. Denies lightheadedness, but does endorse dizziness. She states that the dizziness is not new. Pt reports she is currently undergoing at home EEG monitoring to check on her epilepsy, assess brain function/dizziness.  Denies LE edema. Sleeping fine through the night past several nights with her CPAP machine.     Pt is positive for on discussion of tiredness, lack of stamina, and abdominal bloating, with decreased appetite. She states that she is currently on Bumex 1 mg daily. Making good amounts of urine. She is \"trying\" really hard to follow a low salt diet.     Patient unsure if her symptoms/issues are heart related. Wondering if can wait til OV with CLL on 12/27/19 or what to do? Informed patient will forward to ORB for recommendations. Will return call once obtained. Char, continue to monitor symptoms? Wait til 12/27 with C[facility]? Recommendations please. CRICKETRN      "

## 2021-06-04 NOTE — TELEPHONE ENCOUNTER
Refill Approved    Rx renewed per Medication Renewal Policy. Medication was last renewed on 10.2.19.    Cyndy Baer, [facility] Triage/Med Refill 12/10/2019     Requested Prescriptions   Pending Prescriptions Disp Refills     amLODIPine (NORVASC) 10 MG tablet 90 tablet 3     Sig: Take 1 tablet (10 mg total) by mouth daily.       Calcium-Channel Blockers Protocol Passed - 12/10/2019 10:18 AM        Passed - PCP or prescribing provider visit in past 12 months or next 3 months     Last office visit with prescriber/PCP: 10/2/2019 Mika Ha MD OR same dept: 10/2/2019 Mika Ha MD OR same specialty: 10/2/2019 Mika Ha MD  Last physical: Visit date not found Last MTM visit: Visit date not found   Next visit within 3 mo: Visit date not found  Next physical within 3 mo: Visit date not found  Prescriber OR PCP: iMka Ha MD  Last diagnosis associated with med order: 1. Benign Essential Hypertension  - amLODIPine (NORVASC) 10 MG tablet; Take 1 tablet (10 mg total) by mouth daily.  Dispense: 90 tablet; Refill: 3    2. Hyperlipidemia  - amLODIPine (NORVASC) 10 MG tablet; Take 1 tablet (10 mg total) by mouth daily.  Dispense: 90 tablet; Refill: 3    3. Type 2 diabetes mellitus without complication, with long-term current use of insulin (H)  - amLODIPine (NORVASC) 10 MG tablet; Take 1 tablet (10 mg total) by mouth daily.  Dispense: 90 tablet; Refill: 3    If protocol passes may refill for 12 months if within 3 months of last provider visit (or a total of 15 months).             Passed - Blood pressure filed in past 12 months     BP Readings from Last 1 Encounters:   11/12/19 144/60

## 2021-06-04 NOTE — PROGRESS NOTES
Scheduled Follow Up Call: Attempt 1 Community Health Worker called and left a message for the patient. If the patient is returning my call, please transfer the patient to [facility] at ext. [phone].

## 2021-06-04 NOTE — TELEPHONE ENCOUNTER
Called and spoke with patient. Review of recommendation. Pt agrees with plan. Will keep OV with CLL upcoming. Will return call if new issues or growing concerns between now and upcoming appointment. CRICKET,RN

## 2021-06-05 NOTE — TELEPHONE ENCOUNTER
Left voicemail for patient to return call to clinic. When patient returns call, please give them below message.    Shayla Metz CMA ............... 9:24 AM, 02/05/20

## 2021-06-05 NOTE — PROGRESS NOTES
Second attempt to reach patient for hospital discharge follow up.  No answer. Left VM message asking pt to reschedule today's missed appt..RN has made two unsuccessful attempts to reach patient.   Encounter closed.

## 2021-06-05 NOTE — TELEPHONE ENCOUNTER
Question following Office Visit  When did you see your provider: 1/31/20  What is your question: Would like to have in-home physical therapy as offered at the hospital  Okay to leave a detailed message: Yes

## 2021-06-05 NOTE — TELEPHONE ENCOUNTER
Informed the patient's friend, [place], of Dr. Ha's message. [place] stated that the patient is in the hospital now, but will get her scheduled for an INF with Eren when she gets discharged and they will discuss getting home physical therapy started at that time. [place] noted that if the patient gets discharged by 02/09, she will get the patient in with Eren for an INF on 02/10 and have the patient keep her appt with Dr. aH on 02/11 for a discussion about transferring to assisted living.       Sania Kay, [facility]  11:30 AM  2/7/2020

## 2021-06-05 NOTE — PROGRESS NOTES
Hospital discharge follow up call to pt, no answer.  Left VM message reminding pt of appt on 1/31. RN will attempt call back at another time.

## 2021-06-05 NOTE — PROGRESS NOTES
Community Health Worker called and left a message for the patient.  If the patient is returning my call, please transfer the patient to [facility] at ext. [phone].   Patient has been mailed a unreachable letter and was provided with CHW contact information if they are interested in accessing Clinic Care Coordination.  Order for Care Management has been closed, no further outreach will be done at this time and patient can be re-referred.

## 2021-06-05 NOTE — TELEPHONE ENCOUNTER
Left a detailed voice message informing the patient of the below response from Dr. Ha. When the patient calls back, please assist her in scheduling a 40-minute INF appt with Eren العلي CNP, or other available provider this week to follow-up on her hospital stay and set up in-home physical therapy.       Sania Kay CMA  8:34 AM  2/4/2020

## 2021-06-05 NOTE — TELEPHONE ENCOUNTER
Patient needs to meet with nurse practitioner or available provider this week to set up a face-to-face visit to set up home physical therapy.  Patient needs a 40-minute hospital INF follow-up with that visit this week.    Patient's appointment with me on February 11 is only a 20-minute follow-up appointment.  I will not be able to do a face-to-face visit or hospital follow-up at that time.

## 2021-06-05 NOTE — PATIENT INSTRUCTIONS - HE
Odalys Miles,    It was a pleasure to see you today at [facility].     My recommendations after this visit include:  - Please follow up with Dr Escamilla February 24   - Please follow up with Char Negron in 3 months  - No changes to your medications    Char Negron, CNP

## 2021-06-06 NOTE — PROGRESS NOTES
[facility]      Facility:    [facility] [[id number]]  Code Status: DNR      Chief Complaint/Reason for Visit:   Chief Complaint   Patient presents with     Review Of Multiple Medical Conditions       HPI:   Odalys is a 74 y.o. female who is a transfer from [facility] with an admission on 2/27/20 and discharge on 3/6/20. She has a PMH of DM, CKD stage 4, s/p renal transplant, anemia, PM, chronic CHF per EF 33%, htn who was in the TCU from 2/13/20 to 2/26/20. Apparently feeling more shortness of breath, again presented to the hospital with acute shortness of breath and CP. She was diagnosed with cardiogenic pulmonary edema with decreased EF, given medical management with diuresing.  Was not a candidate for angiogram given declining renal function, required BiPAP and then weaned off.  She developed acute on chronic renal failure with a baseline creatinine around 2.5 though increased to 3.5 per recent addition of losartan (which was decreased), patient not too keen on going through dialysis again as she had initially done in 2011.  Found to have osteomyelitis per MRU in the right great toe, seen by podiatry, started on Bactrim and Omnicef.  Also found to have Proteus UTI. Per anemia, she was given IV iron and started on epo as well. She was then discharged to the TCU again for additional rehab.    Today will assess her vitals, BS control, f/u with nephrology and therapy progress.    Past Medical History:  Past Medical History:   Diagnosis Date     Adrenal insufficiency (H)      Anemia      Anxiety      Arthritis      Ataxia 5/12/2015     CAD (coronary artery disease)      Cardiac pacemaker, dual, in situ 12/7/2016    [employer] Revo MRI DOI: 12/15/2011 Dr. Aishwarya Treviño     Chronic Diarrhea Of Unknown Origin     Created by Conversion      Chronic Gout     Created by Conversion  Replacement Utility updated for latest IMO load     Chronic Reflux Esophagitis      Created by Conversion      Congestive Heart Failure     Created by Conversion  Replacement Utility updated for latest IMO load     Coronary Artery Stenosis     Created by Conversion [facility] Annotation: Feb 27 2011 11:37PM - Alina Zapien: s/p CABGx4  1/11  Replacement Utility updated for latest IMO load     CVA (cerebral vascular accident) (H)      Depression      Diabetic Peripheral Neuropathy     Created by Conversion      DM (diabetes mellitus) (H)      Dysthymic disorder     Created by Conversion      Epilepsy (H)      ESRD (end stage renal disease) (H)      Essential Thrombocytosis     Created by Conversion      History of renal transplant      Hyperlipidemia      Hypertension      Insomnia     Created by Conversion      Ischemic Stroke     Created by Conversion  Replacement Utility updated for latest IMO load     Medullary Sponge Kidney Bilaterally     Created by Conversion      Morbid obesity (H) 8/29/2018     Neuropathy      Nonintractable epilepsy without status epilepticus, unspecified epilepsy type (H) 8/29/2018     CULLEN on CPAP     Created by Conversion      Osteoarthritis     Created by Conversion  Replacement Utility updated for latest IMO load     Renal Transplant Recipient     Created by Conversion      Sensorineural hearing loss     Created by Conversion  Replacement Utility updated for latest IMO load     Thrombophlebitis Of Deep Vessels Of The Lower Extremity     Created by Conversion      Type 2 diabetes mellitus (H)     Created by Conversion      Vertigo            Surgical History:  Past Surgical History:   Procedure Laterality Date     APPENDECTOMY       CARDIAC PACEMAKER PLACEMENT       CORONARY ARTERY BYPASS GRAFT  01/26/2011    Comments: x 4     HYSTERECTOMY  1973     IR EXTREMITY ANGIOGRAM LEFT  1/30/2019     NEPHRECTOMY TRANSPLANTED ORGAN  06/2011     MD TOTAL KNEE ARTHROPLASTY Bilateral        Family History:   Family History   Problem Relation Age of Onset     Diabetes Sister       Breast cancer Sister 62     Cancer Father        Social History:    Social History     Socioeconomic History     Marital status:      Spouse name: Not on file     Number of children: Not on file     Years of education: Not on file     Highest education level: Not on file   Occupational History     Employer: RETIRED   Social Needs     Financial resource strain: Not on file     Food insecurity     Worry: Not on file     Inability: Not on file     Transportation needs     Medical: Not on file     Non-medical: Not on file   Tobacco Use     Smoking status: Former Smoker     Packs/day: 1.50     Years: 20.00     Pack years: 30.00     Types: Cigarettes     Smokeless tobacco: Never Used   Substance and Sexual Activity     Alcohol use: Yes     Comment: occasional     Drug use: No     Sexual activity: Not on file   Lifestyle     Physical activity     Days per week: Not on file     Minutes per session: Not on file     Stress: Not on file   Relationships     Social connections     Talks on phone: Not on file     Gets together: Not on file     Attends [employer] service: Not on file     Active member of club or organization: Not on file     Attends meetings of clubs or organizations: Not on file     Relationship status: Not on file     Intimate partner violence     Fear of current or ex partner: Not on file     Emotionally abused: Not on file     Physically abused: Not on file     Forced sexual activity: Not on file   Other Topics Concern     Not on file   Social History Narrative    .  Chas passed away 2009 from cancer. Lives independently in 1 level town home in [place]. 2 sons- Ben and Av. She has a dog-Isaura. Enjoys knitting.           Review of Systems   Constitutional: Positive for activity change, appetite change and fatigue. Negative for chills, diaphoresis and fever.        No concerns   HENT: Negative for congestion and hearing loss.    Eyes: Negative.    Respiratory: Positive for  shortness of breath.         2L NC   Cardiovascular: Negative.    Gastrointestinal: Negative for abdominal distention, abdominal pain, blood in stool, constipation, diarrhea and nausea.   Endocrine: Negative.    Genitourinary: Negative for difficulty urinating.   Musculoskeletal:        Denies pain   Skin: Positive for wound.        R great toe    Allergic/Immunologic: Negative.    Neurological: Negative for dizziness, tremors, speech difficulty and light-headedness.   Hematological: Negative.    Psychiatric/Behavioral: Negative for agitation, confusion, hallucinations and sleep disturbance. The patient is not nervous/anxious.        There were no vitals filed for this visit.    Physical Exam  Vitals signs reviewed.   HENT:      Head: Normocephalic and atraumatic.      Right Ear: External ear normal.      Left Ear: External ear normal.      Nose: No congestion or rhinorrhea.      Mouth/Throat:      Pharynx: No oropharyngeal exudate or posterior oropharyngeal erythema.   Eyes:      General:         Right eye: No discharge.         Left eye: No discharge.   Neck:      Musculoskeletal: Normal range of motion and neck supple.   Cardiovascular:      Rate and Rhythm: Normal rate.      Heart sounds: No murmur. No friction rub. No gallop.       Comments: PM  Pulmonary:      Effort: No respiratory distress.      Breath sounds: No wheezing or rales.      Comments: Dim, 2L NC, RUFFIN  Abdominal:      General: There is no distension.      Palpations: Abdomen is soft.      Tenderness: There is no abdominal tenderness. There is no guarding.      Comments: Denies constipation or diarrhea   Genitourinary:     Comments: No limitations  Musculoskeletal:      Right lower leg: No edema.      Left lower leg: No edema.      Comments: WBAT, denies pain   Skin:     General: Skin is warm.      Comments: R great toe wound   Neurological:      Mental Status: She is alert and oriented to person, place, and time.   Psychiatric:         Mood and  Affect: Mood normal.      Comments: Denies depression or anxiety         Medication List:  Current Outpatient Medications   Medication Sig     acetaminophen (TYLENOL) 500 MG tablet Take 1 tablet (500 mg total) by mouth every 6 (six) hours as needed.     alendronate (FOSAMAX) 35 MG tablet Take 35 mg by mouth once a week. Weekly on Sundays. Take in the morning with a full glass of water, on an empty stomach, and do not take anything else by mouth or lie down for the next 30 min.  Sundays           amLODIPine (NORVASC) 5 MG tablet Take 1 tablet (5 mg total) by mouth daily.     aspirin 81 MG EC tablet Take 1 tablet (81 mg total) by mouth daily.     azaTHIOprine (IMURAN) 50 mg tablet Take 100 mg by mouth bedtime.      carvediloL (COREG) 25 MG tablet Take 2 tablets (50 mg total) by mouth 2 (two) times a day with meals.     cefdinir (OMNICEF) 300 MG capsule Take 1 capsule (300 mg total) by mouth every morning for 20 days. (or per podiatrist) for osteomyelitis toe     cholecalciferol, vitamin D3, 2,000 unit Tab Take 2,000 Units by mouth daily.     clopidogreL (PLAVIX) 75 mg tablet Take 1 tablet (75 mg total) by mouth daily.     cyanocobalamin, vitamin B-12, 2,500 mcg Tab Take 2,500 mcg by mouth daily.      cycloSPORINE modified (GENGRAF) 25 MG capsule Take 50 mg by mouth 2 (two) times a day.      flash glucose scanning reader (FREESTYLE FELA 14 DAY READER) Misc Use 1 application As Directed daily.     flash glucose sensor (FREESTYLE FELA 14 DAY SENSOR) Kit Use 1 application As Directed every 14 (fourteen) days.     hydrALAZINE (APRESOLINE) 100 MG tablet Take 1 tablet (100 mg total) by mouth 3 (three) times a day.     insulin NPH-insulin regular (NOVOLIN 70-30) 100 unit/mL (70-30) injection Inject 15 Units under the skin daily before breakfast.     insulin NPH-insulin regular (NOVOLIN 70-30) 100 unit/mL (70-30) injection Inject 14 Units under the skin daily before supper.      isosorbide mononitrate (IMDUR) 60 MG 24 hr  "tablet Take 1 tablet (60 mg total) by mouth 2 (two) times a day.     lamoTRIgine (LAMICTAL) 100 MG tablet Take 1 tablet (100 mg total) by mouth 2 (two) times a day.     miconazole (MICOTIN) 2 % powder Twice daily as needed for rash/dermatitis     NOVOLOG U-100 INSULIN ASPART 100 unit/mL injection Three times a day with meals  BG < 70 mg/dL: Treat, and call MD  BG  mg/dL: None - 0 Units  -180 mg/dL: 1 unit  -220 mg/dL: 2 units  -260 mg/dL: 3 units  -300 mg/dL: 4 units  -340 mg/dL: 5 units  -400 mg/dL: 6 units  BG > 400 mg/dL: 7 units and Call MD     pantoprazole (PROTONIX) 40 MG tablet Take 40 mg by mouth 2 (two) times a day.     pen needle, diabetic (BD ULTRA-FINE TWILA PEN NEEDLES) 32 gauge x 5/32\" Ndle Use 1 per day.     polyethylene glycol (MIRALAX) 17 gram packet Take 1 packet (17 g total) by mouth daily.     polyvinyl alcohol (LIQUIFILM TEARS) 1.4 % ophthalmic solution Use 4 times daily as needed for dry eyes     prazosin (MINIPRESS) 2 MG capsule Take 2 capsules (4 mg total) by mouth 3 (three) times a day.     predniSONE (DELTASONE) 5 MG tablet Take 5 mg by mouth daily.     rosuvastatin (CRESTOR) 5 MG tablet Take 5 mg by mouth at bedtime.     senna-docusate (PERICOLACE) 8.6-50 mg tablet Take 1 tablet by mouth 2 (two) times a day.     spironolactone (ALDACTONE) 25 MG tablet Take 0.5 tablets (12.5 mg total) by mouth 2 (two) times a day at 9am and 6pm.     sulfamethoxazole-trimethoprim (SEPTRA) 400-80 mg per tablet Take 1 tablet by mouth 3 (three) times a week for 10 days.     torsemide (DEMADEX) 20 MG tablet Take 4 tablets (80 mg total) by mouth 2 (two) times a day at 9am and 6pm.     traZODone (DESYREL) 50 MG tablet Take 100 mg by mouth at bedtime.     venlafaxine (EFFEXOR-XR) 75 MG 24 hr capsule Take 1 capsule (75 mg total) by mouth daily with breakfast.       Labs:  Results for orders placed or performed in visit on 03/10/20   Basic Metabolic Panel   Result Value Ref " Range    Sodium 136 136 - 145 mmol/L    Potassium 3.9 3.5 - 5.0 mmol/L    Chloride 96 (L) 98 - 107 mmol/L    CO2 27 22 - 31 mmol/L    Anion Gap, Calculation 13 5 - 18 mmol/L    Glucose 119 70 - 125 mg/dL    Calcium 8.6 8.5 - 10.5 mg/dL    BUN 82 (H) 8 - 28 mg/dL    Creatinine 4.02 (H) 0.60 - 1.10 mg/dL    GFR MDRD Af Amer 13 (L) >60 mL/min/1.73m2    GFR MDRD Non Af Amer 11 (L) >60 mL/min/1.73m2     Lab Results   Component Value Date    WBC 6.9 03/10/2020    HGB 8.6 (L) 03/10/2020    HCT 28.3 (L) 03/10/2020    MCV 99 03/10/2020     03/10/2020     Lab Results   Component Value Date    TSH 3.57 11/21/2019     Vitamin D, Total (25-Hydroxy)   Date Value Ref Range Status   02/17/2020 37.5 30.0 - 80.0 ng/mL Final     Lab Results   Component Value Date    HGBA1C 6.3 (H) 01/26/2020       Assessment/Plan:    NSTEMI: No angiogram performed per renal dysfunction, probably due to demand ischemia per cardiogenic overload with decreased EF of 33%, will wean O2 as tolerated. Currently 2L NC. Continue ASA and plavix daily. F/u with cardiology    HFrEF: last 33% (decreased), last 189lb, bumex increased to torsemide to 80mg two times a day and spironolactone 12.5mg two times a day. Last 187lb    Osteomyelitis of right great toe: started on Omnicef, end 3/26 and bactrim, end 3/16. Drsg changes as ordered    CULLEN: using CPAP    IDDM: last A1c 6.3, continue 70/30 14U at HS and 15U in AM, with SS three times a day. Increase BS checks to QID    Poor PO intake: start glucerna     Hx of renal transplant with CKD stage 4: last Cr 4.02 with GFR 11 on 3/10/20 (worsened), continue gengraf 50mg two times a day and imuran 100mg at HS, f/u with neurology in 2 wks    Pain control: continue tylenol 500mg q6h PRN, denies pain    Osteopenia: continue fosamax 35mg weekly    HTN: continue coreg 50mg two times a day, hydralazine 100mg three times a day, imdur 60mg two times a day, start on prazosin 4mg TID and amlodipine on 5mg daily per cardiology.  SBP <130    Normocytic hypochromic anemia: Last hemoglobin 8.6, f/u with nephrology    Vit D def: continue D3 2000U daily    Vit B12 def: continue cyanocobalamin 2500mcg daily    Seizure disorder: continue lamotrigine 100mg two times a day    GERD: pantoprazole 40mg two times a day    Constipation: continue miralax daily and senna S 1 tab two times a day, denies issue    CAD: continue statin and ASA    Insomnia: continue trazodone 100mg at HS    Depression: continue Effexor 75mg daily    Disposition: plans to return home, wants to move to [facility] after selling house      Electronically signed by: Jagdish Cardenas NP

## 2021-06-06 NOTE — TELEPHONE ENCOUNTER
With patient's worsening creatinine, she should not be taking the alendronate/Fosamax weekly.  Please call her and make sure she is not taking that.

## 2021-06-06 NOTE — PROGRESS NOTES
[facility] Admission note      Patient: Odalys Miles  MRN: [id number]  Date of Service: 2/17/2020      [facility] [[id number]]  Reason for Visit     Chief Complaint   Patient presents with     H & P       Code Status     Full code    Assessment     -Acute kidney injury in the setting of chronic kidney disease baseline creatinine 2-2.5  Creatinine noted to 3.21 on recheck in the TCU  -History of depression with patient reporting situational stressors with worsening mood and behavior  -Acute on chronic CHF exacerbation; patient still appears to be fluid overloaded  -Underlying history of renal transplant on chronic immunosuppressants  -History of hypertension poorly controlled in spite of multiple agents.  -History of coronary artery disease status post CABG  -History of atrial fibrillation chronic  -status post pacemaker placement  -History of diabetes currently on Lantus  -History of CVA on aspirin and statin  -History of seizure disorder on Lamictal  -History of iron deficiency anemia status post IV Venofer  - Obstructive sleep apnea  - Hypoxic respiratory failure currently on 3 L of oxygen by nasal cannula  - Morbid obesity.  -Generalized weakness    Plan     Patient admitted to the TCU.  She had a complex and prolonged stay in the hospital she was admitted on 1/28/2020 and has now been discharged to the TCU for strengthening and rehab.  CHF exacerbation was treated aggressively with diuretics.  Discharged on Bumex 2 mg in the morning /HS daily   hypoxic respiratory failure -she is on 3 L of oxygen by nasal cannula and will be weaned as tolerated  Weights will need to be monitored  .  She has been advised a cardiac diet  Advised fluid restriction she still appears to be somewhat volume overloaded she has chronic edema in her lower extremities but does not use any compression hose she is not tolerated them well in the past  Unfortunately she had recheck BMP done today which  shows significant jump in her creatinine to 3.21 from her discharge of 2.68 suspected to suspect secondary to diuretics.  Plan is to DC her current Bumex 2 mg twice daily.  Continue Bumex 2 mg in the morning only.  Advised compliance with fluid and salt restriction.  Patient was given education regarding fluid restriction.  She will be put on a low-salt cardiac diet and also 1.5 L of fluid restriction daily  Recheck a BMP closely in 3 days.  All labs to be faxed urgently to nephrology today.  Patient advised to make an appointment to see nephrology also within a week.  She will need close monitoring of her BMPs and if they continue to worsen she understands nephrology may need to see her sooner  Monitor weights as she is at high risk of going into volume overload as she already appears somewhat volume overloaded.  Blood sugar review done and that appears to be stable  Recheck hemoglobin is stable at 8.9 with no concerns   she does have chronic anemia and did get IV Venofer in the hospital this will need to be monitored to  Behavior she does take Effexor and high doses along with trazodone.  She has been voicing a lot of situational stressors due to the fact that she is leaving her home and moving into an assisted living.  She will need psychological support of her depression becomes an issue  Consider increasing dosage of her Effexor if no improvement noted monitor sleep and behaviors    History     Patient is a very pleasant 74 y.o. female who is admitted to TCU  Patient presented to the hospital with acute shortness of breath.  She was diagnosed with acute on chronic CHF exacerbations.  BNP on admission was 2519.  She had an echocardiogram revealing an EF of 55%.  She was given IV diuretics with improvement.  Currently discharged on oral Bumex with close monitoring of weights recommended.  She had hypoxic respiratory failure felt to be secondary to CHF exacerbation this has resolved prior to discharge she is no  longer needing oxygen.  Patient also was noted to have CKD with acute exacerbation of creatinine however prior to discharge her creatinine discharged was hovering between 2 and 2.5 with mild proteinuria this will need to be closely followed if she continues to have elevated creatinine she understands she will need to see nephrology.  Patient has underlying history of chronic renal transplant and is on multiple immunosuppressants agents.  These were continued in the hospital.  She also had significantly elevated blood pressures.  She continues on hydralazine Coreg terra Zosyn losartan and close monitoring of blood pressures recommended.  She also has a history of coronary artery disease and a pacemaker placement and is on medical management    Past Medical History     Active Ambulatory (Non-Hospital) Problems    Diagnosis     Encounter for palliative care     Muscle weakness (generalized)     Acute decompensated heart failure (H)     Acute renal failure, unspecified acute renal failure type (H)     Acute pulmonary edema with congestive heart failure (H)     Atrioventricular block, complete (H)     History of seizure     Anxiety     Closed displaced fracture of surgical neck of left humerus     Pain in joint, lower leg     Disorder of stomach     Polyp of duodenum     Atherosclerosis of native artery of left lower extremity with ulceration of other part of foot (H)     TIA (transient ischemic attack)     (HFpEF) heart failure with preserved ejection fraction (H)     Nonintractable epilepsy without status epilepticus, unspecified epilepsy type (H)     Morbid obesity (H)     Chronic diarrhea     Adrenal insufficiency (H)     Cardiac pacemaker, dual, in situ     Arteriosclerotic vascular disease     Bacteremia     Seizure disorder (H)     Ataxia     Hyperlipidemia     Noninfectious gastroenteritis     Diverticular disease of colon     Essential Thrombocytosis     Osteoarthritis     Thrombophlebitis Of Deep Vessels Of The  Lower Extremity     Medullary Sponge Kidney Bilaterally     CULLEN on CPAP     Mixed hyperlipidemia     End Stage Renal Disease     Status post kidney transplant     Benign Essential Hypertension     Coronary artery disease due to calcified coronary lesion     Chronic Reflux Esophagitis     Chronic Gout     Type 2 diabetes mellitus (H)     Dysthymic Disorder     Diabetic Peripheral Neuropathy     Anemia     Sensorineural Hearing Loss     Immunosuppressive management encounter following kidney transplant     PTSD (post-traumatic stress disorder)     Major depressive disorder, recurrent episode, in partial or unspecified remission     Iron deficiency anemia     Past Medical History:   Diagnosis Date     Adrenal insufficiency (H)      Anemia      Anxiety      Arthritis      Ataxia 5/12/2015     CAD (coronary artery disease)      Cardiac pacemaker, dual, in situ 12/7/2016     Chronic Diarrhea Of Unknown Origin      Chronic Gout      Chronic Reflux Esophagitis      Congestive Heart Failure      Coronary Artery Stenosis      CVA (cerebral vascular accident) (H)      Depression      Diabetic Peripheral Neuropathy      DM (diabetes mellitus) (H)      Dysthymic disorder      Epilepsy (H)      ESRD (end stage renal disease) (H)      Essential Thrombocytosis      History of renal transplant      Hyperlipidemia      Hypertension      Insomnia      Ischemic Stroke      Medullary Sponge Kidney Bilaterally      Morbid obesity (H) 8/29/2018     Neuropathy      Nonintractable epilepsy without status epilepticus, unspecified epilepsy type (H) 8/29/2018     CULLEN on CPAP      Osteoarthritis      Renal Transplant Recipient      Sensorineural hearing loss      Thrombophlebitis Of Deep Vessels Of The Lower Extremity      Type 2 diabetes mellitus (H)      Vertigo        Past Social History     Reviewed, and she  reports that she has quit smoking. Her smoking use included cigarettes. She has a 30.00 pack-year smoking history. She has never used  smokeless tobacco. She reports current alcohol use. She reports that she does not use drugs.    Family History     Reviewed, and family history includes Breast cancer (age of onset: 62) in her sister; Cancer in her father; Diabetes in her sister.   Her older son also has kidney cancer    Medication List   Post Discharge Medication Reconciliation Status: discharge medications reconciled and changed, per note/orders (see AVS)   Current Outpatient Medications on File Prior to Visit   Medication Sig Dispense Refill     acetaminophen (TYLENOL) 500 MG tablet Take 1 tablet (500 mg total) by mouth every 6 (six) hours as needed.  0     alendronate (FOSAMAX) 35 MG tablet Take 35 mg by mouth once a week. Weekly on Sundays. Take in the morning with a full glass of water, on an empty stomach, and do not take anything else by mouth or lie down for the next 30 min.  Sundays             aspirin 81 MG EC tablet Take 1 tablet (81 mg total) by mouth daily.       azaTHIOprine (IMURAN) 50 mg tablet Take 100 mg by mouth bedtime.        bumetanide (BUMEX) 2 MG tablet Take 1 tablet (2 mg total) by mouth 2 (two) times a day at 9am and 6pm.  0     carvediloL (COREG) 25 MG tablet Take 1 tablet (25 mg total) by mouth 2 (two) times a day with meals.  0     cholecalciferol, vitamin D3, 2,000 unit Tab Take 2,000 Units by mouth daily.       cyanocobalamin, vitamin B-12, 2,500 mcg Tab Take 2,500 mcg by mouth daily.        cycloSPORINE modified (GENGRAF) 25 MG capsule Take 50 mg by mouth 2 (two) times a day.        ferrous sulfate 325 (65 FE) MG tablet Take 1 tablet (325 mg total) by mouth every other day.  0     flash glucose scanning reader (FREESTYLE FELA 14 DAY READER) Misc Use 1 application As Directed daily. 1 each 0     flash glucose sensor (FREESTYLE FELA 14 DAY SENSOR) Kit Use 1 application As Directed every 14 (fourteen) days. 6 kit 3     hydrALAZINE (APRESOLINE) 25 MG tablet Take 3 tablets (75 mg total) by mouth 3 (three) times a day.   "0     isosorbide mononitrate (IMDUR) 30 MG 24 hr tablet Take 1 tablet (30 mg total) by mouth 2 (two) times a day.  0     lamoTRIgine (LAMICTAL) 25 MG tablet Take 25 mg by mouth see administration instructions. Titrate up to target dose of 100mg two times a day  2/1-2/7 25mg Daily  2/8-2/14 50mg Daily  2/15-2/21 50mg Twice daily  2/22-2/28 75mg Twice daily  2/29 100mg Twice daily       LANTUS U-100 INSULIN 100 unit/mL vial 10 unit daily at bedtime 10 mL PRN     losartan (COZAAR) 25 MG tablet Take 1 tablet (25 mg total) by mouth daily.  0     miconazole (MICOTIN) 2 % powder Apply on skin folds until rashes are resolved  0     NOVOLOG U-100 INSULIN ASPART 100 unit/mL injection Three times a day with meals  BG < 70 mg/dL: Treat, and call MD  BG  mg/dL: None - 0 Units  -180 mg/dL: 1 unit  -220 mg/dL: 2 units  -260 mg/dL: 3 units  -300 mg/dL: 4 units  -340 mg/dL: 5 units  -400 mg/dL: 6 units  BG > 400 mg/dL: 7 units and Call MD 10 mL PRN     pantoprazole (PROTONIX) 40 MG tablet Take 40 mg by mouth 2 (two) times a day.       pen needle, diabetic (BD ULTRA-FINE TWILA PEN NEEDLES) 32 gauge x 5/32\" Ndle Use 1 per day. 200 each 4     polyethylene glycol (MIRALAX) 17 gram packet Take 1 packet (17 g total) by mouth daily as needed.  0     predniSONE (DELTASONE) 5 MG tablet Take 5 mg by mouth daily.       rosuvastatin (CRESTOR) 5 MG tablet Take 1 tablet (5 mg) by mouth at bedtime. 90 tablet 3     terazosin (HYTRIN) 5 MG capsule Take 1 capsule (5 mg total) by mouth at bedtime.  0     traZODone (DESYREL) 50 MG tablet Take 100 mg by mouth at bedtime.       venlafaxine (EFFEXOR-XR) 150 MG 24 hr capsule Take 1 capsule (150 mg) by mouth daily. 90 capsule 3     No current facility-administered medications on file prior to visit.        Allergies     Allergies   Allergen Reactions     Lisinopril Cough     Resolved after discontinuation     Mold/Mildew      Latex Rash       Review of Systems   A " comprehensive review of 14 systems was done. Pertinent findings noted here and in history of present illness. All the rest negative.  Constitutional: Negative.  Negative for fever, chills, she reports activity change, appetite change and fatigue.   HENT: Negative for congestion and facial swelling.    Eyes: Negative for photophobia, redness and visual disturbance.   Respiratory: Negative for cough and chest tightness.    Cardiovascular: Negative for chest pain, palpitations and has leg swelling.   Gastrointestinal: Negative for nausea, diarrhea, constipation, blood in stool and abdominal distention.   Genitourinary: Negative.    Musculoskeletal: Negative.  Reports weakness with limited endurance  Skin: Negative.    Neurological: Negative for dizziness, tremors, syncope, weakness, light-headedness and headaches.   Hematological: Does not bruise/bleed easily.   Psychiatric/Behavioral: Negative.  Reporting some situational stressors which are exacerbating her mood and depression.  She is moving to an assisted living and selling her home which is causing her a lot of stress      Physical Exam     Recent Vitals 2/13/2020   Height -   Weight 200 lbs   BSA (m2) 1.98 m2   /67   Pulse 64   Temp 97   Temp src -   SpO2 90   Some recent data might be hidden       Constitutional: Oriented to person, place, and time and appears well-developed.  On 3 L of oxygen by nasal cannula  HEENT:  Normocephalic and atraumatic.  Eyes: Conjunctivae and EOM are normal. Pupils are equal, round, and reactive to light. No discharge.  No scleral icterus. Nose normal. Mouth/Throat: Oropharynx is clear and moist. No oropharyngeal exudate.    NECK: Normal range of motion. Neck supple. No JVD present. No tracheal deviation present. No thyromegaly present.   CARDIOVASCULAR: Normal rate, regular rhythm and intact distal pulses.  Exam reveals no gallop and no friction rub.  Systolic murmur present.  HAS A PACEMAKER  PULMONARY: Effort normal and  breath sounds normal. No respiratory distress.No Wheezing or rales.  Bibasilar crackles heard  ABDOMEN: Soft. Bowel sounds are normal. No distension and no mass.  There is no tenderness. There is no rebound and no guarding. No HSM.  MUSCULOSKELETAL: Normal range of motion.  Plus leg edema and no tenderness. Mild kyphosis, no tenderness.  LYMPH NODES: Has no cervical, supraclavicular, axillary and groin adenopathy.   NEUROLOGICAL: Alert and oriented to person, place, and time. No cranial nerve deficit.  Normal muscle tone. Coordination normal.   GENITOURINARY: Deferred exam.  SKIN: Skin is warm and dry. No rash noted. No erythema. No pallor.   EXTREMITIES: No cyanosis, no clubbing, has 2+ leg  edema. No Deformity.  PSYCHIATRIC: Normal mood, affect and behavior.      Lab Results     Recent Results (from the past 240 hour(s))   POCT Glucose    Collection Time: 02/07/20 11:55 AM   Result Value Ref Range    Glucose 134 70 - 139 mg/dL   POCT Glucose    Collection Time: 02/07/20  5:24 PM   Result Value Ref Range    Glucose 128 70 - 139 mg/dL   POCT Glucose    Collection Time: 02/07/20  9:20 PM   Result Value Ref Range    Glucose 189 (H) 70 - 139 mg/dL   Magnesium    Collection Time: 02/08/20  6:20 AM   Result Value Ref Range    Magnesium 2.2 1.8 - 2.6 mg/dL   Basic Metabolic Panel    Collection Time: 02/08/20  6:20 AM   Result Value Ref Range    Sodium 140 136 - 145 mmol/L    Potassium 4.3 3.5 - 5.0 mmol/L    Chloride 110 (H) 98 - 107 mmol/L    CO2 20 (L) 22 - 31 mmol/L    Anion Gap, Calculation 10 5 - 18 mmol/L    Glucose 88 70 - 125 mg/dL    Calcium 8.4 (L) 8.5 - 10.5 mg/dL    BUN 47 (H) 8 - 28 mg/dL    Creatinine 2.84 (H) 0.60 - 1.10 mg/dL    GFR MDRD Af Amer 20 (L) >60 mL/min/1.73m2    GFR MDRD Non Af Amer 16 (L) >60 mL/min/1.73m2   Platelet Count - every other day x 3    Collection Time: 02/08/20  6:20 AM   Result Value Ref Range    Platelets 285 140 - 440 thou/uL   POCT Glucose    Collection Time: 02/08/20  6:34 AM    Result Value Ref Range    Glucose 82 70 - 139 mg/dL   POCT Glucose    Collection Time: 02/08/20 12:20 PM   Result Value Ref Range    Glucose 172 (H) 70 - 139 mg/dL   POCT Glucose    Collection Time: 02/08/20  5:03 PM   Result Value Ref Range    Glucose 176 (H) 70 - 139 mg/dL   POCT Glucose    Collection Time: 02/08/20  8:21 PM   Result Value Ref Range    Glucose 153 (H) 70 - 139 mg/dL   Magnesium    Collection Time: 02/09/20  5:32 AM   Result Value Ref Range    Magnesium 2.1 1.8 - 2.6 mg/dL   Potassium - Next AM    Collection Time: 02/09/20  5:32 AM   Result Value Ref Range    Potassium 4.0 3.5 - 5.0 mmol/L   Basic Metabolic Panel    Collection Time: 02/09/20  5:32 AM   Result Value Ref Range    Sodium 140 136 - 145 mmol/L    Potassium 4.0 3.5 - 5.0 mmol/L    Chloride 109 (H) 98 - 107 mmol/L    CO2 20 (L) 22 - 31 mmol/L    Anion Gap, Calculation 11 5 - 18 mmol/L    Glucose 90 70 - 125 mg/dL    Calcium 8.7 8.5 - 10.5 mg/dL    BUN 47 (H) 8 - 28 mg/dL    Creatinine 2.97 (H) 0.60 - 1.10 mg/dL    GFR MDRD Af Amer 19 (L) >60 mL/min/1.73m2    GFR MDRD Non Af Amer 15 (L) >60 mL/min/1.73m2   POCT Glucose    Collection Time: 02/09/20  6:24 AM   Result Value Ref Range    Glucose 86 70 - 139 mg/dL   POCT Glucose    Collection Time: 02/09/20 11:34 AM   Result Value Ref Range    Glucose 183 (H) 70 - 139 mg/dL   POCT Glucose    Collection Time: 02/09/20  4:55 PM   Result Value Ref Range    Glucose 144 (H) 70 - 139 mg/dL   POCT Glucose    Collection Time: 02/09/20  5:47 PM   Result Value Ref Range    Glucose 134 70 - 139 mg/dL   POCT Glucose    Collection Time: 02/09/20  9:07 PM   Result Value Ref Range    Glucose 200 (H) 70 - 139 mg/dL   Magnesium    Collection Time: 02/10/20  5:37 AM   Result Value Ref Range    Magnesium 2.0 1.8 - 2.6 mg/dL   Renal Function Profile    Collection Time: 02/10/20  5:37 AM   Result Value Ref Range    Albumin 2.7 (L) 3.5 - 5.0 g/dL    Calcium 9.0 8.5 - 10.5 mg/dL    Phosphorus 4.3 2.5 - 4.5 mg/dL     Glucose 110 70 - 125 mg/dL    BUN 48 (H) 8 - 28 mg/dL    Creatinine 2.85 (H) 0.60 - 1.10 mg/dL    Sodium 142 136 - 145 mmol/L    Potassium 4.0 3.5 - 5.0 mmol/L    Chloride 108 (H) 98 - 107 mmol/L    CO2 24 22 - 31 mmol/L    Anion Gap, Calculation 10 5 - 18 mmol/L    GFR MDRD Af Amer 20 (L) >60 mL/min/1.73m2    GFR MDRD Non Af Amer 16 (L) >60 mL/min/1.73m2   Platelet Count - every other day x 3    Collection Time: 02/10/20  5:37 AM   Result Value Ref Range    Platelets 262 140 - 440 thou/uL   POCT Glucose    Collection Time: 02/10/20  6:22 AM   Result Value Ref Range    Glucose 93 70 - 139 mg/dL   POCT Glucose    Collection Time: 02/10/20 12:02 PM   Result Value Ref Range    Glucose 144 (H) 70 - 139 mg/dL   Iron and Transferrin Iron Binding Capacity    Collection Time: 02/10/20  2:50 PM   Result Value Ref Range    Iron 25 (L) 42 - 175 ug/dL    Transferrin 177 (L) 212 - 360 mg/dL    Transferrin Saturation, Calculated 11 (L) 20 - 50 %    Transferrin IBC, Calculated 221 (L) 313 - 563 ug/dL   POCT Glucose    Collection Time: 02/10/20  5:19 PM   Result Value Ref Range    Glucose 178 (H) 70 - 139 mg/dL   POCT Glucose    Collection Time: 02/10/20  8:35 PM   Result Value Ref Range    Glucose 186 (H) 70 - 139 mg/dL   Renal Function Profile    Collection Time: 02/11/20  5:46 AM   Result Value Ref Range    Albumin 2.6 (L) 3.5 - 5.0 g/dL    Calcium 8.9 8.5 - 10.5 mg/dL    Phosphorus 4.3 2.5 - 4.5 mg/dL    Glucose 111 70 - 125 mg/dL    BUN 48 (H) 8 - 28 mg/dL    Creatinine 2.77 (H) 0.60 - 1.10 mg/dL    Sodium 143 136 - 145 mmol/L    Potassium 4.1 3.5 - 5.0 mmol/L    Chloride 109 (H) 98 - 107 mmol/L    CO2 23 22 - 31 mmol/L    Anion Gap, Calculation 11 5 - 18 mmol/L    GFR MDRD Af Amer 20 (L) >60 mL/min/1.73m2    GFR MDRD Non Af Amer 17 (L) >60 mL/min/1.73m2   POCT Glucose    Collection Time: 02/11/20  6:16 AM   Result Value Ref Range    Glucose 116 70 - 139 mg/dL   POCT Glucose    Collection Time: 02/11/20 11:53 AM   Result Value  Ref Range    Glucose 137 70 - 139 mg/dL   POCT Glucose    Collection Time: 02/11/20  4:48 PM   Result Value Ref Range    Glucose 162 (H) 70 - 139 mg/dL   POCT Glucose    Collection Time: 02/11/20  8:45 PM   Result Value Ref Range    Glucose 151 (H) 70 - 139 mg/dL   Renal Function Profile    Collection Time: 02/12/20  5:11 AM   Result Value Ref Range    Albumin 2.6 (L) 3.5 - 5.0 g/dL    Calcium 8.9 8.5 - 10.5 mg/dL    Phosphorus 4.2 2.5 - 4.5 mg/dL    Glucose 86 70 - 125 mg/dL    BUN 47 (H) 8 - 28 mg/dL    Creatinine 2.68 (H) 0.60 - 1.10 mg/dL    Sodium 142 136 - 145 mmol/L    Potassium 4.1 3.5 - 5.0 mmol/L    Chloride 107 98 - 107 mmol/L    CO2 22 22 - 31 mmol/L    Anion Gap, Calculation 13 5 - 18 mmol/L    GFR MDRD Af Amer 21 (L) >60 mL/min/1.73m2    GFR MDRD Non Af Amer 17 (L) >60 mL/min/1.73m2   Platelet Count - every other day x 3    Collection Time: 02/12/20  5:23 AM   Result Value Ref Range    Platelets 257 140 - 440 thou/uL   POCT Glucose    Collection Time: 02/12/20  6:17 AM   Result Value Ref Range    Glucose 81 70 - 139 mg/dL   POCT Glucose    Collection Time: 02/12/20 11:47 AM   Result Value Ref Range    Glucose 145 (H) 70 - 139 mg/dL            Imaging Results     Xr Chest 2 Views    Result Date: 2/6/2020  EXAM: XR CHEST 2 VIEWS LOCATION: [facility] DATE/TIME: 2/6/2020 2:43 PM INDICATION: dyspnea COMPARISON: Lateral views of the chest 1/25/2020     Left subclavian approach dual chamber pacemaker is right atrial appendage and right ventricular leads. Mitral annular calcifications. Unchanged cardiac silhouette enlargement. Findings of previous median sternotomy and coronary revascularization. Unchanged aortic atheromatous calcification. Unchanged scarring and pleural thickening in the left lateral costophrenic sulcus. New meniscus in the right lateral sulcus suggests a small pleural effusion. Small area of hazy parenchymal attenuation in the lateral right base is unchanged. There are no  new  alveolar or interstitial lung opacities. Rightward convexity curvature of the lower thoracic spine and associated degenerative osteophytes. Sternal wires are intact and aligned.    Xr Chest 2 Views    Result Date: 1/25/2020  EXAM: XR CHEST 2 VIEWS LOCATION: [facility] DATE/TIME: 1/25/2020 4:35 PM INDICATION: shortness of breath COMPARISON: 10/28/2019     Scoliosis. Cardiomegaly with mild pulmonary vascular congestion. Sternotomy wires and dual lead cardiac pacemaker are stable. Small bilateral pleural effusions are new. Findings most likely represent CHF.            SHAKILA Griffin

## 2021-06-06 NOTE — TELEPHONE ENCOUNTER
Unable to leave message. If patient calls back please relay provider's message below to patient. Thanks.    Mana LAFLEUR CMA

## 2021-06-06 NOTE — TELEPHONE ENCOUNTER
It appears the patient was discharged home with home care from TCU on March 12.  Hospital stay from February 27 until March 6 with heart failure and progressive kidney failure.    Patient does need clinic follow-up as she is returning home from the TCU.    Patient needs a 40-minute appointment this week to be seen by me for hospital follow-up.

## 2021-06-06 NOTE — TELEPHONE ENCOUNTER
Who is calling:  Home Care  Reason for Call:  Will Dr Ha follow patient by phone and or fax for home care?  Please advise.   Date of last appointment with primary care: NA  Okay to leave a detailed message: Yes [phone]

## 2021-06-06 NOTE — PATIENT INSTRUCTIONS - HE
Start your seizure medication: Lamotrigine/Trileptal 100 mg twice a day.    Do not take your insulin, unless your blood sugar gets above 200.  If your blood sugar gets above 200 restart your insulin at 5 units twice a day.    Start eating regular small meals at least 3 times a day.  Try to eat a few bites of scrambled eggs every morning.    If you have increasing lightheaded dizzy spells, contact me to decide if you should reduce your dose of amlodipine.    Weight yourself every day and start recording weights.  If you gain more than 3 pounds in a day, and/or increasing leg swelling or shortness of breath, contact the clinic to see if you should take more torsemide.    If you lose 5 more pounds and are not eating well, also contact me.    Finish your current antibiotic.    You will be contacted by the  for referral to the wound clinic/vascular clinic to evaluate your toe to see if surgical or movement of the dead tissue is needed.    Monitor closely for increasing redness swelling or drainage from your toe or foot.  If you develop a fever or signs of infection, contact clinic.    If you do develop a dry cough with fever, contact clinic by phone to discuss.  If you develop worsening/severe shortness of breath, go to the emergency room for immediate evaluation.    Call the transplant clinic to determine your follow-up plan with them.    Continue the oxygen 2 L nasal cannula 24 hours a day long-term, because of your pulmonary hypertension.

## 2021-06-06 NOTE — PROGRESS NOTES
[facility] clinic Follow Up Note    Odalys Miles   74 y.o. female    Date of Visit: 3/18/2020    Chief Complaint   Patient presents with     Hospital Visit Follow Up     Subjective  Odalys is here with her friend, [place].  Patient is here for hospital follow-up after multiple hospitalizations with osteomyelitis of the right great toe and congestive heart failure exacerbations, including a non-ST elevation MI this last month.    Patient has severe peripheral vascular disease.  She developed osteomyelitis of the right great toe.  She had a previous left toe ulcer but that it healed up prior to this.    I did review the GLEN that was done last month in the hospital, that showed noncompressible arteries with low flow.  Documentation showed decision not to proceed with surgery because of concern over nonhealing wound.    Patient was treated with prolonged antibiotics, the Bactrim 3 times a week ended on March 16.  She still on Omnicef until March 26.    The toe is now dry gangrene on the tip of the toe.  No erythema or drainage or fever.  She does not have pain.    No diarrhea.    Patient had significant increasing shortness of breath with congestive heart failure exacerbation.  Patient had non-Q wave MI in late February.     I did review the heart echo from February 27, 2020.  Ejection fraction 35% with segmental wall motion abnormalities, just mild mitral regurgitation.  Moderate pulmonary hypertension seen.    Patient was having hypoxic respiratory failure and is now on oxygen 2 L which she continues on now.    Previous heart echo January 2020 showed ejection fraction of 55%.  Mild calcified mitral stenosis and mild mitral regurgitation.  Mild aortic stenosis.  Mild LVH.    Patient does have a dual-chamber pacemaker.    Patient is a renal transplant patient.  Continues on her usual Imuran Gengraf and prednisone 5 mg.    During her hospitalization with congestive heart failure exacerbation and osteomyelitis she  had significant worsening of her kidney function.    August 2019 creatinine was 1.9.    In the hospital creatinine was 3.3-3.7.    Last labs done on March 10 showed a creatinine of 4.0 with a BUN of 82.  Potassium 3.6.  Sodium 136.    Hemoglobin was stable at 8.6.  There was no bleeding issues noted.  She did have an iron infusion last month.  She gets erythropoietin in the past and had gotten out of the hospital.    She does not have a nephrology appointment follow-up at this time.  She has not communicated with her transplant clinic since discharge from the hospital.    She was at TCU and went home with home care on March 13.  She is staying with her friend, [place].    She does have sleep apnea but compliant with CPAP.  Moderate daytime fatigue.      Diabetes type 2 has been well controlled in the past.  Hemoglobin A1c 6.3% in January.  He does have a   freestyle av.    Her blood sugars at the TCU were 93-1 54 with no low blood sugars reported.  She was on 15 units in the morning and 14 units and supper of her 70/30 insulin.    She has not used her NovoLog sliding scale since discharge from the TCU.    Patient states she has a very poor appetite.  No nausea or vomiting but has not been eating much at all.  Did not eat anything today.    Yesterday her blood sugar was 118 and she did not take any insulin yesterday.  This morning her blood sugar was 73.  She did not take any insulin today.    No diarrhea.  No abdominal pain complaints.    She has not had recurrent chest pain.  She has not had recurrent shortness of breath.    She does have a scale but has not checked her daily weight at home since discharge from the TCU.    She had significant changes to her blood pressure medication in the hospital.  There was some notation that nephrology would want her to consider removing the vasodilating agent amlodipine to reduce edema concerns.    She is now on Imdur and hydralazine in addition to a higher dose of carvedilol  50 mg twice daily.  Still on spironolactone 12.5 mg twice daily.  Also on prazosin 4 mg 3 times daily.    On aspirin and Plavix and 5 mg of Crestor for coronary artery disease.  She has not tolerated higher doses of Crestor in the past.    Past history of a lipoma complaint in her perineal region, no mention of new issues with that.    No urinary complaints.    Has history of osteoporosis and on Fosamax.    Bilateral 50-69% carotid artery stenosis seen on January 2019 echo.  Previous left cerebellar and left occipital stroke.  There was no mention of a new cerebral ischemic event with recent hospitalizations.    She denies any new cough or fever.    I did review the chest imaging from July 2019 with some right middle lobe 2-3 mm nodules.    Reflux controlled with Protonix twice daily.    She has a past history of seizure disorder but no recurrent seizure.  She was on Keppra in the past.  She was given lamotrigine 100 mg twice a day for her current seizure management but it appears that she had not been taking it.  She does have the medication at home but had not started that.    I did review the EEG from September 2019 that did show some sharp activity.  August 2019 head CT scan otherwise negative, no mass or bleed.          PMHx:    Past Medical History:   Diagnosis Date     Adrenal insufficiency (H)      Anemia      Anxiety      Arthritis      Ataxia 5/12/2015     CAD (coronary artery disease)      Cardiac pacemaker, dual, in situ 12/7/2016    [employer] Revo MRI DOI: 12/15/2011 Dr. Aishwarya Treviño     Chronic Diarrhea Of Unknown Origin     Created by Conversion      Chronic Gout     Created by Conversion  Replacement Utility updated for latest IMO load     Chronic Reflux Esophagitis     Created by Conversion      Congestive Heart Failure     Created by Conversion  Replacement Utility updated for latest IMO load     Coronary Artery Stenosis     Created by Conversion [facility] Annotation: Feb 27 2011 11:37PM -  Alina Zapien: s/p CABGx4  1/11  Replacement Utility updated for latest IMO load     CVA (cerebral vascular accident) (H)      Depression      Diabetic Peripheral Neuropathy     Created by Conversion      DM (diabetes mellitus) (H)      Dysthymic disorder     Created by Conversion      Epilepsy (H)      ESRD (end stage renal disease) (H)      Essential Thrombocytosis     Created by Conversion      History of renal transplant      Hyperlipidemia      Hypertension      Insomnia     Created by Conversion      Ischemic Stroke     Created by Conversion  Replacement Utility updated for latest IMO load     Medullary Sponge Kidney Bilaterally     Created by Conversion      Morbid obesity (H) 8/29/2018     Neuropathy      Nonintractable epilepsy without status epilepticus, unspecified epilepsy type (H) 8/29/2018     CULLEN on CPAP     Created by Conversion      Osteoarthritis     Created by Conversion  Replacement Utility updated for latest IMO load     Renal Transplant Recipient     Created by Conversion      Sensorineural hearing loss     Created by Conversion  Replacement Utility updated for latest IMO load     Thrombophlebitis Of Deep Vessels Of The Lower Extremity     Created by Conversion      Type 2 diabetes mellitus (H)     Created by Conversion      Vertigo      PSHx:    Past Surgical History:   Procedure Laterality Date     APPENDECTOMY       CARDIAC PACEMAKER PLACEMENT       CORONARY ARTERY BYPASS GRAFT  01/26/2011    Comments: x 4     HYSTERECTOMY  1973     IR EXTREMITY ANGIOGRAM LEFT  1/30/2019     NEPHRECTOMY TRANSPLANTED ORGAN  06/2011     [place] TOTAL KNEE ARTHROPLASTY Bilateral      Immunizations:   Immunization History   Administered Date(s) Administered     DT (pediatric) 01/01/1992     Influenza N2o6-90, 01/25/2010     Influenza high dose,seasonal,PF, 65+ yrs 10/12/2015, 09/28/2016, 09/26/2017, 08/29/2018     Influenza, inj, historic,unspecified 10/23/2007, 10/27/2008, 11/09/2009, 10/01/2019     Pneumo Conj  13-V (2010&after) 10/12/2015     Pneumo Polysac 23-V 10/01/1996, 10/27/2008     Td,adult,historic,unspecified 08/08/2002     Tdap 06/30/2016, 11/05/2018     ZOSTER, LIVE 07/03/2012       ROS A comprehensive review of systems was performed and was otherwise negative    Medications, allergies, and problem list were reviewed and updated    Exam  BP (!) 74/46 (Patient Site: Right Arm, Patient Position: Sitting, Cuff Size: Adult Large)   Pulse 64   Wt 183 lb 9.6 oz (83.3 kg)   LMP 07/23/1974   SpO2 97%   BMI 31.51 kg/m    Patient is alert and oriented x3.  Her skin color is good.  She does not have peripheral pulses with her severe vascular disease.  Her hands are not cold however.  I could not get a blood pressure on the patient on either arm, she does not even have a palpable radial pulse for me to get a blood pressure pulse.  She was able to stand without visible orthostasis.  Ambulated to exam table.  Lungs are clear to auscultation with no wheezing or crackles and fairly good respiratory excursion.  No dullness.  Heart is regular without ectopy, 2-3 out of 6 systolic murmur left upper sternal border and at apex.  I did not hear an S3 or S4.  No rub.    Abdomen is moderately overweight but nontender and soft.  She does have trace ankle edema bilaterally.    I did examine her right great toe.  She has dry gangrene on the distal third of the toe, there is no erythema or purulent discharge or evidence of active infection.  She has a small callus on the plantar surface of her foot, but not ulcerated.    Assessment/Plan  1. Chronic systolic congestive heart failure (H)  Patient has a past history of diastolic congestive heart failure and dysfunction with mitral valve stenosis and regurgitation.  Recent MI last month appears to cause significant wall motion abnormalities and systolic congestive heart failure.    She had significant medication changes including hydralazine and Imdur initiated.  Cannot tolerate ACE  inhibitor or ARB with her renal failure.    Cardiology gave goal blood pressure less than 130, but very difficult to check her blood pressure with severe vascular disease.    She denies orthostasis and appears to be having adequate perfusion today based on mentation and skin color and able to ambulate.    I did discuss possibility of reducing the amlodipine down to 2.5 mg a day to reduce the peripheral vasodilating agents.    Also other option is to adjust her diuretic.  She does appear to be euvolemic or even on the dry side currently.    At the TCU she was previously at 80 mg twice a day torsemide and then they reduce that to 60 mg twice a day because of elevated creatinine.    She continues to have worsening BUN and creatinine, I will attempt to reduce her diuretic down to 40 mg twice a day torsemide and short-term follow-up.    She was given a 2-week follow-up, depending on the coronavirus outbreak.    With her pulmonary hypertension, I recommended patient stay on the 2 L of nasal cannula continuous 24 hours a day.  She had an O2 sat of 88% on room air as she walked in/ambulation, she had accidentally turned off her oxygen.  O2 sat came to 97% on 2 L at rest with oxygen 2 L.    2. Coronary artery disease due to calcified coronary lesion  She has not had chest pain.  She presented with increasing shortness of breath.  Records from the hospital indicated suspicion of a strain non-ST elevation MI associate with her heart failure exacerbation    She did not undergo an angiogram because of her worsening kidney failure.    Plan is for medical management.    Crestor 5 mg but does not tolerate higher doses of statin.  Imdur 60 mg twice daily.    Aspirin and Plavix    DNR/DNI    3. Carotid stenosis, bilateral  Medical management as above.  She has had a history of left cerebellar and occipital stroke consistent with posterior circulation arterial disease.    4. Peripheral vascular disease (H)  Severe arterial disease.   Unable to get pulses on feet.  Unable to get wrist pulses or accurate blood pressure.    Decision was made not to undergo surgery previously because of her immunosuppression with a renal transplant and poor perfusion to her feet.    I will still refer to wound clinic to see if the dead tissue needs to be debrided.  Patient is aware that she may need an amputation in the future, with possibly a difficult to heal wound.    She will watch for signs of infection on a daily basis.    She will finish her Omnicef antibiotic which will be completed on March 26.  - Ambulatory referral to Wound Clinic    She does have an unsteady gait and uses walker for ambulation.  Patient is homebound, does not drive.  Patient would benefit from home care for wound management with her toe gangrene.  Also for medication monitoring, and management of her diabetes with fluctuating blood sugars and poor diet.  I would recommend PT OT and home health aide as able.  Home health care for diabetic foot wound with osteomyelitis, congestive heart failure, diabetes and renal failure.    5. History of osteomyelitis  No evidence of recurrent infection at this time  - Ambulatory referral to Wound Clinic    6. Acute renal failure superimposed on stage 4 chronic kidney disease, unspecified acute renal failure type (H)  Worsening renal failure on top of chronic renal insufficiency with previous renal transplant.    Suspected ATN event.  Unknown if rejection occurring.    Patient was told to contact her transplant clinic as soon as possible to arrange follow-up.  She will continue on her current immunosuppression with Imuran Gengraf and 5 mg a day prednisone currently.    Poor appetite and weight loss, possibly associated with uremia.  Checking labs today.  We discussed improving diet with regular meals.  We also discussed avoiding high potassium foods with her progressive renal failure.    7. Renal Transplant Recipient  As above    8. CULLEN on CPAP  Patient  states she is compliant with CPAP, even with naps.    History of moderate pulmonary hypertension.  She was told to use oxygen 24 hours a day.  She can use the oxygen with CPAP.  We will need to clarify that she is using oxygen with her CPAP machine at night at her next visit.    9. Seizure disorder (H)  No recurrent seizure.  She had not been taking her seizure medication.  She was told to start her lamotrigine 100 mg twice a day.  This apparently was given to her by neurology since her last visit with me.  No longer on Keppra.    10. Type 2 diabetes mellitus without complication, with long-term current use of insulin (H)  Blood sugars are running low, with poor appetite.    I suspect poor appetite associated with her renal failure.    She reports borderline low blood sugars.  She was told not to restart her insulin unless her blood sugars get above 200.  See patient instructions.  Patient can contact me by phone as needed for insulin adjustment before her next appointment in 2 weeks.    Freestyle av    Not on oral medication for diabetes    11. Anemia, unspecified type  Multifactorial with renal failure, anemia of chronic disease.  There was no mention of significant blood loss.    Hemoglobin was stable on last check on March 10.  Check iron labs but she was given an iron infusion last month.    She has not arranged follow-up with her nephrology clinic, but likely would benefit from erythropoietin.  Patient will need to follow-up with her nephrologist for that.  - HM2(CBC w/o Differential)  - Ferritin  - Iron and Transferrin Iron Binding Capacity    12. Medication monitoring encounter    - Comprehensive Metabolic Panel    13. Anxiety  Controlled.  Effexor 75 mg and trazodone 100 mg.    PHQ 9 score of 12    Currently still on Fosamax, will contact patient to have that stopped with her renal failure.    Time was greater than 40 minutes with all time face-to-face.  Greater than 50% time coordination of care.  Time  was spent reviewing her multiple medical issues as outlined above    Return in about 2 weeks (around 4/1/2020) for Recheck.   Patient Instructions   Start your seizure medication: Lamotrigine/Trileptal 100 mg twice a day.    Do not take your insulin, unless your blood sugar gets above 200.  If your blood sugar gets above 200 restart your insulin at 5 units twice a day.    Start eating regular small meals at least 3 times a day.  Try to eat a few bites of scrambled eggs every morning.    If you have increasing lightheaded dizzy spells, contact me to decide if you should reduce your dose of amlodipine.    Weight yourself every day and start recording weights.  If you gain more than 3 pounds in a day, and/or increasing leg swelling or shortness of breath, contact the clinic to see if you should take more torsemide.    If you lose 5 more pounds and are not eating well, also contact me.    Finish your current antibiotic.    You will be contacted by the  for referral to the wound clinic/vascular clinic to evaluate your toe to see if surgical or movement of the dead tissue is needed.    Monitor closely for increasing redness swelling or drainage from your toe or foot.  If you develop a fever or signs of infection, contact clinic.    If you do develop a dry cough with fever, contact clinic by phone to discuss.  If you develop worsening/severe shortness of breath, go to the emergency room for immediate evaluation.    Call the transplant clinic to determine your follow-up plan with them.    Continue the oxygen 2 L nasal cannula 24 hours a day long-term, because of your pulmonary hypertension.        Mika Ha MD        Current Outpatient Medications   Medication Sig Dispense Refill     acetaminophen (TYLENOL) 500 MG tablet Take 1 tablet (500 mg total) by mouth every 6 (six) hours as needed.  0     alendronate (FOSAMAX) 35 MG tablet Take 35 mg by mouth once a week. Weekly on Sundays. Take in the morning with a  full glass of water, on an empty stomach, and do not take anything else by mouth or lie down for the next 30 min.  Sundays             amLODIPine (NORVASC) 5 MG tablet Take 1 tablet (5 mg total) by mouth daily.  0     aspirin 81 MG EC tablet Take 1 tablet (81 mg total) by mouth daily.       azaTHIOprine (IMURAN) 50 mg tablet Take 100 mg by mouth bedtime.        carvediloL (COREG) 25 MG tablet Take 2 tablets (50 mg total) by mouth 2 (two) times a day with meals.  0     cefdinir (OMNICEF) 300 MG capsule Take 1 capsule (300 mg total) by mouth every morning for 20 days. (or per podiatrist) for osteomyelitis toe 20 capsule 0     cholecalciferol, vitamin D3, 2,000 unit Tab Take 2,000 Units by mouth daily.       clopidogreL (PLAVIX) 75 mg tablet Take 1 tablet (75 mg total) by mouth daily.  0     cyanocobalamin, vitamin B-12, 2,500 mcg Tab Take 2,500 mcg by mouth daily.        cycloSPORINE modified (GENGRAF) 25 MG capsule Take 50 mg by mouth 2 (two) times a day.        flash glucose scanning reader (FREESTYLE FELA 14 DAY READER) Misc Use 1 application As Directed daily. 1 each 0     flash glucose sensor (FREESTYLE FELA 14 DAY SENSOR) Kit Use 1 application As Directed every 14 (fourteen) days. 6 kit 3     hydrALAZINE (APRESOLINE) 100 MG tablet Take 1 tablet (100 mg total) by mouth 3 (three) times a day.  0     insulin NPH-insulin regular (NOVOLIN 70-30) 100 unit/mL (70-30) injection Inject 15 Units under the skin daily before breakfast.       insulin NPH-insulin regular (NOVOLIN 70-30) 100 unit/mL (70-30) injection Inject 14 Units under the skin daily before supper.        isosorbide mononitrate (IMDUR) 60 MG 24 hr tablet Take 1 tablet (60 mg total) by mouth 2 (two) times a day.  0     lamoTRIgine (LAMICTAL) 100 MG tablet Take 1 tablet (100 mg total) by mouth 2 (two) times a day.  0     NOVOLOG U-100 INSULIN ASPART 100 unit/mL injection Three times a day with meals  BG < 70 mg/dL: Treat, and call MD  BG  mg/dL: None  "- 0 Units  -180 mg/dL: 1 unit  -220 mg/dL: 2 units  -260 mg/dL: 3 units  -300 mg/dL: 4 units  -340 mg/dL: 5 units  -400 mg/dL: 6 units  BG > 400 mg/dL: 7 units and Call MD 10 mL PRN     pantoprazole (PROTONIX) 40 MG tablet Take 40 mg by mouth 2 (two) times a day.       pen needle, diabetic (BD ULTRA-FINE TWILA PEN NEEDLES) 32 gauge x 5/32\" Ndle Use 1 per day. 200 each 4     prazosin (MINIPRESS) 2 MG capsule Take 2 capsules (4 mg total) by mouth 3 (three) times a day.  0     predniSONE (DELTASONE) 5 MG tablet Take 5 mg by mouth daily.       rosuvastatin (CRESTOR) 5 MG tablet Take 5 mg by mouth at bedtime.       senna-docusate (PERICOLACE) 8.6-50 mg tablet Take 1 tablet by mouth 2 (two) times a day.  0     spironolactone (ALDACTONE) 25 MG tablet Take 0.5 tablets (12.5 mg total) by mouth 2 (two) times a day at 9am and 6pm.  0     torsemide (DEMADEX) 20 MG tablet Take 4 tablets (80 mg total) by mouth 2 (two) times a day at 9am and 6pm. (Patient taking differently: Take 60 mg by mouth 2 (two) times a day at 9am and 6pm. )  0     traZODone (DESYREL) 50 MG tablet Take 100 mg by mouth at bedtime.       venlafaxine (EFFEXOR-XR) 75 MG 24 hr capsule Take 1 capsule (75 mg total) by mouth daily with breakfast.  0     No current facility-administered medications for this visit.      Allergies   Allergen Reactions     Blood-Group Specific Substance      Anti-E present. Expect delay in transfusion. Draw 2 lavender tops for Type and Screen requests.     Lisinopril Cough     Resolved after discontinuation     Mold/Mildew      Latex Rash     Social History     Tobacco Use     Smoking status: Former Smoker     Packs/day: 1.50     Years: 20.00     Pack years: 30.00     Types: Cigarettes     Smokeless tobacco: Never Used   Substance Use Topics     Alcohol use: Yes     Comment: occasional     Drug use: No           "

## 2021-06-06 NOTE — PROGRESS NOTES
[facility] Admission note      Patient: Odalys Miles  MRN: [id number]  Date of Service: 2/24/2020      [facility] [[id number]]  Reason for Visit     Chief Complaint   Patient presents with     Review Of Multiple Medical Conditions       Code Status     Full code    Assessment     -Acute kidney injury in the setting of chronic kidney disease baseline creatinine 2-2.5  Creatinine noted to 3.16 on recheck in the TCU  -History of depression with patient reporting situational stressors with worsening mood and behavior  -Acute on chronic CHF exacerbation; patient still appears to be fluid overloaded  -Underlying history of renal transplant on chronic immunosuppressants  -History of hypertension poorly controlled in spite of multiple agents.  -History of coronary artery disease status post CABG  -History of atrial fibrillation chronic  -status post pacemaker placement  -History of diabetes currently on Lantus  -History of CVA on aspirin and statin  -History of seizure disorder on Lamictal  -History of iron deficiency anemia status post IV Venofer  - Obstructive sleep apnea  - Hypoxic respiratory failure currently on 3 L of oxygen by nasal cannula  - Morbid obesity.  -Generalized weakness    Plan     Patient admitted to the TCU.  She had a complex and prolonged stay in the hospital she was admitted on 1/28/2020 and has now been discharged to the TCU for strengthening and rehab.  She has been weaned off oxygen  Weights will need to be monitored  Due to worsening renal impairment with a recheck creatinine of 3.4 she was put on a low-dose of Bumex at 2 mg daily.  Recheck BMP today is unfortunately still high at 3.16   her baseline creatinine is between 2 and 3 as per her.  I have again counseled her extensively that she needs to watch her fluid and salt intake.  She is also on losartan   we will plan on discontinuing this medication   she takes low-dose of 25 mg daily.  Recheck BMP in 3  days.  If no improvement will restart her losartan   but in the meantime we will give her some permissive hypertension also to see if that helps her with her  renal failure  She has significant lymphedema and weights need to be monitored.  She has been weaned off oxygen.  She has a follow-up monthly with the transplant clinic and will follow them this week to monitor her medications and was advised to follow-up with her labs to see if they had any new recommendations regarding her advancing renal failure  Blood sugars have been stable mostly under 200.  Blood pressure review done and almost all her blood pressures are under 140 with an occasional high which patient believes is taken inaccurately while she was walking around  Patient's labs were reviewed with her and extensive education done regarding salt and fluid restriction  Total time spent is 35 minutes more than 20 minutes of which were spent face-to-face talking to patient reviewing her kidney functions including recent labs weights and counseling patient about salt and fluid restriction as outlined in my note above.  She is a renal transplant patient and understands the gravity of the situation.  She needs to see the transplant nephrologist closely and has made an appointment this week    History     Patient is a very pleasant 74 y.o. female who is admitted to TCU  Patient presented to the hospital with acute shortness of breath.  She was diagnosed with acute on chronic CHF exacerbations.  BNP on admission was 2519.  She had an echocardiogram revealing an EF of 55%.  She was given IV diuretics with improvement.  Currently discharged on oral Bumex with close monitoring of weights recommended.  Due to progressive renal failure she is on a lower dose of diuretics with Bumex 2 mg daily weights have been stable at 198 pounds    She had hypoxic respiratory failure felt to be secondary to CHF exacerbation this has resolved prior to discharge she is no longer  needing oxygen.  Patient also was noted to have CKD with acute exacerbation of creatinine however prior to discharge her creatinine discharged was hovering between 2 and 2.5 with mild proteinuria this will need to be closely followed if she continues to have elevated creatinine she understands she will need to see nephrology.  Unfortunately recheck electrolytes and kidney functions remain impaired with a creatinine of greater than 3 since admission she has not had any improvement even on a low-dose of Bumex recheck creatinine today is 3.14    Patient has underlying history of chronic renal transplant and is on multiple immunosuppressants agents.  These were continued in the hospital.  She also had significantly elevated blood pressures.  She continues on hydralazine Coreg terra Zosyn losartan and close monitoring of blood pressures recommended.  Blood pressures appear to have improved somewhat on the low side with low diastolic pressures noted in the TCU  She also has a history of coronary artery disease and a pacemaker placement and is on medical management    Past Medical History     Active Ambulatory (Non-Hospital) Problems    Diagnosis     Encounter for palliative care     Muscle weakness (generalized)     Acute decompensated heart failure (H)     Acute renal failure, unspecified acute renal failure type (H)     Acute pulmonary edema with congestive heart failure (H)     Atrioventricular block, complete (H)     History of seizure     Anxiety     Closed displaced fracture of surgical neck of left humerus     Pain in joint, lower leg     Disorder of stomach     Polyp of duodenum     Atherosclerosis of native artery of left lower extremity with ulceration of other part of foot (H)     TIA (transient ischemic attack)     (HFpEF) heart failure with preserved ejection fraction (H)     Nonintractable epilepsy without status epilepticus, unspecified epilepsy type (H)     Morbid obesity (H)     Chronic diarrhea      Adrenal insufficiency (H)     Cardiac pacemaker, dual, in situ     Arteriosclerotic vascular disease     Bacteremia     Seizure disorder (H)     Ataxia     Hyperlipidemia     Noninfectious gastroenteritis     Diverticular disease of colon     Essential Thrombocytosis     Osteoarthritis     Thrombophlebitis Of Deep Vessels Of The Lower Extremity     Medullary Sponge Kidney Bilaterally     CULLEN on CPAP     Mixed hyperlipidemia     End Stage Renal Disease     Status post kidney transplant     Benign Essential Hypertension     Coronary artery disease due to calcified coronary lesion     Chronic Reflux Esophagitis     Chronic Gout     Type 2 diabetes mellitus (H)     Dysthymic Disorder     Diabetic Peripheral Neuropathy     Anemia     Sensorineural Hearing Loss     Immunosuppressive management encounter following kidney transplant     PTSD (post-traumatic stress disorder)     Major depressive disorder, recurrent episode, in partial or unspecified remission     Iron deficiency anemia     Past Medical History:   Diagnosis Date     Adrenal insufficiency (H)      Anemia      Anxiety      Arthritis      Ataxia 5/12/2015     CAD (coronary artery disease)      Cardiac pacemaker, dual, in situ 12/7/2016     Chronic Diarrhea Of Unknown Origin      Chronic Gout      Chronic Reflux Esophagitis      Congestive Heart Failure      Coronary Artery Stenosis      CVA (cerebral vascular accident) (H)      Depression      Diabetic Peripheral Neuropathy      DM (diabetes mellitus) (H)      Dysthymic disorder      Epilepsy (H)      ESRD (end stage renal disease) (H)      Essential Thrombocytosis      History of renal transplant      Hyperlipidemia      Hypertension      Insomnia      Ischemic Stroke      Medullary Sponge Kidney Bilaterally      Morbid obesity (H) 8/29/2018     Neuropathy      Nonintractable epilepsy without status epilepticus, unspecified epilepsy type (H) 8/29/2018     CULLEN on CPAP      Osteoarthritis      Renal Transplant  Recipient      Sensorineural hearing loss      Thrombophlebitis Of Deep Vessels Of The Lower Extremity      Type 2 diabetes mellitus (H)      Vertigo        Past Social History     Reviewed, and she  reports that she has quit smoking. Her smoking use included cigarettes. She has a 30.00 pack-year smoking history. She has never used smokeless tobacco. She reports current alcohol use. She reports that she does not use drugs.    Family History     Reviewed, and family history includes Breast cancer (age of onset: 62) in her sister; Cancer in her father; Diabetes in her sister.   Her older son also has kidney cancer    Medication List   Post Discharge Medication Reconciliation Status: discharge medications reconciled and changed, per note/orders (see AVS)   Current Outpatient Medications on File Prior to Visit   Medication Sig Dispense Refill     acetaminophen (TYLENOL) 500 MG tablet Take 1 tablet (500 mg total) by mouth every 6 (six) hours as needed.  0     alendronate (FOSAMAX) 35 MG tablet Take 35 mg by mouth once a week. Weekly on Sundays. Take in the morning with a full glass of water, on an empty stomach, and do not take anything else by mouth or lie down for the next 30 min.  Sundays             aspirin 81 MG EC tablet Take 1 tablet (81 mg total) by mouth daily.       azaTHIOprine (IMURAN) 50 mg tablet Take 100 mg by mouth bedtime.        bumetanide (BUMEX) 2 MG tablet Take 1 tablet (2 mg total) by mouth 2 (two) times a day at 9am and 6pm. (Patient taking differently: Take 2 mg by mouth daily. )  0     carvediloL (COREG) 25 MG tablet Take 1 tablet (25 mg total) by mouth 2 (two) times a day with meals.  0     cholecalciferol, vitamin D3, 2,000 unit Tab Take 2,000 Units by mouth daily.       cyanocobalamin, vitamin B-12, 2,500 mcg Tab Take 2,500 mcg by mouth daily.        cycloSPORINE modified (GENGRAF) 25 MG capsule Take 50 mg by mouth 2 (two) times a day.        ferrous sulfate 325 (65 FE) MG tablet Take 1 tablet  "(325 mg total) by mouth every other day.  0     flash glucose scanning reader (FREESTYLE FELA 14 DAY READER) Misc Use 1 application As Directed daily. 1 each 0     flash glucose sensor (FREESTYLE FELA 14 DAY SENSOR) Kit Use 1 application As Directed every 14 (fourteen) days. 6 kit 3     hydrALAZINE (APRESOLINE) 25 MG tablet Take 3 tablets (75 mg total) by mouth 3 (three) times a day.  0     isosorbide mononitrate (IMDUR) 30 MG 24 hr tablet Take 1 tablet (30 mg total) by mouth 2 (two) times a day.  0     lamoTRIgine (LAMICTAL) 25 MG tablet Take 25 mg by mouth see administration instructions. Titrate up to target dose of 100mg two times a day  2/1-2/7 25mg Daily  2/8-2/14 50mg Daily  2/15-2/21 50mg Twice daily  2/22-2/28 75mg Twice daily  2/29 100mg Twice daily       LANTUS U-100 INSULIN 100 unit/mL vial 10 unit daily at bedtime 10 mL PRN     losartan (COZAAR) 25 MG tablet Take 1 tablet (25 mg total) by mouth daily.  0     NOVOLOG U-100 INSULIN ASPART 100 unit/mL injection Three times a day with meals  BG < 70 mg/dL: Treat, and call MD  BG  mg/dL: None - 0 Units  -180 mg/dL: 1 unit  -220 mg/dL: 2 units  -260 mg/dL: 3 units  -300 mg/dL: 4 units  -340 mg/dL: 5 units  -400 mg/dL: 6 units  BG > 400 mg/dL: 7 units and Call MD 10 mL PRN     pantoprazole (PROTONIX) 40 MG tablet Take 40 mg by mouth 2 (two) times a day.       pen needle, diabetic (BD ULTRA-FINE TWILA PEN NEEDLES) 32 gauge x 5/32\" Ndle Use 1 per day. 200 each 4     polyethylene glycol (MIRALAX) 17 gram packet Take 1 packet (17 g total) by mouth daily as needed.  0     predniSONE (DELTASONE) 5 MG tablet Take 5 mg by mouth daily.       rosuvastatin (CRESTOR) 5 MG tablet Take 1 tablet (5 mg) by mouth at bedtime. 90 tablet 3     terazosin (HYTRIN) 5 MG capsule Take 1 capsule (5 mg total) by mouth at bedtime.  0     traZODone (DESYREL) 50 MG tablet Take 100 mg by mouth at bedtime.       venlafaxine (EFFEXOR-XR) 150 MG 24 hr " "capsule Take 1 capsule (150 mg) by mouth daily. 90 capsule 3     No current facility-administered medications on file prior to visit.        Allergies     Allergies   Allergen Reactions     Lisinopril Cough     Resolved after discontinuation     Mold/Mildew      Latex Rash       Review of Systems   A comprehensive review of 14 systems was done. Pertinent findings noted here and in history of present illness. All the rest negative.  Constitutional: Negative.  Negative for fever, chills, she reports activity change, appetite change and fatigue.   HENT: Negative for congestion and facial swelling.    Eyes: Negative for photophobia, redness and visual disturbance.   Respiratory: Negative for cough and chest tightness.    Cardiovascular: Negative for chest pain, palpitations and has leg swelling.   Gastrointestinal: Negative for nausea, diarrhea, constipation, blood in stool and abdominal distention.   Genitourinary: Negative.    Musculoskeletal: Negative.  Reports weakness with limited endurance  Skin: Negative.    Neurological: Negative for dizziness, tremors, syncope, weakness, light-headedness and headaches.   Hematological: Does not bruise/bleed easily.   Psychiatric/Behavioral: Negative.  Reporting some situational stressors which are exacerbating her mood and depression.  She is moving to an assisted living and selling her home which is causing her a lot of stress      Physical Exam     Recent Vitals 2/19/2020   Height 5' 4\"   Weight 197 lbs   BSA (m2) 2.01 m2   /53   Pulse 72   Temp 98   Temp src -   SpO2 93   Some recent data might be hidden       Constitutional: Oriented to person, place, and time and appears well-developed.    HEENT:  Normocephalic and atraumatic.  Eyes: Conjunctivae and EOM are normal. Pupils are equal, round, and reactive to light. No discharge.  No scleral icterus. Nose normal. Mouth/Throat: Oropharynx is clear and moist. No oropharyngeal exudate.    NECK: Normal range of motion. Neck " supple. No JVD present. No tracheal deviation present. No thyromegaly present.   CARDIOVASCULAR: Normal rate, regular rhythm and intact distal pulses.  Exam reveals no gallop and no friction rub.  Systolic murmur present.  HAS A PACEMAKER  PULMONARY: Effort normal and breath sounds normal. No respiratory distress.No Wheezing or rales.  Bibasilar crackles heard  ABDOMEN: Soft. Bowel sounds are normal. No distension and no mass.  There is no tenderness. There is no rebound and no guarding. No HSM.  MUSCULOSKELETAL: Normal range of motion.  Plus leg edema and no tenderness. Mild kyphosis, no tenderness.  LYMPH NODES: Has no cervical, supraclavicular, axillary and groin adenopathy.   NEUROLOGICAL: Alert and oriented to person, place, and time. No cranial nerve deficit.  Normal muscle tone. Coordination normal.   GENITOURINARY: Deferred exam.  SKIN: Skin is warm and dry. No rash noted. No erythema. No pallor.   EXTREMITIES: No cyanosis, no clubbing, has 2+ leg  edema. No Deformity.  PSYCHIATRIC: Normal mood, affect and behavior.      Lab Results     Recent Results (from the past 240 hour(s))   Basic Metabolic Panel    Collection Time: 02/17/20  9:24 AM   Result Value Ref Range    Sodium 140 136 - 145 mmol/L    Potassium 3.8 3.5 - 5.0 mmol/L    Chloride 101 98 - 107 mmol/L    CO2 27 22 - 31 mmol/L    Anion Gap, Calculation 12 5 - 18 mmol/L    Glucose 127 (H) 70 - 125 mg/dL    Calcium 8.8 8.5 - 10.5 mg/dL    BUN 56 (H) 8 - 28 mg/dL    Creatinine 3.21 (H) 0.60 - 1.10 mg/dL    GFR MDRD Af Amer 17 (L) >60 mL/min/1.73m2    GFR MDRD Non Af Amer 14 (L) >60 mL/min/1.73m2   Vitamin D, Total (25-Hydroxy)    Collection Time: 02/17/20  9:24 AM   Result Value Ref Range    Vitamin D, Total (25-Hydroxy) 37.5 30.0 - 80.0 ng/mL   HM2(CBC w/o Differential)    Collection Time: 02/17/20  9:24 AM   Result Value Ref Range    WBC 8.7 4.0 - 11.0 thou/uL    RBC 3.08 (L) 3.80 - 5.40 mill/uL    Hemoglobin 8.9 (L) 12.0 - 16.0 g/dL    Hematocrit 30.1  (L) 35.0 - 47.0 %    MCV 98 80 - 100 fL    MCH 28.9 27.0 - 34.0 pg    MCHC 29.6 (L) 32.0 - 36.0 g/dL    RDW 16.8 (H) 11.0 - 14.5 %    Platelets 254 140 - 440 thou/uL    MPV 12.1 8.5 - 12.5 fL   Basic Metabolic Panel    Collection Time: 02/19/20  9:07 AM   Result Value Ref Range    Sodium 140 136 - 145 mmol/L    Potassium 3.9 3.5 - 5.0 mmol/L    Chloride 102 98 - 107 mmol/L    CO2 28 22 - 31 mmol/L    Anion Gap, Calculation 10 5 - 18 mmol/L    Glucose 96 70 - 125 mg/dL    Calcium 8.7 8.5 - 10.5 mg/dL    BUN 62 (H) 8 - 28 mg/dL    Creatinine 3.40 (H) 0.60 - 1.10 mg/dL    GFR MDRD Af Amer 16 (L) >60 mL/min/1.73m2    GFR MDRD Non Af Amer 13 (L) >60 mL/min/1.73m2   Basic Metabolic Panel    Collection Time: 02/24/20  6:50 AM   Result Value Ref Range    Sodium 144 136 - 145 mmol/L    Potassium 3.9 3.5 - 5.0 mmol/L    Chloride 105 98 - 107 mmol/L    CO2 28 22 - 31 mmol/L    Anion Gap, Calculation 11 5 - 18 mmol/L    Glucose 97 70 - 125 mg/dL    Calcium 9.0 8.5 - 10.5 mg/dL    BUN 61 (H) 8 - 28 mg/dL    Creatinine 3.16 (H) 0.60 - 1.10 mg/dL    GFR MDRD Af Amer 17 (L) >60 mL/min/1.73m2    GFR MDRD Non Af Amer 14 (L) >60 mL/min/1.73m2                SHAKILA Griffin

## 2021-06-06 NOTE — TELEPHONE ENCOUNTER
We can follow-up for home care, but please be aware that her renal transplant team manages all her kidney issues and transplant medications.  Endocrinology manages all her diabetes issues.  Neurology manages her seizure meds.  Any questions for those issues would need to be directed toward those clinics.

## 2021-06-06 NOTE — PROGRESS NOTES
[facility]      Facility:    [facility] [[id number]]  Code Status: DNR      Chief Complaint/Reason for Visit:   Chief Complaint   Patient presents with     Review Of Multiple Medical Conditions       HPI:   Odalys is a 74 y.o. female who is a transfer from [facility] with an admission on 2/27/20 and discharge on 3/6/20. She has a PMH of DM, CKD stage 4, s/p renal transplant, anemia, PM, chronic CHF per EF 33%, htn who was in the TCU from 2/13/20 to 2/26/20. Apparently feeling more shortness of breath, again presented to the hospital with acute shortness of breath and CP. She was diagnosed with cardiogenic pulmonary edema with decreased EF, given medical management with diuresing.  Was not a candidate for angiogram given declining renal function, required BiPAP and then weaned off.  She developed acute on chronic renal failure with a baseline creatinine around 2.5 though increased to 3.5 per recent addition of losartan (which was decreased), patient not too keen on going through dialysis again as she had initially done in 2011.  Found to have osteomyelitis per MRU in the right great toe, seen by podiatry, started on Bactrim and Omnicef.  Also found to have Proteus UTI. Per anemia, she was given IV iron and started on epo as well. She was then discharged to the TCU again for additional rehab.    Past Medical History:  Past Medical History:   Diagnosis Date     Adrenal insufficiency (H)      Anemia      Anxiety      Arthritis      Ataxia 5/12/2015     CAD (coronary artery disease)      Cardiac pacemaker, dual, in situ 12/7/2016    [employer] Revo MRI DOI: 12/15/2011 Dr. Aishwarya Treviño     Chronic Diarrhea Of Unknown Origin     Created by Conversion      Chronic Gout     Created by Conversion  Replacement Utility updated for latest IMO load     Chronic Reflux Esophagitis     Created by Conversion      Congestive Heart Failure     Created by Conversion  Replacement  Utility updated for latest IMO load     Coronary Artery Stenosis     Created by Conversion [facility] Annotation: Feb 27 2011 11:37PM - Alina Zapien: s/p CABGx4  1/11  Replacement Utility updated for latest IMO load     CVA (cerebral vascular accident) (H)      Depression      Diabetic Peripheral Neuropathy     Created by Conversion      DM (diabetes mellitus) (H)      Dysthymic disorder     Created by Conversion      Epilepsy (H)      ESRD (end stage renal disease) (H)      Essential Thrombocytosis     Created by Conversion      History of renal transplant      Hyperlipidemia      Hypertension      Insomnia     Created by Conversion      Ischemic Stroke     Created by Conversion  Replacement Utility updated for latest IMO load     Medullary Sponge Kidney Bilaterally     Created by Conversion      Morbid obesity (H) 8/29/2018     Neuropathy      Nonintractable epilepsy without status epilepticus, unspecified epilepsy type (H) 8/29/2018     CULLEN on CPAP     Created by Conversion      Osteoarthritis     Created by Conversion  Replacement Utility updated for latest IMO load     Renal Transplant Recipient     Created by Conversion      Sensorineural hearing loss     Created by Conversion  Replacement Utility updated for latest IMO load     Thrombophlebitis Of Deep Vessels Of The Lower Extremity     Created by Conversion      Type 2 diabetes mellitus (H)     Created by Conversion      Vertigo            Surgical History:  Past Surgical History:   Procedure Laterality Date     APPENDECTOMY       CARDIAC PACEMAKER PLACEMENT       CORONARY ARTERY BYPASS GRAFT  01/26/2011    Comments: x 4     HYSTERECTOMY  1973     IR EXTREMITY ANGIOGRAM LEFT  1/30/2019     NEPHRECTOMY TRANSPLANTED ORGAN  06/2011     OH TOTAL KNEE ARTHROPLASTY Bilateral        Family History:   Family History   Problem Relation Age of Onset     Diabetes Sister      Breast cancer Sister 62     Cancer Father        Social History:    Social History      Socioeconomic History     Marital status:      Spouse name: Not on file     Number of children: Not on file     Years of education: Not on file     Highest education level: Not on file   Occupational History     Employer: RETIRED   Social Needs     Financial resource strain: Not on file     Food insecurity     Worry: Not on file     Inability: Not on file     Transportation needs     Medical: Not on file     Non-medical: Not on file   Tobacco Use     Smoking status: Former Smoker     Packs/day: 1.50     Years: 20.00     Pack years: 30.00     Types: Cigarettes     Smokeless tobacco: Never Used   Substance and Sexual Activity     Alcohol use: Yes     Comment: occasional     Drug use: No     Sexual activity: Not on file   Lifestyle     Physical activity     Days per week: Not on file     Minutes per session: Not on file     Stress: Not on file   Relationships     Social connections     Talks on phone: Not on file     Gets together: Not on file     Attends [employer] service: Not on file     Active member of club or organization: Not on file     Attends meetings of clubs or organizations: Not on file     Relationship status: Not on file     Intimate partner violence     Fear of current or ex partner: Not on file     Emotionally abused: Not on file     Physically abused: Not on file     Forced sexual activity: Not on file   Other Topics Concern     Not on file   Social History Narrative    .  Chas passed away 2009 from cancer. Lives independently in 1 level town home in [place]. 2 sons- Ben and Av. She has a dog-Isaura. Enjoys knitting.           Review of Systems   Constitutional: Positive for activity change, appetite change and fatigue. Negative for chills, diaphoresis and fever.   HENT: Negative for congestion and hearing loss.    Eyes: Negative.    Respiratory: Positive for shortness of breath.    Cardiovascular: Negative.    Gastrointestinal: Positive for abdominal distention.  "Negative for abdominal pain, blood in stool, constipation, diarrhea and nausea.   Endocrine: Negative.    Genitourinary: Negative for difficulty urinating.   Musculoskeletal:        Denies pain   Skin: Positive for wound.        R great toe    Allergic/Immunologic: Negative.    Neurological: Negative for dizziness, tremors, speech difficulty and light-headedness.   Hematological: Negative.    Psychiatric/Behavioral: Negative for agitation, confusion, hallucinations and sleep disturbance. The patient is not nervous/anxious.        Vitals:    03/09/20 0901   BP: 144/56   Pulse: 61   Resp: 20   Temp: 97  F (36.1  C)   SpO2: 90%   Weight: 189 lb (85.7 kg)   Height: 5' 4\" (1.626 m)       Physical Exam  Vitals signs reviewed.   HENT:      Head: Normocephalic and atraumatic.      Right Ear: External ear normal.      Left Ear: External ear normal.      Nose: No congestion or rhinorrhea.      Mouth/Throat:      Pharynx: No oropharyngeal exudate or posterior oropharyngeal erythema.   Eyes:      General:         Right eye: No discharge.         Left eye: No discharge.   Neck:      Musculoskeletal: Normal range of motion and neck supple.   Cardiovascular:      Rate and Rhythm: Normal rate.      Heart sounds: No murmur. No friction rub. No gallop.       Comments: PM  Pulmonary:      Effort: No respiratory distress.      Breath sounds: No wheezing or rales.      Comments: Dim, 1.5L NC, RUFFIN  Abdominal:      General: There is no distension.      Palpations: Abdomen is soft.      Tenderness: There is no abdominal tenderness. There is no guarding.      Comments: Denies constipation or diarrhea   Genitourinary:     Comments: No limitations  Musculoskeletal:      Right lower leg: No edema.      Left lower leg: No edema.      Comments: WBAT, denies pain   Skin:     General: Skin is warm.      Comments: R great toe wound   Neurological:      Mental Status: She is alert and oriented to person, place, and time.   Psychiatric:         Mood " and Affect: Mood normal.      Comments: Denies depression or anxiety         Medication List:  Current Outpatient Medications   Medication Sig     acetaminophen (TYLENOL) 500 MG tablet Take 1 tablet (500 mg total) by mouth every 6 (six) hours as needed.     alendronate (FOSAMAX) 35 MG tablet Take 35 mg by mouth once a week. Weekly on Sundays. Take in the morning with a full glass of water, on an empty stomach, and do not take anything else by mouth or lie down for the next 30 min.  Sundays           amLODIPine (NORVASC) 5 MG tablet Take 1 tablet (5 mg total) by mouth daily.     aspirin 81 MG EC tablet Take 1 tablet (81 mg total) by mouth daily.     azaTHIOprine (IMURAN) 50 mg tablet Take 100 mg by mouth bedtime.      carvediloL (COREG) 25 MG tablet Take 2 tablets (50 mg total) by mouth 2 (two) times a day with meals.     cefdinir (OMNICEF) 300 MG capsule Take 1 capsule (300 mg total) by mouth every morning for 20 days. (or per podiatrist) for osteomyelitis toe     cholecalciferol, vitamin D3, 2,000 unit Tab Take 2,000 Units by mouth daily.     clopidogreL (PLAVIX) 75 mg tablet Take 1 tablet (75 mg total) by mouth daily.     cyanocobalamin, vitamin B-12, 2,500 mcg Tab Take 2,500 mcg by mouth daily.      cycloSPORINE modified (GENGRAF) 25 MG capsule Take 50 mg by mouth 2 (two) times a day.      flash glucose scanning reader (FREESTYLE FELA 14 DAY READER) Misc Use 1 application As Directed daily.     flash glucose sensor (FREESTYLE FELA 14 DAY SENSOR) Kit Use 1 application As Directed every 14 (fourteen) days.     hydrALAZINE (APRESOLINE) 100 MG tablet Take 1 tablet (100 mg total) by mouth 3 (three) times a day.     insulin NPH-insulin regular (NOVOLIN 70-30) 100 unit/mL (70-30) injection Inject 15 Units under the skin daily before breakfast.     insulin NPH-insulin regular (NOVOLIN 70-30) 100 unit/mL (70-30) injection Inject 14 Units under the skin daily before supper.      isosorbide mononitrate (IMDUR) 60 MG 24 hr  "tablet Take 1 tablet (60 mg total) by mouth 2 (two) times a day.     lamoTRIgine (LAMICTAL) 100 MG tablet Take 1 tablet (100 mg total) by mouth 2 (two) times a day.     miconazole (MICOTIN) 2 % powder Twice daily as needed for rash/dermatitis     NOVOLOG U-100 INSULIN ASPART 100 unit/mL injection Three times a day with meals  BG < 70 mg/dL: Treat, and call MD  BG  mg/dL: None - 0 Units  -180 mg/dL: 1 unit  -220 mg/dL: 2 units  -260 mg/dL: 3 units  -300 mg/dL: 4 units  -340 mg/dL: 5 units  -400 mg/dL: 6 units  BG > 400 mg/dL: 7 units and Call MD     pantoprazole (PROTONIX) 40 MG tablet Take 40 mg by mouth 2 (two) times a day.     pen needle, diabetic (BD ULTRA-FINE TWILA PEN NEEDLES) 32 gauge x 5/32\" Ndle Use 1 per day.     polyethylene glycol (MIRALAX) 17 gram packet Take 1 packet (17 g total) by mouth daily.     polyvinyl alcohol (LIQUIFILM TEARS) 1.4 % ophthalmic solution Use 4 times daily as needed for dry eyes     prazosin (MINIPRESS) 2 MG capsule Take 2 capsules (4 mg total) by mouth 3 (three) times a day.     predniSONE (DELTASONE) 5 MG tablet Take 5 mg by mouth daily.     rosuvastatin (CRESTOR) 5 MG tablet Take 5 mg by mouth at bedtime.     senna-docusate (PERICOLACE) 8.6-50 mg tablet Take 1 tablet by mouth 2 (two) times a day.     spironolactone (ALDACTONE) 25 MG tablet Take 0.5 tablets (12.5 mg total) by mouth 2 (two) times a day at 9am and 6pm.     sulfamethoxazole-trimethoprim (SEPTRA) 400-80 mg per tablet Take 1 tablet by mouth 3 (three) times a week for 10 days.     torsemide (DEMADEX) 20 MG tablet Take 4 tablets (80 mg total) by mouth 2 (two) times a day at 9am and 6pm.     traZODone (DESYREL) 50 MG tablet Take 100 mg by mouth at bedtime.     venlafaxine (EFFEXOR-XR) 75 MG 24 hr capsule Take 1 capsule (75 mg total) by mouth daily with breakfast.       Labs:  Results for orders placed or performed during the hospital encounter of 02/27/20   Basic Metabolic Panel "   Result Value Ref Range    Sodium 141 136 - 145 mmol/L    Potassium 3.7 3.5 - 5.0 mmol/L    Chloride 101 98 - 107 mmol/L    CO2 26 22 - 31 mmol/L    Anion Gap, Calculation 14 5 - 18 mmol/L    Glucose 97 70 - 125 mg/dL    Calcium 9.3 8.5 - 10.5 mg/dL    BUN 78 (H) 8 - 28 mg/dL    Creatinine 3.62 (H) 0.60 - 1.10 mg/dL    GFR MDRD Af Amer 15 (L) >60 mL/min/1.73m2    GFR MDRD Non Af Amer 12 (L) >60 mL/min/1.73m2     Lab Results   Component Value Date    WBC 6.1 03/03/2020    HGB 8.1 (L) 03/03/2020    HCT 26.2 (L) 03/03/2020    MCV 96 03/03/2020     03/04/2020     Lab Results   Component Value Date    TSH 3.57 11/21/2019     Vitamin D, Total (25-Hydroxy)   Date Value Ref Range Status   02/17/2020 37.5 30.0 - 80.0 ng/mL Final     Lab Results   Component Value Date    HGBA1C 6.3 (H) 01/26/2020       Assessment/Plan:    NSTEMI: No angiogram performed per renal dysfunction, probably due to demand ischemia per cardiogenic overload with decreased EF of 33%, will wean O2 as tolerated. Continue ASA and plavix daily. F/u with cardiology    HFrEF: last 33% (decreased), last 189lb, bumex increased to torsemide to 80mg two times a day and spironolactone 12.5mg BID    Osteomyelitis of right great toe: started on Omnicef, end 3/26 and bactrim, end 3/16. Drsg changes     CULLEN: using CPAP    IDDM: last A1c 6.3, decrease 70/30 to 14U at HS (per BS <90 in AM) and continue 15U in AM    Poor PO intake: start glucerna     Hx of renal transplant with CKD stage 4: last Cr 3.62 with 12 on 3/6/20, continue gengraf 50mg two times a day and imuran 100mg at HS, f/u with neurology in 2 wks    Pain control: continue tylenol 500mg q6h PRN, denies pain    Osteopenia: continue fosamax 35mg weekly    HTN: continue coreg 50mg two times a day, hydralazine 100mg three times a day, imdur 60mg two times a day, start on prazosin 4mg TID and amlodipine on 5mg daily per cardiology. SBP <130    Vit D def: continue D3 2000U daily    Vit B12 def: continue  cyanocobalamin 2500mcg daily    Seizure disorder: continue lamotrigine 100mg two times a day    GERD: pantoprazole 40mg two times a day    Constipation: continue miralax daily and senna S 1 tab two times a day, denies issue    CAD: continue statin and ASA    Insomnia: continue trazodone 100mg at HS    Depression: continue Effexor 75mg daily    Disposition: plans to return home, wants to move to [facility] after selling house    The care plan has been reviewed and all orders signed. Changes to care plan, if any, as noted. Otherwise, continue care plan of care.  The total time spent with this patient was 35 minutes, with greater than 50% spent in counseling and coordination of care that included multiple issues per f/u with cardiology/nephrology/podiatry, weaning off O2 and therapy, which lasted 20 minutes.    Post Discharge Medication Reconciliation Status: discharge medications reconciled and changed, per note/orders (see AVS)      Electronically signed by: Jagdish Cardenas NP

## 2021-06-06 NOTE — TELEPHONE ENCOUNTER
Who is calling:  Adrien From [facility]   Reason for Call:  Caller states patient is coming in for appointment with PCP tomorrow caller is requesting for Face to Face documentation during tomorrows appointment . Caller also questioning dose provider follow up patients home care by phone and fax.   Date of last appointment with primary care: 12/16/19  Okay to leave a detailed message: No

## 2021-06-06 NOTE — PROGRESS NOTES
[facility]      Facility:    [facility] [[id number]]  Code Status: DNR      Chief Complaint/Reason for Visit:   Chief Complaint   Patient presents with     Review Of Multiple Medical Conditions       HPI:   Odalys is a 74 y.o. female who is a transfer from [facility] with an admission on 2/6/20 and discharge on 2/12/20. She has a PMH of renal transplant, CKD stage 4, CVA, CAD with CABG, DM2, seizure disorder, Htn, CULLEN on CPAP, chronic CHF, recent CHF exacerbation discharged on 1/28/20 with acute shortness of breath d/t CHF.  She improved with diuresis, BNP 2519 as it was 2275 the previous week. Echo showed EF 55%, resumed bumex 2mg two times a day, was on 1mg daily. She was weaned off O2. Baseline Cr 2-2.5, followed with nephrology. Her immunosuppression was restarted on Imuran, cyclosporine and prednisone.  Her BP was significantly elevated, she resumed on hydralazine 75 mg 3 times daily, carvedilol 25 mg twice daily, losartan 25 mg daily and terazosin 5mg daily.  She also has chronic A. fib with pacemaker in place, heart rate was stable.  Not on anticoagulation treatment.  Has history of ischemic stroke without residual weakness on aspirin and statin.  Glucose numbers improved with Lantus 10 units daily with sliding scale.  Resumed Elekta mental for seizure disorder.  Also developed iron deficiency anemia received IV replacement and resumed on oral Fe replacement.  She was then discharged to TCU for additional rehab.    Today will assess weaning of O2, CHF, vitals, recent labs and therapy progress.    Past Medical History:  Past Medical History:   Diagnosis Date     Adrenal insufficiency (H)      Anemia      Anxiety      Arthritis      Ataxia 5/12/2015     CAD (coronary artery disease)      Cardiac pacemaker, dual, in situ 12/7/2016    [employer] Revo MRI DOI: 12/15/2011 Dr. Aishwarya Treviño     Chronic Diarrhea Of Unknown Origin     Created by Conversion       Chronic Gout     Created by Conversion  Replacement Utility updated for latest IMO load     Chronic Reflux Esophagitis     Created by Conversion      Congestive Heart Failure     Created by Conversion  Replacement Utility updated for latest IMO load     Coronary Artery Stenosis     Created by Conversion [facility] Annotation: Feb 27 2011 11:37PM - Alina Zapien: s/p CABGx4  1/11  Replacement Utility updated for latest IMO load     CVA (cerebral vascular accident) (H)      Depression      Diabetic Peripheral Neuropathy     Created by Conversion      DM (diabetes mellitus) (H)      Dysthymic disorder     Created by Conversion      Epilepsy (H)      ESRD (end stage renal disease) (H)      Essential Thrombocytosis     Created by Conversion      History of renal transplant      Hyperlipidemia      Hypertension      Insomnia     Created by Conversion      Ischemic Stroke     Created by Conversion  Replacement Utility updated for latest IMO load     Medullary Sponge Kidney Bilaterally     Created by Conversion      Morbid obesity (H) 8/29/2018     Neuropathy      Nonintractable epilepsy without status epilepticus, unspecified epilepsy type (H) 8/29/2018     CULLEN on CPAP     Created by Conversion      Osteoarthritis     Created by Conversion  Replacement Utility updated for latest IMO load     Renal Transplant Recipient     Created by Conversion      Sensorineural hearing loss     Created by Conversion  Replacement Utility updated for latest IMO load     Thrombophlebitis Of Deep Vessels Of The Lower Extremity     Created by Conversion      Type 2 diabetes mellitus (H)     Created by Conversion      Vertigo            Surgical History:  Past Surgical History:   Procedure Laterality Date     APPENDECTOMY       CARDIAC PACEMAKER PLACEMENT       CORONARY ARTERY BYPASS GRAFT  01/26/2011    Comments: x 4     HYSTERECTOMY  1973     IR EXTREMITY ANGIOGRAM LEFT  1/30/2019     NEPHRECTOMY TRANSPLANTED ORGAN  06/2011     GA TOTAL  KNEE ARTHROPLASTY Bilateral        Family History:   Family History   Problem Relation Age of Onset     Diabetes Sister      Breast cancer Sister 62     Cancer Father        Social History:    Social History     Socioeconomic History     Marital status:      Spouse name: Not on file     Number of children: Not on file     Years of education: Not on file     Highest education level: Not on file   Occupational History     Employer: RETIRED   Social Needs     Financial resource strain: Not on file     Food insecurity:     Worry: Not on file     Inability: Not on file     Transportation needs:     Medical: Not on file     Non-medical: Not on file   Tobacco Use     Smoking status: Former Smoker     Packs/day: 1.50     Years: 20.00     Pack years: 30.00     Types: Cigarettes     Smokeless tobacco: Never Used   Substance and Sexual Activity     Alcohol use: Yes     Comment: occasional     Drug use: No     Sexual activity: Not on file   Lifestyle     Physical activity:     Days per week: Not on file     Minutes per session: Not on file     Stress: Not on file   Relationships     Social connections:     Talks on phone: Not on file     Gets together: Not on file     Attends Yazdanism service: Not on file     Active member of club or organization: Not on file     Attends meetings of clubs or organizations: Not on file     Relationship status: Not on file     Intimate partner violence:     Fear of current or ex partner: Not on file     Emotionally abused: Not on file     Physically abused: Not on file     Forced sexual activity: Not on file   Other Topics Concern     Not on file   Social History Narrative     Not on file          Review of Systems   Constitutional: Positive for activity change and fatigue. Negative for appetite change, chills, diaphoresis and fever.        Wants to wean off O2   HENT: Negative for congestion and hearing loss.    Eyes: Negative.    Respiratory: Positive for shortness of breath. Negative  "for wheezing.         1.5L NC   Cardiovascular: Negative.    Gastrointestinal: Negative for abdominal distention, abdominal pain, blood in stool, constipation, diarrhea and nausea.   Endocrine: Negative.    Genitourinary: Negative for difficulty urinating.   Musculoskeletal:        Denies pain   Skin:        intact   Allergic/Immunologic: Negative.    Neurological: Negative for dizziness, tremors, speech difficulty and light-headedness.   Hematological: Negative.    Psychiatric/Behavioral: Negative for agitation, confusion, hallucinations and sleep disturbance. The patient is not nervous/anxious.        Vitals:    02/19/20 1200   BP: 146/53   Pulse: 72   Resp: 20   Temp: 98  F (36.7  C)   SpO2: 93%   Weight: 197 lb (89.4 kg)   Height: 5' 4\" (1.626 m)       Physical Exam  Vitals signs reviewed.   Constitutional:       Appearance: She is obese.      Comments: Continue RUFFIN, attempting to wean off O2   HENT:      Head: Normocephalic and atraumatic.      Right Ear: External ear normal.      Left Ear: External ear normal.      Nose: No congestion or rhinorrhea.      Mouth/Throat:      Mouth: Mucous membranes are dry.      Pharynx: No oropharyngeal exudate or posterior oropharyngeal erythema.   Eyes:      General:         Right eye: No discharge.         Left eye: No discharge.   Neck:      Musculoskeletal: Normal range of motion and neck supple.      Comments: Intact  Cardiovascular:      Rate and Rhythm: Rhythm irregular.      Heart sounds: Murmur present.      Comments: IRR, 2/6 CYRUS RSB and LSB, PM  Pulmonary:      Effort: No respiratory distress.      Breath sounds: Rales present. No wheezing.      Comments: Bibasilar crackles, RUFFIN, 1.5L NC  Abdominal:      General: Bowel sounds are normal. There is no distension.      Palpations: Abdomen is soft.      Tenderness: There is no abdominal tenderness. There is no guarding.      Comments: Denies constipation or diarrhea   Genitourinary:     Comments: No " limitations  Musculoskeletal:      Right lower leg: No edema.      Left lower leg: No edema.      Comments: Denies pain   Skin:     General: Skin is warm and dry.      Comments: Intact   Neurological:      Mental Status: She is alert and oriented to person, place, and time. Mental status is at baseline.   Psychiatric:         Mood and Affect: Mood normal.      Comments: Has history of depression         Medication List:  Current Outpatient Medications   Medication Sig     acetaminophen (TYLENOL) 500 MG tablet Take 1 tablet (500 mg total) by mouth every 6 (six) hours as needed.     alendronate (FOSAMAX) 35 MG tablet Take 35 mg by mouth once a week. Weekly on Sundays. Take in the morning with a full glass of water, on an empty stomach, and do not take anything else by mouth or lie down for the next 30 min.  Sundays           aspirin 81 MG EC tablet Take 1 tablet (81 mg total) by mouth daily.     azaTHIOprine (IMURAN) 50 mg tablet Take 100 mg by mouth bedtime.      bumetanide (BUMEX) 2 MG tablet Take 1 tablet (2 mg total) by mouth 2 (two) times a day at 9am and 6pm. (Patient taking differently: Take 2 mg by mouth daily. )     carvediloL (COREG) 25 MG tablet Take 1 tablet (25 mg total) by mouth 2 (two) times a day with meals.     cholecalciferol, vitamin D3, 2,000 unit Tab Take 2,000 Units by mouth daily.     cyanocobalamin, vitamin B-12, 2,500 mcg Tab Take 2,500 mcg by mouth daily.      cycloSPORINE modified (GENGRAF) 25 MG capsule Take 50 mg by mouth 2 (two) times a day.      ferrous sulfate 325 (65 FE) MG tablet Take 1 tablet (325 mg total) by mouth every other day.     flash glucose scanning reader (FREESTYLE FELA 14 DAY READER) Misc Use 1 application As Directed daily.     flash glucose sensor (FREESTYLE FELA 14 DAY SENSOR) Kit Use 1 application As Directed every 14 (fourteen) days.     hydrALAZINE (APRESOLINE) 25 MG tablet Take 3 tablets (75 mg total) by mouth 3 (three) times a day.     isosorbide mononitrate  "(IMDUR) 30 MG 24 hr tablet Take 1 tablet (30 mg total) by mouth 2 (two) times a day.     lamoTRIgine (LAMICTAL) 25 MG tablet Take 25 mg by mouth see administration instructions. Titrate up to target dose of 100mg two times a day  2/1-2/7 25mg Daily  2/8-2/14 50mg Daily  2/15-2/21 50mg Twice daily  2/22-2/28 75mg Twice daily  2/29 100mg Twice daily     LANTUS U-100 INSULIN 100 unit/mL vial 10 unit daily at bedtime     losartan (COZAAR) 25 MG tablet Take 1 tablet (25 mg total) by mouth daily.     miconazole (MICOTIN) 2 % powder Apply on skin folds until rashes are resolved     NOVOLOG U-100 INSULIN ASPART 100 unit/mL injection Three times a day with meals  BG < 70 mg/dL: Treat, and call MD  BG  mg/dL: None - 0 Units  -180 mg/dL: 1 unit  -220 mg/dL: 2 units  -260 mg/dL: 3 units  -300 mg/dL: 4 units  -340 mg/dL: 5 units  -400 mg/dL: 6 units  BG > 400 mg/dL: 7 units and Call MD     pantoprazole (PROTONIX) 40 MG tablet Take 40 mg by mouth 2 (two) times a day.     pen needle, diabetic (BD ULTRA-FINE TWILA PEN NEEDLES) 32 gauge x 5/32\" Ndle Use 1 per day.     polyethylene glycol (MIRALAX) 17 gram packet Take 1 packet (17 g total) by mouth daily as needed.     predniSONE (DELTASONE) 5 MG tablet Take 5 mg by mouth daily.     rosuvastatin (CRESTOR) 5 MG tablet Take 1 tablet (5 mg) by mouth at bedtime.     terazosin (HYTRIN) 5 MG capsule Take 1 capsule (5 mg total) by mouth at bedtime.     traZODone (DESYREL) 50 MG tablet Take 100 mg by mouth at bedtime.     venlafaxine (EFFEXOR-XR) 150 MG 24 hr capsule Take 1 capsule (150 mg) by mouth daily.       Labs:  Results for orders placed or performed in visit on 02/17/20   Basic Metabolic Panel   Result Value Ref Range    Sodium 140 136 - 145 mmol/L    Potassium 3.8 3.5 - 5.0 mmol/L    Chloride 101 98 - 107 mmol/L    CO2 27 22 - 31 mmol/L    Anion Gap, Calculation 12 5 - 18 mmol/L    Glucose 127 (H) 70 - 125 mg/dL    Calcium 8.8 8.5 - 10.5 " mg/dL    BUN 56 (H) 8 - 28 mg/dL    Creatinine 3.21 (H) 0.60 - 1.10 mg/dL    GFR MDRD Af Amer 17 (L) >60 mL/min/1.73m2    GFR MDRD Non Af Amer 14 (L) >60 mL/min/1.73m2     Lab Results   Component Value Date    WBC 8.7 02/17/2020    HGB 8.9 (L) 02/17/2020    HCT 30.1 (L) 02/17/2020    MCV 98 02/17/2020     02/17/2020     Lab Results   Component Value Date    TSH 3.57 11/21/2019     Lab Results   Component Value Date    HGBA1C 6.3 (H) 01/26/2020     Vitamin D, Total (25-Hydroxy)   Date Value Ref Range Status   02/17/2020 37.5 30.0 - 80.0 ng/mL Final     Lab Results   Component Value Date    BNP 2,519 (H) 02/06/2020         Assessment/Plan:    HFrEF: last EF 55%, frequent exacerbations, last BNP +2500, following with cardiology and heart failure clinic.  Recently  Bumex decreased to 2 mg daily per hypotension, last weight 197lbs (possible 3lb weight loss). Now on a 2L/day FW restriction     HTN: Continue carvedilol 25 mg twice daily, hydralazine 75 mg 3 times daily, imdur 30mg two times a day and Hytrin 5 mg at bedtime.  Per monitoring systolic blood pressure <150    CULLEN: using CPAP well    Hx of renal transplant: continue Imuran 100mg at HS, cephalosporin 50 mg twice daily and prednisone 5 mg daily    CKD stage 4: last Cr 3.21 with GFR 14, has worsened, labs sent to nephrology    DM2: continue lantus 10U at HS, SS and monitor BS     Pain control: Continue Tylenol 500 mg every 6 hours PRN, denies pain    Insomnia: Continue trazodone 100 mg at bedtime    Depression: Continue venlafaxine 150 mg daily    Osteopenia: Continue Fosamax 35 mg weekly on Sunday    GERD: Continue pantoprazole 40 mg twice daily    Seizure disorder: Continue Lamictal per escalating dose    Morbid obesity: last 37.8    History of CVA: Continue aspirin 81 mg daily and statin    Vitamin B deficiency: Continue cyanocobalamin 2500mcg daily    Vitamin D deficiency: Continue D3 2000 units daily    Normocytic hypochromic anemia: Last hemoglobin 8.9  on 2/17/20, received IV iron in hospital, continues ferrous sulfate 325 mg daily    Disposition: Has determined will be discharged to [facility]. Pending cognitive assessment      Electronically signed by: Jagdish Cardenas NP

## 2021-06-06 NOTE — PROGRESS NOTES
[facility]      Facility:    [facility] [[id number]]  Code Status: DNR      Chief Complaint/Reason for Visit:   Chief Complaint   Patient presents with     Review Of Multiple Medical Conditions       HPI:   Odalys is a 74 y.o. female who is a transfer from [facility] with an admission on 2/6/20 and discharge on 2/12/20. She has a PMH of renal transplant, CKD stage 4, CVA, CAD with CABG, DM2, seizure disorder, Htn, CULLEN on CPAP, chronic CHF, recent CHF exacerbation discharged on 1/28/20 with acute shortness of breath d/t CHF.  She improved with diuresis, BNP 2519 as it was 2275 the previous week. Echo showed EF 55%, resumed bumex 2mg two times a day, was on 1mg daily. She was weaned off O2. Baseline Cr 2-2.5, followed with nephrology. Her immunosuppression was restarted on Imuran, cyclosporine and prednisone.  Her BP was significantly elevated, she resumed on hydralazine 75 mg 3 times daily, carvedilol 25 mg twice daily, losartan 25 mg daily and terazosin 5mg daily.  She also has chronic A. fib with pacemaker in place, heart rate was stable.  Not on anticoagulation treatment.  Has history of ischemic stroke without residual weakness on aspirin and statin.  Glucose numbers improved with Lantus 10 units daily with sliding scale.  Resumed Elekta mental for seizure disorder.  Also developed iron deficiency anemia received IV replacement and resumed on oral Fe replacement.  She was then discharged to TCU for additional rehab.    Past Medical History:  Past Medical History:   Diagnosis Date     Adrenal insufficiency (H)      Anemia      Anxiety      Arthritis      Ataxia 5/12/2015     CAD (coronary artery disease)      Cardiac pacemaker, dual, in situ 12/7/2016    [employer] Revo MRI DOI: 12/15/2011 Dr. Aishwarya Treviño     Chronic Diarrhea Of Unknown Origin     Created by Conversion      Chronic Gout     Created by Conversion  Replacement Utility updated for latest O  load     Chronic Reflux Esophagitis     Created by Conversion      Congestive Heart Failure     Created by Conversion  Replacement Utility updated for latest IMO load     Coronary Artery Stenosis     Created by Conversion [facility] Annotation: Feb 27 2011 11:37PM - Alina Zapien: s/p CABGx4  1/11  Replacement Utility updated for latest IMO load     CVA (cerebral vascular accident) (H)      Depression      Diabetic Peripheral Neuropathy     Created by Conversion      DM (diabetes mellitus) (H)      Dysthymic disorder     Created by Conversion      Epilepsy (H)      ESRD (end stage renal disease) (H)      Essential Thrombocytosis     Created by Conversion      History of renal transplant      Hyperlipidemia      Hypertension      Insomnia     Created by Conversion      Ischemic Stroke     Created by Conversion  Replacement Utility updated for latest IMO load     Medullary Sponge Kidney Bilaterally     Created by Conversion      Morbid obesity (H) 8/29/2018     Neuropathy      Nonintractable epilepsy without status epilepticus, unspecified epilepsy type (H) 8/29/2018     CULLEN on CPAP     Created by Conversion      Osteoarthritis     Created by Conversion  Replacement Utility updated for latest IMO load     Renal Transplant Recipient     Created by Conversion      Sensorineural hearing loss     Created by Conversion  Replacement Utility updated for latest IMO load     Thrombophlebitis Of Deep Vessels Of The Lower Extremity     Created by Conversion      Type 2 diabetes mellitus (H)     Created by Conversion      Vertigo            Surgical History:  Past Surgical History:   Procedure Laterality Date     APPENDECTOMY       CARDIAC PACEMAKER PLACEMENT       CORONARY ARTERY BYPASS GRAFT  01/26/2011    Comments: x 4     HYSTERECTOMY  1973     IR EXTREMITY ANGIOGRAM LEFT  1/30/2019     NEPHRECTOMY TRANSPLANTED ORGAN  06/2011     HI TOTAL KNEE ARTHROPLASTY Bilateral        Family History:   Family History   Problem  Relation Age of Onset     Diabetes Sister      Breast cancer Sister 62     Cancer Father        Social History:    Social History     Socioeconomic History     Marital status:      Spouse name: Not on file     Number of children: Not on file     Years of education: Not on file     Highest education level: Not on file   Occupational History     Employer: RETIRED   Social Needs     Financial resource strain: Not on file     Food insecurity:     Worry: Not on file     Inability: Not on file     Transportation needs:     Medical: Not on file     Non-medical: Not on file   Tobacco Use     Smoking status: Former Smoker     Packs/day: 1.50     Years: 20.00     Pack years: 30.00     Types: Cigarettes     Smokeless tobacco: Never Used   Substance and Sexual Activity     Alcohol use: Yes     Comment: occasional     Drug use: No     Sexual activity: Not on file   Lifestyle     Physical activity:     Days per week: Not on file     Minutes per session: Not on file     Stress: Not on file   Relationships     Social connections:     Talks on phone: Not on file     Gets together: Not on file     Attends Gnosticism service: Not on file     Active member of club or organization: Not on file     Attends meetings of clubs or organizations: Not on file     Relationship status: Not on file     Intimate partner violence:     Fear of current or ex partner: Not on file     Emotionally abused: Not on file     Physically abused: Not on file     Forced sexual activity: Not on file   Other Topics Concern     Not on file   Social History Narrative     Not on file          Review of Systems   Constitutional: Positive for activity change and fatigue. Negative for appetite change, chills, diaphoresis and fever.   HENT: Negative for congestion and hearing loss.    Eyes: Negative.    Respiratory: Positive for shortness of breath. Negative for wheezing.    Cardiovascular: Negative.    Gastrointestinal: Negative for abdominal distention,  abdominal pain, blood in stool, constipation, diarrhea and nausea.   Endocrine: Negative.    Genitourinary: Negative for difficulty urinating.   Musculoskeletal:        Denies pain   Skin:        intact   Allergic/Immunologic: Negative.    Neurological: Negative for dizziness, tremors, speech difficulty and light-headedness.   Hematological: Negative.    Psychiatric/Behavioral: Negative for agitation, confusion, hallucinations and sleep disturbance. The patient is not nervous/anxious.        Vitals:    02/13/20 0759   BP: 167/67   Pulse: 64   Resp: 18   Temp: 97  F (36.1  C)   SpO2: 90%   Weight: 200 lb (90.7 kg)       Physical Exam  Vitals signs reviewed.   Constitutional:       Appearance: She is obese.      Comments: Continue RUFFIN    HENT:      Head: Normocephalic and atraumatic.      Right Ear: External ear normal.      Left Ear: External ear normal.      Nose: No congestion or rhinorrhea.      Mouth/Throat:      Mouth: Mucous membranes are dry.      Pharynx: No oropharyngeal exudate or posterior oropharyngeal erythema.   Eyes:      General:         Right eye: No discharge.         Left eye: No discharge.   Neck:      Musculoskeletal: Normal range of motion and neck supple.      Comments: Intact  Cardiovascular:      Rate and Rhythm: Rhythm irregular.      Heart sounds: Murmur present.      Comments: IRR, 2/6 CYRUS RSB and LSB, PM  Pulmonary:      Effort: No respiratory distress.      Breath sounds: Rales present. No wheezing.      Comments: Bibasilar crackles, RUFFIN, 2L NC  Abdominal:      General: Bowel sounds are normal. There is no distension.      Palpations: Abdomen is soft.      Tenderness: There is no abdominal tenderness. There is no guarding.      Comments: Denies constipation or diarrhea   Genitourinary:     Comments: No limitations  Musculoskeletal:      Right lower leg: No edema.      Left lower leg: No edema.      Comments: Denies pain   Skin:     General: Skin is warm and dry.      Comments: Intact    Neurological:      Mental Status: She is alert and oriented to person, place, and time. Mental status is at baseline.   Psychiatric:         Mood and Affect: Mood normal.      Comments: Has history of depression         Medication List:  Current Outpatient Medications   Medication Sig     acetaminophen (TYLENOL) 500 MG tablet Take 1 tablet (500 mg total) by mouth every 6 (six) hours as needed.     alendronate (FOSAMAX) 35 MG tablet Take 35 mg by mouth once a week. Weekly on Sundays. Take in the morning with a full glass of water, on an empty stomach, and do not take anything else by mouth or lie down for the next 30 min.  Sundays           aspirin 81 MG EC tablet Take 1 tablet (81 mg total) by mouth daily.     azaTHIOprine (IMURAN) 50 mg tablet Take 100 mg by mouth bedtime.      bumetanide (BUMEX) 2 MG tablet Take 1 tablet (2 mg total) by mouth 2 (two) times a day at 9am and 6pm.     carvediloL (COREG) 25 MG tablet Take 1 tablet (25 mg total) by mouth 2 (two) times a day with meals.     cholecalciferol, vitamin D3, 2,000 unit Tab Take 2,000 Units by mouth daily.     cyanocobalamin, vitamin B-12, 2,500 mcg Tab Take 2,500 mcg by mouth daily.      cycloSPORINE modified (GENGRAF) 25 MG capsule Take 50 mg by mouth 2 (two) times a day.      ferrous sulfate 325 (65 FE) MG tablet Take 1 tablet (325 mg total) by mouth every other day.     flash glucose scanning reader (FREESTYLE FELA 14 DAY READER) Misc Use 1 application As Directed daily.     flash glucose sensor (FREESTYLE FELA 14 DAY SENSOR) Kit Use 1 application As Directed every 14 (fourteen) days.     hydrALAZINE (APRESOLINE) 25 MG tablet Take 3 tablets (75 mg total) by mouth 3 (three) times a day.     isosorbide mononitrate (IMDUR) 30 MG 24 hr tablet Take 1 tablet (30 mg total) by mouth 2 (two) times a day.     lamoTRIgine (LAMICTAL) 25 MG tablet Take 25 mg by mouth see administration instructions. Titrate up to target dose of 100mg two times a day  2/1-2/7 25mg  "Daily  2/8-2/14 50mg Daily  2/15-2/21 50mg Twice daily  2/22-2/28 75mg Twice daily  2/29 100mg Twice daily     LANTUS U-100 INSULIN 100 unit/mL vial 10 unit daily at bedtime     losartan (COZAAR) 25 MG tablet Take 1 tablet (25 mg total) by mouth daily.     miconazole (MICOTIN) 2 % powder Apply on skin folds until rashes are resolved     NOVOLOG U-100 INSULIN ASPART 100 unit/mL injection Three times a day with meals  BG < 70 mg/dL: Treat, and call MD  BG  mg/dL: None - 0 Units  -180 mg/dL: 1 unit  -220 mg/dL: 2 units  -260 mg/dL: 3 units  -300 mg/dL: 4 units  -340 mg/dL: 5 units  -400 mg/dL: 6 units  BG > 400 mg/dL: 7 units and Call MD     pantoprazole (PROTONIX) 40 MG tablet Take 40 mg by mouth 2 (two) times a day.     pen needle, diabetic (BD ULTRA-FINE TWILA PEN NEEDLES) 32 gauge x 5/32\" Ndle Use 1 per day.     polyethylene glycol (MIRALAX) 17 gram packet Take 1 packet (17 g total) by mouth daily as needed.     predniSONE (DELTASONE) 5 MG tablet Take 5 mg by mouth daily.     rosuvastatin (CRESTOR) 5 MG tablet Take 1 tablet (5 mg) by mouth at bedtime.     terazosin (HYTRIN) 5 MG capsule Take 1 capsule (5 mg total) by mouth at bedtime.     traZODone (DESYREL) 50 MG tablet Take 100 mg by mouth at bedtime.     venlafaxine (EFFEXOR-XR) 150 MG 24 hr capsule Take 1 capsule (150 mg) by mouth daily.       Labs:  Results for orders placed or performed during the hospital encounter of 02/06/20   Basic Metabolic Panel   Result Value Ref Range    Sodium 140 136 - 145 mmol/L    Potassium 4.0 3.5 - 5.0 mmol/L    Chloride 109 (H) 98 - 107 mmol/L    CO2 20 (L) 22 - 31 mmol/L    Anion Gap, Calculation 11 5 - 18 mmol/L    Glucose 90 70 - 125 mg/dL    Calcium 8.7 8.5 - 10.5 mg/dL    BUN 47 (H) 8 - 28 mg/dL    Creatinine 2.97 (H) 0.60 - 1.10 mg/dL    GFR MDRD Af Amer 19 (L) >60 mL/min/1.73m2    GFR MDRD Non Af Amer 15 (L) >60 mL/min/1.73m2     Lab Results   Component Value Date    WBC 8.5 " 02/07/2020    HGB 9.0 (L) 02/07/2020    HCT 29.2 (L) 02/07/2020    MCV 92 02/07/2020     02/12/2020     Lab Results   Component Value Date    TSH 3.57 11/21/2019     Lab Results   Component Value Date    HGBA1C 6.3 (H) 01/26/2020     Vitamin D, Total (25-Hydroxy)   Date Value Ref Range Status   04/24/2017 31.4 30.0 - 80.0 ng/mL Final     Lab Results   Component Value Date    BNP 2,519 (H) 02/06/2020         Assessment/Plan:    HFrEF: last EF 55%, frequent exacerbations, last BNP +2500, following with cardiology and heart failure clinic.  Continue Bumex 2 mg twice daily, last weight 200 pounds. Now needs O2 when in therapy    HTN: Continue carvedilol 25 mg twice daily, hydralazine 75 mg 3 times daily, imdur 30mg two times a day and Hytrin 5 mg at bedtime.  Monitor blood pressure 3 times daily    CULLEN: using CPAP well    Hx of renal transplant: continue Imuran 100mg at HS, cephalosporin 50 mg twice daily and prednisone 5 mg daily    CKD stage 4: last Cr 2.68 with GFR 17, recheck BMP    DM2: continue lanuts 10U at HS, SS and monitor BS     Pain control: Continue Tylenol 500 mg every 6 hours PRN, denies pain    Insomnia: Continue trazodone 100 mg at bedtime    Depression: Continue venlafaxine 150 mg daily    Osteopenia: Continue Fosamax 35 mg weekly on Sunday    GERD: Continue pantoprazole 40 mg twice daily    Seizure disorder: Continue Lamictal per escalating dose    Morbid obesity: last 37.8    History of CVA: Continue aspirin 81 mg daily and statin    Vitamin B deficiency: Continue cyanocobalamin 2500mcg daily    Vitamin D deficiency: Continue D3 2000 units daily    Normocytic hypochromic anemia: Last hemoglobin 9.0, received IV iron in hospital, continues ferrous sulfate 325 mg daily, recheck CBC    Labs: BMP, CBC, vit d    Disposition: Has determined will be discharged to [facility]. Pending cognitive assessment    The care plan has been reviewed and all orders signed. Changes to care plan, if any, as noted.  Otherwise, continue care plan of care.  The total time spent with this patient was 36 minutes, with greater than 50% spent in counseling and coordination of care that included multiple issues per CHF, needing O2, labs and therapy, which lasted 20 minutes.    Post Discharge Medication Reconciliation Status: discharge medications reconciled and changed, per note/orders (see AVS)      Electronically signed by: Jagdish Cardenas NP

## 2021-06-06 NOTE — PROGRESS NOTES
[facility] Patient Outreach:     Discharge Date::  3/13/20      Reason for TCU stay (discharge diagnosis)::  NSTEMI, CHF      Are you feeling better, the same or worse since your discharge?:  Patient is feeling better          As part of your discharge plan, did they discuss home care with you?: Yes        Have your seen them yet, or are they scheduled to visit?: Yes                Do you have any follow up visits scheduled with your PCP or Specialist?:  No          I'm glad to hear you're doing well and we want you to continue to do well. Your PCP would like to see you for a follow-up visit. Can we help set that up for your today?: Yes            (RN) Patient transferred to [facility]? **If immediate concers (e.g. patient is feeling worse and/or not taking new medictations), send in basket message to PCP with quick summary of concern.: No ([facility] is going to message PCP's clinic and they'll reach out to patient.  )

## 2021-06-06 NOTE — TELEPHONE ENCOUNTER
Is it okay for me to schedule patient? Or, should she hold off?  Shayla Metz CMA ............... 3:50 PM, 03/16/20

## 2021-06-07 NOTE — TELEPHONE ENCOUNTER
Patient and home health nurse, Shannon, informed in another encounter. Per other encounter, patient was instructed to come in for an appt on Monday. This was scheduled.      Sania Kay CMA  1:36 PM  3/19/2020

## 2021-06-07 NOTE — TELEPHONE ENCOUNTER
Patient and home health nurse, kira Ortega. Appt scheduled for Monday. Refills pended.      Sania Kay CMA  1:35 PM  3/19/2020

## 2021-06-07 NOTE — ANESTHESIA PREPROCEDURE EVALUATION
Anesthesia Evaluation      Patient summary reviewed   No history of anesthetic complications     Airway   Mallampati: II  Neck ROM: full   Pulmonary - normal exam    breath sounds clear to auscultation  (+) shortness of breath, sleep apnea on CPAP, , a smoker                         Cardiovascular - normal exam  Exercise tolerance: < 4 METS  (+) pacemaker, hypertension, past MI, CAD, CABG/stent, dysrhythmias, CHF, , hypercholesterolemia, PVD    ECG reviewed  Rhythm: regular  Rate: normal,      ROS comment:   Normal left ventricular size and systolic function. The estimated left ventricular ejection fraction is 55%. Unable to assess left ventricular diastolic function given mitral stenosis.    Normal right ventricular size and systolic function with mild hypertrophy. Pacer lead is present in the ventricle.    Left atrial volume is moderately increased.    Mild aortic stenosis.    Mild calcific mitral stenosis with mild mitral regurgitation.    Mild to moderate tricuspid valve regurgitation.    When compared to the previous study dated 4/5/2019, no significant change.        Neuro/Psych    (+) seizures, neuromuscular disease,  CVA , depression, anxiety/panic attacks,     Comments: vertigo    Endo/Other    (+) diabetes mellitus type 2, arthritis, obesity,   (-) not pregnant     Comments: gout    GI/Hepatic/Renal    (+)   chronic renal disease ESRD and dialysis, last dialysis date: 4/6/2020,     Comments: S/p Renal transplant  Chronic diarrhea  Medullary sponge kidney           Dental - normal exam                        Anesthesia Plan  Planned anesthetic: MAC    ASA 3   Induction: intravenous   Anesthetic plan and risks discussed with: patient, legal guardian and child/children  Anesthesia plan special considerations: antiemetics,   Post-op plan: other (CULLEN orders)  DNR/DNI status   DNR/DNI status discussed with legal guardian.  Plan is for suspension of DNR

## 2021-06-07 NOTE — TELEPHONE ENCOUNTER
Contact clinic by phone next week if need for sooner  Follow up,   With current corona virus outbreak, I would not recommend going to the ER unless the shortness of breath significantly worsens.  She has very poor arterial circulation, so the pulse oximeter on the finger may not be accurate.  Base clinical decision on her level of respiratory distress.

## 2021-06-07 NOTE — TELEPHONE ENCOUNTER
FYI - Status Update  Who is Calling: Home Care  Update: Homecare wanted to relay that Pt is having shortness of breath, O2 was bumped up to 3 liters, inspiratory wheezing bases, with O2 reading 70 than 90; 99 so it is hard to tell what her O2 actually is.  Pt is not coughing up anything, no cyanosis, and does not appear to be in respiratory distress.  Pt does not have an appointment until 04/06/20, should that appointment be moved up?  Okay to leave a detailed message?:  No

## 2021-06-07 NOTE — ANESTHESIA CARE TRANSFER NOTE
Last vitals:   /68  HR 60 paced  SpO2 96% on 4 L face mask  Temp 36 skin    Patient's level of consciousness is awake  Spontaneous respirations: yes  Maintains airway independently: yes  Dentition unchanged: yes  Oropharynx: oropharynx clear of all foreign objects     Report given to Naya FRANCISCO on the floor    QCDR Measures:  ASA# 20 - Surgical Safety Checklist: WHO surgical safety checklist completed prior to induction    PQRS# 430 - Adult PONV Prevention: 4558F - Pt received => 2 anti-emetic agents (different classes) preop & intraop  ASA# 8 - Peds PONV Prevention: NA - Not pediatric patient, not GA or 2 or more risk factors NOT present  PQRS# 424 - Keke-op Temp Management: 4559F - At least one body temp DOCUMENTED => 35.5C or 95.9F within required timeframe  PQRS# 426 - PACU Transfer Protocol: - Transfer of care checklist used  ASA# 14 - Acute Post-op Pain: ASA14B - Patient did NOT experience pain >= 7 out of 10

## 2021-06-07 NOTE — TELEPHONE ENCOUNTER
Friend, [place], informed and expressed understanding. She will inform the patient.      Sania Kay [facility]  9:14 AM  3/19/2020

## 2021-06-07 NOTE — TELEPHONE ENCOUNTER
RN triage --   Call from pt and HC nurse Shannon   Pt and HC nurse confused about medications --   Pt does not have several medications or doses as listed per med list   Today pt has taken :  bumex 2 mg -  Amlodipine 5mg - ASA 81 mg - carvedilol 19 mg - cyclosporine  - protonix 1 tab - prednisone 5 mg - bactrim [ 40/80] - effexor 150 mg - B12 1000mg - Vit D 2000mg - hydralazine 50 mg - lamotigine 25 mg --   Now = BP 88/40 -- not dizzy - no falls or fainting -- not weak -- feeling ' extremely tired' -   Today ate 1/2 piece toast w/ peanut butter -- and water -- did not check blood sugar today until now == 119 --   Reviewed home care advice for low BP  Per protocol = should be seen today   Please advise :  When and where do you want pt seen ?    HC also needs complete updated med list - pt currently not taking and does not have meds or correct doses from current med list -- please clarify -- fax new med list to [phone] --   Stacia Knutson RN [facility] RN triage        Reason for Disposition    Systolic BP < 90 and NOT dizzy, lightheaded or weak    Protocols used: LOW BLOOD PRESSURE-A-OH

## 2021-06-07 NOTE — ANESTHESIA POSTPROCEDURE EVALUATION
Patient: Odalys Miles  Procedure(s):  ESOPHAGOGASTRODUODENOSCOPY (EGD)  Anesthesia type: MAC    Patient location: Phase II Recovery  Last vitals:   Vitals Value Taken Time   /63 4/7/2020  3:29 PM   Temp 36.3  C (97.3  F) 4/7/2020  3:29 PM   Pulse 61 4/7/2020  4:06 PM   Resp 18 4/7/2020  3:29 PM   SpO2 96 % 4/7/2020  3:29 PM   Vitals shown include unvalidated device data.  Post vital signs: stable  Level of consciousness: awake and responds to simple questions  Post-anesthesia pain: pain controlled  Post-anesthesia nausea and vomiting: no  Pulmonary: unassisted, return to baseline  Cardiovascular: stable and blood pressure at baseline  Hydration: adequate  Anesthetic events: no    QCDR Measures:  ASA# 11 - Keke-op Cardiac Arrest: ASA11B - Patient did NOT experience unanticipated cardiac arrest  ASA# 12 - Keke-op Mortality Rate: ASA12B - Patient did NOT die  ASA# 13 - PACU Re-Intubation Rate: NA - No ETT / LMA used for case  ASA# 10 - Composite Anes Safety: ASA10A - No serious adverse event    Additional Notes:

## 2021-06-07 NOTE — TELEPHONE ENCOUNTER
Medication Question or Clarification  Who is calling: Shannon   What medication are you calling about (include dose and sig)?:    Disp  Refills  Start  End     cefdinir (OMNICEF) 300 MG capsule  7 capsule  0  3/19/2020  3/26/2020     Sig - Route: Take 1 capsule (300 mg total) by mouth every morning for 7 days. (or per podiatrist) for osteomyelitis toe - Oral     Sent to pharmacy as: cefdinir 300 mg capsule (OMNICEF)     E-Prescribing Status: Receipt confirmed by pharmacy (3/19/2020  1:46 PM CDT)         Who prescribed the medication?: Mika Ha MD   What is your question/concern?: caller is needing to know if patient is suppose to continue the medication or stop after the 7 days.   Requested Pharmacy: n/a  Okay to leave a detailed message?: Yes

## 2021-06-07 NOTE — PATIENT INSTRUCTIONS - HE
Do not take alendronate anymore because of elevated creatinine.    Because of continued lower blood pressures and lightheaded dizzy spells, reduce the prazosin down to 2 mg in the evening only.  Do not take the morning dose anymore.    Goal blood pressure 110/60- 130/70.    Contact me if you are getting blood pressures above 135/80.    Contact me if you are having increasing shortness of breath or leg swelling, or you have greater than a 3 pound weight gain.    Contact me if blood sugars are running above 200.    Contact your kidney transplant clinic to arrange plan for follow-up with them.    I have placed a referral back to nephrology for you to schedule an appointment sometime later this spring.  Someone from scheduling should contact you to help with that, or continue to call your nephrology clinic to schedule.    Continue with your oxygen 24 hours a day.    Checking lab work to recheck potassium and kidney labs today.    Monitor your toe closely for signs of infection.  Finish your current antibiotic this week as planned.  If any sign of recurrent infection, contact clinic immediately for further antibiotics and consideration for appointment with the wound care clinic at that time.

## 2021-06-07 NOTE — TELEPHONE ENCOUNTER
Contact patient.  She is having worsening BUN and creatinine levels.  Her potassium level is normal.    At yesterday's visit she was told to reduce the prazosin down to just 2 mg in the evening.  Proceed with that medication change and make sure she made the change.    Also, have her reduce the torsemide down to 40 mg once a day only in the morning.  She had previously been taking this twice a day.    Patient will need to make an appointment earlier than planned, in 2 weeks to see me to recheck her kidney labs and evaluate for heart failure.    Have patient continue to weigh herself daily.  If she gains an additional 3 pounds or more from her baseline weight of 186 pounds on her home scale.  If she gains more than 3 pounds, or gets above 189 pounds, have her go back to twice a day torsemide.    Schedule patient at 1 PM on April 6, Monday to see me.    Also make sure she contacts her kidney transplant clinic and make an appointment with her nephrologist, I did put in that referral yesterday.

## 2021-06-07 NOTE — TELEPHONE ENCOUNTER
Reviewed current med list with patient.  Current med list is correct.  She currently has insulin on hold.    She should reduce the spironolactone down to 12.5 mg a day.    Have her reduce the prazosin down to 2 mg twice a day.    Have her also reduce the Demadex 40 mg twice a day now.    Patient was just seen yesterday.    Schedule patient in my clinic on Monday to be seen in clinic.

## 2021-06-07 NOTE — TELEPHONE ENCOUNTER
Shannon informed. Patient informed. She noted that the patient's CPAP is only through a nasal canula but patient breathes through her mouth when asleep. Only able to use oxygen through the nose when she can remember to do so when awake. Just an FYI, in case this could be a contributor to symptoms.     Home care nurse also noted having to turn oxygen up to 3, used to be set to 2.      Sania Kay, DANISH  12:24 PM  3/27/2020

## 2021-06-07 NOTE — TELEPHONE ENCOUNTER
Medication Request  Medication name:    Disp  Refills  Start  End     prazosin (MINIPRESS) 2 MG capsule   0  3/23/2020      Sig - Route: Take 1 capsule (2 mg total) by mouth at bedtime. - Oral     Class: No Print         Requested Pharmacy: [facility]  Reason for request: pharmacy never received medication.   When did you use medication last?:    Patient offered appointment:  patient declined  Okay to leave a detailed message: yes

## 2021-06-07 NOTE — TELEPHONE ENCOUNTER
Wellness Screening Tool  Symptom Screening:  Do you have a:    Fever?  no    Cough?  no    Shortness of breath?  no  ? If yes, is this a change? no    Skin rash?  no  Within the past 14 days, have you been in contact with someone who:    Is currently being ruled out for COVID-19 (novel coronavirus)?  no    Has tested positive for COVID-19?  no    Has symptoms of a respiratory illness (fever, cough, shortness of breath)?  no  Have you recently traveled to an area with COVID-19 areas: no    Refer to the [facility] [place] Coronavirus webpage for COVID-19 areas:    Within the past 3 weeks, have you been exposed to the following:    Pertussis?  no    Chicken pox?  no    Measles? no  Patient's appointment status: provider telephone visit  Patient reminded that no visitors are allowed at this time due to COVID-19 concerns. If determined pt has symptoms and is still needed to be seen pt notified they must wear a mask upon entering the clinic.

## 2021-06-07 NOTE — TELEPHONE ENCOUNTER
Shannon was informed that the antibiotic was supposed to go until tomorrow. The patient does have enough of this med. No further questions from home care at this time.       Sania Kay, [facility]  1:07 PM  3/25/2020

## 2021-06-07 NOTE — TELEPHONE ENCOUNTER
Friend, [place], informed and expressed understanding. [facility] stated that they cannot go into the home for a one time encounter and that the company [facility] can do that.     Edit: Just saw an encounter with a phone call from [place] at [facility]. Called that # and left a message to call back. Will try calling them again later too.       Sania Kay, DANISH  9:09 AM  3/19/2020

## 2021-06-07 NOTE — PROGRESS NOTES
[facility] clinic Follow Up Note    Odalys Miles   74 y.o. female    Date of Visit: 3/23/2020    Chief Complaint   Patient presents with     Follow-up     Subjective  Odalys is here with her son, Ben.  Her friend, [place], not present.    Patient is here for follow-up on multiple medical issues.    Patient was in the hospital in February with a non-Q wave MI, and CHF exacerbation.  She is also had worsening kidney function.    I did review cardiology note from Dr. Paz on March 5.  Plan was to continue on the Imdur and hydralazine for CHF diagnosis.    I did review Dr. Puente, nephrology, hospital note from March 4.  Plan was to try to avoid amlodipine if lower extremity edema issues.  Prazosin was adjusted for hypertension.  Patient has not had any lower extremity edema issues.    Patient continues with orthostatic lightheaded dizzy spells during the day, although slightly better since medication changes after last visit.    On March 18 patient was seen in clinic with a difficult to obtain blood pressure, but low possibly at 74/46.    Patient was on 60 mg twice a day torsemide, which was reduced to 40 mg twice a day.    Patient was on 12.5 mg twice a day spironolactone, that was reduced to 12.5 mg once a day.    Patient was on prazosin 4 mg twice a day, that was lowered to 2 mg twice a day.    Patient continues on amlodipine 5 mg a day.    Patient continues on Imdur, hydralazine and 50 mg twice daily carvedilol which was increased in the hospital.    Patient is eating better, eating regular meals.    Patient denies any increasing shortness of breath and there is still no edema.    Weight is increasing with improved oral intake.  Weight at last visit was 183 pounds, here is 186.2 pounds.  Patient states that her scale at home today showed 185.5 pounds, which was close to our scale.    Patient's blood pressure at home is been 122/50, 136/46 and 135/56.  She feels she is urinating normally.    August 2019  creatinine was 1.9.  In the hospital earlier this year creatinine was 3.3-3.7.  On March 10 creatinine was 4.0 with a BUN of 82.    On March 18 creatinine was 3.39 with BUN 96 and potassium 5.0.  Blood sugar 118.    Patient had been treated for osteomyelitis of the great toe earlier this year, with extended antibiotics but not surgery secondary to poor arterial circulation and concerned about the wound not healing.    Patient is a renal transplant patient on immunosuppression.    GLEN done in February of this year in the hospital showed noncompressible arteries with low flow.    Patient finished Bactrim on March 16 but is still on Omnicef until March 26.  No rash or diarrhea.    No recurrent erythema or drainage at the toe.  She still has dry gangrene.  She was referred to the vascular clinic at last appointment but patient states she did not get contact to make that appointment and she is unsure if she wants to go in for that appointment as there is no progression of infection and previous discussion had already decided no surgery.    Patient had been given insulin previously was 70/30 insulin was having low blood sugars.  She has held all insulin since her visit on March 18.  She has not had any further low blood sugar events.  Her blood sugars have been 118/96/91.  Was 111 this morning.    No abdominal pain complaints.  Bowels are regular no diarrhea.    No chest pain or chest pressure.  Denies any worsening shortness of breath, walking some in her residence.    January 2020 ejection fraction 55%.  After non-Q wave MI in February cardiac echo at that time showed ejection fraction down to 35% with moderate pulmonary hypertension.    She has had chronic hypoxic respiratory failure and does continue on 2 L nasal cannula which she is compliant with now.    She does have a dual-chamber pacemaker.    She is on aspirin and Plavix and continues on 5 mg of Crestor, she has not tolerated higher doses of Crestor in the  past.    She does have sleep apnea but compliant with CPAP.    Anxiety controlled with Effexor and trazodone, she denies exacerbation.    Past history of osteoporosis.  She was told to stop the alendronate because of increasing creatinine, but apparently is still taking that.    She did start home care last week but has not started physical therapy at home.    The son plans to take her back to [place] to live with him at least temporarily.    No recurrent seizures.  She is now taking her lamotrigine twice a day as prescribed.  September 2019 EEG did show sharp activity.  She is not had any recent seizure event, however.    Past history of severe vascular disease including carotid artery stenosis.  No new neurologic changes.    Past history of iron deficiency anemia with IV iron infusion in February.  Previous erythropoietin injections.    Her hemoglobin was stable at 9.5 with an iron saturation of 9% earlier this month.  She continues on oral iron supplement.    She still has not made an appointment with her nephrology clinic, where she has gotten erythropoietin in the past.    PMHx:    Past Medical History:   Diagnosis Date     Adrenal insufficiency (H)      Anemia      Anxiety      Arthritis      Ataxia 5/12/2015     CAD (coronary artery disease)      Cardiac pacemaker, dual, in situ 12/7/2016    [employer] Revo MRI DOI: 12/15/2011 Dr. Aishwarya Treviño     Chronic Diarrhea Of Unknown Origin     Created by Conversion      Chronic Gout     Created by Conversion  Replacement Utility updated for latest IMO load     Chronic Reflux Esophagitis     Created by Conversion      Congestive Heart Failure     Created by Conversion  Replacement Utility updated for latest IMO load     Coronary Artery Stenosis     Created by Conversion [facility] Annotation: Feb 27 2011 11:37PM - Alina Zapien: s/p CABGx4  1/11  Replacement Utility updated for latest IMO load     CVA (cerebral vascular accident) (H)      Depression       Diabetic Peripheral Neuropathy     Created by Conversion      DM (diabetes mellitus) (H)      Dysthymic disorder     Created by Conversion      Epilepsy (H)      ESRD (end stage renal disease) (H)      Essential Thrombocytosis     Created by Conversion      History of renal transplant      Hyperlipidemia      Hypertension      Insomnia     Created by Conversion      Ischemic Stroke     Created by Conversion  Replacement Utility updated for latest IMO load     Medullary Sponge Kidney Bilaterally     Created by Conversion      Morbid obesity (H) 8/29/2018     Neuropathy      Nonintractable epilepsy without status epilepticus, unspecified epilepsy type (H) 8/29/2018     CULLEN on CPAP     Created by Conversion      Osteoarthritis     Created by Conversion  Replacement Utility updated for latest IMO load     Renal Transplant Recipient     Created by Conversion      Sensorineural hearing loss     Created by Conversion  Replacement Utility updated for latest IMO load     Thrombophlebitis Of Deep Vessels Of The Lower Extremity     Created by Conversion      Type 2 diabetes mellitus (H)     Created by Conversion      Vertigo      PSHx:    Past Surgical History:   Procedure Laterality Date     APPENDECTOMY       CARDIAC PACEMAKER PLACEMENT       CORONARY ARTERY BYPASS GRAFT  01/26/2011    Comments: x 4     HYSTERECTOMY  1973     IR EXTREMITY ANGIOGRAM LEFT  1/30/2019     NEPHRECTOMY TRANSPLANTED ORGAN  06/2011     IN TOTAL KNEE ARTHROPLASTY Bilateral      Immunizations:   Immunization History   Administered Date(s) Administered     DT (pediatric) 01/01/1992     Influenza Q4j7-30, 01/25/2010     Influenza high dose,seasonal,PF, 65+ yrs 10/12/2015, 09/28/2016, 09/26/2017, 08/29/2018     Influenza, inj, historic,unspecified 10/23/2007, 10/27/2008, 11/09/2009, 10/01/2019     Pneumo Conj 13-V (2010&after) 10/12/2015     Pneumo Polysac 23-V 10/01/1996, 10/27/2008     Td,adult,historic,unspecified 08/08/2002     Tdap 06/30/2016,  11/05/2018     ZOSTER, LIVE 07/03/2012       ROS A comprehensive review of systems was performed and was otherwise negative    Medications, allergies, and problem list were reviewed and updated    Exam  /44 (Patient Site: Right Arm, Patient Position: Sitting, Cuff Size: Adult Large)   Pulse 60   Wt 186 lb 3.2 oz (84.5 kg)   LMP 07/23/1974   BMI 31.96 kg/m    Patient is alert and oriented and color is good.  I did get blood pressure of 100/40 myself on her right arm.  Her lungs are clear to auscultation.  There is no crackles or wheezing or dullness.  Heart is regular without ectopy.  3 out of 6 systolic murmur, heard previously.  I did not hear an S3 or S4.  She has no lower extremity edema.  Moderate abdominal obesity but nontender.  She is able to stand up and climb up on the exam table independently.  I did examine her right great toe.  Still dry gangrene but no new erythema or discharge or evidence of progression of infection.    Assessment/Plan  1. Chronic systolic congestive heart failure (H)  Still compensated despite medication reduction.  She did appear to be too dry with hypotension at last visit.    Blood pressure still on the low side with some moderate orthostasis.  I will reduce the prazosin slightly by reducing to 2 mg in the evening only, discontinue the 2 mg morning dose.    If she does have lower extremity edema issues, could consider reducing the amlodipine or adjusting amlodipine dose, and could go back up on the praises and if needed for blood pressure control.    Continue the torsemide 40 mg twice a day.    If her potassium level gets at all low, consider increase of the spironolactone back to a total of 25 mg a day.    Patient will maintain a low-salt diet, but she was told not to use the artificial salt substitute, potassium chloride, because of possible risk of elevated potassium level.    Continue carvedilol 50 mg twice a day.    Continue hydralazine and Imdur at current dose for  heart failure.      - prazosin (MINIPRESS) 2 MG capsule; Take 1 capsule (2 mg total) by mouth at bedtime.; Refill: 0  - spironolactone (ALDACTONE) 25 MG tablet; Take 0.5 tablets (12.5 mg total) by mouth daily.  Dispense: 90 tablet; Refill: 3    Continue compliance with her CPAP and 2 L oxygen.    Continue daily weights.  She does appear to be euvolemic or near there currently.  I would give her goal weight of 185-186 pounds.    See patient instructions for monitoring weight.    2. Coronary artery disease due to calcified coronary lesion  No recurrent chest pain.  Medical management with aspirin and Plavix and 5 mg of Crestor.  She has not tolerated higher doses of Crestor in the past.  Possibly associated with myalgias and her sleep apnea.    With her compliant with CPAP and her oxygen now, could consider further increase of Crestor in the future.    3. Peripheral vascular disease (H)  Severe.  Gangrene of great toe.  We again discussed signs and symptoms of recurrent infection.  She will finish her Omnicef and monitor for signs or symptoms of recurrent infection.    She was hesitant to go to the wound care clinic if surgery not needed at this time, given the coronavirus outbreak.  She will monitor closely for signs of recurrent infection, and contact clinic to restart antibiotics and get a referral back to the wound care clinic at that time.    4. Type 2 diabetes mellitus without complication, with long-term current use of insulin (H)  She will continue off insulin, because of low blood sugars earlier.  Hemoglobin A1c in January was 6.3%.    Her blood sugars are  off insulin currently.    She does have an appoint with endocrinology on April 17.    5. History of osteomyelitis  No recurrence, as above    6. Renal Transplant Recipient  Patient still has not contacted her transplant clinic.  She was again reminded to call them to arrange follow-up and monitoring of her immunosuppression.    Also with her elevated  creatinine compared to last year, and significant medication changes in the hospital, I will have her follow-up with her nephrologist.    She has chronic anemia associate with chronic disease and her renal insufficiency.  Consider erythropoietin or further IV iron infusion, through her nephrology office.  - Ambulatory referral to Nephrology    7. CULLEN on CPAP  Compliant with CPAP    8. Seizure disorder (H)  Is back on her medication with lamotrigine twice daily.  No recurrent seizure.    9. Medication monitoring encounter    - Basic Metabolic Panel    10. Chronic renal insufficiency, stage 4 (severe) (H)  As above  - Ambulatory referral to Nephrology    With a significant elevated creatinine, she was told to stop the alendronate    History of chronic anxiety, stable and controlled on Effexor and trazodone    Return in 5 weeks (on 4/27/2020) for Recheck.   Patient Instructions   Do not take alendronate anymore because of elevated creatinine.    Because of continued lower blood pressures and lightheaded dizzy spells, reduce the prazosin down to 2 mg in the evening only.  Do not take the morning dose anymore.    Goal blood pressure 110/60- 130/70.    Contact me if you are getting blood pressures above 135/80.    Contact me if you are having increasing shortness of breath or leg swelling, or you have greater than a 3 pound weight gain.    Contact me if blood sugars are running above 200.    Contact your kidney transplant clinic to arrange plan for follow-up with them.    I have placed a referral back to nephrology for you to schedule an appointment sometime later this spring.  Someone from scheduling should contact you to help with that, or continue to call your nephrology clinic to schedule.    Continue with your oxygen 24 hours a day.    Checking lab work to recheck potassium and kidney labs today.    Monitor your toe closely for signs of infection.  Finish your current antibiotic this week as planned.  If any sign of  recurrent infection, contact clinic immediately for further antibiotics and consideration for appointment with the wound care clinic at that time.    Mika Ha MD        Current Outpatient Medications   Medication Sig Dispense Refill     acetaminophen (TYLENOL) 500 MG tablet Take 1 tablet (500 mg total) by mouth every 6 (six) hours as needed.  0     amLODIPine (NORVASC) 5 MG tablet Take 1 tablet (5 mg total) by mouth daily.  0     aspirin 81 MG EC tablet Take 1 tablet (81 mg total) by mouth daily.       azaTHIOprine (IMURAN) 50 mg tablet Take 50 mg by mouth at bedtime.        carvediloL (COREG) 25 MG tablet Take 2 tablets (50 mg total) by mouth 2 (two) times a day with meals. 360 tablet 3     cefdinir (OMNICEF) 300 MG capsule Take 1 capsule (300 mg total) by mouth every morning for 7 days. (or per podiatrist) for osteomyelitis toe 7 capsule 0     cholecalciferol, vitamin D3, 2,000 unit Tab Take 2,000 Units by mouth daily.       clopidogreL (PLAVIX) 75 mg tablet Take 1 tablet (75 mg total) by mouth daily. 90 tablet 3     cyanocobalamin, vitamin B-12, 2,500 mcg Tab Take 2,500 mcg by mouth daily.        cycloSPORINE modified (GENGRAF) 25 MG capsule Take 50 mg by mouth 2 (two) times a day.        flash glucose scanning reader (FREESTYLE FELA 14 DAY READER) Misc Use 1 application As Directed daily. 1 each 0     flash glucose sensor (FREESTYLE FELA 14 DAY SENSOR) Kit Use 1 application As Directed every 14 (fourteen) days. 6 kit 3     hydrALAZINE (APRESOLINE) 100 MG tablet Take 1 tablet (100 mg total) by mouth 3 (three) times a day.  0     lamoTRIgine (LAMICTAL) 100 MG tablet Take 1 tablet (100 mg total) by mouth 2 (two) times a day.  0     NOVOLOG U-100 INSULIN ASPART 100 unit/mL injection Three times a day with meals  BG < 70 mg/dL: Treat, and call MD  BG  mg/dL: None - 0 Units  -180 mg/dL: 1 unit  -220 mg/dL: 2 units  -260 mg/dL: 3 units  -300 mg/dL: 4 units  -340 mg/dL: 5  "units  -400 mg/dL: 6 units  BG > 400 mg/dL: 7 units and Call MD 10 mL PRN     pantoprazole (PROTONIX) 40 MG tablet Take 40 mg by mouth 2 (two) times a day.       pen needle, diabetic (BD ULTRA-FINE TWILA PEN NEEDLES) 32 gauge x 5/32\" Ndle Use 1 per day. 200 each 4     prazosin (MINIPRESS) 2 MG capsule Take 1 capsule (2 mg total) by mouth at bedtime.  0     predniSONE (DELTASONE) 5 MG tablet Take 5 mg by mouth daily.       rosuvastatin (CRESTOR) 5 MG tablet Take 5 mg by mouth at bedtime.       senna-docusate (PERICOLACE) 8.6-50 mg tablet Take 1 tablet by mouth 2 (two) times a day.  0     spironolactone (ALDACTONE) 25 MG tablet Take 0.5 tablets (12.5 mg total) by mouth daily. 90 tablet 3     torsemide (DEMADEX) 20 MG tablet Take 2 tablets (40 mg total) by mouth 2 (two) times a day at 9am and 6pm. 360 tablet 3     traZODone (DESYREL) 50 MG tablet Take 100 mg by mouth at bedtime.       venlafaxine (EFFEXOR-XR) 75 MG 24 hr capsule Take 1 capsule (75 mg total) by mouth daily with breakfast. 90 capsule 3     isosorbide mononitrate (IMDUR) 60 MG 24 hr tablet Take 1 tablet (60 mg total) by mouth 2 (two) times a day. 180 tablet 3     No current facility-administered medications for this visit.      Allergies   Allergen Reactions     Blood-Group Specific Substance      Anti-E present. Expect delay in transfusion. Draw 2 lavender tops for Type and Screen requests.     Lisinopril Cough     Resolved after discontinuation     Mold/Mildew      Latex Rash     Social History     Tobacco Use     Smoking status: Former Smoker     Packs/day: 1.50     Years: 20.00     Pack years: 30.00     Types: Cigarettes     Smokeless tobacco: Never Used   Substance Use Topics     Alcohol use: Yes     Comment: occasional     Drug use: No           "

## 2021-06-07 NOTE — TELEPHONE ENCOUNTER
Who is calling:  Pharmacy    Reason for Call:  Calling regarding:  cefdinir (OMNICEF) 300 MG capsule  7 capsule  0  3/19/2020  3/26/2020     Sig - Route: Take 1 capsule (300 mg total) by mouth every morning for 7 days      Questions if PCP wants patient one a 7 day course or add additional days?      Date of last appointment with primary care: 03/23/2020    Okay to leave a detailed message: Yes    Please call to clarify dosing instructions with the pharmacy.

## 2021-06-07 NOTE — PROGRESS NOTES
"Odalys Miles is a 74 y.o. female who is being evaluated via a billable telephone visit.      The patient has been notified of following:     \"This telephone visit will be conducted via a call between you and your physician/provider. We have found that certain health care needs can be provided without the need for a physical exam.  This service lets us provide the care you need with a short phone conversation.  If a prescription is necessary we can send it directly to your pharmacy.  If lab work is needed we can place an order for that and you can then stop by our lab to have the test done at a later time.    If during the course of the call the physician/provider feels a telephone visit is not appropriate, you will not be charged for this service.\"     Physician has received verbal consent for a Telephone Visit from the patient? Yes    Odalys Miles complains of  No chief complaint on file.      I have reviewed and updated the patient's Past Medical History, Social History, Family History and Medication List.    ALLERGIES  Blood-group specific substance; Lisinopril; Mold/mildew; and Latex    Additional provider notes: Patient has followed up for osteomyelitis of the right hallux. She was able to send recent pictures of the right hallux. Denies pain or drainage. She is finishing course of antibiotics per ID in 2 days. No concerns at this time.     Assessment/Plan:  Ulceration right hallux  Osteomyelitis right hallux  PAD    -She appears to be doing well, no pain or drainage from the right hallux. Based on recent pictures, I do not observe any signs of infection. There is localized rubor, which is likely due to ischemic process and PAD. Antibiotics per ID. I would like her to follow-up with vascular surgery after the COVID-19 restrictions have been removed. I have encouraged her to monitor daily for any signs of infection and contact my office if this occurs. All questions invited and answered. Avoid getting " right foot wet. She will contact my office in 45 days to see if an in-person appointment can be made.     Phone call duration: 11 minutes    Aiyana Hammond LPN

## 2021-06-07 NOTE — PROGRESS NOTES
"Odalys Miles is a 74 y.o. female who is being evaluated via a billable telephone visit.      The patient has been notified of following:     \"This telephone visit will be conducted via a call between you and your physician/provider. We have found that certain health care needs can be provided without the need for a physical exam.  This service lets us provide the care you need with a short phone conversation.  If a prescription is necessary we can send it directly to your pharmacy.  If lab work is needed we can place an order for that and you can then stop by our lab to have the test done at a later time.    Telephone visits are billed at different rates depending on your insurance coverage. During this emergency period, for some insurers they may be billed the same as an in-person visit.  Please reach out to your insurance provider with any questions.    If during the course of the call the physician/provider feels a telephone visit is not appropriate, you will not be charged for this service.\"    Patient has given verbal consent to a Telephone visit? Yes    Patient would like to receive their AVS by AVS Preference: Mail a copy.    Additional provider notes:     Reason for visit      1. Type 2 diabetes mellitus with diabetic polyneuropathy, with long-term current use of insulin (H)        HPI     Odalys Miles is a very pleasant 74 y.o. old female who presents for follow up.  SUMMARY:    Odalys is contacted today in f/u for DM 2. She reports that she has been in and out of the Hospital over the last several months. She is in fact, speaking from a TCU.  She now has C-diff.  Her current A1c is 6.3.  She notes that she has been having difficulty eating. She went into ARF and lost her kidney function, and is now in Dialysis.  She will be going to Assisted Living from there.  She reports that Dr Ha started her on 70/30 mix and that she has been taking 4 units in the morning and 6 units in the evening. This seems " to be working well for her. She notes that her BG are staying between 125 and 130.       Blood glucose data:    Average blood sugar for the last 7 days: 125-130    Past Medical History     Patient Active Problem List   Diagnosis     Essential Thrombocytosis     Osteoarthritis     Thrombophlebitis Of Deep Vessels Of The Lower Extremity     Medullary Sponge Kidney Bilaterally     CULLEN on CPAP     Mixed hyperlipidemia     End Stage Renal Disease     Status post kidney transplant     Benign Essential Hypertension     Coronary artery disease due to calcified coronary lesion     Chronic Reflux Esophagitis     Chronic Gout     (HFpEF) heart failure with preserved ejection fraction (H)     Type 2 diabetes mellitus (H)     Dysthymic Disorder     Neuropathy     Anemia     Sensorineural Hearing Loss     Hyperlipidemia     Ataxia     Cardiac pacemaker, dual, in situ     Adrenal insufficiency (H)     Nonintractable epilepsy without status epilepticus, unspecified epilepsy type (H)     Morbid obesity (H)     TIA (transient ischemic attack)     Diabetic ulcer of toe of right foot associated with type 2 diabetes mellitus, unspecified ulcer stage (H)     Osteomyelitis of ankle and foot (H)     Atherosclerosis of native artery of left lower extremity with ulceration of other part of foot (H)     Arteriosclerotic vascular disease     Bacteremia     Chronic diarrhea     Closed displaced fracture of surgical neck of left humerus     Disorder of stomach     Diverticular disease of colon     Immunosuppressive management encounter following kidney transplant     Iron deficiency anemia     Major depressive disorder, recurrent episode, in partial or unspecified remission     Noninfectious gastroenteritis     Pain in the coccyx     Polyp of duodenum     PTSD (post-traumatic stress disorder)     Seizure disorder (H)     History of seizure     Anxiety     Atrioventricular block, complete (H)     Acute pulmonary edema with congestive heart  failure (H)     Acute renal failure, unspecified acute renal failure type (H)     Acute systolic congestive heart failure (H)     Encounter for palliative care     Muscle weakness (generalized)     Acute hypoxemic respiratory failure (H)     Acute pulmonary edema (H)     Essential hypertension     Elevated troponin     Dyslipidemia     Acute non-Q wave non-ST elevation myocardial infarction (NSTEMI) (H)     PAD (peripheral artery disease) (H)     Acute on chronic combined systolic and diastolic heart failure with preserved ejection fraction (H)     Acute renal failure with acute tubular necrosis superimposed on stage 4 chronic kidney disease (H)     Anemia due to chronic kidney disease     History of recent blood transfusion     Urinary tract infection due to Proteus     Hyperkalemia     Shortness of breath     Altered mental status, unspecified altered mental status type     Anemia due to blood loss, acute     Iron deficiency anemia due to chronic blood loss     Acute lower GI bleeding     C. difficile colitis        Family History       family history includes Breast cancer (age of onset: 62) in her sister; Cancer in her father; Diabetes in her sister.    Social History      reports that she has quit smoking. Her smoking use included cigarettes. She has a 30.00 pack-year smoking history. She has never used smokeless tobacco. She reports current alcohol use. She reports that she does not use drugs.      Review of Systems     Patient has no polyuria or polydipsia, no chest pain, dyspnea or TIA's, no numbness, tingling or pain in extremities  Remainder negative except as noted in HPI.    Vital Signs     LMP 07/23/1974   Wt Readings from Last 3 Encounters:   04/12/20 177 lb 12.8 oz (80.6 kg)   03/23/20 186 lb 3.2 oz (84.5 kg)   03/18/20 183 lb 9.6 oz (83.3 kg)         Assessment     1. Type 2 diabetes mellitus with diabetic polyneuropathy, with long-term current use of insulin (H)        Óscar Edwards DM is  currently stable.  Should she tarry, I will meet with her again in 1 year.          Lab Results     Hemoglobin A1c   Date Value Ref Range Status   01/26/2020 6.3 (H) 4.2 - 6.1 % Final   10/11/2019 6.5 (H) 3.5 - 6.0 % Final   05/14/2019 8.5 (H) 3.5 - 6.0 % Final   02/05/2019 6.9 (H) 3.5 - 6.0 % Final   11/14/2018 6.6 (H) 3.5 - 6.0 % Final     Creatinine   Date Value Ref Range Status   04/12/2020 2.25 (H) 0.60 - 1.10 mg/dL Final   04/11/2020 3.08 (H) 0.60 - 1.10 mg/dL Final   04/10/2020 2.10 (H) 0.60 - 1.10 mg/dL Final     Microalbumin, Random Urine   Date Value Ref Range Status   08/29/2018 >50.00 (H) 0.00 - 1.99 mg/dL Final       Cholesterol   Date Value Ref Range Status   05/14/2019 159 <=199 mg/dL Final     HDL Cholesterol   Date Value Ref Range Status   05/14/2019 48 (L) >=50 mg/dL Final     LDL Calculated   Date Value Ref Range Status   05/14/2019 76 <=129 mg/dL Final     Triglycerides   Date Value Ref Range Status   05/14/2019 175 (H) <=149 mg/dL Final       Lab Results   Component Value Date    ALT <9 04/06/2020    AST 9 04/06/2020    ALKPHOS 39 (L) 04/06/2020    BILITOT 0.6 04/06/2020         Current Medications     Outpatient Medications Prior to Visit   Medication Sig Dispense Refill     aspirin 81 MG EC tablet Take 1 tablet (81 mg total) by mouth daily.       azaTHIOprine (IMURAN) 50 mg tablet Take 50 mg by mouth at bedtime.        carvediloL (COREG) 25 MG tablet Take 1 tablet (25 mg total) by mouth 2 (two) times a day with meals. 360 tablet 3     cholecalciferol, vitamin D3, 2,000 unit Tab Take 2,000 Units by mouth daily.       clopidogreL (PLAVIX) 75 mg tablet Take 1 tablet (75 mg total) by mouth daily. 90 tablet 3     cyanocobalamin, vitamin B-12, 2,500 mcg Tab Take 2,500 mcg by mouth daily.        cycloSPORINE modified (GENGRAF) 25 MG capsule Take 1 capsule (25 mg total) by mouth 2 (two) times a day.  0     flash glucose scanning reader ([employer] FELA 14 DAY READER) Misc Use 1 application As Directed  "daily. 1 each 0     flash glucose sensor (FREESTYLE FELA 14 DAY SENSOR) Kit Use 1 application As Directed every 14 (fourteen) days. 6 kit 3     isosorbide mononitrate (IMDUR) 60 MG 24 hr tablet Take 1 tablet (60 mg total) by mouth 2 (two) times a day. 180 tablet 3     lamoTRIgine (LAMICTAL) 100 MG tablet Take 1 tablet (100 mg total) by mouth 2 (two) times a day.  0     NOVOLOG U-100 INSULIN ASPART 100 unit/mL injection Three times a day with meals  BG < 70 mg/dL: Treat, and call MD  BG  mg/dL: None - 0 Units  -180 mg/dL: 1 unit  -220 mg/dL: 2 units  -260 mg/dL: 3 units  -300 mg/dL: 4 units  -340 mg/dL: 5 units  -400 mg/dL: 6 units  BG > 400 mg/dL: 7 units and Call MD 10 mL PRN     pantoprazole (PROTONIX) 40 MG tablet Take 40 mg by mouth 2 (two) times a day.       pen needle, diabetic (BD ULTRA-FINE TWILA PEN NEEDLES) 32 gauge x 5/32\" Ndle Use 1 per day. 200 each 4     predniSONE (DELTASONE) 5 MG tablet Take 5 mg by mouth daily.       rosuvastatin (CRESTOR) 5 MG tablet Take 5 mg by mouth at bedtime.       senna-docusate (PERICOLACE) 8.6-50 mg tablet Take 1 tablet by mouth 2 (two) times a day.  0     traZODone (DESYREL) 50 MG tablet Take 100 mg by mouth at bedtime.       vancomycin (FIRVANQ) 50 mg/mL oral solution Take 5 mL (250 mg total) by mouth 4 (four) times a day for 11 days.  0     venlafaxine (EFFEXOR-XR) 75 MG 24 hr capsule Take 1 capsule (75 mg total) by mouth daily with breakfast. 90 capsule 3     acetaminophen (TYLENOL) 500 MG tablet Take 1 tablet (500 mg total) by mouth every 6 (six) hours as needed.  0     No facility-administered medications prior to visit.          Total time of call between patient and provider was 20 minutes    Alda FNP-C      "

## 2021-06-07 NOTE — TELEPHONE ENCOUNTER
Pharmacy informed that the antibiotic was supposed to go until tomorrow. No further questions at this time.      Sania Kay, [facility]  2:00 PM  3/25/2020

## 2021-06-08 NOTE — TELEPHONE ENCOUNTER
[facility] joyce calling back. States they did a wound culture for patient. It should be viewable in Epic.    Please review and advise if they need to do anything else. please call them back @ [phone]

## 2021-06-08 NOTE — TELEPHONE ENCOUNTER
Per Dr Perez, ID provider would like ambulatory referral for pt to be seen with Vasc. Gangrene of second toe R foot. See 6/16/20 office visit.

## 2021-06-08 NOTE — TELEPHONE ENCOUNTER
ID Team    Pt saw Dr Perez in hospital back in April. (I assume patient to f/u with him?)    ziyad joyce called.  States pt has a new dx of osteomyelitis on 1st and 2nd toe. Notified to send over lab cultures and imaging.    Next available appt with Dr Perez is 06/30. Okay to wait until then for video?    Please call senior living @ [phone]    If doing video appt, send link to: senior , [email or web address]

## 2021-06-08 NOTE — PATIENT INSTRUCTIONS - HE
Thank you for coming today.  Second toe on the right foot has what is called dry gangrene (for several weeks) which is primarily at perfusion injury as opposed to an infection related injury.  The second right toe is nonsalvageable and will eventually fall off or being amputated.   Antibiotics have no role in the healing process here.    Plan:  Vascular surgery consult.  Order placed.  Antibiotics have no indication here.  Watch for swelling and pain and redness on the back of the foot, fever, chills, night sweats.  These symptoms could indicate infection and very for antibiotics if they occur.  Follow up as needed.     ANNI Perez MD

## 2021-06-08 NOTE — TELEPHONE ENCOUNTER
I called and informed I am unable to view result. Odalis will fax, the result is preliminary, however does have some sensitivities.

## 2021-06-08 NOTE — TELEPHONE ENCOUNTER
"Received a fax from [facility] with this request:     \"Can we please get clarified oxygen orders for Odalys? When she moved in she has orders 'Ok to wean off of oxygen. Keep O2 sats above 90%.' Resident's O2 sats have been below 90% quite a few times (report attached). Can you please give us specific amounts of liters you want used? Thank you.\"    Please advise an O2 order if able.   "

## 2021-06-08 NOTE — TELEPHONE ENCOUNTER
Recs received. I called Odalis, she states that the pt is not on abx currently. The pt was at this TCU in April and the R toe wound was closed, now it is open. Xray is suspicious for osteomyelitis. Pt is on dialysis long tern and also has a hx of c-diff when she was in the Hospital on abx in April.   I spoke to Dr Perez, he would like a wound culture and to see the pt in clinic 6/16 @ 8 or 11. I called  for Odalis to c/b to schedule for one of these times and to collect a wound culture.

## 2021-06-09 NOTE — ANESTHESIA CARE TRANSFER NOTE
Last vitals: VSS@ 1027 127/59, 62, 98% 2L nc, 18, 97.3  Vitals:    07/04/20 0745   BP: 129/61   Pulse: 61   Resp: 16   Temp: 36.6  C (97.8  F)   SpO2: 100%     Patient's level of consciousness is awake and drowsy  Spontaneous respirations: yes  Maintains airway independently: yes  Dentition unchanged: yes  Oropharynx: oropharynx clear of all foreign objects    QCDR Measures:  ASA# 20 - Surgical Safety Checklist: WHO surgical safety checklist completed prior to induction    PQRS# 430 - Adult PONV Prevention: 4558F - Pt received => 2 anti-emetic agents (different classes) preop & intraop  ASA# 8 - Peds PONV Prevention: 4558F-8P - Pt did NOT receive => 2 antiemetic agents  PQRS# 424 - Keke-op Temp Management: NA - MAC anesthesia or case < 60 minutes  PQRS# 426 - PACU Transfer Protocol: - Transfer of care checklist used  ASA# 14 - Acute Post-op Pain: ASA14B - Patient did NOT experience pain >= 7 out of 10

## 2021-06-09 NOTE — ANESTHESIA POSTPROCEDURE EVALUATION
Patient: Odalys Miles  Procedure(s):  AMPUTATION,First and Second Toe Right Foot (Right)  Anesthesia type: MAC    Patient location: [facility] Surgical Floor  Last vitals:   Vitals Value Taken Time   /56 7/4/2020 10:30 AM   Temp 36.5  C (97.7  F) 7/4/2020 10:30 AM   Pulse 61 7/4/2020 11:47 AM   Resp 16 7/4/2020 10:30 AM   SpO2 96 % 7/4/2020 10:30 AM   Vitals shown include unvalidated device data.  Post vital signs: stable  Level of consciousness: awake and responds to simple questions  Post-anesthesia pain: pain controlled  Post-anesthesia nausea and vomiting: no  Pulmonary: unassisted, return to baseline  Cardiovascular: stable and blood pressure at baseline  Hydration: adequate  Anesthetic events: no    QCDR Measures:  ASA# 11 - Keke-op Cardiac Arrest: ASA11B - Patient did NOT experience unanticipated cardiac arrest  ASA# 12 - Keke-op Mortality Rate: ASA12B - Patient did NOT die  ASA# 13 - PACU Re-Intubation Rate: NA - No ETT / LMA used for case  ASA# 10 - Composite Anes Safety: ASA10A - No serious adverse event    Additional Notes:

## 2021-06-09 NOTE — TELEPHONE ENCOUNTER
Writer spoke with nurse Garcia at the TCU, she is faxing the COVID results again to [phone].

## 2021-06-09 NOTE — TELEPHONE ENCOUNTER
Roxana called from [facility] to inform staff future appts are not needed as pt is going onto hospice. Writer thanked Roxana for the update.

## 2021-06-09 NOTE — TELEPHONE ENCOUNTER
FYI - Status Update  Who is Calling: MARYAM Musa Case Manager, [facility]  Update: Patient has signed on to [facility]osp[facility] on 7/820.   Okay to leave a detailed message?:  No return call needed

## 2021-06-09 NOTE — TELEPHONE ENCOUNTER
Please let Dr. TOLENTINO know that the  home will be sending covering MD (Dr. TOLENTINO) an email in a few minutes to confirm cause of death info. Please see recent documentation in pt's chart. Thanks.

## 2021-06-09 NOTE — TELEPHONE ENCOUNTER
Informed the medical examiner of the below msg. Also informed her that the doctor overseeing pt's care at [facility] had been the one to sign the death certificate, instead of someone at our office. Donna stated that she will reassign the below question to [facility]. She confirmed not needing anything further from us at this time.

## 2021-06-09 NOTE — PROGRESS NOTES
ASSESSMENT and PLAN:  1. Polypharmacy  We discussed MTM and she's interested in this.  - Ambulatory referral to Medication Management    2. Hypothermia  Resolved.    3. Hyperlipidemia  She's wondering about her lipitor causing SEs.  We can try crestor instead.    4. DM  She's working w/ DM education and will be seeing endocrine.  No hypos.    Patient Instructions   Please set up an appt with our pharmacist--Chayito Mccollum.  Please see me in 2-4 weeks for follow up.  Take care!          Medications Discontinued During This Encounter   Medication Reason     atorvastatin (LIPITOR) 40 MG tablet        No Follow-up on file.    CHIEF COMPLAINT:  Chief Complaint   Patient presents with     Hospital Visit Follow Up     Hypothermia       HISTORY OF PRESENT ILLNESS:  Odalys Miles is a 71 y.o. female  Presenting to the clinic today for a hospital follow up. she was admitted on 4/1/17 for Hypothermia. she was contacted after discharge on 4/2/17 and was advised to follow up with her PCP 14 days post discharge. She was in the hospital twice this weekend. Once for chills and the other time she went in because her body temperature was 87 degrees. Today she feels well, but still cold. She is much more coherent than she was during her hospital visits. She was prescribed Keflex and has been taking two at lunch. She believes that she may be overdosing on insulin. She was told in the hospital to lower her insulin She was supposed to take 20 in the morning and at night, but she was only taking 30 units total at night. Therefore, the nurse wanted her to take 30 units in the morning and 30 units at night, but that did not make sense because she needed to decrease not increase her lantus. She was told to see an endocrinologist and until then to take 10 units of lantus in the morning and 10 units at night. Last night her blood sugar level was around 368, the highest it has been for her in a while. This morning her blood sugar was around 218  and she felt much more normal. She admits to being an irresponsible diabetic and that she has been told multiple times to eat healthier and lose weight. She saw the diabetic educator and believes it is a good fit, but admits that she has not seen the endocrinologist in 2 years. She is willing to meet with a pharmacist to help her organize and take her medications. Last night, she was coughing frequently. Right now she also has a kidney infection and was told that this may have caused her to become ill.     Dyslipidemia: She is concerned about taking the Lipitor due to recent news on the medication as well as a friend having passed from a sudden death. This friend was taking Lipitor and they were told that Lipitor has not been ruled out yet as a possible cause of death. She has been experiencing body aches and fatigue that she attributes to the medication. She is interested in trying a different medication to manage her cholesterol.     REVIEW OF SYSTEMS:   She has had trouble sleeping for as long as she can remember therefore she takes Trazodone, but states that it has not been working well. The way she uri is by laughing and making light of situations. All other systems are negative.    PFSH:  Her children are starting to speak to her about assisted living and she is considering getting help with at least with organizing and taking her pills. She gets her medication from [facility] pharmacy.       TOBACCO USE:  History   Smoking Status     Former Smoker     Packs/day: 1.50     Years: 20.00   Smokeless Tobacco     Never Used       VITALS:  Vitals:    04/05/17 0844   BP: 120/70   Patient Site: Right Arm   Pulse: 68   Temp: 98.1  F (36.7  C)   TempSrc: Oral   Weight: (!) 232 lb 2 oz (105.3 kg)     Wt Readings from Last 3 Encounters:   04/05/17 (!) 232 lb 2 oz (105.3 kg)   04/03/17 (!) 260 lb (117.9 kg)   04/02/17 (!) 232 lb 1.6 oz (105.3 kg)     PHYSICAL EXAM:  Constitutional:  Reveals an alert,  pleasant middle aged female.   Vitals:  Noted.   Lungs: Clear to auscultation bilaterally, respirations unlabored.   Heart: Regular rate and rhythm, S1 and S2 normal, no murmur, rub, or gallop,   Neurologic: Normal     ADDITIONAL HISTORY SUMMARIZED (2): Reviewed discharge summary from 4/2/17 regarding hypothermia.   DECISION TO OBTAIN EXTRA INFORMATION (1): None.   RADIOLOGY TESTS (1): None.  LABS (1): Reviewed labs from 4/2/17 and 4/3/17.   MEDICINE TESTS (1): None.  INDEPENDENT REVIEW (2 each): None.     The visit lasted a total of 21 minutes face to face with the patient. Over 50% of the time was spent counseling and educating the patient about hypothermia and lantus.    I, Evelina Barrios, am scribing for and in the presence of, Dr. Jamila Lechuga.    I, Dr. Jamila Lechuga, personally performed the services described in this documentation, as scribed by Evelina Barrios in my presence, and it is both accurate and complete.    MEDICATIONS:  Current Outpatient Prescriptions   Medication Sig Dispense Refill     alendronate (FOSAMAX) 35 MG tablet Take 35 mg by mouth once a week. Take in the morning with a full glass of water, on an empty stomach, and do not take anything else by mouth or lie down for the next 30 min.  Sundays       amLODIPine (NORVASC) 10 MG tablet Take 1 tablet (10 mg) by mouth daily. 90 tablet 0     aspirin 81 MG EC tablet Take 81 mg by mouth daily.       azaTHIOprine (IMURAN) 50 mg tablet Take 100 mg by mouth bedtime.        blood glucose test (ACCU-CHEK ESTHER PLUS TEST STRP) strips USE TO TEST 4 TO 6 TIMES DAILY 550 strip 3     carvedilol (COREG) 12.5 MG tablet Take 1 tablet (12.5 mg total) by mouth 2 (two) times a day. 180 tablet 3     cephalexin (KEFLEX) 250 MG capsule Take 1 capsule (250 mg total) by mouth 3 (three) times a day for 4 days. 12 capsule 0     cholecalciferol, vitamin D3, 2,000 unit Tab Take 2,000 Units by mouth daily.       cycloSPORINE modified (GENGRAF) 25 MG capsule Take 50  "mg by mouth 2 (two) times a day.        insulin aspart (NOVOLOG) 100 unit/mL injection pen Inject 30 Units under the skin 3 (three) times a day before meals. 30 units with each meal       insulin degludec (TRESIBA FLEXTOUCH U-200) 200 unit/mL (3 mL) InPn Inject 30 Units under the skin daily. 15 mL 6     insulin glargine (LANTUS SOLOSTAR) 100 unit/mL (3 mL) pen Inject 20 Units under the skin 2 (two) times a day with meals. 8 adj dose pen PRN     insulin regular (NOVOLIN R) 100 unit/mL injection Inject 10 Units under the skin 2 (two) times a day before meals.       lancets (ACCU-CHEK FASTCLIX) Misc Check blood sugar up to 5 times per day 102 each 3     levETIRAcetam (KEPPRA) 500 MG tablet Take 500 mg by mouth every morning.        levETIRAcetam (KEPPRA) 500 MG tablet Take 1,000 mg by mouth at bedtime.       loperamide (IMODIUM) 2 mg capsule Take 2 mg by mouth 4 (four) times a day as needed for diarrhea.       pantoprazole (PROTONIX) 40 MG tablet Take 40 mg by mouth 2 (two) times a day.       pen needle, diabetic (COMFORT EZ PEN NEEDLES) 31 gauge x 3/16\" Ndle Use for blood sugar testing 2-3 times daily 100 each 3     predniSONE (DELTASONE) 10 mg tablet pack 4 tablets daily for 2 days then 3 tablets daily for 2 days then 2 tablets daily for 2 days then 1 tablet daily for 2 days then return to 5 mg daily 20 tablet 0     sulfamethoxazole-trimethoprim (SEPTRA) 400-80 mg per tablet Take 1 tablet by mouth daily.       traZODone (DESYREL) 50 MG tablet Take 50 mg by mouth at bedtime.       venlafaxine (EFFEXOR-XR) 150 MG 24 hr capsule Take 1 capsule (150 mg total) by mouth daily. 90 capsule 3     acetaminophen (TYLENOL) 325 MG tablet Take 650 mg by mouth every 6 (six) hours as needed for pain.       insulin aspart (NOVOLOG) 100 unit/mL injection Inject under the skin 3 (three) times a day before meals. correction scale 2 units for every 15 mg/dl >140mg/dl (use in addition to scheduled mealtime dose)       rosuvastatin " (CRESTOR) 10 MG tablet Take 1 tablet (10 mg total) by mouth at bedtime. 90 tablet 3     No current facility-administered medications for this visit.        Total data points: 3

## 2021-06-09 NOTE — ANESTHESIA PREPROCEDURE EVALUATION
Anesthesia Evaluation      Patient summary reviewed   No history of anesthetic complications     Airway   Mallampati: II  Neck ROM: full   Pulmonary - normal exam    breath sounds clear to auscultation  (+) shortness of breath, sleep apnea, a smoker                         Cardiovascular - normal exam  Exercise tolerance: < 4 METS  (+) pacemaker, hypertension, past MI, CAD, CABG/stent, dysrhythmias, CHF, , hypercholesterolemia, PVD    ECG reviewed  Rhythm: regular  Rate: normal,      ROS comment:   Normal left ventricular size and systolic function. The estimated left ventricular ejection fraction is 55%. Unable to assess left ventricular diastolic function given mitral stenosis.    Normal right ventricular size and systolic function with mild hypertrophy. Pacer lead is present in the ventricle.    Left atrial volume is moderately increased.    Mild aortic stenosis.    Mild calcific mitral stenosis with mild mitral regurgitation.    Mild to moderate tricuspid valve regurgitation.    When compared to the previous study dated 4/5/2019, no significant change.        Neuro/Psych    (+) seizures, neuromuscular disease,  CVA , depression, anxiety/panic attacks,     Comments: vertigo    Endo/Other    (+) diabetes mellitus type 2, arthritis, obesity,   (-) not pregnant     Comments: gout    GI/Hepatic/Renal    (+)   chronic renal disease,     Comments: S/p Renal transplant  Chronic diarrhea  Medullary sponge kidney           Dental - normal exam                          Anesthesia Plan  Planned anesthetic: MAC    ASA 4 - emergent   Induction: intravenous   Anesthetic plan and risks discussed with: patient  Anesthesia plan special considerations: antiemetics,   Post-op plan: routine recovery (CULLEN orders)  DNR/DNI status   DNR/DNI status discussed with patient.  Plan is for no suspension of DNR

## 2021-06-09 NOTE — PRE-PROCEDURE
Pre-Procedure Unconfirmed COVID Test     Odalys KRISHNAN Ginger    COVID Screening  Due to the inability to confirm the patient's COVID status (positive or negative), the patient was screened for COVID symptoms     Patient reports the following:  Fever? No   Cough? No   Shortness of breath? No   Skin rash? No    If the patient is positive for new symptoms or worsening symptoms, and the procedure is deemed necessary by the ordering provider, notify your manager/supervisor     Patient Information  Patient informed of the no visitor policy  Patient instructed to continue to self-quarantine prior to procedure  Patient informed to contact the ordering provider if the following symptoms develop prior to procedure:   Fever  Cough  Shortness of Breath  Sore throat   Runny or stuffy nose  Muscle or body aches  Headaches  Fatigue  Vomiting or diarrhea   Rash    Saba Guerrero

## 2021-06-09 NOTE — PROGRESS NOTES
Diagnoses and all orders for this visit:    Lateral pain of left hip  She'll come back for xrays.  -     XR Sacroliliac Joints 1 Or 2 VWS; Future; Expected date: 2/22/17  -     XR Lumbar Spine 2 or 3 VWS; Future; Expected date: 2/22/17    Insomnia  She's been on trazodone in the past, along w/ two other meds and the combo made her too sleepy.  We'll try just trazodone.  -     traZODone (DESYREL) 50 MG tablet; Take 1 tablet (50 mg total) by mouth bedtime.  Dispense: 90 tablet; Refill: 1    Incontinence  We discussed that good blood sugar control will help.  I did review that she's had an increase in the protien in her urine based on her last microalbumin.  She's seeing her transplant clinic today and will d/w them.  We can also have her see [facility] if needed.      CHIEF COMPLAINT:  Chief Complaint   Patient presents with     Back Pain     Left lower back/ hip pain over a year, getting worse     Urinary Frequency     Can't control urinating     Insomnia     Trouble falling asleep       HISTORY OF PRESENT ILLNESS:  Odalys Miles is a 71 y.o. female  presenting to the clinic today for back pain, urinary frequency, and insomnia. She is accompanied by her friend, [place].    Hip Pain: She has had persistent left hip pain that she pinpoints to her buttocks. Her pain radiates up into her back but not down her leg. She has to sit down often while walking. She uses a walker most days. Her pain has been present for the past year.    Urinary Frequency: She has had frequent urinary incontinence for the past 5 months. She urinates every 1.5 hours and often during the night. Her urges to urinate come on quickly which usually result in her unable to make it to the bathroom. She is not currently on any diuretics. She has undergone bladder surgery twice in the past.    Insomnia: She has been having issues with sleep due to her urinary frequency. She has never been a sound sleeper but notes she is frustrated about her inability to  get adequate sleep. She has chronic fatigue. She tends to fall asleep for 1.5-2 hours when she sits down during the day. She estimates she could sleep for a week uninterrupted. She used to need regular iron infusions. She has tried trazodone in the past which helped her sleep but she felt overly groggy during the day. She has a history of sleep apnea. She is open to trying trazodone again.    REVIEW OF SYSTEMS:   She takes prednisone regularly. She is followed by the transplant clinic which monitors her blood protein and iron levels. Her type II diabetes mellitus has been stable. Her blood sugar was 107 this morning. She occasionally has blood sugars in the 200s. All other systems are negative.    PFSH:  She underwent a kidney transplant. She had a stroke and had to undergo a transesophageal echocardiogram of her heart. She gets nervous about going to the doctor's office.    Patient Active Problem List   Diagnosis     Essential Thrombocytosis     Osteoarthritis     Insomnia     Thrombophlebitis Of Deep Vessels Of The Lower Extremity     Medullary Sponge Kidney Bilaterally     Obstructive Sleep Apnea     Dyslipidemia     End Stage Renal Disease     Renal Transplant Recipient     Benign Essential Hypertension     Coronary Artery Stenosis     Chronic Diarrhea Of Unknown Origin     Chronic Reflux Esophagitis     Hypertension     Chronic Gout     Congestive Heart Failure     Ischemic Stroke     Type 2 diabetes mellitus     Dysthymic Disorder     Diabetic Peripheral Neuropathy     Anemia     Sensorineural Hearing Loss     Hyperlipidemia     Ataxia     Cardiac pacemaker, dual, in situ     Past Medical History:   Diagnosis Date     Arthritis      CAD (coronary artery disease)      CVA (cerebral vascular accident)      DM (diabetes mellitus)      Epilepsy      ESRD (end stage renal disease)      History of renal transplant      Hypertension      Vertigo      Past Surgical History:   Procedure Laterality Date     CARDIAC  PACEMAKER PLACEMENT       HYSTERECTOMY      Age 28     NC APPENDECTOMY      Description: Appendectomy;  Recorded: 11/13/2007;     NC CABG, VEIN, SINGLE      Description: CABG (CABG);  Proc Date: 01/26/2011;  Comments: x 4     NC INS NEW/RPLC PRM PACEMAKER W/TRANSV ELTRD VENTR      Description: Permanent Pacemaker Placement;  Recorded: 08/01/2012;     NC TOTAL ABDOM HYSTERECTOMY      Description: Total Abdominal Hysterectomy;  Recorded: 11/13/2007;     NC TOTAL KNEE ARTHROPLASTY      Description: Total Knee Arthroplasty;  Recorded: 11/13/2007;     Social History     Social History     Marital status:      Spouse name: N/A     Number of children: N/A     Years of education: N/A     Occupational History     Not on file.     Social History Main Topics     Smoking status: Former Smoker     Packs/day: 1.50     Years: 20.00     Smokeless tobacco: Never Used     Alcohol use Yes      Comment: occasional     Drug use: No     Sexual activity: Not on file     Other Topics Concern     Not on file     Social History Narrative     TOBACCO USE:  History   Smoking Status     Former Smoker     Packs/day: 1.50     Years: 20.00   Smokeless Tobacco     Never Used     Family History   Problem Relation Age of Onset     Cancer Sister      Diabetes Sister      VITALS:  Vitals:    02/22/17 0951   BP: 112/68   Patient Site: Right Arm   Pulse: 64   Weight: (!) 229 lb 8 oz (104.1 kg)     Wt Readings from Last 3 Encounters:   02/22/17 (!) 229 lb 8 oz (104.1 kg)   12/07/16 (!) 230 lb (104.3 kg)   09/28/16 (!) 237 lb 6.4 oz (107.7 kg)     PHYSICAL EXAM:  Constitutional:  Reveals an alert, pleasant female.   Vitals:  Noted.   Spine: Left-sided SI joint tenderness.  Neurologic: Normal    ADDITIONAL HISTORY SUMMARIZED (2): Reviewed Dr. Patel's note from 12/16/16 regarding diabetes mellitus and end stage renal disease.  DECISION TO OBTAIN EXTRA INFORMATION (1): None.   RADIOLOGY TESTS (1): Ordered x-ray.  LABS (1): Reviewed A1c and  comprehensive metabolic panel from 12/16/16.  MEDICINE TESTS (1): None.  INDEPENDENT REVIEW (2 each): None.     The visit lasted a total of 25 minutes face to face with the patient. Over 50% of the time was spent counseling and educating the patient about her insomnia, urinary frequency, and follow-up plan.    INicko, am scribing for and in the presence of, Dr. Jamila Lechuga.    I, Dr. Jamila Lechuga, personally performed the services described in this documentation, as scribed by Nicko Wang in my presence, and it is both accurate and complete.    MEDICATIONS:  Current Outpatient Prescriptions   Medication Sig Dispense Refill     alendronate (FOSAMAX) 35 MG tablet Take 35 mg by mouth once a week. Take in the morning with a full glass of water, on an empty stomach, and do not take anything else by mouth or lie down for the next 30 min.  Sundays       amLODIPine (NORVASC) 10 MG tablet Take 1 tablet (10 mg) by mouth daily. 30 tablet 1     aspirin 81 MG EC tablet Take 81 mg by mouth daily.       atorvastatin (LIPITOR) 40 MG tablet Take 1 tablet (40 mg) by mouth daily. 90 tablet 0     azaTHIOprine (IMURAN) 50 mg tablet Take 100 mg by mouth bedtime.        blood glucose test (ACCU-CHEK ESTHER PLUS TEST STRP) strips USE TO TEST 4 TO 6 TIMES DAILY 550 strip 3     carvedilol (COREG) 12.5 MG tablet Take 1 tablet (12.5 mg total) by mouth 2 (two) times a day. 180 tablet 3     cholecalciferol, vitamin D3, 2,000 unit Tab Take 2,000 Units by mouth daily.       cyanocobalamin 500 MCG tablet Take 1,000 mcg by mouth daily.        cycloSPORINE modified (GENGRAF) 25 MG capsule Take 50 mg by mouth 2 (two) times a day.        insulin aspart (NOVOLOG) 100 unit/mL injection pen 13 units for light, 17 for a regular and 22 units for larger plus correction 2 units for every 40 mg%>140       insulin glargine (LANTUS SOLOSTAR) 100 unit/mL (3 mL) pen Inject 20 Units under the skin 2 (two) times a day with meals. 8 adj dose pen PRN      "lancets (ACCU-CHEK FASTCLIX) Misc Check blood sugar up to 5 times per day 102 each 3     levETIRAcetam (KEPPRA) 500 MG tablet Take 500 mg by mouth 2 (two) times a day.        pantoprazole (PROTONIX) 40 MG tablet Take 40 mg by mouth 2 (two) times a day.       pen needle, diabetic (COMFORT EZ PEN NEEDLES) 31 gauge x 3/16\" Ndle Use for blood sugar testing 2-3 times daily 100 each 3     predniSONE (DELTASONE) 5 MG tablet Take 5 mg by mouth daily.       sulfamethoxazole-trimethoprim (BACTRIM) 400-80 mg per tablet take 2 tablet by oral route  every 12 hours       venlafaxine (EFFEXOR-XR) 150 MG 24 hr capsule Take 1 capsule (150 mg total) by mouth daily. 90 capsule 3     loperamide (IMODIUM) 2 mg capsule Take 2 mg by mouth as needed.        melatonin 1 mg Tab tablet Use as directed       traZODone (DESYREL) 50 MG tablet Take 1 tablet (50 mg total) by mouth bedtime. 90 tablet 1     No current facility-administered medications for this visit.        Total data points: 4  "

## 2021-06-09 NOTE — TELEPHONE ENCOUNTER
I do not know anything about this patient. There is no info in her chart about the cause of death. Please call the Hospice. It looks like they took over her care on 20. The hospice MD who was involved in her care, probably knows more about the cause of death and can fill the paperwork for  home.

## 2021-06-09 NOTE — TELEPHONE ENCOUNTER
Writer contacted pts TCU to get COVID testing results. Nurse Radha stated the COVID test was negative and it was done in the [facility] system. She is faxing the results.

## 2021-06-09 NOTE — PRE-PROCEDURE
Procedure Name: right lower extremity angiogram possible intervention, monitoring and sedation for procedure  Date/Time: 6/23/2020 9:26 AM    Verbal consent obtained?: Yes  Written consent obtained?: Yes  Risks and benefits: Risks, benefits and alternatives were discussed  Discharge plan: Appropriate discharge home plan in place for patients who are going home after procedure   Consent given by: patient  Expected level of sedation: moderate  ASA Class: Class 3- Severe systemic disease, definite functional limitations  Mallampati: Grade 2- soft palate, base of uvula, tonsillar pillars, and portion of posterior pharyngeal wall visible  Patient states understanding of procedure being performed: Yes  Patient's understanding of procedure matches consent: Yes  Procedure consent matches procedure scheduled: Yes  Appropriately NPO: yes  Lungs: lungs clear with good breath sounds bilaterally  Heart: normal heart sounds and rate  History & Physical reviewed: History and physical reviewed and no updates needed  Statement of review: I have reviewed the lab findings, diagnostic data, medications, and the plan for sedation

## 2021-06-09 NOTE — TELEPHONE ENCOUNTER
Can you advise in considering a fracture being noted in pt's death certificate? Pt's diagnoses included a closed displaced fracture of surgical neck of left humerus.

## 2021-06-09 NOTE — PROGRESS NOTES
[facility]   [facility]-Pelvic Initial Evaluation    Patient Name: Odalys Miles  Date of evaluation: 3/31/2017  Referral Diagnosis: Left low back pain  Referring provider: Renata Ash C*  Visit Diagnosis:     ICD-10-CM    1. Left buttock pain M79.1    2. Generalized muscle weakness M62.81      PATIENT HAS A PACEMAKER AND ON IMMUNOSUPPRESSANT MEDICATIONS   Assessment:    Patient is presenting with worsening L buttock pain of insidious onset. Per report from the patent it has been worsening since 9/2016 and is limiting her walking duration and standing tolerance.  Upon evaluation, patient with mild positive neural tension on the L, tenderness to palpation at the L piriformis and glut med, and pain with hip IR on the L.  She also is a fall risk and educated on the use of life alert which she has at home.    Pt. is appropriate for skilled PT intervention as outlined in the Plan of Care (POC).  Pt. is a good candidate for skilled PT services to improve pain levels and function.    Goals:  Pt. will be independent with home exercise program in : 4 weeks  Pt will: decrease CARLITOS score by 30% in 8 weeks  Pt will: be able to walk for 10 minutes in 10 weeks  Pt will: be able to  kitchen for 5 minutes to cook at home meals in 8 weeks    Patient's expectations/goals are realistic.    Barriers to Learning or Achieving Goals:  Chronicity of the problem.       Plan / Patient Instructions:        Plan of Care:   Communication with: Referral Source  Patient Related Instruction: Nature of Condition;Treatment plan and rationale;Basis of treatment;Posture;Body mechanics  Times per Week: 2  Number of Weeks: 12  Number of Visits: 12  Precautions / Restrictions : patient has pacemaker  Therapeutic Exercise: Stretching;Strengthening  Neuromuscular Reeducation: posture;kinesio tape;balance/proprioception  Manual Therapy: soft tissue mobilization;myofascial release;joint mobilization    Plan for next visit: balance  assessment, manual therapy if helpful     Subjective:         Social information:   Living Situation:[facility]   Occupation:retired   Work Status:NA   Equipment Available: None and walker rollater    History of Present Illness:    Odalys is a 71 y.o. female who presents to therapy today with complaints of L buttock pain that began in . She has a very involved medical history, please see below. She is noting her primary impairment to be decreased walking and standing tolerance. The most comfortable position for her is sitting. We did discuss sitting no longer than 20 minutes at a time. She is scheduled to see a pelvic  in the second week in April. PT explained to patient we will need to coordinate with the pelvic specialist. She reports to have fallen twice today and would like to address her balance.      Pertinent medical history: thrombocytosis, osteoarthritis, insomnia, hx RENAL TRANSPLANT  on immunosuppressive therapy, hypertension, chronic gout, congestive heart failure, type 2 diabetes, ischemic stroke, peripheral neuropathy diabetic, hyperlipidemia, CULLEN, anemia, MRI compatible cardiac PACEMAKER placement . B TKA.    BP: 143/107  Pain Ratin  Pain rating at best: 2  Pain rating at worst: 9 starts to recede when she sits down  Pain description: pain    Functional limitations are described as occurring with:   sports or recreation clubs  walking 5 minute    Patient reports benefit from:  rest           Objective:      Note: Items left blank indicates the item was not performed or not indicated at the time of the evaluation.    Patient Outcome Measures :    Modified Oswestry Low Back Pain Disablity Questionnaire  in %: 56   Scores range from 0-100%, where a score of 0% represents minimal pain and maximal function. The minimal clinically important difference is a score reduction of 12%.    Examination  1. Left buttock pain     2. Generalized muscle weakness       Involved side:  Left  Posture Observation:      General sitting posture is  fair.  General standing posture is fair.    Lumbar ROM:    Date: 3/30/17     *Indicate scale AROM AROM AROM   Lumbar Flexion 0 cm finger tip to floor without increase in pain     Lumbar Extension No increase in pain      Right Left Right Left Right Left   Lumbar Sidebending Minimal loss Moderate loss, pain       Lumbar Rotation         Thoracic Flexion      Thoracic Extension      Thoracic Sidebending         Thoracic Rotation           Lower Extremity Strength:     Date: 3/30/17     LE strength/5 Right Left Right Left Right Left   Hip Flexion (L1-3) 4+ 4-       Hip Extension (L5-S1)         Hip Abduction (L4-5) 4- 4-       Hip Adduction (L2-3) 4- 3+       Hip External Rotation         Hip Internal Rotation         Knee Extension (L3-4) 5 5       Knee Flexion 5 4-       Ankle Dorsiflexion (L4-5) 4 4-       Great Toe Extension (L5)         Ankle Plantar flexion (S1)         Abdominals        Sensation    Equal and B      Reflex Testing  Lumbar Dermatomes Right Left UE Reflexes Right Left   Iliac Crest and Groin (L1)   Biceps (C5-6)     Anterior Medial Thigh (L2)   Brachioradialis (C5-6)     Anterior Thigh, Medial Epicondyle Femur (L3)   Triceps (C7-8)     Lateral Thigh, Anterior Knee, Medial Leg/Malleolus (L4)   Lorena s test     Lateral Leg, Dorsal Foot (L5)   LE Reflexes     Lateral Foot (S1)   Patellar (L3-4)     Posterior Leg (S2)   Achilles (S1-2)     Other:   Babinski Response       Palpation: L GLut med and piriformis    Lumbar Special Tests:     Lumbar Special Tests Right Left SI Tests Right  Left   Quadrant test   SI Compression     Straight leg raise 90- 90- SI Distraction     Crossover response   POSH Test     Slump  Mild + Sacral Thrust     Sit-up test  FADIR     Trunk extensor endurance test  Resisted Abduction     Prone instability test  Other: pain with passive hip Ir  +   Pubic shotgun  Other:       Repeated Motion Testing:  Does not  peripheralize    Passive Mobility - Joint Integrity:  Not tested    LE Screen:  Reduced hip IR B, moderate    Treatment Today     TREATMENT MINUTES COMMENTS   Evaluation 30 Thorough review of medical history   Self-care/ Home management     Manual therapy 15 Patient positioned in R SL- TPR to L glut med and piriformis   Neuromuscular Re-education     Therapeutic Activity     Therapeutic Exercises 10 Patient and patients' friend Lisa educated on PT poc and goals for treatment   Gait training     Modality__________________                Total 55    Blank areas are intentional and mean the treatment did not include these items.     PT Evaluation Code: (Please list factors)  Patient History/Comorbidities: kidney transplant, OA, B TKA, pacemaker DM  Examination: lumbopelvis  Clinical Presentation: evolving  Clinical Decision Making: Moderate    Patient History/  Comorbidities Examination  (body structures and functions, activity limitations, and/or participation restrictions) Clinical Presentation Clinical Decision Making (Complexity)   No documented Comorbidities or personal factors 1-2 Elements Stable and/or uncomplicated Low   1-2 documented comorbidities or personal factor 3 Elements Evolving clinical presentation with changing characteristics Moderate   3-4 documented comorbidities or personal factors 4 or more Unstable and unpredictable High                Christina Guzman  3/31/2017  2:25 PM

## 2021-06-09 NOTE — PROGRESS NOTES
Subjective findings: The patient returns to clinic today for continued diabetic foot care complaining of long thick painful nails.    Objective findings:  Nails bilateral feet normal length with 2-3 times normal thickness. Skin bilateral feet warm, dry  and intact.  DP and PT pulses +1 over 4 bilateral feet. Capillary refill less than 2 seconds bilateral feet.  Negative clonus, negative Babinski bilateral feet.  Range of motion within normal limits bilateral feet. Muscle power +5 over 5 bilaterally in all compartments.      Assessment:  Onychomycosis, onychauxis, diabetes mellitus     Plan: I debrided nails 1 through 5 both feet. I have  recommended that the patient return to the clinic every 3 months for continued diabetic foot care.

## 2021-06-09 NOTE — TELEPHONE ENCOUNTER
"Pt had R leg angiogram on 6/23. Pt is doing \"good\", she is still at [facility]. She is aware of upcoming f/u's. She had no further questions.   "

## 2021-06-09 NOTE — TELEPHONE ENCOUNTER
This is probably going to have to wait for Dr. Ha, since I did not start the death certificate, and I do not know the patient situation.

## 2021-06-09 NOTE — PROGRESS NOTES
Assessment: Odalys is a tough little cookie getting by with a little help from her friends.  She brought one in with her to today's appointment as she can't drive.  She goes off about how she loves food and that's what she does and how she's going to go out in life.  I found out later on she's been through a kidney transplant and been on dialysis.  I put it out there for her today to gauge what's more important to her - her food or staying off dialysis? & Voilah a breakthrough that staying off dialysis wins by far!  Her last a1c was 8.7 and she's had some recent hospital visits with crazy low temps and low blood sugars.  Today we went into depth on diabetes pathophysiology and how her body is responding to high portions of carbs along with how it damages her kidneys, vision, and nerves.  It appears that her hospital visits were due to going on a diet low on carbs and still injecting the same amount of insulin as if she were eating a lot of carbs.  We discussed how her intake of carbs has to match her insulin dose otherwise she will end up too low or too high.  She did not bring her meter or her insulin with her to this visit.  She is using an off brand OTC insulin from [employer].  By the way she was describing it, it sounded like a U-500 insulin, but she didn't know for sure.  It sounds to me she is on Humulin N & R U-100, but I will not make assumptions or insulin recommendations based on that lack of information.  I told her to call soon after our appointment to inform us of the insulin she takes.  She has an appointment with her primary tomorrow and will bring in both her meter and insulin.  She takes 2 doses of Humulin N (she refers to as lantus) in morning and night and has greatly varied her numbers from 10-30units BID since she was first in the hospital.  She states she would be willing to go back on pen insulin and pay if needed.  I would recommend Tresiba and would like to send that to her pharmacy to see if  they cover it.  This would offer her a once daily insulin that lasts evenly for the 24 hour period and has reduced night time hypoglycemia in other patients of mine.    I asked her how much insulin she takes at meals and she could not accurately recall her sliding scale or mealtime dose.  This will be important to getting her back in the saddle by counting carbs and finding the right amount of insulin to inject based on her blood sugar & carbs together.  I have an appointment with her in 1 1/2 weeks to further determine her insulin requirements & she has an endo appointment near the end of the month with Dr. Potts.  At the end of the appointment she was very engaged and eager to be compliant and more educated on how to manage her diabetes.  She was educated on nutritional label reading, carb counting, portion sizing, a1c & BG goals, preventing future complications, hypo/hyperglycemia signs & symptoms.  She will call her insurance to find how many visits she can have with diabetes education to get her back on track to keep her bathroom privileges (kidneys working).      Plan: Need to know what insulins she is using as they are cheaper brands from [employer] before recommending any dose changes.  She needs to also bring in her meter and write down the doses she is giving herself throughout the day.  She meets with her primary doctor tomorrow when she will bring in her meter and insulins.  I recommend switching to tresiba if possible for her long acting and starting a new sliding scale & insulin to carb ratio to give her better control.  She is a great candidate for a glucose sensor 7 day study to give us a picture on her blood sugar 300x/day.  She has a follow up with me in 1 1/2 weeks.    Subjective and Objective:      Odalys Miles is referred by Jamila Lechuga for Diabetes Education.     Lab Results   Component Value Date    HGBA1C 8.7 (H) 12/16/2016         Current diabetes medications:  ??? - does not know for  sure    Goals       Medication            New Goals of 11.04.15:  Take Novolog based on amount of carb on plate not overall meal size.    Old Goals:  Only take Novolog  Before meals, do no take if not going to eat.- Met, patient is no longer taking Novolog if she is not having a full breakfast  Take Lantus at same time every night.-Met, patient is taking Lantus about 8:30pm every night        Nutrition            New Goal of 11.04.15:  Work with roommate to eliminate breading on meats    Old Goal:  Try eating something small when waking, piece of fruit or piece of toast-Met, patient is eating a slice of toast with peanut butter or piece of fruit in the morning            Follow up:   Primary care visit  Endocrinology, in 3 weeks  CDE (certified diabetic educator)      Education:     Monitoring   Meter (per above goals): Needs instruction/review at follow-up  Monitoring: Needs instruction/review at follow-up  BG goals: Assessed, Discussed and Literature provided    Nutrition Management  Nutrition Management: Assessed, Discussed and Literature provided  Weight: Assessed, Discussed and Literature provided  Portions/Balance: Assessed, Discussed and Literature provided  Carb ID/Count: Assessed, Discussed and Literature provided  Label Reading: Assessed, Discussed and Literature provided  Heart Healthy Fats: Assessed and Discussed  Menu Planning: Assessed and Discussed  Dining Out: Assessed and Discussed  Physical Activity: Assessed and Discussed  Medications: Assessed and Discussed  Orals: Not addressed  Injected Medications: Assessed, Discussed and Literature provided   Storage/Exp:Assessed, Discussed and Literature provided   Site Rotation: Assessed and Discussed   Sites Assessed: no    Diabetes Disease Process: Assessed and Discussed    Acute Complications: Prevent, Detect, Treat:  Hypoglycemia: Assessed, Discussed and Literature provided  Hyperglycemia: Assessed, Discussed and Literature provided  Sick Days: Not  addressed  Driving: Not addressed    Chronic Complications  Foot Care:Assessed and Discussed  Skin Care: Not addressed  Eye: Assessed and Discussed  ABC: Assessed and Discussed  Teeth:Not addressed  Goal Setting and Problem Solving: Assessed and Discussed  Barriers: Assessed  Psychosocial Adjustments: Assessed and Discussed      Time spent with the patient: 60 minutes for diabetes education and counseling.   Previous Education: no  Visit Type:DSMT  Hours Remaining: DSMT 1 and MNT 2      Lb Turner  4/4/2017

## 2021-06-09 NOTE — TELEPHONE ENCOUNTER
Who is calling:  Donna  Reason for Call:  Needs to speak to the provider regarding the patients fracture and maybe should be listed in the death certificate.  Informed that Dr Ha is out of the office until Monday.  Okay to leave a detailed message: Yes

## 2021-06-09 NOTE — PROGRESS NOTES
ASSESSMENT: Odalys Miles is a 71 y.o. female who presents for consultation at the request of HE PCP Jamila Lechuga MD, with a past medical history significant for thrombocytosis, osteoarthritis, insomnia, hx RENAL TRANSPLANT 2011 on immunosuppressive therapy, hypertension, chronic gout, congestive heart failure, type 2 diabetes, ischemic stroke, peripheral neuropathy diabetic, hyperlipidemia, CULLEN, anemia, MRI compatible cardiac PACEMAKER placement 2011, who presents today for new patient evaluation of ongoing left posterior buttock pain ongoing since September 2016 without radicular type symptoms.  On exam she does have increased pain with SI provocative maneuvers on the left indicating acute sacroiliitis/SI joint dysfunction.  Updated lumbar spine x-ray did reveal left sacroiliac joint moderate sacroiliitis.    She does report left leg weakness ongoing for the last 2 years.  She does also have mild weakness left EHL and plantar flexors dorsiflexors as well.  Lumbar spine MRI from 2015 did reveal chronic compression left L4 and L5 nerve roots due to asymmetric left foraminal disc osteophyte complex likely contributing to her ongoing weakness.    Patient does report urinary urge incontinence type symptoms ongoing for the last 3-4 months which she is seeing a urologist for on Friday.  Patient denies any complete loss of bowel or bladder control, denies urinary retention, therefore this is unlikely related to lumbar stenosis.     CARLITOS: 42    WHO-5: 11    PHQ-2: 0    Diagnoses and all orders for this visit:    Left low back pain  -     Ambulatory referral to PT/OT    Sacroiliac joint dysfunction of left side  -     Ambulatory referral to PT/OT    Sacroiliitis  -     Ambulatory referral to PT/OT    Lumbar canal stenosis  -     Ambulatory referral to PT/OT    Chronic left lumbar radiculopathy  -     Ambulatory referral to PT/OT     PLAN:  Reviewed spine anatomy and disease process. Discussed diagnosis and treatment  options with the patient today. A shared decision making model was used.  The patient's values and choices were respected. The following represents what was discussed and decided upon by the provider and the patient.      -DIAGNOSTIC TESTS:  Reviewed lumbar spine MRI from 2015 as well as updated lumbar spine x-ray and SI joint x-ray.  -No further images ordered at this time however if urologist does not feel her incontinence is urge related, then I would recommend obtaining an updated lumbar MRI, she does have a MRI compatible pacemaker.  -Also discussed that we could consider a left lower extremity EMG to evaluate chronic radiculopathy, however as it is been ongoing for 2 years, unlikely that surgery would improve her symptoms at this point, therefore patient wants to hold off.  If it is worsening at any time however I would recommend obtaining a left lower extremity EMG at that time.    -PHYSICAL THERAPY:  Referral to [facility] therapy [facility] [place] for SI joint dysfunction.  Discussed the importance of core strengthening, ROM, stretching exercises with the patient and how each of these entities is important in decreasing pain.  Explained to the patient that the purpose of physical therapy is to teach the patient a home exercise program.  These exercises need to be performed every day in order to decrease pain and prevent future occurrences of pain.        -MEDICATIONS:  No medications prescribed today as she is high risk for interaction due to immunosuppressive therapies.    Discussed side effects of medications and proper use. Patient verbalized understanding.    -INTERVENTIONS:  Discussed that if she is unable to tolerate physical therapy or gets no benefit from 2-3 sessions, would recommend that we can trial a left SI joint steroid injection, however likely would need to be put on *prophylactic antibiotics* due to immunosuppressive therapy and high risk for infection.    -PATIENT EDUCATION:   25 minutes of total visit time was spent face to face with the patient today, 60 % of the visit was spent on counseling, education, and coordinating care.     -FOLLOW-UP:   Follow-up in 3 weeks, sooner pain is worsening or new symptoms arise.    Advised pt to call the [facility] if symptoms worsen or you have problems controlling bladder and bowel function.   ______________________________________________________________________    SUBJECTIVE:  HPI:  Odalys Miles  Is a 71 y.o. female who presents today for new patient evaluation of low back pain on the left that radiates in the left posterior buttock without radicular type symptoms ongoing since September 2016 that is a constant 9/10 aching/throb that is worse with walking and standing and improves but does not resolve with sitting.  She did have a fall back in June 2016 and sustained a left shoulder fracture however does not recall low back pain starting until September 2016.  Patient denies acute weakness however does have ongoing left lower extremity weakness for the last 2 years with no known injury but is unchanged.  Otherwise denies bowel or bladder incontinence, denies urinary retention, denies numbness or tingling, denies right leg symptoms.    --She does reports that starting 3-4 months ago she abruptly started having urinary urgency where she describes when she feels the urge to go the bathroom she sometimes cannot even make it without having accidents to the point where she is currently wearing depends on a regular basis due to this.  She does have a appointment with [facility] urology this Friday, 3/10/2017 to discuss further.    Treatment to Date: Physical therapy for shoulder pain in the past only.    Medications: Does not currently take anything for pain due to multiple interactions with her antirejection/immunosuppressive drugs.  Also on prednisone 5 mg daily.    Current Outpatient Prescriptions on File Prior to Encounter   Medication Sig Dispense  "Refill     alendronate (FOSAMAX) 35 MG tablet Take 35 mg by mouth once a week. Take in the morning with a full glass of water, on an empty stomach, and do not take anything else by mouth or lie down for the next 30 min.  Sundays       amLODIPine (NORVASC) 10 MG tablet Take 1 tablet (10 mg) by mouth daily. 30 tablet 1     aspirin 81 MG EC tablet Take 81 mg by mouth daily.       atorvastatin (LIPITOR) 40 MG tablet Take 1 tablet (40 mg) by mouth daily. 90 tablet 0     azaTHIOprine (IMURAN) 50 mg tablet Take 100 mg by mouth bedtime.        blood glucose test (ACCU-CHEK ESTHER PLUS TEST STRP) strips USE TO TEST 4 TO 6 TIMES DAILY 550 strip 3     carvedilol (COREG) 12.5 MG tablet Take 1 tablet (12.5 mg total) by mouth 2 (two) times a day. 180 tablet 3     cholecalciferol, vitamin D3, 2,000 unit Tab Take 2,000 Units by mouth daily.       cyanocobalamin 500 MCG tablet Take 1,000 mcg by mouth daily.        cycloSPORINE modified (GENGRAF) 25 MG capsule Take 50 mg by mouth 2 (two) times a day.        insulin aspart (NOVOLOG) 100 unit/mL injection pen 13 units for light, 17 for a regular and 22 units for larger plus correction 2 units for every 40 mg%>140       insulin glargine (LANTUS SOLOSTAR) 100 unit/mL (3 mL) pen Inject 20 Units under the skin 2 (two) times a day with meals. 8 adj dose pen PRN     lancets (ACCU-CHEK FASTCLIX) Misc Check blood sugar up to 5 times per day 102 each 3     levETIRAcetam (KEPPRA) 500 MG tablet Take 500 mg by mouth 2 (two) times a day.        pantoprazole (PROTONIX) 40 MG tablet Take 40 mg by mouth 2 (two) times a day.       pen needle, diabetic (COMFORT EZ PEN NEEDLES) 31 gauge x 3/16\" Ndle Use for blood sugar testing 2-3 times daily 100 each 3     predniSONE (DELTASONE) 5 MG tablet Take 5 mg by mouth daily.       traZODone (DESYREL) 50 MG tablet Take 1 tablet (50 mg total) by mouth bedtime. 90 tablet 1     venlafaxine (EFFEXOR-XR) 150 MG 24 hr capsule Take 1 capsule (150 mg total) by mouth " daily. 90 capsule 3     [DISCONTINUED] loperamide (IMODIUM) 2 mg capsule Take 2 mg by mouth as needed.        [DISCONTINUED] melatonin 1 mg Tab tablet Use as directed       [DISCONTINUED] sulfamethoxazole-trimethoprim (BACTRIM) 400-80 mg per tablet take 2 tablet by oral route  every 12 hours       No current facility-administered medications on file prior to encounter.        Allergies   Allergen Reactions     Latex      Mold/Mildew      Other Allergy (See Comments) Nausea And Vomiting     Patient states allergy to Pantopin.       Past Medical History:   Diagnosis Date     Arthritis      CAD (coronary artery disease)      CVA (cerebral vascular accident)      DM (diabetes mellitus)      Epilepsy      ESRD (end stage renal disease)      History of renal transplant      Hypertension      Vertigo         Patient Active Problem List   Diagnosis     Essential Thrombocytosis     Osteoarthritis     Insomnia     Thrombophlebitis Of Deep Vessels Of The Lower Extremity     Medullary Sponge Kidney Bilaterally     Obstructive Sleep Apnea     Dyslipidemia     End Stage Renal Disease     Renal Transplant Recipient     Benign Essential Hypertension     Coronary Artery Stenosis     Chronic Diarrhea Of Unknown Origin     Chronic Reflux Esophagitis     Hypertension     Chronic Gout     Congestive Heart Failure     Ischemic Stroke     Type 2 diabetes mellitus     Dysthymic Disorder     Diabetic Peripheral Neuropathy     Anemia     Sensorineural Hearing Loss     Hyperlipidemia     Ataxia     Cardiac pacemaker, dual, in situ       Past Surgical History:   Procedure Laterality Date     CARDIAC PACEMAKER PLACEMENT       HYSTERECTOMY      Age 28     MO APPENDECTOMY      Description: Appendectomy;  Recorded: 11/13/2007;     MO CABG, VEIN, SINGLE      Description: CABG (CABG);  Proc Date: 01/26/2011;  Comments: x 4     MO INS NEW/RPLC PRM PACEMAKER W/TRANSV ELTRD VENTR      Description: Permanent Pacemaker Placement;  Recorded: 08/01/2012;      LA TOTAL ABDOM HYSTERECTOMY      Description: Total Abdominal Hysterectomy;  Recorded: 11/13/2007;     LA TOTAL KNEE ARTHROPLASTY      Description: Total Knee Arthroplasty;  Recorded: 11/13/2007;       Family History   Problem Relation Age of Onset     Cancer Sister      Diabetes Sister        SOCIAL HX: Patient is here with her friend today.  Is .  Denies smoking, alcohol, illicit drug use.    ROS: Positive for easy bruising, poor sleep quality, blood sugar changes.  Specifically negative for bowel/bladder dysfunction, balance changes, headache, dizziness, foot drop, fevers, chills, appetite changes, nausea/vomiting, unexplained weight loss. Otherwise 13 systems reviewed are negative. Please see the patient's intake questionnaire from today for details.    OBJECTIVE:  Visit Vitals     /64 (Patient Site: Left Arm, Patient Position: Sitting)     Pulse 69     Temp 97.7  F (36.5  C) (Oral)     Wt (!) 237 lb (107.5 kg)     SpO2 95%     BMI 43.35 kg/m2       PHYSICAL EXAMINATION:    --CONSTITUTIONAL:  Vital signs as above.  No acute distress.  The patient is well nourished and well groomed.  --PSYCHIATRIC:  Appropriate mood and affect. The patient is awake, alert, oriented to person, place, time and answering questions appropriately with clear speech.    --SKIN:  Skin over the face, bilateral lower extremities, and posterior torso is clean, dry, intact without rashes.    --RESPIRATORY: Normal rhythm and effort. No abnormal accessory muscle breathing patterns noted.   --ABDOMINAL:  Soft, non-distended, non-tender throughout all quadrants.  No pulsatile mass palpated in the left lower quadrant.  --STANDING EXAMINATION:  Normal lumbar lordosis noted, no lateral shift.  --MUSCULOSKELETAL: Lumbar spine inspection reveals no evidence of deformity. Range of motion is not limited in lumbar flexion, extension. No tenderness to palpation lumbar spine. Straight leg raising in the supine position is negative to  radicular pain. Sciatic notch non-tender on the right, significantly tender on the left.  --SACROILIAC JOINT: Positive left distraction.  Positive left Albaro's with reproduction of pain to affected extremity. One Finger point test positive left.  --GROSS MOTOR: Gait is non-antalgic. Easily arises from a seated position. Toe walking and heel walking are normal without significant difficulty.    --LOWER EXTREMITY MOTOR TESTING:  Plantar flexion left 4/5, right 5/5 (chronic ×2 years)  Dorsiflexion left 4/5, right 5/5 (chronic)  Great toe MTP extension left 4/5, right 5/5 (chronic)  Knee flexion left 5/5, right 5/5  Knee extension left 5/5, right 5/5   Hip flexion left 5/5, right 5/5  Hip abduction left 5/5, right 5/5  Hip adduction left 5/5, right 5/5   --HIPS: Full range of motion bilaterally. Negative FABERs on the involved lower extremity.   --NEUROLOGICAL:  2/4 patellar, medial hamstring, and achilles reflexes bilaterally.  Sensation to light touch is intact in the bilateral L4, L5, and S1 dermatomes. Babinski is negative. No clonus.  --VASCULAR:  2/4 dorsalis pedis and posterior tibialsi pulses bilaterally.  Bilateral lower extremities are warm.  There is no pitting edema of the bilateral lower extremities.    RESULTS: Prior medical records from 9/2016 to current were reviewed today.    Imaging: MRI of the lumbar spine was reviewed today. The images were shown to the patient and the findings were explained using a spine model.    Xr Lumbar Spine 2 Or 3 Vws  Result Date: 3/2/2017  INDICATION: Low back pain.COMPARISON: 05/26/2015FINDINGS: 5 lumbar type vertebra. 30 degrees lumbar levocurvature centered at L2-3. Straightened lumbar lordosis. Mild right lateral listhesis at L1-2 and left lateral listhesis at L3-4. Moderate to advanced loss of and vertebral disc height on the right from L1 through L3 and on the left at L4-5. Associated endplate spurring and small multilevel lateral bridging osteophytes. Mild multilevel  lumbar facet arthropathy. Aortic calcifications.This report was electronically interpreted by: Dr. Bj Perea MD ON 03/02/2017 at 11:57    Xr Sacroliliac Joints 1 Or 2 Vws  Result Date: 3/1/2017  INDICATION: Pain in left hip.COMPARISON: None.  FINDINGS: There is sclerosis around the inferior aspect of the left sacroiliac joint consistent with mild to moderate sacroiliitis. There is mild osteoarthritic change in the left hip joint. No fractures or dislocations.This report was electronically interpreted by: Dr. Amado Silveira MD ON 03/01/2017 at 13:02    MR LUMBAR SPINE WO CONTRAST  5/26/2015  INDICATION: Low back pain. The patient endorses left lower extremity paresthesias and weakness.  TECHNIQUE: Routine MRI lumbar spine without intravenous contrast  COMPARISON: None.  FINDINGS: Nomenclature is based on 5 lumbar type vertebral bodies. S-shaped thoracolumbar curvature with approximately 35 degree lower thoracic dextrocurvature and 30 degree lumbar levocurvature. Straightening of usual lumbar lordosis. Minimal   anterolisthesis of L4 upon L5. Mild right lateral listhesis of L1 upon L2 and mild left lateral listhesis of L3 upon L4. No pars defect. The conus tip is identified at mid L1. Mild paraspinal muscular atrophy.   L4-L5: Mild to moderate loss of disc height and signal asymmetric left. Circumferential disc bulge with asymmetric left foraminal lateral disc osteophyte complex. Ligamentum flavum thickening and moderate facet arthropathy. Left lateral recess stenosis with posterior displacement of the left L5 nerve root. Mild central spinal canal stenosis. Moderate left neural foraminal stenosis with some impingement upon the exiting left L4 nerve root. Minimal encroachment upon the inferior right neural foramen.  L5-S1: Minimal disc desiccation and preserved disc height. No focal disc herniation. Mild to moderate facet arthropathy. No spinal canal or neural foraminal stenosis.  CONCLUSION:  1. S-shaped  thoracolumbar curvature with multilevel lumbar spondylosis that is most significant along the concavity the spinal curvature.  2. Mild to moderate trefoil type spinal canal stenosis at L3-L4 with impingement upon traversing L4 nerve roots.  3. Left lateral recess stenosis at L4-L5 with mild central spinal canal stenosis and displacement of the left L5 nerve root.  4. Moderate left neural foraminal stenosis at L4-L5 with impingement upon the exiting left L4 nerve root.  5. Mild spinal canal stenosis at L2-L3 with asymmetric right lateral recess stenosis. Additional right lateral recess stenosis with minimal central canal stenosis at L1-L2.  6. Mild multilevel neural foraminal narrowing as detailed above.

## 2021-06-09 NOTE — PROGRESS NOTES
[facility] Daily Progress     Patient Name: Odalys Miles  Date: 2017  Visit #: 2  Referral Diagnosis: Left low back pain  Referring provider: Renata Ash C*  Visit Diagnosis:     ICD-10-CM    1. Left buttock pain M79.1    2. Generalized muscle weakness M62.81    3. Abnormal posture R29.3          Assessment:     Response to Intervention:  She verbalized clear understanding of HEP, including new exercises.    Symptoms are consistent with:    Patient is appropriate to continue with skilled physical therapy intervention, as indicated by initial plan of care.    Goal Status:  Pt. will be independent with home exercise program in : 4 weeks  Pt will: decrease CARLITOS score by 30% in 8 weeks  Pt will: be able to walk for 10 minutes in 10 weeks  Pt will: be able to  kitchen for 5 minutes to cook at home meals in 8 weeks      Plan / Patient Education:     Continue with initial plan of care.  Progress with home program as tolerated.      Subjective:     Pain Ratin    Odalys's back pain is better today, but she has had some health issues over the weekend.    She was admitted to the hospital Saturday and .  She had a bladder infection.  Her initial blood sugar was 47.  Initial core temperature was 87.    She is on prednisone currently and her back has been feeling better, but she is shaky.    She has had some stomach issues and diarrhea.  She is not feeling like doing any strenuous activity.      Objective:           Shortened session secondary to illness.    Exercises:  Exercise #1: Seated B Hip ABD and ADD isometrics, verbal review  Comment #1: Supine Piriformis stretch 2 x 30 seconds/leg, verbal review  Exercise #2: supine hamstring nerve glide x 10 bilateral HEP  Comment #2: LAQ nerve glide x 10 bilateral HEP       Treatment Today    TREATMENT MINUTES COMMENTS   Evaluation     Self-care/ Home management     Manual therapy  See above.   Neuromuscular Re-education     Therapeutic  Activity     Therapeutic Exercises 20 See exercise flow-sheet for details.    Gait training     Modality__________________                Total 20    Blank areas are intentional and mean the treatment did not include these items.       Jagdish Myles, PT  4/6/2017

## 2021-06-09 NOTE — PROGRESS NOTES
"Odalys Miles is a 74 y.o. female who is being evaluated via a billable video visit.      The patient has been notified of following:     \"This video visit will be conducted via a call between you and your physician/provider. We have found that certain health care needs can be provided without the need for an in-person physical exam.  This service lets us provide the care you need with a video conversation.  If a prescription is necessary we can send it directly to your pharmacy.  If lab work is needed we can place an order for that and you can then stop by our lab to have the test done at a later time.    Video visits are billed at different rates depending on your insurance coverage. Please reach out to your insurance provider with any questions.    If during the course of the call the physician/provider feels a video visit is not appropriate, you will not be charged for this service.\"    Patient has given verbal consent to a Video visit? Yes              Video-Visit Details    Type of service:  Video Visit    Originating Location (pt. Location): [facility]    Distant Location (provider location):  [facility]ASCULAR [facility]      Platform used for Video Visit: Doximity     Referred by ID- Right second toe gangrene.      Renata Carlisle RN  "

## 2021-06-09 NOTE — TELEPHONE ENCOUNTER
Who is calling:  Lisseth with [employer], [phone]  Reason for Call:    Lisseth is requesting a Covering Provider fill out Cause of Death information.  Please reach out to Lisseth and let her know who the Covering Provider is so she can send the email to Covering Provider.  Thank you  Date of last appointment with primary care: 3/23/2020  Okay to leave a detailed message: Yes      Patient needs to be cremated today as her  is tomorrow, 2020.    Priority message per  Home request.

## 2021-06-09 NOTE — OP NOTE
[facility] Interventional Radiology vascular surgery procedure    Date: 06/23/20    Procedures Performed:  Ir Extremity Angiogram Right from the aorta level via left groin approach    Ultrasound-guided vascular access    Angioplasty right anterior tibial artery    Angioplasty right tibioperoneal trunk and peroneal artery    Selective right leg angiogram with distal most catheter position in greater than third order peroneal artery.  This is included with the angioplasty    Provider:  Alison Au MD    Indication:  This is a 74-year-old woman with end-stage renal failure on hemodialysis and with diabetes and a nonhealing right foot wound with a gangrenous toe.  Toe pressures read as 25 and CTA suggesting significant tibial vessel disease.  Plan for an intervention to see if there is a target for revascularization improve healing.    Sedation:  Moderate Sedation: The procedure was performed with administration of intravenous conscious sedation with appropriate preoperative, intraoperative, and postoperative evaluation.  120 minutes of supervised face to face intraservice time was provided by a radiology nurse under my direct supervision.  Versed 3 mg fentanyl 150 mcg given.    Antibiotics:  None    Fluoroscopic Time:  41.9 Minutes    Radiation Dose:  605 mGy    Contrast:  Cc Visipaque    Procedure:    Ultrasound was used to evaluate the left groin.  The selected vessel was the common femoral artery which had significant posterior wall calcification and some areas of anterior wall calcification, but was patent. Ultrasound was then used for real-time ultrasound guided needle entry of the common femoral artery. Permanent images were recorded and saved to the patient's medical record.    Micropuncture technique was used to deliver a stiff angle Glidewire which was in turn used to deliver a 4 Welsh sheath.  Omni Flush catheter was advanced to the lower abdominal aorta and her initial aortogram was  performed    Findings: Widely patent lower abdominal aorta, common iliac arteries, internal iliac arteries, and external iliac arteries bilaterally.  Acute angle to the aortic bifurcation.  Patent common femoral arteries bilaterally.    Wire and catheter were then used to select the aortic bifurcation and advanced a wire down to the common femoral artery level on the right side.  A right leg angiogram was performed from this level.    Findings: Patent profunda femoris artery and superficial femoral artery.  Patent popliteal artery poorly visualized due to the knee prosthesis but with brisk flow through it suggesting no disease.  Advanced tibial vessel disease with all 3 tibial vessels eventually occluding.  Extremely advanced disease.  Visualization was poor from the groin level    At this point we advanced a wire into the SFA and used to exchange in a CXI catheter.  With this advanced under the popliteal level we performed selective angiography of the lower leg.    Findings: Anterior tibial artery extends beyond his elbow and then abruptly occludes and then is a beaded vessel with occasional reconstitutions that completely stops before the ankle.  Tibioperoneal trunk leads to the peroneal artery which has a focal critical angle origin with an near occlusion in it.  The peroneal artery beyond this near occlusion then is a dominant vessel down to the ankle with wispy collaterals into the foot.  The posterior tibial artery is occluded at its origin.  Does not reconstitute.     At this point the patient was heparinized and we exchanged in a 90 cm 4 American sheath to the above-knee popliteal level.  We then selectively cannulated the peroneal artery origin.  This cannulation resulted in a focal dissection and so we aborted.  When turned our attention to the entry tibial artery and advanced down it always.  We could not get our catheter past about MCC point although the wire advanced nearly to the ankle.  Given this  we exchanged and an 014 wire and exchanged through that a 2 mm diameter by 150 mm length angioplasty balloon.  Performed the same single inflation of the entire anterior tibial artery from its elbow down to this point with the angioplasty balloon taken to 8 milagro of pressure and held up for 2 minutes.  The balloon was then taken down.  We performed angiography from the popliteal level.    No real improvement in the anterior tibial artery.  If anything flow looked more sluggish now in the lower leg than before.    I concern given this that there might be microembolization down the leg although I did not see any other compromise.  And the flow was visible although slow.  We therefore brought in some TPA and introduced a milligrams of TPA from the popliteal artery level down the leg.  We also went back to the peroneal artery and recannulated this time and a true lumen channel using a gold tip 018 Glidewire.  With the wire down into the vessel we were able to get our catheter into the origin of the vessel but not beyond it.  This was an 018  cm catheter.  Given that we could not advance it past this we exchanged in stead a 2 mm x 120 mm 0.18 angioplasty balloon.  This crossed that stenosis was able to advance about 6 cm into the peroneal artery.  This allowed us to balloon angioplasty the 2 peroneal trunk and peroneal artery.  Prior to doing this we took the wire out of the balloon and perform angiogram from the balloon to confirm that we were in the true lumen of the peroneal artery.     Findings: True Lumen location of the catheter tip in the peroneal artery with wispy flow down the peroneal artery toward the ankle.     We introduced 1 mg of TPA down the balloon catheter so that it would good on the peroneal artery distribution.    We now put the wire back in the balloon we took the balloon up to 10 milagro of pressure and held it up for 2 minutes.  The balloon was then taken back and an angiogram performed from the  popliteal level.  We will perform another angiogram from the popliteal sheath level    Findings: Resolution of the critical focal stenosis in the peroneal artery with good flow through the peroneal to where the ankle but again little flow in the foot.  Introduced 3 mg of TPA through the popliteal artery sheath to help with any microembolization that may have happened.    We performed another angiogram from the sheath level    Unchanged angiogram with dominant peroneal artery flow toward the ankle but again little feeling in the foot.    This point I felt we done all we could for this patient's critical situation we removed our sheaths and catheters and deployed a 6 Zimbabwean Angio-Seal to for closure of the groin access.  Completion duplex of the Angio-Seal showed good positioning of the indices on the anterior wall of the artery and flow beyond.    The patient's right foot was quite warm with good cap refill in the sole of the foot.        Impression:  Advanced tibial disease.  Successful treatment of a very focal occlusion in the origin of the peroneal artery which is the dominant flow to where the ankle.  Also angioplasty of the anterior tibial artery which may improve flow through collaterals but which did not reach all the way down to the ankle.  Concern for microembolization treated with a total of 4 mg of TPA.  Successful access site closure.      Alison Au  Vascular Surgery

## 2021-06-09 NOTE — PROGRESS NOTES
" VASCULAR SURGERY OUTPATIENT VIRTUAL CONSULT OR VISIT   VASCULAR SURGEON: Alison Au MD    LOCATION:  Virtual visit done using telephone    Odalys Miles   Medical Record #:  [id number]  YOB: 1945  Age:  74 y.o.     Date of Service: 6/18/2020    PRIMARY CARE PROVIDER: Mika Ha MD      Reason for consultation: Evaluation for right second toe ischemia    IMPRESSION: Gangrenous and magnifying second toe of the right foot.  Also an ulcer of the first toe on the same side.  Toe pressures of any 24 on the right side on her visit in February although they were in the 90s in July 2019 suggesting that 1 of the studies is likely erroneous.  CT performed and appears to show normal flow to the level of the knee.  Tibial vessels also appear to likely be patent with dominant anterior tibial artery on the right but visualization is too poor to be confident that there is not a stenosis that can be treated.  MRI shows osteomyelitis in the toe.  Dialysis dependent now and so nephropathy with the not an issue.  Also needs evaluation for fistula placement: She had a fistula 10 years ago in the left forearm    RECOMMENDATION: Patient with gangrenous right second toe and most recently critically low toe pressures warranting angiogram and possible intervention likely focused on the right tibial vasculature.  Once this is all resolved and after toe amputation and healing we will look into placing a fistula for her.  As part of this we will get a vein mapping for her visit 2 weeks after angiointervention.      HPI:  Odalys Miles is a 74 y.o. female who was evaluated today for second toe dry gangrene and first toe ulcer on the right side.  Patient tells me that this started out as a very small wound on the first toe and \"spread\" to the second toe.  The second toe dramatically worsened although the first toe has remained with a small ulcer.  Prior to this she had not had wounds.  She was eventually put through an " MRI which showed osteomyelitis and sent infectious disease sending her to us for treatment of her ischemia.    This is a patient who has a longstanding chronic renal failure of unclear etiology.  She did have a left forearm fistula but got a kidney transplant about 10 years ago.  Fistula then spontaneously occluded.  In April things have deteriorated with a kidney transplant to the point that she required admission and hemodialysis through a [facility].  She is now chronically on hemodialysis.  During that admission she had C. difficile colitis with bleeding and underwent EGD which was negative.  She also underwent angiography for possible coiling the ligation but no target was found.      She is still getting steroids and cyclosporine however and so remains immunosuppressed some degree.    In addition to all this she carries a diagnosis of adrenal suppression.  She had a coronary artery bypass in 2011 and has a pacemaker.  No recent heart issues and she has been followed by cardiology.  Quit smoking in 1985.    Kidney transplant 10 years. Failed in April. On hemodialysis.  Had fistula  I left arm forearm.  No longer works.  Right handed. Kidney issues whole like.     Cardiac bypass 2011. Pacemaker.  No other heart issues.  Followed by cardiologist.   20 years ago she had a stroke which was with symptoms of not being able to read words on a computer screen.  This resolved spontaneously and work-up did not result in identification of a cause.      Quit smking 1985.     FAmily hx negative for PAD.      No other new health issues.  Currently not having any diarrhea or urinary symptoms.  No cough or shortness of breath.  No chest pain.  No new visual or neurologic symptoms.  No new rashes or other concerns.    PHH:    Past Medical History:   Diagnosis Date     Adrenal insufficiency (H)      Anemia      Anxiety      Arthritis      Ataxia 5/12/2015     CAD (coronary artery disease)      Cardiac pacemaker, dual, in situ  12/7/2016    [employer] Revo MRI DOI: 12/15/2011 Dr. Aishwarya Treviño     Chronic Diarrhea Of Unknown Origin     Created by Conversion      Chronic Gout     Created by Conversion  Replacement Utility updated for latest IMO load     Chronic Reflux Esophagitis     Created by Conversion      Congestive Heart Failure     Created by Conversion  Replacement Utility updated for latest IMO load     Coronary Artery Stenosis     Created by Conversion [facility] Annotation: Feb 27 2011 11:37PM - Alina Zapien: s/p CABGx4  1/11  Replacement Utility updated for latest IMO load     CVA (cerebral vascular accident) (H)      Depression      Diabetic Peripheral Neuropathy     Created by Conversion      DM (diabetes mellitus) (H)      Dysthymic disorder     Created by Conversion      Epilepsy (H)      ESRD (end stage renal disease) (H)      Essential Thrombocytosis     Created by Conversion      History of renal transplant      Hyperlipidemia      Hypertension      Insomnia     Created by Conversion      Ischemic Stroke     Created by Conversion  Replacement Utility updated for latest IMO load     Medullary Sponge Kidney Bilaterally     Created by Conversion      Morbid obesity (H) 8/29/2018     Neuropathy      Nonintractable epilepsy without status epilepticus, unspecified epilepsy type (H) 8/29/2018     CULLEN on CPAP     Created by Conversion      Osteoarthritis     Created by Conversion  Replacement Utility updated for latest IMO load     Renal Transplant Recipient     Created by Conversion      Sensorineural hearing loss     Created by Conversion  Replacement Utility updated for latest IMO load     Thrombophlebitis Of Deep Vessels Of The Lower Extremity     Created by Conversion      Type 2 diabetes mellitus (H)     Created by Conversion      Vertigo         Past Surgical History:   Procedure Laterality Date     APPENDECTOMY       CARDIAC PACEMAKER PLACEMENT       CORONARY ARTERY BYPASS GRAFT  01/26/2011    Comments: x 4      HYSTERECTOMY  1973     IR EXTREMITY ANGIOGRAM LEFT  1/30/2019     IR MESENTERIC ANGIOGRAM  4/8/2020     IR TUNNELED CATHETER INSERT  4/6/2020     NEPHRECTOMY TRANSPLANTED ORGAN  06/2011    took out both kidneys and put in right sided kidney     NY ESOPHAGOGASTRODUODENOSCOPY TRANSORAL DIAGNOSTIC N/A 4/7/2020    Procedure: ESOPHAGOGASTRODUODENOSCOPY (EGD);  Surgeon: Art Ashraf MD;  Location: [facility] [place];  Service: Gastroenterology     NY TOTAL KNEE ARTHROPLASTY Bilateral        ALLERGIES:  Blood-group specific substance; Lisinopril; Mold/mildew; and Latex    MEDS:    Current Outpatient Medications:      acetaminophen (TYLENOL) 500 MG tablet, Take 1 tablet (500 mg total) by mouth every 6 (six) hours as needed., Disp: , Rfl: 0     aspirin 81 MG EC tablet, Take 1 tablet (81 mg total) by mouth daily., Disp: , Rfl:      azaTHIOprine (IMURAN) 50 mg tablet, Take 50 mg by mouth at bedtime. , Disp: , Rfl:      carvediloL (COREG) 25 MG tablet, Take 1 tablet (25 mg total) by mouth 2 (two) times a day with meals., Disp: 360 tablet, Rfl: 3     cholecalciferol, vitamin D3, 2,000 unit Tab, Take 2,000 Units by mouth daily., Disp: , Rfl:      clopidogreL (PLAVIX) 75 mg tablet, Take 1 tablet (75 mg total) by mouth daily., Disp: 90 tablet, Rfl: 3     cyanocobalamin, vitamin B-12, 2,500 mcg Tab, Take 2,500 mcg by mouth daily. , Disp: , Rfl:      cycloSPORINE modified (GENGRAF) 25 MG capsule, Take 1 capsule (25 mg total) by mouth 2 (two) times a day., Disp: , Rfl: 0     flash glucose scanning reader (FREESTYLE FELA 14 DAY READER) Misc, Use 1 application As Directed daily., Disp: 1 each, Rfl: 0     flash glucose sensor (FREESTYLE FELA 14 DAY SENSOR) Kit, Use 1 application As Directed every 14 (fourteen) days., Disp: 6 kit, Rfl: 3     isosorbide mononitrate (IMDUR) 60 MG 24 hr tablet, Take 1 tablet (60 mg total) by mouth 2 (two) times a day., Disp: 180 tablet, Rfl: 3     lamoTRIgine (LAMICTAL) 100 MG tablet, Take 1 tablet (100  "mg total) by mouth 2 (two) times a day., Disp: , Rfl: 0     NOVOLOG U-100 INSULIN ASPART 100 unit/mL injection, Three times a day with meals BG < 70 mg/dL: Treat, and call MD BG  mg/dL: None - 0 Units -180 mg/dL: 1 unit -220 mg/dL: 2 units -260 mg/dL: 3 units -300 mg/dL: 4 units -340 mg/dL: 5 units -400 mg/dL: 6 units BG > 400 mg/dL: 7 units and Call MD, Disp: 10 mL, Rfl: PRN     pantoprazole (PROTONIX) 40 MG tablet, Take 40 mg by mouth 2 (two) times a day., Disp: , Rfl:      pen needle, diabetic (BD ULTRA-FINE TWILA PEN NEEDLES) 32 gauge x 5/32\" Ndle, Use 1 per day., Disp: 200 each, Rfl: 4     predniSONE (DELTASONE) 5 MG tablet, Take 5 mg by mouth daily., Disp: , Rfl:      rosuvastatin (CRESTOR) 5 MG tablet, Take 5 mg by mouth at bedtime., Disp: , Rfl:      senna-docusate (PERICOLACE) 8.6-50 mg tablet, Take 1 tablet by mouth 2 (two) times a day., Disp: , Rfl: 0     traZODone (DESYREL) 50 MG tablet, Take 100 mg by mouth at bedtime., Disp: , Rfl:      venlafaxine (EFFEXOR-XR) 75 MG 24 hr capsule, Take 1 capsule (75 mg total) by mouth daily with breakfast., Disp: 90 capsule, Rfl: 3  No current facility-administered medications for this visit.     SOCIAL HABITS:    Social History     Tobacco Use   Smoking Status Former Smoker     Packs/day: 1.50     Years: 20.00     Pack years: 30.00     Types: Cigarettes   Smokeless Tobacco Never Used       Social History     Substance and Sexual Activity   Alcohol Use Yes    Comment: occasional       Social History     Substance and Sexual Activity   Drug Use No       FAMILY HISTORY:    Family History   Problem Relation Age of Onset     Diabetes Sister      Breast cancer Sister 62     Cancer Father        REVIEW OF SYSTEMS:    A 12 point ROS was reviewed and except for what is listed in the HPI above, all others are negative    PE:  LMP 07/23/1974   Wt Readings from Last 1 Encounters:   06/16/20 154 lb (69.9 kg)     There is no height or " weight on file to calculate BMI.    EXAM:  EXAM LIMITED AS THIS IS A VIRTUAL VISIT by telephone    DIAGNOSTIC STUDIES:     Images:  Cta Abdominal Aorta Iliofemoral Runoff    Result Date: 6/18/2020  [place] [facility] LOCATION: [facility] [place] DATE: 6/17/2020 CTA ABDOMEN PELVIS LOWER EXTREMITY    INDICATION: PAD TECHNIQUE: Routine CTA abdomen, pelvis, and lower extremity. 2D and 3D reconstructions were performed by the CT technologist. Dose reduction techniques were used. CONTRAST: Iohexol (Omni) 100 mL ml COMPARISON: None FINDINGS: CTA: Scattered calcified plaque supraceliac aorta. Calcified plaque at the origins of the celiac, SMA and JARROD. Calcified plaque at the origins of the renal arteries bilaterally. Calcified plaque infrarenal abdominal aorta with associated tortuosity. No evidence of abdominal aortic aneurysm. Diffuse calcified plaque common iliac, external iliac and internal iliac arteries bilaterally. Transplant renal artery arising from the distal right external iliac artery. Diffuse calcified plaque right common femoral, right profunda femoral and right superficial femoral arteries. Artifact from right total knee prosthesis partially obscures the right popliteal artery. Diffuse calcified plaque right popliteal artery. Diffuse calcification of the right anterior tibial artery with areas of short segment occlusion. Right dorsalis pedis artery patent. Right peroneal artery patent. Diffuse calcified plaque left posterior tibial arteries with areas of short segment occlusion. Plantar arch appears patent. Diffuse calcified plaque left common femoral, left profunda femoral and left superficial femoral arteries. Artifact from left total knee listhesis partially secures the left popliteal artery. Diffuse calcified plaque left popliteal artery. Diffuse calcification of the left anterior tibial artery with areas of short segment occlusion. Left dorsalis pedis artery patent. Left peroneal artery patent. Diffuse  calcified plaque left posterior tibial arteries with area of short segment occlusion. LUNGS: Large right pleural effusion with associated atelectasis. Tiny left pleural effusion. ABDOMEN: The exam was optimized for CTA, and evaluation of solid organs is suboptimal. The liver, gallbladder, pancreas, spleen and adrenal glands are unremarkable. Atrophy of the kidneys bilaterally with cyst lower pole left kidney. This is of no clinical significance.  PELVIS: Transplant kidney right pelvis. Bladder is decompressed.     CONCLUSION: 1.  DIFFUSE CALCIFICATION OF THE PERIPHERAL VESSELS LIKELY RELATED TO THE PATIENT'S RENAL DISEASE. 2.  SINGLE VESSEL RUNOFF BILATERALLY VIA THE PERONEAL ARTERIES. RECONSTITUTION OF THE DORSALIS PEDIS ARTERIES BILATERALLY. 3.  DIFFUSE ATROPHY OF THE KIDNEYS BILATERALLY. TRANSPLANT KIDNEY RIGHT PELVIS WITH TRANSPLANT RENAL ARTERY ARISING FROM THE DISTAL RIGHT EXTERNAL ILIAC ARTERY.      I personally reviewed the images and my interpretation is that her noninvasive studies suggest inadequate blood supply for healing of her wounds with toe pressure of 24 mmHg on the right side.  I am concerned this is falsely low given that her toe pressures were normal in just July of last year.  CT does not clearly demonstrate any blockage although visualization of her tibial vessels is poor..    LABS:      Sodium   Date Value Ref Range Status   04/12/2020 134 (L) 136 - 145 mmol/L Final   04/11/2020 132 (L) 136 - 145 mmol/L Final   04/10/2020 134 (L) 136 - 145 mmol/L Final     Potassium   Date Value Ref Range Status   04/12/2020 3.7 3.5 - 5.0 mmol/L Final   04/11/2020 4.1 3.5 - 5.0 mmol/L Final   04/10/2020 3.9 3.5 - 5.0 mmol/L Final     Chloride   Date Value Ref Range Status   04/12/2020 96 (L) 98 - 107 mmol/L Final   04/11/2020 97 (L) 98 - 107 mmol/L Final   04/10/2020 99 98 - 107 mmol/L Final     BUN   Date Value Ref Range Status   04/12/2020 9 8 - 28 mg/dL Final   04/11/2020 19 8 - 28 mg/dL Final   04/10/2020  14 8 - 28 mg/dL Final     Creatinine   Date Value Ref Range Status   04/12/2020 2.25 (H) 0.60 - 1.10 mg/dL Final   04/11/2020 3.08 (H) 0.60 - 1.10 mg/dL Final   04/10/2020 2.10 (H) 0.60 - 1.10 mg/dL Final     Hemoglobin   Date Value Ref Range Status   04/12/2020 7.7 (L) 12.0 - 16.0 g/dL Final   04/11/2020 7.7 (L) 12.0 - 16.0 g/dL Final   04/10/2020 8.4 (L) 12.0 - 16.0 g/dL Final     Platelets   Date Value Ref Range Status   04/07/2020 205 140 - 440 thou/uL Final   04/06/2020 212 140 - 440 thou/uL Final   04/06/2020 220 140 - 440 thou/uL Final     BNP   Date Value Ref Range Status   04/05/2020 2,038 (H) 0 - 137 pg/mL Final   02/27/2020 3,048 (H) 0 - 137 pg/mL Final   02/06/2020 2,519 (H) 0 - 137 pg/mL Final     INR   Date Value Ref Range Status   04/06/2020 1.32 (H) 0.90 - 1.10 Final   02/27/2020 1.25 (H) 0.90 - 1.10 Final   08/17/2019 1.02 0.90 - 1.10 Final       This was a telephone based visit with start time of 2:27pm and end time of 2:39pm    Alison Au MD  VASCULAR SURGERY

## 2021-06-09 NOTE — PROGRESS NOTES
New patient evaluation  --PCP referred   --To review xray lumbar spine and SI 3/1/17  --C/O left low back pain that started 9/2016   --No known injury  --Rates pain 9/10  --No hx of back surgery, injection, PT  --MRI lumbar spine 5/26/2015    Medication  --No pain meds due to kidney transplant per pt, restricted

## 2021-06-09 NOTE — PROGRESS NOTES
"S: Patient is a 73 y/o female, 5'1\", 153 lbs seen at [facility], room 220, for the fitting and delivery of a MaxTrax Air ankle walker, size small short, for his right side on orders from Dr. Matthew DPM for the treatment of Dx: Amputation of first and second digits of right foot.     O: Patient presented supine and sleeping in her hospital bed at the time of my arrival. Patient slept through most of our visit.      G: Patient's CAM boot will support, stabilize, and immobilize their right foot and ankle in order to allow healing to occur during their recovery period.      A: I fit the patient with a size small/short CAM boot.  Patient and I discussed necessary care and use instructions and they were provided with written care and use instructions, although patient was in and out of sleep during our discussion.  Patient reported they understood all instructions and all of their questions were answered during the course of our visit.      P: Patient was given my card and asked to call our office if there are any issues or questions regarding their CAM boot in the future.  "

## 2021-06-09 NOTE — TELEPHONE ENCOUNTER
Called [facility]. They stated that the hospice provider is Dr. Lorie Ang. However they stated that they could not give me her email. I was told to ask the  home to call the # for [facility] and to have them ask for Tona in Triage. They also gave me their fax #: [phone].     [facility] informed and confirmed not needing anything else at this time.

## 2021-06-10 NOTE — PROGRESS NOTES
Assessment: Odalys is a very pleasant lady with her friend today to help her digest some information on tighter diabetes management.  Today we found that at some point in the last year or two she became confused on when to take novolin N & R at the appropriate times.  She has been taking N at meals and R in the morning and night time.  This is most likely the cause for lows taking 30 units of R in the night and morning in the past.  Today we went over this many times and labeled her vials.  I marked how R means rapid and is orange because high doses can be dangerous, N was marked blue and is for night time.  She states she will be picking up her insulin pens later today so she was informed that orange - novolog means rapid and at meals still.  She will be taking toujeo when she picks it up and starting at a dose of 40 nightly.  Today at her appointment I had her check her blood sugar and it was 109.  She was feeling light headed so I gave her a granola bar to help her feel normal again.  I reviewed bringing glucose tabs, juice, or bars to bring her sugars up whenever she does feel sweaty or shakey.  She states usually in the past that feeling means that it is just time to eat.  So we talked about portion sizing to raise her blood sugar a mild amount in those situations.  We discussed injection sites and letting bruised or red skin heal & finding new healthier looking skin sites to inject.  She is on a sliding scale of 2 units for every 40 >141 until 300+ where she will give herself 9 units.  Along with this she will take 8, 12, or 16 units based on a small, medium, or large meal scale.  Her scale was lowered because she has been taking novolin N for meals this past year.  She had some higher readings before dinner so I suggested giving herself 3 extra units before each dinner to start to bring those numbers down.  Today we reviewed diabetes pathophysiology, A1C & bg goals, preventing other future complications,  hypo/hyperglycemia signs & symptoms, nutritional label reading, portion sizing, & carb counting.  She states she has contacted [employer] for their continuous glucose monitor.  I feel that this will be very helpful to prevent lows and very high blood sugars.    Plan: Start toujeo at 40 units nightly.  She will need help titrating this dose for her fasting blood sugars to be <130.  She will start using novolog at meals as described above and give 3 extra units for her dinners.  She will meet with endocrinology in 1 week and then with me the day after to further titrate her insulin doses.  She will start to count carbs and reduce the amount she eats at mealtimes.  She knows therefore that low carb meals would be considered a small meal and not to inject as much insulin for them.  She will continue to follow-up with [employer] and ask Dr. Potts to help her with that at their next visit.    Subjective and Objective:      Odalys Miles is referred by Jamila Lechuga for Diabetes Education.     Lab Results   Component Value Date    HGBA1C 8.7 (H) 12/16/2016         Current diabetes medications:  Novolin N & R mistakenly used as R for night time and N for meals.    Goals        Patient Stated      Nutrition (pt-stated)            04/14/17:    She will start to cut down on carbs in her meals so she can use less insulin and lose weight.         Other      Medication            New Goals of 04/14/17:    Take toujeo or novolin N at the correct prescribed times.  Take novolog or novolin R before meals or for correction of BGs.              Follow up:   Endocrinology, in 1 week  CDE (certified diabetic educator)      Education:     Monitoring   Meter (per above goals): Assessed and Discussed  Monitoring: Assessed and Discussed  BG goals: Assessed and Discussed    Nutrition Management  Nutrition Management: Assessed and Discussed  Weight: Assessed, Discussed and Literature provided  Portions/Balance: Assessed, Discussed and Literature  provided  Carb ID/Count: Assessed, Discussed and Literature provided  Label Reading: Assessed, Discussed and Literature provided  Heart Healthy Fats: Assessed and Discussed  Menu Planning: Assessed and Discussed  Dining Out: Assessed and Discussed  Physical Activity: Assessed and Discussed  Medications: Assessed and Discussed  Orals: Discussed  Injected Medications: Assessed, Discussed and Literature provided   Storage/Exp:Assessed and Discussed   Site Rotation: Assessed, Discussed and Literature provided   Sites Assessed: yes    Diabetes Disease Process: Assessed, Discussed and Literature provided    Acute Complications: Prevent, Detect, Treat:  Hypoglycemia: Assessed, Discussed and Literature provided  Hyperglycemia: Assessed, Discussed and Literature provided  Sick Days: Discussed  Driving: Not addressed    Chronic Complications  Foot Care:Assessed and Discussed  Skin Care: Not addressed  Eye: Assessed, Discussed and Literature provided  ABC: Assessed and Discussed  Teeth:Not addressed  Goal Setting and Problem Solving: Assessed and Discussed  Barriers: Assessed and Discussed  Psychosocial Adjustments: Assessed and Discussed      Time spent with the patient: 60 minutes for diabetes education and counseling.   Previous Education: yes  Visit Type:DSMT  Hours Remaining: DSMT 8 and MNT 3      Lb Turner  4/17/2017

## 2021-06-10 NOTE — PROGRESS NOTES
Assessment: I introduced Odalys to her nemesis (the scale) today, and had her make nice and apologize to it.  She has started to lose weight and no longer hates to weigh herself as of today.  (4-5lbs).  She just met with Endocrinology - Dr. Potts yesterday and he made a couple minor changes.  Odalys's big question today was about diet coke and what that does to her sugars and body.  I asked her if she wants to work for coke, or be their income for the rest of her life.  I believe this got more of a message across and stated that she will start work it out of her diet.  I believe it is empowering not to be dependent on something that is not helping her body and stated even if it isn't raising her sugars, it isn't helping with her health so why would she endorse such a company.  She will start to substitute it with non sweetened iced tea and drink more water as that's what her body is made up of.  She is titrating her meal dosing appropriately and her blood sugars have dropped down into range dramatically.  The last week her fasting have all been <115 and mostly <100.  She goes without breakfast and tests before lunch, dinner, and before bed.  Her bedtime blood sugars are the most elevated from 150-200 so I told her to inject 2 more units than she normally would for every dinner.  She was told by endocrine to stop her bedtime dose, but I believe this neglects the occassional BGs in the 200-300s.  I encouraged her to still dose, but to dose half of what her sliding scale says.  We discussed basal insulin titration today and will follow up next appointment if she understands this correctly.  She is much more empowered with her michele with diabetes and she is doing very well.  She understands to lower her dose by 2 units if she starts to break into the 70s or if she goes really low carb which put her in the hospital.  Her goal is to save her kidneys and to learn more self management to continually keep her a1c under 9.3.   She was advised to have greek yogurt by her nurse and I went into healthy bacteria and how they can help her break down foods and get the proper nutrition from what she is eating.    Plan: 2+ units to her novolog at dinners.  1/2 the dose of novolog at bedtime.  Continue 40 units of lantus at nighttime.  Continue lowering her carb portions and losing weight.  She knows what to look for with patterns now and I will test to see how well she understands this at our next appointment.  Follow up in 1 month.    Subjective and Objective:      Odalys Miles is referred by Jamila Lechuga for Diabetes Education.     Lab Results   Component Value Date    HGBA1C 9.3 (H) 04/24/2017         Current diabetes medications:  novolog & lantus 40 - units    Goals        Patient Stated      Nutrition (pt-stated)            04/14/17:    She will start to cut down on carbs in her meals so she can use less insulin and lose weight.         Other      Medication            New Goals of 04/14/17:    Take toujeo or novolin N at the correct prescribed times.  Take novolog or novolin R before meals or for correction of BGs.              Follow up:   CDE (certified diabetic educator)      Education:     Monitoring   Meter (per above goals): Assessed and Discussed  Monitoring: Assessed and Discussed  BG goals: Assessed and Discussed    Nutrition Management  Nutrition Management: Assessed and Discussed  Weight: Assessed, Discussed and Literature provided  Portions/Balance: Assessed and Discussed  Carb ID/Count: Assessed and Discussed  Label Reading: Assessed and Discussed  Heart Healthy Fats: Assessed and Discussed  Menu Planning: Assessed and Discussed  Dining Out: Assessed and Discussed  Physical Activity: Assessed and Discussed  Medications: Assessed and Discussed  Orals: Assessed and Discussed  Injected Medications: Assessed and Discussed   Storage/Exp:Assessed and Discussed   Site Rotation: Not addressed   Sites Assessed: no    Diabetes  Disease Process: Assessed and Discussed    Acute Complications: Prevent, Detect, Treat:  Hypoglycemia: Assessed and Discussed  Hyperglycemia: Assessed and Discussed  Sick Days: Assessed and Discussed  Driving: Not addressed    Chronic Complications  Foot Care:Assessed and Discussed  Skin Care: Not addressed  Eye: Assessed and Discussed  ABC: Assessed and Discussed  Teeth:Not addressed  Goal Setting and Problem Solving: Assessed and Discussed  Barriers: Assessed and Discussed  Psychosocial Adjustments: Assessed and Discussed      Time spent with the patient: 60 minutes for diabetes education and counseling.   Previous Education: yes  Visit Type:DSMT  Hours Remaining: DSMT 7 and MNT 3      Lb Turner  4/25/2017

## 2021-06-10 NOTE — PROGRESS NOTES
Progress Note    Reason for Visit:  Chief Complaint     Diabetes Mellitus          Progress Note:    HPI:       This pleasant 71-year-old female patient is a new patient to me she is here because of type 2 diabetes she has been diabetic for 20 years and on insulin for the last 15 years.    The patient had a renal transplant.  Because of medullary sponge kidney.      Leslie is currently using Lantus 40 units at at bedtime and NovoLog before meals. She takes 13 units for light meal, 17 units for regular meal and 22 units for large meal plus a correction of 2 units for every 40 mg percent the blood sugar is greater than 140. She checks her blood sugars 4 times a day and did bring in her logbook for review. She is having absolutely no hypoglycemia. In general her blood sugars are elevated after eating. Fasting blood sugars are quite acceptable. She feels well. Her memory is improved. She has no symptoms referable to her eyes or feet     Component      Latest Ref Rng & Units 4/2/2017 4/3/2017 4/3/2017 4/3/2017          12:08 PM  9:28 AM 10:01 AM 12:45 PM   Anion Gap, Calculation      5 - 18 mmol/L   12    Glucose      70 - 125 mg/dL   115    BUN      8 - 28 mg/dL   32 (H)    Creatinine      0.60 - 1.10 mg/dL   1.94 (H)    GFR MDRD Af Amer      >60 mL/min/1.73m2   31 (L)    GFR MDRD Non Af Amer      >60 mL/min/1.73m2   25 (L)    Bilirubin, Total      0.0 - 1.0 mg/dL   0.3    Calcium      8.5 - 10.5 mg/dL   9.4    Protein, Total      6.0 - 8.0 g/dL   6.5    ALBUMIN      3.5 - 5.0 g/dL   3.3 (L)    Alkaline Phosphatase      45 - 120 U/L   59    AST      0 - 40 U/L   17    ALT      0 - 45 U/L   13    Cholesterol      <=199 mg/dL       Triglycerides      <=149 mg/dL       HDL Cholesterol      >=50 mg/dL       LDL Calculated      <=129 mg/dL       Patient Fasting > 8hrs?             Hemoglobin A1c      3.5 - 6.0 %       TSH      0.30 - 5.00 uIU/mL       Free T4      0.7 - 1.8 ng/dL       Lactic Acid      0.5 - 2.2 mmol/L   2.2     Glucose, POC      mg/dL 313 95  208   Phosphorus      2.5 - 4.5 mg/dL           Review of Systems:    Nervous System: No headache, dizziness, fainting or memory loss. No tingling sensation of hand or feet.  Ears: No hearing loss or ringing in the ears  Eyes: No blurring of vision, redness, itching or dryness.  Nose: No nosebleed or loss of smell  Mouth: No mouth sores or loss of taste  Throat: No hoarseness or difficulty swallowing  Neck: No enlarged thyroid or lymph nodes.  Heart: No chest pain, palpitation or irregular heartbeat. No swelling of hands or feet  Lungs: No shortness of breath, cough, night sweats, wheezing or hemoptysis.  Gastrointestinal: No nausea or vomiting, constipation or diarrhea.  No acid reflux, abdominal pain or blood in stools.  Kidney/Bladdr: No polyuria, polydipsia, nocturia or hematuria.  Genital/Sexual: No loss of libido  Skin: No rash, hair loss or hirsutism.  No abnormal striae  Muscles/Joints/Bones: No morning stiffness, muscle aches and pain or loss of height.    Current Medications:  Current Outpatient Prescriptions   Medication Sig     alendronate (FOSAMAX) 35 MG tablet Take 35 mg by mouth once a week. Take in the morning with a full glass of water, on an empty stomach, and do not take anything else by mouth or lie down for the next 30 min.  Sundays     amLODIPine (NORVASC) 10 MG tablet Take 1 tablet (10 mg) by mouth daily.     aspirin 81 MG EC tablet Take 1 tablet (81 mg total) by mouth daily.     azaTHIOprine (IMURAN) 50 mg tablet Take 100 mg by mouth bedtime.      blood glucose test (ACCU-CHEK ESTHER PLUS TEST STRP) strips USE TO TEST 4 TO 6 TIMES DAILY     carvedilol (COREG) 12.5 MG tablet Take 1 tablet (12.5 mg total) by mouth 2 (two) times a day.     cholecalciferol, vitamin D3, 2,000 unit Tab Take 2,000 Units by mouth daily.     cycloSPORINE modified (GENGRAF) 25 MG capsule Take 50 mg by mouth 2 (two) times a day.      insulin aspart (NOVOLOG FLEXPEN) 100 unit/mL  "injection pen Inject 10-30 Units under the skin 3 (three) times a day before meals.     insulin glargine (TOUJEO SOLOSTAR) 300 unit/mL (1.5 mL) injection Inject 16 Units under the skin daily.     insulin regular (NOVOLIN R) 100 unit/mL injection Inject 10 Units under the skin 2 (two) times a day before meals.     lancets (ACCU-CHEK FASTCLIX) Misc Check blood sugar up to 5 times per day     levETIRAcetam (KEPPRA) 500 MG tablet Take 500 mg by mouth every morning.      levETIRAcetam (KEPPRA) 500 MG tablet Take 1,000 mg by mouth at bedtime.     pantoprazole (PROTONIX) 40 MG tablet Take 40 mg by mouth 2 (two) times a day.     pen needle, diabetic (BD ULTRA-FINE TWILA PEN NEEDLES) 32 gauge x 5/32\" Ndle Use 1 per day.     predniSONE (DELTASONE) 5 MG tablet Take 5 mg by mouth daily.     rosuvastatin (CRESTOR) 5 MG tablet Take 1 tablet (5 mg total) by mouth at bedtime.     sulfamethoxazole-trimethoprim (SEPTRA) 400-80 mg per tablet Take 1 tablet by mouth daily.     traZODone (DESYREL) 50 MG tablet Take 100 mg by mouth at bedtime.     venlafaxine (EFFEXOR-XR) 150 MG 24 hr capsule Take 1 capsule (150 mg total) by mouth daily.       Patients Active Problems:  Patient Active Problem List   Diagnosis     Essential Thrombocytosis     Osteoarthritis     Insomnia     Thrombophlebitis Of Deep Vessels Of The Lower Extremity     Medullary Sponge Kidney Bilaterally     Obstructive Sleep Apnea     Mixed hyperlipidemia     End Stage Renal Disease     Renal Transplant Recipient     Benign Essential Hypertension     Coronary Artery Stenosis     Chronic Diarrhea Of Unknown Origin     Chronic Reflux Esophagitis     Hypertension     Chronic Gout     Congestive Heart Failure     Ischemic Stroke     Type 2 diabetes mellitus     Dysthymic Disorder     Diabetic Peripheral Neuropathy     Anemia     Sensorineural Hearing Loss     Hyperlipidemia     Ataxia     Cardiac pacemaker, dual, in situ     Hypothermia, initial encounter     Sepsis secondary to " UTI     Adrenal insufficiency       History:   reports that she has quit smoking. She has a 30.00 pack-year smoking history. She has never used smokeless tobacco. She reports that she drinks alcohol. She reports that she does not use illicit drugs.   reports that she has quit smoking. She has a 30.00 pack-year smoking history. She has never used smokeless tobacco. She reports that she drinks alcohol. She reports that she does not use illicit drugs.  History   Smoking Status     Former Smoker     Packs/day: 1.50     Years: 20.00   Smokeless Tobacco     Never Used      reports that she has quit smoking. She has a 30.00 pack-year smoking history. She has never used smokeless tobacco. She reports that she drinks alcohol. She reports that she does not use illicit drugs.  History   Sexual Activity     Sexual activity: Not on file     Past Medical History:   Diagnosis Date     Anemia      Anxiety      Arthritis      CAD (coronary artery disease)      CVA (cerebral vascular accident)      Depression      DM (diabetes mellitus)      Epilepsy      ESRD (end stage renal disease)      History of renal transplant      Hyperlipidemia      Hypertension      Neuropathy      Vertigo      Family History   Problem Relation Age of Onset     Cancer Sister      Diabetes Sister      Cancer Father      Past Medical History:   Diagnosis Date     Anemia      Anxiety      Arthritis      CAD (coronary artery disease)      CVA (cerebral vascular accident)      Depression      DM (diabetes mellitus)      Epilepsy      ESRD (end stage renal disease)      History of renal transplant      Hyperlipidemia      Hypertension      Neuropathy      Vertigo      Past Surgical History:   Procedure Laterality Date     CARDIAC PACEMAKER PLACEMENT       HYSTERECTOMY      Age 28     NEPHRECTOMY TRANSPLANTED ORGAN  06/2011     NV APPENDECTOMY      Description: Appendectomy;  Recorded: 11/13/2007;     NV CABG, VEIN, SINGLE      Description: CABG (CABG);  Proc  Date: 01/26/2011;  Comments: x 4     MT INS NEW/RPLC PRM PACEMAKER W/TRANSV ELTRD VENTR      Description: Permanent Pacemaker Placement;  Recorded: 08/01/2012;     MT TOTAL ABDOM HYSTERECTOMY      Description: Total Abdominal Hysterectomy;  Recorded: 11/13/2007;     MT TOTAL KNEE ARTHROPLASTY      Description: Total Knee Arthroplasty;  Recorded: 11/13/2007;       Vitals   weight is 236 lb (107 kg) (abnormal). Her blood pressure is 128/76 and her pulse is 76.         Exam  General appearance: The patient looked well, not in acute distress.  Eyes: no evidence of thyroid eye disease.   Retinal exam: No evidence of diabetic retinopathy.  Mouth and Throat: Normal  Neck: No evidence of thyromegaly, enlarged lymph node or tenderness  Chest: Trachea is central. Chest is clear to auscultation and percussion. Breat sounds are normal.  Cardiovascular exam: JVP is not raised. Heart sounds are normal, no murmurs or rub  Peripheral pulses are palpable.   Abdomen: No masses or tenderness.    Back: No vertebral tenderness or kyphosis.  Extremities: No evidence of leg edema.   Skin: Normal to touch.  No abnormal striae  Neurologic exam:  Visual fields are intact by confrontation, grossly intact. No evidence of peripheral neuropathy.  Detailed foot exam normal.        Diagnosis:  No diagnosis found.    Orders:   No orders of the defined types were placed in this encounter.        Assessment and Plan:  Type 2 diabetes the patient is confused regarding her medication she came to the clinic accompanied by a friend.    She takes Lantus 40 units at bedtime.    She takes NovoLog 15 units with a small meal 20 with moderate medial and 25 was a large meal.    Last A1c was 8.7 back in December.    The patient takes prednisone 5 mg for the renal transplant.    She does not take NovoLog with breakfast but takes it at bedtime with correction of 2 units for every 40 points above 140.    I did advise the patient to try not to take the insulin  NovoLog at bedtime and only take it if the blood sugar is above 250 and not to exceed 4 units of the NovoLog.    I did encourage her to take NovoLog with breakfast as long as her blood sugar is above 80 and she is going to eat breakfast.    I did advise the patient to check the blood sugar regularly.    We will check A1c today.  She inquired about insulin pump and continuous glucose monitoring sensor if the A1c is well controlled that there is no need for further action if it is not then I would recommend these I will also check to see C-peptide.    She feels fatigued and tired I will check her thyroid function test and replace that if the TSH is above 2.5.    She is taking Fosamax 35 mg she has impaired renal function I did advise her to get me a bone DEXA scan which is done at [facility] and we may discontinue Fosamax and put her on Prolia.    She takes Norvasc 10 mg for blood pressure blood pressure is well controlled 128/76 pulse is 76.    She does have acid reflux and takes Protonix 40 mg.  She also takes carvedilol 12.5 mg twice a day for cardiomyopathy.    Patient will return to clinic in 3 months I did spend 60 minutes with the patient more than 50% was spent on counseling and managing her care.

## 2021-06-10 NOTE — PROGRESS NOTES
MTM Encounter    ASSESSMENT AND PLAN    1. Type 2 diabetes mellitus   It appears that her BG control is improving. There was some confusion about when she was taking basal vs bolus insulin doses, but the diabetes educator was able to straighten that out. It sounds like she is taking Novolin R and N now, but is planning on switching to insulin pens, Toujeo and Novolog. She is working closely with the diabetes educator and will be seeing the endocrinologist soon as well.     2. Mixed hyperlipidemia  Patient will be switching from atorvastatin to rosuvastatin due to concerns of possible myalgia side effects on the atorvastatin. Rosuvastatin 10 mg was prescribed, but because of an interaction with cyclosporine, maximum dose of rosuvastatin is 5 mg since cyclosporine can increase rosuvastatin serum levels. Cyclosporine levels are not affected. Most statins do interact with cyclosporine and dose adjustments must be made to reduce risk of side effects. Advised patient to take a half tablet of rosuvastatin (5 mg) daily.  Plan   1. Reduce dose of rosuvastatin to 5 mg daily.     3. Hypertension/CAD   Blood pressure is well controlled and meeting goal of <140/90 mm Hg.     4. Renal Transplant Recipient  Managed by transplant clinic.     5. Dysthymic disorder/Insomnia  Mood is well controlled with Effexor. She is still having trouble sleeping despite starting trazodone. Recommended trying an increase in dose to see if that is more effective, without causing any next day drowsiness. She agreed with the plan.  Plan   1. Increase trazodone to 100 mg at bedtime.     6. Epilepsy  Well controlled with levetiracetam. No seizure in several years and tolerating medication.         SUBJECTIVE AND OBJECTIVE  Odalys Miles is a 71 y.o. female here for a medication therapy management (MTM) appointment.     1. Type 2 diabetes mellitus   Odalys is taking basal-bolus insulin and she says it is Novolog and Lantus, but says she gets it in vial  form from [facility] for $25 per vial. I'm thinking it's really regular insulin and NPH. She takes 40 units of the long acting insulin at bedtime and uses a sliding scale for the mealtime insulin. She was hospitalized a few weeks ago for low body temperature and hypoglycemia. Afterwards she met with a diabetes educator and discovered she flip flopped her insulins and was taking her mealtime at bedtime and vice versa. She is feeling better now after discovering this error and her BG have been improving. This morning her BG was 117. She is seeing the endocrinologist next week and might do a continuous glucose monitor. The transplant clinic also suggested that she may be a good candidate for an insulin pump.     Lab Results   Component Value Date    HGBA1C 8.7 (H) 12/16/2016       2. Mixed hyperlipidemia  Odalys is taking atorvastatin 40 mg daily, but she was concerned with side effects as she was having muscle pain in her shoulders so is planning on switching to rosuvastatin when she finishes her supply of atorvastatin. She also takes a low dose aspirin daily.     The 10-year ASCVD risk score (Senia DC Jr, et al., 2013) is: 24.2%    Values used to calculate the score:      Age: 71 years      Sex: Female      Is Non- : No      Diabetic: Yes      Tobacco smoker: No      Systolic Blood Pressure: 120 mmHg      Is BP treated: Yes      HDL Cholesterol: 42 mg/dL      Total Cholesterol: 152 mg/dL    3. Hypertension/CAD  Odalys is taking amlodipine 10 mg daily and carvedilol 12.5 mg BID for blood pressure. She wasn't sure what these were for.  BP Readings from Last 3 Encounters:   04/05/17 120/70   04/03/17 (!) 186/72   04/02/17 173/77       4. Renal Transplant Recipient  Odalys is taking Imuran 100 mg at bedtime, prednisone 5 mg daily, and Gengraf 50 mg BID.  She had a renal transplant in June 2011 and regularly sees the transplant clinic providers every month. She gets bone scans 2-3 times a year and takes  "alendronate to help prevent fractures. She takes pantoprazole 40 mg BID for gastric protection. She has been having diarrhea since discharge and wonders if it is from the antibiotics they had her on. The transplant clinic recommended yogurt and advised again probiotics due to infection risk with her transplant.     5. Dysthymic disorder/Insomnia  Odalys is taking Effexor  mg daily. She calls this her \"crazy medicine.\" She thinks it helps a lot to prevent depression. She used to be on a cocktail of medications for her mood and sleep, but it left her in a fog. She feels much more of herself now. Counseling has also helped. She just started a sleeping pill, trazodone 50 mg, a couple weeks ago, but it hasn't been effective. She has been on melatonin in the past, but it didn't work.     6. Epilepsy  For her history of seizures, Odalys is taking Keppra 500 mg in the morning and 100 mg at bedtime. She hasn't had a seizure for at least 4 years.          Odalys's medication list was reviewed with them, discussing reason for use, directions for use, and potential side effects of each medication as needed. Indication, safety, efficacy, and convenience was assessed for all medications addressed above.  No environmental factors were noted currently affecting patient.  This care plan was communicated via EMR with her primary care provider, Jamila Lechuga MD, who is the authorizing prescriber for this visit.  Direct supervision was available by either the patient's PCP or other available provider.    Time Spent: 60 minutes  Time and complexity billing metrics are included in the doc-flowsheet linked to this visit.       Chayito Mccollum, PharmD, BCACP    Current Outpatient Prescriptions   Medication Sig Dispense Refill     predniSONE (DELTASONE) 5 MG tablet Take 5 mg by mouth daily.       alendronate (FOSAMAX) 35 MG tablet Take 35 mg by mouth once a week. Take in the morning with a full glass of water, on an empty stomach, and " "do not take anything else by mouth or lie down for the next 30 min.  Sundays       amLODIPine (NORVASC) 10 MG tablet Take 1 tablet (10 mg) by mouth daily. 90 tablet 0     aspirin 81 MG EC tablet Take 1 tablet (81 mg total) by mouth daily.  99     azaTHIOprine (IMURAN) 50 mg tablet Take 100 mg by mouth bedtime.        blood glucose test (ACCU-CHEK ESTHER PLUS TEST STRP) strips USE TO TEST 4 TO 6 TIMES DAILY 550 strip 3     carvedilol (COREG) 12.5 MG tablet Take 1 tablet (12.5 mg total) by mouth 2 (two) times a day. 180 tablet 3     cholecalciferol, vitamin D3, 2,000 unit Tab Take 2,000 Units by mouth daily.       cycloSPORINE modified (GENGRAF) 25 MG capsule Take 50 mg by mouth 2 (two) times a day.        insulin aspart (NOVOLOG FLEXPEN) 100 unit/mL injection pen Inject 10-30 Units under the skin 3 (three) times a day before meals. 30 mL 6     insulin glargine (TOUJEO SOLOSTAR) 300 unit/mL (1.5 mL) injection Inject 16 Units under the skin daily. 3 Syringe 6     insulin regular (NOVOLIN R) 100 unit/mL injection Inject 10 Units under the skin 2 (two) times a day before meals.       lancets (ACCU-CHEK FASTCLIX) Misc Check blood sugar up to 5 times per day 102 each 3     levETIRAcetam (KEPPRA) 500 MG tablet Take 500 mg by mouth every morning.        levETIRAcetam (KEPPRA) 500 MG tablet Take 1,000 mg by mouth at bedtime.       pantoprazole (PROTONIX) 40 MG tablet Take 40 mg by mouth 2 (two) times a day.       pen needle, diabetic (BD ULTRA-FINE TWILA PEN NEEDLES) 32 gauge x 5/32\" Ndle Use 1 per day. 200 each 4     rosuvastatin (CRESTOR) 10 MG tablet Take 1 tablet (10 mg total) by mouth at bedtime. 90 tablet 3     sulfamethoxazole-trimethoprim (SEPTRA) 400-80 mg per tablet Take 1 tablet by mouth daily.       traZODone (DESYREL) 50 MG tablet Take 50 mg by mouth at bedtime.       venlafaxine (EFFEXOR-XR) 150 MG 24 hr capsule Take 1 capsule (150 mg total) by mouth daily. 90 capsule 3     No current facility-administered " medications for this visit.

## 2021-06-10 NOTE — PROGRESS NOTES
[facility] Daily Progress     Patient Name: Odalys Miles  Date: 2017  Visit #: 3  Referral Diagnosis: Left low back pain  Referring provider: Renata Ash C*  Visit Diagnosis:     ICD-10-CM    1. Left buttock pain M79.1    2. Generalized muscle weakness M62.81    3. Abnormal posture R29.3          Assessment:     Response to Intervention:  Patient tolerated new exercises and well and we will progress as tolerated. Manual therapy not needed this session due to patients low pain level.    Symptoms are consistent with:  Decreased L sided low back pain  Patient is appropriate to continue with skilled physical therapy intervention, as indicated by initial plan of care.    Goal Status:  Pt. will be independent with home exercise program in : 4 weeks  Pt will: decrease CARLITOS score by 30% in 8 weeks  Pt will: be able to walk for 10 minutes in 10 weeks  Pt will: be able to  kitchen for 5 minutes to cook at home meals in 8 weeks      Plan / Patient Education:     Continue with initial plan of care.  Progress with home program as tolerated.    PLAN for next visit: Nustep if ever tolerable  Subjective:     Pain Ratin   Patient is feeling a little better. She just feels off. She continues with diarrhea for 2 weeks.  She is not truthfully up for exercise but her back is doing much better. She is afraid to go supine today so the exercises will focus in sitting and standing.    Objective:   PT educated Patient to discuss with MD of pharmacist about an OTC diarrhea aid that would be safe for her to take.  Shortened session secondary to illness.    Exercises:  Exercise #1: Seated B Hip ABD and ADD isometrics, verbal review  Comment #1: Supine Piriformis stretch 2 x 30 seconds/leg, verbal review  Exercise #2: supine hamstring nerve glide x 10 bilateral HEP  Comment #2: LAQ nerve glide x 15 bilateral HEP  Exercise #3: Standing Hip Abduction x 10/leg, alternating  Comment #3: Standing heel/toe  raises x 10  Exercise #4: Standing hip extension x 10/leg, alternating       Treatment Today    TREATMENT MINUTES COMMENTS   Evaluation     Self-care/ Home management     Manual therapy Not performed this visit due to diarrhea See above.   Neuromuscular Re-education     Therapeutic Activity     Therapeutic Exercises 20 See exercise flow-sheet for details.    Gait training     Modality__________________                Total 20    Blank areas are intentional and mean the treatment did not include these items.       Christina Guzman, PT  4/17/2017

## 2021-06-10 NOTE — PROGRESS NOTES
ASSESSMENT and PLAN:  1. Benign Essential Hypertension  Stable and well controlled.    2. Renal Transplant Recipient  Stable, continues to follow with her transplant clinic.    3. Type 2 diabetes mellitus  She's seeing endocrine and DM education now.  She's motivated to get her disease under control. She's working well w/ [facility] and is happy w/ her progress.    4. Essential hypertension  Well controlled as above.    5. Hyperlipidemia  She's now on 5mg of crestor at the rec of her transplant clinic.    Patient Instructions   Keep up the good work!  Please see me again in about three months; earlier as needed.  Take care!        CHIEF COMPLAINT:  Chief Complaint   Patient presents with     Follow-up     DM, HTN, and medications       HISTORY OF PRESENT ILLNESS:  Odalys Miles is a 71 y.o. female  presenting to the clinic today for diabetes follow up.     Diabetes: She was told to drop her insulin to two units if she begins to experience hypoglycemia. She is attempting to wean off drinking soda and to continue losing weight. Her blood sugars have been improving in the past few weeks. Whenever her blood sugars range from 141 to 180 she will take 2 units of insulin to correct. She takes 8 units whenever eating and 2 units to correct. She is experiencing slight shakes now that her blood sugars are dropping. She has set goals and is working on improving her sugar levels.     Hypertension: Her blood pressure is well controlled. Her blood pressure today is 110/54.     REVIEW OF SYSTEMS:   She has had diarrhea since she was discharged from the hospital. The transplant clinic told her that she should not take antibiotics. Since she has started to eat yogurt with probiotics the diarrhea has improved. All other systems are negative.    TOBACCO USE:  History   Smoking Status     Former Smoker     Packs/day: 1.50     Years: 20.00   Smokeless Tobacco     Never Used       VITALS:  Vitals:    05/02/17 1453   BP: 110/54   Patient  Site: Right Arm   Pulse: 64   Weight: (!) 231 lb 3 oz (104.9 kg)     Wt Readings from Last 3 Encounters:   05/02/17 (!) 231 lb 3 oz (104.9 kg)   04/25/17 (!) 234 lb (106.1 kg)   04/24/17 (!) 236 lb (107 kg)       PHYSICAL EXAM:  Constitutional:  Reveals an alert, pleasant middle aged female.   Vitals:  Noted.   Neurologic: Normal     ADDITIONAL HISTORY SUMMARIZED (2): Reviewed note from 4/24/17 regarding diabetes. Reviewed note from 4/25/17 regarding diabetes and diet.   DECISION TO OBTAIN EXTRA INFORMATION (1): None.   RADIOLOGY TESTS (1): None.  LABS (1): Reviewed labs from 4/24/17.   MEDICINE TESTS (1): None.  INDEPENDENT REVIEW (2 each): None.     The visit lasted a total of 13  minutes face to face with the patient. Over 50% of the time was spent counseling and educating the patient about diabetes.    I, Evelina Barrios, am scribing for and in the presence of, Dr. Jamila Lechuga.    I, Dr. Jamila Lechuga, personally performed the services described in this documentation, as scribed by Evelina Barrios in my presence, and it is both accurate and complete.    MEDICATIONS:  Current Outpatient Prescriptions   Medication Sig Dispense Refill     alendronate (FOSAMAX) 35 MG tablet Take 35 mg by mouth once a week. Take in the morning with a full glass of water, on an empty stomach, and do not take anything else by mouth or lie down for the next 30 min.  Sundays       amLODIPine (NORVASC) 10 MG tablet Take 1 tablet (10 mg) by mouth daily. 90 tablet 0     aspirin 81 MG EC tablet Take 1 tablet (81 mg total) by mouth daily.  99     azaTHIOprine (IMURAN) 50 mg tablet Take 100 mg by mouth bedtime.        blood glucose test (ACCU-CHEK ESTHER PLUS TEST STRP) strips USE TO TEST 4 TO 6 TIMES DAILY 550 strip 3     carvedilol (COREG) 12.5 MG tablet Take 1 tablet (12.5 mg total) by mouth 2 (two) times a day. 180 tablet 3     cholecalciferol, vitamin D3, 2,000 unit Tab Take 2,000 Units by mouth daily.       cycloSPORINE modified  "(GENGRAF) 25 MG capsule Take 50 mg by mouth 2 (two) times a day.        insulin aspart (NOVOLOG FLEXPEN) 100 unit/mL injection pen Inject 10-30 Units under the skin 3 (three) times a day before meals. 30 mL 6     insulin glargine (TOUJEO SOLOSTAR) 300 unit/mL (1.5 mL) injection Inject 16 Units under the skin daily. 3 Syringe 6     insulin regular (NOVOLIN R) 100 unit/mL injection Inject 10 Units under the skin 2 (two) times a day before meals.       lancets (ACCU-CHEK FASTCLIX) Misc Check blood sugar up to 5 times per day 102 each 3     levETIRAcetam (KEPPRA) 500 MG tablet Take 500 mg by mouth every morning.        levETIRAcetam (KEPPRA) 500 MG tablet Take 1,000 mg by mouth at bedtime.       pantoprazole (PROTONIX) 40 MG tablet Take 40 mg by mouth 2 (two) times a day.       pen needle, diabetic (BD ULTRA-FINE TWILA PEN NEEDLES) 32 gauge x 5/32\" Ndle Use 1 per day. 200 each 4     predniSONE (DELTASONE) 5 MG tablet Take 5 mg by mouth daily.       rosuvastatin (CRESTOR) 5 MG tablet Take 1 tablet (5 mg total) by mouth at bedtime. 90 tablet 3     sulfamethoxazole-trimethoprim (SEPTRA) 400-80 mg per tablet Take 1 tablet by mouth daily.       traZODone (DESYREL) 50 MG tablet Take 100 mg by mouth at bedtime.       venlafaxine (EFFEXOR-XR) 150 MG 24 hr capsule Take 1 capsule (150 mg total) by mouth daily. 90 capsule 3     No current facility-administered medications for this visit.        Total data points: 3      "

## 2021-06-11 NOTE — PROGRESS NOTES
Subjective findings: The patient returns to clinic today for continued diabetic foot care.     Objective findings:  Nails bilateral feet normal length with 2-3 times normal thickness.  The second toenail left foot is elongated.  Skin bilateral feet warm, dry  and intact.  DP and PT pulses +1 over 4 bilateral feet. Capillary refill less than 2 seconds bilateral feet.  Negative clonus, negative Babinski bilateral feet.  Range of motion within normal limits bilateral feet. Muscle power +5 over 5 bilaterally in all compartments.      Assessment:  Onychomycosis, onychauxis, diabetes mellitus      Plan: I debrided the second toenail left foot today.  The remaining nails were normal length and did not need to be treated. I have  recommended that the patient return to the clinic every 3 months for continued diabetic foot care.

## 2021-06-11 NOTE — PROGRESS NOTES
[facility] Discharge Summary  Patient Name: Odalys Miles  Date: 7/17/2017  Referral Diagnosis: L low back pain  Referring provider: Renata Ash C*  Visit Diagnosis:   1. Left buttock pain     2. Generalized muscle weakness     3. Abnormal posture     4. Chronic systolic congestive heart failure     5. End stage renal disease     6. Essential hypertension, benign     7. Type 2 diabetes mellitus         Goals:  See below    Patient was seen for 3 visits from 3/30/17 to 4/17/17 with 0 missed appointments.  The patient attended therapy initially, but did not finish the therapy sessions prescribed.  Goals were not fully achieved. Explanation for goals not achieved: no known reason    Therapy will be discontinued at this time.  The patient will need a new referral to resume.    Thank you for your referral.  Christina Guzman  7/17/2017  12:59 PM

## 2021-06-12 NOTE — PROGRESS NOTES
Subjective findings: The patient returns to clinic today for continued diabetic foot care.      Objective findings:  Nails bilateral feet normal length with 2-3 times normal thickness.  The second toenail left foot is elongated.  Skin bilateral feet warm, dry  and intact.  There is a thick hyperkeratotic nucleated lesion on the plantar aspect of the right foot.  DP and PT pulses +1 over 4 bilateral feet. Capillary refill less than 2 seconds bilateral feet.  Negative clonus, negative Babinski bilateral feet.  Range of motion within normal limits bilateral feet. Muscle power +5 over 5 bilaterally in all compartments.      Assessment:  Onychomycosis, onychauxis, tyloma right foot, diabetes mellitus      Plan: I debrided  nails 2 through 5 the second toenail left foot today.  Also debrided the painful hyperkeratotic lesion on the plantar aspect of the right foot.I have  recommended that the patient return to the clinic every 3 months for continued diabetic foot care.

## 2021-06-13 NOTE — PROGRESS NOTES
ASSESSMENT and PLAN:  1. Hyperlipidemia  We will repeat labs today.  - Lipid Cascade    2. Essential hypertension  Adequate control.    3. Type 2 diabetes mellitus  Optimal self-care and management.  I have encouraged her to get back in for ongoing diabetes education as well as following up with endocrinology.  Her lab work is pending but I do not have high hopes for her A1c.  - Glycosylated Hemoglobin A1c  - Lipid Cascade    4. Hand arthritis  Is significantly affecting her lifestyle.  It bothers her when she is playing cards.  It is painful.  We will have her see [facility] hand specialist.  - Ambulatory referral to Orthopedics    5. Right shoulder pain  Her strength on the right is good.  She did have some tenderness of her AC joint.  We will get an x-ray.  From there, we will determine a physical therapy or orthopedics or sports medicine would be her next best step.  - XR Shoulder Right 2 or More VWS; Future      Medications Discontinued During This Encounter   Medication Reason     insulin regular (NOVOLIN R) 100 unit/mL injection        Return in about 3 months (around 12/26/2017).    Patient Instructions   Today's labs will be available on [facility].  If you aren't signed up, then we'll mail them out.  If anything needs more immediate follow up, we'll also call.    [facility]  PHONE: [phone] (Srini or Douglas for hands)    Right shoulder xray today.    Please get back in to see DM education and schedule follow up with Dr. Potts.    Take care!          CHIEF COMPLAINT:  Chief Complaint   Patient presents with     Shoulder Pain     NO injury 3months-pain     Hand Pain     right hand pain-2 months       HISTORY OF PRESENT ILLNESS:  Odalys Miles is a 72 y.o. female  presenting to the clinic today for follow-up of her type 2 diabetes.  She also has some other concerns today.    She is struggling with right shoulder pain.  It has been present for about 3 months.  It hurts her to reach behind her back as  well as raise her arm up.  She denies any injury that preceded this.  She also has some pain in her right hand that has been going on for about 2 months.  She has some Dupuytren's contractures as well as some changes related to arthritis.  This causes her some discomfort.  It bothers her when she goes to shake hands with people.    She has not been doing well with her diabetic management.  She checks her blood sugars and they are all elevated.  She has not been good with diet and she knows that.  She has not followed up with diabetes education and she is aware of that.  She has had some stress in her life related to her friend's son who is now in senior care for making harassing phone calls to her family.  They have differing political view.  She is working through that.    REVIEW OF SYSTEMS:   All other systems are negative.    PFSH:  She is making sure to get up and out of bed on a regular basis.  She has a good support system around her.      TOBACCO USE:  History   Smoking Status     Former Smoker     Packs/day: 1.50     Years: 20.00   Smokeless Tobacco     Never Used       VITALS:  Vitals:    09/26/17 1415   BP: 118/70   Pulse: 80   SpO2: 98%   Weight: (!) 228 lb (103.4 kg)     Wt Readings from Last 3 Encounters:   09/26/17 (!) 228 lb (103.4 kg)   05/02/17 (!) 231 lb 3 oz (104.9 kg)   04/25/17 (!) 234 lb (106.1 kg)         PHYSICAL EXAM:  Constitutional:  Reveals an alert, pleasant adult female.   Vitals:  Noted.   Lungs: Clear to auscultation bilaterally, respirations unlabored.   Heart: Regular rate and rhythm, S1 and S2 normal, no murmur, rub, or gallop,   Extremities: She has Dupuytren's contractures bilaterally, she has a contracture of the fifth finger on her right hand, AC joint is tender, normal upside down pop can testing, strength is good, decreased internal rotation   skin: Skin color, texture, turgor normal, no rashes or lesions, no open lesions on her feet, she does have a pre-ulcerative callus  "noted  Neurologic: Grossly normal     QUALITY MEASURES:  The following high BMI interventions were performed this visit: weight monitoring    MEDICATIONS:  Current Outpatient Prescriptions   Medication Sig Dispense Refill     alendronate (FOSAMAX) 35 MG tablet Take 35 mg by mouth once a week. Take in the morning with a full glass of water, on an empty stomach, and do not take anything else by mouth or lie down for the next 30 min.  Sundays       amLODIPine (NORVASC) 10 MG tablet Take 1 tablet (10 mg) by mouth daily. 90 tablet 2     aspirin 81 MG EC tablet Take 1 tablet (81 mg total) by mouth daily.  99     azaTHIOprine (IMURAN) 50 mg tablet Take 100 mg by mouth bedtime.        carvedilol (COREG) 12.5 MG tablet Take 1 tablet (12.5 mg total) by mouth 2 (two) times a day. 180 tablet 3     cholecalciferol, vitamin D3, 2,000 unit Tab Take 2,000 Units by mouth daily.       CONTOUR NEXT STRIPS strips Test blood sugar 4-6 times daily 550 each 3     cycloSPORINE modified (GENGRAF) 25 MG capsule Take 50 mg by mouth 2 (two) times a day.        insulin aspart (NOVOLOG FLEXPEN) 100 unit/mL injection pen Inject 10-30 Units under the skin 3 (three) times a day before meals. 30 mL 6     insulin glargine (TOUJEO SOLOSTAR) 300 unit/mL (1.5 mL) injection Inject 38 Units under the skin daily. 3 Syringe 6     lancets (ACCU-CHEK FASTCLIX) Misc Check blood sugar up to 5 times per day 102 each 3     levETIRAcetam (KEPPRA) 500 MG tablet Take 500 mg by mouth every morning.        levETIRAcetam (KEPPRA) 500 MG tablet Take 1,000 mg by mouth at bedtime.       pantoprazole (PROTONIX) 40 MG tablet Take 40 mg by mouth 2 (two) times a day.       pen needle, diabetic (BD ULTRA-FINE TWILA PEN NEEDLES) 32 gauge x 5/32\" Ndle Use 1 per day. 200 each 4     predniSONE (DELTASONE) 5 MG tablet Take 5 mg by mouth daily.       rosuvastatin (CRESTOR) 5 MG tablet Take 1 tablet (5 mg total) by mouth at bedtime. 90 tablet 2     sulfamethoxazole-trimethoprim " (SEPTRA) 400-80 mg per tablet Take 1 tablet by mouth daily.       traZODone (DESYREL) 50 MG tablet Take 1 tablet (50 mg) by mouth at bedtime. 90 tablet 1     venlafaxine (EFFEXOR-XR) 150 MG 24 hr capsule Take 1 capsule (150 mg) by mouth daily. 90 capsule 3     traZODone (DESYREL) 50 MG tablet Take 100 mg by mouth at bedtime.       No current facility-administered medications for this visit.

## 2021-06-13 NOTE — PROGRESS NOTES
"MTM Encounter    ASSESSMENT AND PLAN    1. Type 2 diabetes mellitus   Most recent A1c is above goal and since then, patient has worked hard to change her eating habits. We spent time discussing healthy food options, which she found helpful. I think there is an opportunity to optimize her basal insulin based on her reported fasting BG, so I instructed her to increase Toujeo to 42 units SQ at bedtime. We updated her medication chart. Because she cannot afford Novolog, she is going to switch to regular insulin in vial form from [employer], which she was been on before. She was confused about whether to ask for \"N or R\" insulin. I explained the difference - NPH vs regular and advised to get regular for mealtime use. Reviewed administration and dosing. If her A1c is still elevated at next check, would recommend a continuous glucose monitor.   Plan   1. Increase Toujeo to 42 units SQ bedtime.   2. Switch Novolog to regular insulin before meals.     2. Mixed hyperlipidemia  Appropriately on a moderate intensity statin given age and diabetes diagnosis per ACC/AHA guidelines. Last LDL of 78 mg/dl. I would not increase dose of rosuvastatin any higher than 5 mg due to interaction with cyclosporine, as cyclosporine can increase rosuvastatin serum levels. Cyclosporine levels are not affected.    3. Hypertension/CAD   Blood pressure is well controlled and meeting goal of <140/90 mm Hg.     4. Renal Transplant Recipient  Managed by transplant clinic.     5. Dysthymic disorder/Insomnia  Well controlled with Effexor. Improvement in insomnia since increasing trazodone with no adverse effects.     6. Epilepsy  Well controlled with levetiracetam. No seizure in several years and tolerating medication.         SUBJECTIVE AND OBJECTIVE  Odalys Miles is a 72 y.o. female here for a medication therapy management (MTM) appointment. She brings in her detailed medication list. At our last visit, I helped write down what each of her medications " "are for, which she found very helpful.     1. Type 2 diabetes mellitus   Odalys says she is doing much better with her BG. She fell off the wagon this summer and was not eating well, but she's back on track because she wants her A1c to come down. She is taking basal-bolus insulin with Novolog and Toujeo. She uses a sliding scale with Novolog and tests her BG 4 times a day. She does carb counting - 2 units per 15 g carbs plus 2 units for every 40 mg/dl over 140. Highest dose is 14 units. She is going to switch back to regular insulin because her copay with Novolog is $400 a month. She gets this from [employer], but wonders if she needs \"R or N.\" Toujeo is only $80 a month. She takes Toujeo 38 units at bedtime. In the morning, the highest it's been is 150. Usually between 120-140. Lunch is about the same; dinner is pretty good, but nighttime is higher because she snacks. Denies any hypoglycemia.     Lab Results   Component Value Date    HGBA1C 10.1 (H) 09/26/2017       2. Mixed hyperlipidemia  Odalys is taking rosuvastatin 5 mg daily. She had possible muscle aches with atorvastatin. She also takes a low dose aspirin daily. She has a history of stroke about 15 years ago.     The 10-year ASCVD risk score ([place] CLARE Jr, et al., 2013) is: 26.5%    Values used to calculate the score:      Age: 72 years      Sex: Female      Is Non- : No      Diabetic: Yes      Tobacco smoker: No      Systolic Blood Pressure: 118 mmHg      Is BP treated: Yes      HDL Cholesterol: 56 mg/dL      Total Cholesterol: 193 mg/dL    3. Hypertension/CAD  Odalys is taking amlodipine 10 mg daily and carvedilol 12.5 mg BID for blood pressure.     BP Readings from Last 3 Encounters:   09/26/17 118/70   08/23/17 115/70   05/02/17 110/54       4. Renal Transplant Recipient  Odalys is taking Imuran 100 mg at bedtime, prednisone 5 mg daily, and Gengraf 50 mg BID.  She had a renal transplant in June 2011 and regularly sees the transplant " clinic providers every month. She gets bone scans 2-3 times a year and takes alendronate to help prevent fractures. She takes pantoprazole 40 mg BID for gastric protection. Also on Septra for PCP prophylaxis.     5. Dysthymic disorder/Insomnia  Odalys is taking Effexor  mg daily. She thinks it helps a lot to prevent depression. Counseling has also helped. She has been going through some increased stress lately with her friend's son's mental health issues, but she thinks she is still handling it all ok. She is also taking trazodone 100 mg at bedtime, which was a dose increase after our last visit. It works better and has helped a lot, but still not every night. She gets antsy while laying in bed. She has been on melatonin in the past, but it didn't work.     6. Epilepsy  For her history of seizures, Odalys is taking Keppra 500 mg in the morning and 100 mg at bedtime. She hasn't had a seizure for at least 4 years.          Odalys's medication list was reviewed with them, discussing reason for use, directions for use, and potential side effects of each medication as needed. Indication, safety, efficacy, and convenience was assessed for all medications addressed above.  No environmental factors were noted currently affecting patient.  This care plan was communicated via EMR with her primary care provider, Jamila Lechuga MD, who is the authorizing prescriber for this visit.  Direct supervision was available by either the patient's PCP or other available provider.    Time Spent: 40 minutes  Time and complexity billing metrics are included in the doc-flowsheet linked to this visit.       Chayito Mccollum, PharmD, BCACP    Current Outpatient Prescriptions   Medication Sig Dispense Refill     alendronate (FOSAMAX) 35 MG tablet Take 35 mg by mouth once a week. Take in the morning with a full glass of water, on an empty stomach, and do not take anything else by mouth or lie down for the next 30 min.  Sundays       amLODIPine  "(NORVASC) 10 MG tablet Take 1 tablet (10 mg) by mouth daily. 90 tablet 2     aspirin 81 MG EC tablet Take 1 tablet (81 mg total) by mouth daily.  99     azaTHIOprine (IMURAN) 50 mg tablet Take 100 mg by mouth bedtime.        carvedilol (COREG) 12.5 MG tablet Take 1 tablet (12.5 mg total) by mouth 2 (two) times a day. 180 tablet 3     cholecalciferol, vitamin D3, 2,000 unit Tab Take 2,000 Units by mouth daily.       CONTOUR NEXT STRIPS strips Test blood sugar 4-6 times daily 550 each 3     cycloSPORINE modified (GENGRAF) 25 MG capsule Take 50 mg by mouth 2 (two) times a day.        insulin aspart (NOVOLOG FLEXPEN) 100 unit/mL injection pen Inject 10-30 Units under the skin 3 (three) times a day before meals. 30 mL 2     insulin glargine (TOUJEO SOLOSTAR) 300 unit/mL (1.5 mL) injection Inject 38 Units under the skin daily. 3 Syringe 0     lancets (ACCU-CHEK FASTCLIX) Misc Check blood sugar up to 5 times per day 102 each 3     levETIRAcetam (KEPPRA) 500 MG tablet Take 1,000 mg by mouth at bedtime.       levETIRAcetam (KEPPRA) 500 MG tablet One tablet in the morning. Two tablets in the afternoon 90 tablet 0     pantoprazole (PROTONIX) 40 MG tablet Take 40 mg by mouth 2 (two) times a day.       pen needle, diabetic (BD ULTRA-FINE TWILA PEN NEEDLES) 32 gauge x 5/32\" Ndle Use 1 per day. 200 each 4     predniSONE (DELTASONE) 5 MG tablet Take 5 mg by mouth daily.       rosuvastatin (CRESTOR) 5 MG tablet Take 1 tablet (5 mg total) by mouth at bedtime. 90 tablet 2     sulfamethoxazole-trimethoprim (SEPTRA) 400-80 mg per tablet Take 1 tablet by mouth daily.       traZODone (DESYREL) 50 MG tablet Take 1 tablet (50 mg) by mouth at bedtime. 90 tablet 1     venlafaxine (EFFEXOR-XR) 150 MG 24 hr capsule Take 1 capsule (150 mg) by mouth daily. 90 capsule 3     No current facility-administered medications for this visit.      "

## 2021-06-13 NOTE — PROGRESS NOTES
Assessment/Plan:   1. Carpal tunnel syndrome on left  2. Pre-op exam  Patient approved for surgery with general or local anesthesia. Postoperative pain to be managed by surgeon during post-operative [employer] timeframe, typically 30-60 days for major surgery, and less for others. Postoperative Care will be managed by Hospital Service. Labs will be done as indicated. Follow up as needed. Low Risk Surgery. History of cardiac disease including congestive heart failyre and pacemaker. Discussed need for proper blood sugar management.   - Electrocardiogram Perform and Read  - Glycosylated Hemoglobin A1C: A1c improved from 10.1-8.8.  - Basic Metabolic Panel; Future  - HM2(CBC w/o Differential)  - Basic Metabolic Panel    Subjective:   Scheduled Procedure: Left carpel tunnel and cubital tunnel left elbow  Surgery Date:  11/06/17  Surgery Location:  [facility]  Surgeon:  Dr. Douglas    Current Outpatient Prescriptions   Medication Sig Dispense Refill     alendronate (FOSAMAX) 35 MG tablet Take 35 mg by mouth once a week. Take in the morning with a full glass of water, on an empty stomach, and do not take anything else by mouth or lie down for the next 30 min.  Sundays       amLODIPine (NORVASC) 10 MG tablet Take 1 tablet (10 mg) by mouth daily. 90 tablet 2     aspirin 81 MG EC tablet Take 1 tablet (81 mg total) by mouth daily.  99     azaTHIOprine (IMURAN) 50 mg tablet Take 100 mg by mouth bedtime.        carvedilol (COREG) 12.5 MG tablet Take 1 tablet (12.5 mg total) by mouth 2 (two) times a day. 180 tablet 3     cholecalciferol, vitamin D3, 2,000 unit Tab Take 2,000 Units by mouth daily.       CONTOUR NEXT STRIPS strips Test blood sugar 4-6 times daily 550 each 3     cycloSPORINE modified (GENGRAF) 25 MG capsule Take 50 mg by mouth 2 (two) times a day.        insulin glargine (TOUJEO SOLOSTAR) 300 unit/mL (1.5 mL) injection Inject 38 Units under the skin daily. 3 Syringe 0     lancets (ACCU-CHEK FASTCLIX)  "Misc Check blood sugar up to 5 times per day 102 each 3     levETIRAcetam (KEPPRA) 500 MG tablet Take 1,000 mg by mouth at bedtime.       pantoprazole (PROTONIX) 40 MG tablet Take 40 mg by mouth 2 (two) times a day.       pen needle, diabetic (BD ULTRA-FINE TWILA PEN NEEDLES) 32 gauge x 5/32\" Ndle Use 1 per day. 200 each 4     predniSONE (DELTASONE) 5 MG tablet Take 5 mg by mouth daily.       rosuvastatin (CRESTOR) 5 MG tablet Take 1 tablet (5 mg total) by mouth at bedtime. 90 tablet 2     traZODone (DESYREL) 50 MG tablet Take 1 tablet (50 mg) by mouth at bedtime. 90 tablet 1     venlafaxine (EFFEXOR-XR) 150 MG 24 hr capsule Take 1 capsule (150 mg) by mouth daily. 90 capsule 3     insulin aspart (NOVOLOG FLEXPEN) 100 unit/mL injection pen Inject 10-30 Units under the skin 3 (three) times a day before meals. 30 mL 2     levETIRAcetam (KEPPRA) 500 MG tablet One tablet in the morning. Two tablets in the afternoon 90 tablet 0     sulfamethoxazole-trimethoprim (SEPTRA) 400-80 mg per tablet Take 1 tablet by mouth daily.       No current facility-administered medications for this visit.        Allergies   Allergen Reactions     Latex      Lisinopril Cough     Resolved after discontinuation     Mold/Mildew      Other Allergy (See Comments) Nausea And Vomiting     Patient states allergy to Pantopin.       Immunization History   Administered Date(s) Administered     DT (pediatric) 01/01/1992     Influenza [id number], 01/25/2010     Influenza high dose, seasonal 10/12/2015, 09/28/2016, 09/26/2017     Influenza, inj, historic 10/23/2007, 10/27/2008, 11/09/2009     Pneumo Conj 13-V (2010&after) 10/12/2015     Pneumo Polysac 23-V 10/01/1996, 10/27/2008     Td, historic 08/08/2002     Tdap 06/30/2016     ZOSTER 07/03/2012       Patient Active Problem List   Diagnosis     Essential Thrombocytosis     Osteoarthritis     Insomnia     Thrombophlebitis Of Deep Vessels Of The Lower Extremity     Medullary Sponge Kidney Bilaterally     " Obstructive Sleep Apnea     Mixed hyperlipidemia     End Stage Renal Disease     Renal Transplant Recipient     Coronary Artery Stenosis     Chronic Diarrhea Of Unknown Origin     Chronic Reflux Esophagitis     Hypertension     Chronic Gout     Congestive Heart Failure     Ischemic Stroke     Type 2 diabetes mellitus     Dysthymic Disorder     Diabetic Peripheral Neuropathy     Anemia     Sensorineural Hearing Loss     Hyperlipidemia     Ataxia     Cardiac pacemaker, dual, in situ     Hypothermia, initial encounter     Adrenal insufficiency       Past Medical History:   Diagnosis Date     Anemia      Anxiety      Arthritis      CAD (coronary artery disease)      CVA (cerebral vascular accident)      Depression      DM (diabetes mellitus)      Epilepsy      ESRD (end stage renal disease)      History of renal transplant      Hyperlipidemia      Hypertension      Neuropathy      Vertigo        Social History     Social History     Marital status:      Spouse name: N/A     Number of children: N/A     Years of education: N/A     Occupational History     Not on file.     Social History Main Topics     Smoking status: Former Smoker     Packs/day: 1.50     Years: 20.00     Smokeless tobacco: Never Used     Alcohol use Yes      Comment: occasional     Drug use: No     Sexual activity: Not on file     Other Topics Concern     Not on file     Social History Narrative       Past Surgical History:   Procedure Laterality Date     CARDIAC PACEMAKER PLACEMENT       HYSTERECTOMY      Age 28     NEPHRECTOMY TRANSPLANTED ORGAN  06/2011     AZ APPENDECTOMY      Description: Appendectomy;  Recorded: 11/13/2007;     AZ CABG, VEIN, SINGLE      Description: CABG (CABG);  Proc Date: 01/26/2011;  Comments: x 4     AZ INS NEW/RPLC PRM PACEMAKER W/TRANSV ELTRD VENTR      Description: Permanent Pacemaker Placement;  Recorded: 08/01/2012;     AZ TOTAL ABDOM HYSTERECTOMY      Description: Total Abdominal Hysterectomy;  Recorded:  11/13/2007;     [facility] TOTAL KNEE ARTHROPLASTY      Description: Total Knee Arthroplasty;  Recorded: 11/13/2007;       History of Present Illness  Ginger Morrow, 72 years old female with history of osteoarthritis, insomnia, obstructive sleep apnea, mixed hyperlipidemia, end-stage renal disease with renal transplant, coronary artery disease with congestive heart failure, hypertension, ischemic stroke, uncontrolled type 2 diabetes mellitus, diabetic peripheral neuropathy, and cardiac pacemaker, dual. Patient presents to the clinic today for a pre-examination. She reports that she has had carpal tunnel syndrome for a while in both hands and she did have a repair on the right wrist long time ago but recently both her left and her right wrist started bothering her again. She states that she gets numbness and tingling on her left wrist and its painful most of the time. During her visit to the surgeon she discovered that her elbows are also involved. She has no concerns today.     Recent Health  Fever: no  Chills: no  Fatigue: no  Chest Pain: no  Cough: no  Dyspnea: no  Urinary Frequency: yes  Nausea: no  Vomiting: no  Diarrhea: no  Abdominal Pain: no  Easy Bruising: yes  Lower Extremity Swelling: no  Poor Exercise Tolerance: yes    Most recent Health Maintenance Visit:  2016 ago    Pertinent History  Prior Anesthesia: yes  Previous Anesthesia Reaction:  no  Diabetes: yes  Cardiovascular Disease: yes  Pulmonary Disease: no  Renal Disease: yes  GI Disease: no  Sleep Apnea: yes  Thromboembolic Problems: no  Clotting Disorder: no  Bleeding Disorder: no  Transfusion Reaction: no  Impaired Immunity: yes  Steroid use in the last 6 months: yes  Frequent Aspirin use: yes    Family history of None    Social history of patient does not wear denture or partial plates    After surgery, the patient plans to recover at home with family.    Review of Systems  A 12 point comprehensive review of systems was negative except as noted.     "  Objective:         Vitals:    10/24/17 1321   BP: 122/68   Pulse: (!) 58   SpO2: 97%   Weight: (!) 233 lb 9.6 oz (106 kg)   Height: 5' 2\" (1.575 m)       Physical Exam:  General appearance - alert, well appearing, and in no distress  Mental status - alert, oriented to person, place, and time  Eyes - pupils equal and reactive, extraocular eye movements intact  Mouth - mucous membranes moist, pharynx normal without lesions  Neck - supple, no significant adenopathy  Lymphatics - no palpable lymphadenopathy, no hepatosplenomegaly  Chest - clear to auscultation, no wheezes, rales or rhonchi, symmetric air entry  Heart - normal rate, regular rhythm, normal S1, S2, no murmurs, rubs, clicks or gallops  Abdomen - soft, nontender, nondistended, no masses or organomegaly  Neurological - alert, oriented, normal speech, no focal findings or movement disorder noted, abnormal neurological exam unchanged from prior examinations  Musculoskeletal - no joint tenderness, deformity or swelling  Extremities - peripheral pulses normal, no pedal edema, no clubbing or cyanosis, intact peripheral pulses, monofilament sensory exam is abnormal in both feet, especially on the toes.  Skin - normal coloration and turgor, no rashes, no suspicious skin lesions noted       "

## 2021-06-14 NOTE — PROGRESS NOTES
MTM Encounter    ASSESSMENT AND PLAN    1. Type 2 diabetes mellitus   A1c has improved from 10.1% in September to 8.3% in November. Congratulated patient on this great improvement. Reviewing her BG, most of her daytime readings are above goal, so instructed her to increase her mealtime regular insulin sliding scale to 3 units for every 40 mg/dl over 140 mg/dl. She is concerned about the  cost of her basal insulin - Toujeo. I was able to provide her with samples of Basaglar (generic insulin glargine), which is the same insulin so dosing is the same. If her cost does substantially increase with the new year we can discuss changing to mixed 70/30 insulin or a regimen of NPH + regular insulin as these are all available at low cost in vial form. She will be meeting with endocrinology next month as well.   Plan   1. Increase regular insulin sliding scale to 3 units for every 40 mg/dl over 140 mg/dl.   2. Provided samples of Basaglar (insulin glargine).     2. Mixed hyperlipidemia  Appropriately on a moderate intensity statin given age and diabetes diagnosis per ACC/AHA guidelines. Last LDL of 78 mg/dl. I would not increase dose of rosuvastatin any higher than 5 mg due to interaction with cyclosporine, as cyclosporine can increase rosuvastatin serum levels. Cyclosporine levels are not affected.    3. Hypertension/CAD   Blood pressure is well controlled and meeting goal of <130/80 mm Hg per updated ACC/AHA hypertension guidelines.     4. Renal Transplant Recipient  Managed by transplant clinic.     5. Dysthymic disorder/Insomnia  Well controlled with Effexor and trazodone.     6. Epilepsy  Well controlled with levetiracetam. No seizure in several years and tolerating medication.         SUBJECTIVE AND OBJECTIVE  Odalys Miles is a 72 y.o. female here for a medication therapy management (MTM) appointment.     1. Type 2 diabetes mellitus   Odalys says she is doing much better with her BG and is trying hard to eat better. She  is pleased with the improvement in her A1c. She is taking basal-bolus insulin with regular insulin and Toujeo 42 units at bedtime. She uses a sliding scale with mealtime regular insulin and tests her BG 4 times a day. She uses 2 units for every 40 mg/dl over 140. Morning dose is usually 0-2 units, lunch is 2-12 units, and dinner is 12-14 units. She changed from Novolog to regular insulin in vial form due to cost. She is concerned with the new year and possible change in insurance, that her Toujeo cost will also increase.   AM: 175, 143, 124, 131, 125, 139, 129, 106, 138, 141  Lunch: 180, 153, 164, 211, 138, 151, 139, 120  Dinner: 170, 163, 216, 192, 173, 120, 189, 184   Evenin, 224, 236, 252, 174, 176, 188, 191     Lab Results   Component Value Date    HGBA1C 8.3 (H) 2017       2. Mixed hyperlipidemia  Odalys is taking rosuvastatin 5 mg daily. She had possible muscle aches with atorvastatin. She also takes a low dose aspirin daily. She has a history of stroke about 15 years ago.     The 10-year ASCVD risk score (Violet DC Jr, et al., 2013) is: 28%    Values used to calculate the score:      Age: 72 years      Sex: Female      Is Non- : No      Diabetic: Yes      Tobacco smoker: No      Systolic Blood Pressure: 122 mmHg      Is BP treated: Yes      HDL Cholesterol: 56 mg/dL      Total Cholesterol: 193 mg/dL    3. Hypertension/CAD  Odalys is taking amlodipine 10 mg daily and carvedilol 12.5 mg BID for blood pressure.     BP Readings from Last 3 Encounters:   17 122/74   10/24/17 122/68   17 118/70       4. Renal Transplant Recipient  Odalys is taking Imuran 100 mg at bedtime, prednisone 5 mg daily, and Gengraf 50 mg BID.  She had a renal transplant in 2011 and regularly sees the transplant clinic providers every month. She gets bone scans 2-3 times a year and takes alendronate to help prevent fractures. She takes pantoprazole 40 mg BID for gastric protection. Also on  Septra for PCP prophylaxis.     5. Dysthymic disorder/Insomnia  Odalys is taking Effexor  mg daily. She thinks it helps a lot to prevent depression. Counseling has also helped. She is also taking trazodone 100 mg at bedtime for insomnia.     6. Epilepsy  For her history of seizures, Odalys is taking Keppra 500 mg in the morning and 100 mg at bedtime. She hasn't had a seizure for at least 4 years.          Odalys's medication list was reviewed with them, discussing reason for use, directions for use, and potential side effects of each medication as needed. Indication, safety, efficacy, and convenience was assessed for all medications addressed above.  No environmental factors were noted currently affecting patient.  This care plan was communicated via EMR with her primary care provider, Jamila Lechuga MD, who is the authorizing prescriber for this visit.  Direct supervision was available by either the patient's PCP or other available provider.    Time Spent: 30 minutes  Time and complexity billing metrics are included in the doc-flowsheet linked to this visit.       Chayito Mccollum, PharmD, BCACP    Current Outpatient Prescriptions   Medication Sig Dispense Refill     insulin regular (NOVOLIN R) 100 unit/mL injection Inject 2-14 Units under the skin 3 (three) times a day before meals.       alendronate (FOSAMAX) 35 MG tablet Take 35 mg by mouth once a week. Take in the morning with a full glass of water, on an empty stomach, and do not take anything else by mouth or lie down for the next 30 min.  Sundays       amLODIPine (NORVASC) 10 MG tablet Take 1 tablet (10 mg) by mouth daily. 90 tablet 2     aspirin 81 MG EC tablet Take 1 tablet (81 mg total) by mouth daily.  99     azaTHIOprine (IMURAN) 50 mg tablet Take 100 mg by mouth bedtime.        carvedilol (COREG) 12.5 MG tablet Take 1 tablet (12.5 mg total) by mouth 2 (two) times a day. 180 tablet 3     cholecalciferol, vitamin D3, 2,000 unit Tab Take 2,000 Units by  "mouth daily.       CONTOUR NEXT STRIPS strips Test blood sugar 4-6 times daily 550 each 3     cycloSPORINE modified (GENGRAF) 25 MG capsule Take 50 mg by mouth 2 (two) times a day.        lancets (ACCU-CHEK FASTCLIX) Misc Check blood sugar up to 5 times per day 102 each 3     levETIRAcetam (KEPPRA) 500 MG tablet Take 1,000 mg by mouth at bedtime.       levETIRAcetam (KEPPRA) 500 MG tablet One tablet in the morning. Two tablets in the afternoon 90 tablet 0     pantoprazole (PROTONIX) 40 MG tablet Take 40 mg by mouth 2 (two) times a day.       pen needle, diabetic (BD ULTRA-FINE TWILA PEN NEEDLES) 32 gauge x 5/32\" Ndle Use 1 per day. 200 each 4     predniSONE (DELTASONE) 5 MG tablet Take 5 mg by mouth daily.       rosuvastatin (CRESTOR) 5 MG tablet Take 1 tablet (5 mg total) by mouth at bedtime. 90 tablet 2     sulfamethoxazole-trimethoprim (SEPTRA) 400-80 mg per tablet Take 1 tablet by mouth daily.       TOUJEO SOLOSTAR 300 unit/mL (1.5 mL) injection Inject 38 Units under the skin daily. 4.5 mL 0     traZODone (DESYREL) 50 MG tablet Take 1 tablet (50 mg) by mouth at bedtime. 90 tablet 1     venlafaxine (EFFEXOR-XR) 150 MG 24 hr capsule Take 1 capsule (150 mg) by mouth daily. 90 capsule 3     No current facility-administered medications for this visit.      "

## 2021-06-14 NOTE — PROGRESS NOTES
Assessment/Plan:      Visit for Preoperative Exam.     Recommend decreasing basal insulin the night before surgery; take usual meds on the AM of surgery.  Bring CPAP to surgery center.  No fast acting insulin while NPO. Proceed with proposed surgery without additional clinical clarifications. Low Risk Surgery.     Patient Instructions   Continue your usual medications.    On the night before surgery, decrease your insulin to 38 units.  No fast acting insulin while not eating.  Resume your usual insulin doses when you are back eating normally.    Today's labs will be available on [facility].  If you aren't signed up, then we'll mail them out.  If anything needs more immediate follow up, we'll also call.        Subjective:     Scheduled Procedure: Carpel tunnel on Rt wrist and elbow  Surgery Date:  12/11/17  Surgery Location:  [facility]  Surgeon:  Dr. Vikas Douglas    ASSESSED PROBLEMS:  Problem List Items Addressed This Visit     CULLEN on CPAP    Mixed hyperlipidemia    Renal Transplant Recipient    Relevant Orders    Basic Metabolic Panel    Cardiac pacemaker, dual, in situ - Primary      Other Visit Diagnoses     Poorly controlled type 2 diabetes mellitus with complication        Relevant Orders    Glycosylated Hemoglobin A1c    Carpal tunnel syndrome of right wrist              CHIEF COMPLAINT:  Chief Complaint   Patient presents with     Pre-op Exam       HISTORY OF PRESENT ILLNESS:  Odalys Miles is a 72 y.o. female here for an internal medicine pre-operative consultation. The exam is requested by   in preparation for right carpal surgery to be performed at [facility]  on 12/11/17. Today s examination on 11/28/17 is done to review the underlying surgical condition of carpal tunnel syndrome and deputryn's contracture, clear for anesthesia, and review medical problems with appropriate changes in medications.    She's had a hx of carpal tunnel disease bilaterally.  She had surgery on the  right about 25 years ago; her sx have recurred.  She recently had surgery on her left wrist and is pleased with the results.    She's feeling well aside from her carpal tunnel.  Her PMHx is noteable for DM2 and CKD s/p kidney tx.  An A1c on 10/24 was 8.8; she's been working hard on diet since then.  She has no sx of CP/SOB.  She has pacemaker; it's working well.  She has CULLEN and uses CPAP.    Odalys Miles has tolerated previous surgeries well without bleeding or anesthesia difficulty.       .............................................................................................................................................  Current Outpatient Prescriptions   Medication Sig Dispense Refill     alendronate (FOSAMAX) 35 MG tablet Take 35 mg by mouth once a week. Take in the morning with a full glass of water, on an empty stomach, and do not take anything else by mouth or lie down for the next 30 min.  Sundays       amLODIPine (NORVASC) 10 MG tablet Take 1 tablet (10 mg) by mouth daily. 90 tablet 2     aspirin 81 MG EC tablet Take 1 tablet (81 mg total) by mouth daily.  99     azaTHIOprine (IMURAN) 50 mg tablet Take 100 mg by mouth bedtime.        carvedilol (COREG) 12.5 MG tablet Take 1 tablet (12.5 mg total) by mouth 2 (two) times a day. 180 tablet 3     cholecalciferol, vitamin D3, 2,000 unit Tab Take 2,000 Units by mouth daily.       CONTOUR NEXT STRIPS strips Test blood sugar 4-6 times daily 550 each 3     cycloSPORINE modified (GENGRAF) 25 MG capsule Take 50 mg by mouth 2 (two) times a day.        insulin aspart (NOVOLOG FLEXPEN) 100 unit/mL injection pen Inject 10-30 Units under the skin 3 (three) times a day before meals. 30 mL 2     lancets (ACCU-CHEK FASTCLIX) Misc Check blood sugar up to 5 times per day 102 each 3     levETIRAcetam (KEPPRA) 500 MG tablet One tablet in the morning. Two tablets in the afternoon 90 tablet 0     pantoprazole (PROTONIX) 40 MG tablet Take 40 mg by mouth 2 (two) times a  "day.       pen needle, diabetic (BD ULTRA-FINE TWILA PEN NEEDLES) 32 gauge x 5/32\" Ndle Use 1 per day. 200 each 4     predniSONE (DELTASONE) 5 MG tablet Take 5 mg by mouth daily.       rosuvastatin (CRESTOR) 5 MG tablet Take 1 tablet (5 mg total) by mouth at bedtime. 90 tablet 2     sulfamethoxazole-trimethoprim (SEPTRA) 400-80 mg per tablet Take 1 tablet by mouth daily.       TOUJEO SOLOSTAR 300 unit/mL (1.5 mL) injection Inject 38 Units under the skin daily. 4.5 mL 0     traZODone (DESYREL) 50 MG tablet Take 1 tablet (50 mg) by mouth at bedtime. 90 tablet 1     venlafaxine (EFFEXOR-XR) 150 MG 24 hr capsule Take 1 capsule (150 mg) by mouth daily. 90 capsule 3     levETIRAcetam (KEPPRA) 500 MG tablet Take 1,000 mg by mouth at bedtime.       No current facility-administered medications for this visit.      She's taking 42 units of toujeo now; fasting sugars .    Allergies   Allergen Reactions     Latex      Lisinopril Cough     Resolved after discontinuation     Mold/Mildew      Other Allergy (See Comments) Nausea And Vomiting     Patient states allergy to Pantopin.       Immunization History   Administered Date(s) Administered     DT (pediatric) 01/01/1992     Influenza R5x1-48, 01/25/2010     Influenza high dose, seasonal 10/12/2015, 09/28/2016, 09/26/2017     Influenza, inj, historic,unspecified 10/23/2007, 10/27/2008, 11/09/2009     Pneumo Conj 13-V (2010&after) 10/12/2015     Pneumo Polysac 23-V 10/01/1996, 10/27/2008     Td,adult,historic,unspecified 08/08/2002     Tdap 06/30/2016     ZOSTER 07/03/2012       Patient Active Problem List   Diagnosis     Essential Thrombocytosis     Osteoarthritis     Insomnia     Thrombophlebitis Of Deep Vessels Of The Lower Extremity     Medullary Sponge Kidney Bilaterally     CULLEN on CPAP     Mixed hyperlipidemia     End Stage Renal Disease     Renal Transplant Recipient     Coronary Artery Stenosis     Chronic Diarrhea Of Unknown Origin     Chronic Reflux Esophagitis     " Hypertension     Chronic Gout     Congestive Heart Failure     Ischemic Stroke     Type 2 diabetes mellitus     Dysthymic Disorder     Diabetic Peripheral Neuropathy     Anemia     Sensorineural Hearing Loss     Hyperlipidemia     Ataxia     Cardiac pacemaker, dual, in situ     Adrenal insufficiency       Past Medical History:   Diagnosis Date     Anemia      Anxiety      Arthritis      CAD (coronary artery disease)      CVA (cerebral vascular accident)      Depression      DM (diabetes mellitus)      Epilepsy      ESRD (end stage renal disease)      History of renal transplant      Hyperlipidemia      Hypertension      Neuropathy      Vertigo        Social History     Social History     Marital status:      Spouse name: N/A     Number of children: N/A     Years of education: N/A     Occupational History     Not on file.     Social History Main Topics     Smoking status: Former Smoker     Packs/day: 1.50     Years: 20.00     Smokeless tobacco: Never Used     Alcohol use Yes      Comment: occasional     Drug use: No     Sexual activity: Not on file     Other Topics Concern     Not on file     Social History Narrative       Past Surgical History:   Procedure Laterality Date     CARDIAC PACEMAKER PLACEMENT       HYSTERECTOMY      Age 28     NEPHRECTOMY TRANSPLANTED ORGAN  06/2011     NH APPENDECTOMY      Description: Appendectomy;  Recorded: 11/13/2007;     NH CABG, VEIN, SINGLE      Description: CABG (CABG);  Proc Date: 01/26/2011;  Comments: x 4     NH INS NEW/RPLC PRM PACEMAKER W/TRANSV ELTRD VENTR      Description: Permanent Pacemaker Placement;  Recorded: 08/01/2012;     NH TOTAL ABDOM HYSTERECTOMY      Description: Total Abdominal Hysterectomy;  Recorded: 11/13/2007;     NH TOTAL KNEE ARTHROPLASTY      Description: Total Knee Arthroplasty;  Recorded: 11/13/2007;         History of Present Illness  Recent Health  Fever: no  Chills: no  Fatigue: no  Chest Pain: no  Cough: no  Dyspnea: no  Urinary Frequency:  no  Nausea: no  Vomiting: no  Diarrhea: no  Abdominal Pain: no  Easy Bruising: yes  Lower Extremity Swelling: no  Poor Exercise Tolerance: no    Most recent Health Maintenance Visit:  1 year(s) ago    Pertinent History  Prior Anesthesia: yes  Previous Anesthesia Reaction:  no  Diabetes: yes  Cardiovascular Disease: no  Pulmonary Disease: no  Renal Disease: no  GI Disease: no  Sleep Apnea: yes, pt wears CPAP machine  Thromboembolic Problems: no  Clotting Disorder: no  Bleeding Disorder: no  Transfusion Reaction: no  Impaired Immunity: yes  Steroid use in the last 6 months: yes, pt takes 5 mg prednisone daily  Frequent Aspirin use: yes, not sure if she is to stop or not    Family history of None    Social history of patient does not wear denture or partial plates, there is no transfusion refusal and there are no concerns regarding care after surgery    After surgery, the patient plans to recover at home with family.    Review of Systems  Psych: she's anxious about when her pacer battery is low and needs to be replaced   A 12 point comprehensive review of systems was negative except as noted.          Objective:      Vitals:    11/28/17 0910   BP: 122/74   Pulse: 65   SpO2: 98%        Physical Exam:  General Appearance: Alert, cooperative, no distress, appears stated age   Head: Normocephalic, without obvious abnormality, atraumatic   Eyes: PERRL, conjunctiva/corneas clear, EOM's intact   Throat: Lips, mucosa, and tongue normal; teeth and gums normal   Neck: Decreased lateral ROM  Lungs: Clear to auscultation bilaterally, respirations unlabored.   Heart: Regular rate and rhythm, S1 and S2 normal, no murmur, rub, or gallop,   Abdomen: Soft, non-tender, bowel sounds active all four quadrants, no masses, no organomegaly   Extremities: Extremities normal, atraumatic, no cyanosis or edema   Skin: Skin color, texture, turgor normal, no rashes or lesions   Neurologic: Normal

## 2021-06-15 PROBLEM — I50.30 (HFPEF) HEART FAILURE WITH PRESERVED EJECTION FRACTION (H): Status: ACTIVE | Noted: 2019-01-10

## 2021-06-15 NOTE — PROGRESS NOTES
In clinic device check with Device Nurse followed by office visit with Dr. Escamilla.  Please see link for full device report.  Patient was informed of results and next follow up during today's visit.

## 2021-06-15 NOTE — PROGRESS NOTES
ASSESSMENT:     Diagnoses and all orders for this visit:    Lumbalgia  -     OPS TFESI Lumbar Sacral Unilateral; Future; Expected date: 1/12/18  -     Discontinue: gabapentin (NEURONTIN) 300 MG capsule; Take 1 capsule (300 mg total) by mouth daily.  Dispense: 180 capsule; Refill: 2  -     Ambulatory referral to Neurosurgery  -     gabapentin (NEURONTIN) 100 MG capsule; Take 100 mg by mouth 3 (three) times a day.  Dispense: 180 capsule; Refill: 2    Lumbar disc herniation  -     OPS TFESI Lumbar Sacral Unilateral; Future; Expected date: 1/12/18  -     Discontinue: gabapentin (NEURONTIN) 300 MG capsule; Take 1 capsule (300 mg total) by mouth daily.  Dispense: 180 capsule; Refill: 2  -     Ambulatory referral to Neurosurgery  -     gabapentin (NEURONTIN) 100 MG capsule; Take 100 mg by mouth 3 (three) times a day.  Dispense: 180 capsule; Refill: 2    Myofascial pain  -     OPS TFESI Lumbar Sacral Unilateral; Future; Expected date: 1/12/18  -     Discontinue: gabapentin (NEURONTIN) 300 MG capsule; Take 1 capsule (300 mg total) by mouth daily.  Dispense: 180 capsule; Refill: 2  -     Ambulatory referral to Neurosurgery  -     gabapentin (NEURONTIN) 100 MG capsule; Take 100 mg by mouth 3 (three) times a day.  Dispense: 180 capsule; Refill: 2    SI (sacroiliac) pain  -     OPS TFESI Lumbar Sacral Unilateral; Future; Expected date: 1/12/18  -     Discontinue: gabapentin (NEURONTIN) 300 MG capsule; Take 1 capsule (300 mg total) by mouth daily.  Dispense: 180 capsule; Refill: 2  -     Ambulatory referral to Neurosurgery  -     gabapentin (NEURONTIN) 100 MG capsule; Take 100 mg by mouth 3 (three) times a day.  Dispense: 180 capsule; Refill: 2    Spinal stenosis of lumbar region with neurogenic claudication  -     OPS TFESI Lumbar Sacral Unilateral; Future; Expected date: 1/12/18  -     Discontinue: gabapentin (NEURONTIN) 300 MG capsule; Take 1 capsule (300 mg total) by mouth daily.  Dispense: 180 capsule; Refill: 2  -      Ambulatory referral to Neurosurgery  -     gabapentin (NEURONTIN) 100 MG capsule; Take 100 mg by mouth 3 (three) times a day.  Dispense: 180 capsule; Refill: 2            Odalys Miles is a 72 y.o. female  with a BMI of Body mass index is 41.68 kg/(m^2). who presents today in consultation at the request of Jamila Delvalle MD  for new patient evaluation of chronic low back pain with radicular pain to the left gluteal, left anterior thigh for the last 2 years.  Lumbar MRI that was done on April 1 of 2017 generalized posterior disc bulge at the L4-L5 contributing to compression of the left L4 there is root ganglion and causing severe left foraminal stenosis that is probably contributing to patient current symptoms.  Patient also reports symptoms of neurogenic claudication that is probably due to the moderate to severe central canal stenosis at the L3-L4, and mild to moderate central canal stenosis at the L2-L3.  Patient reports symptoms of urinary incontinence for the last 2 years that be due to the lumbar canal stenosis.  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  I recommend patient to proceed with left L4-L5 transforaminal epidural steroid injection to help with her symptoms, and thereafter to start on physical therapy.  I prescribed gabapentin 100 mg to be taken 1 tablet 3 times a day.  Patient will discuss starting on this medication with her primary care provider, since she had kidney transplant.  I provided patient with a referral to neurosurgery to discuss possible surgical treatments if indicated.          CARLITOS:  56  WHO-5:  16    PLAN:  A shared decision making model was used.  The patient's values and choices were respected.  The following represents what was discussed and decided upon by the physician and the patient.      1.  DIAGNOSTIC TESTS: I reviewed lumbar MRI imaging of the reports with the patient today.  She verbalizes  understanding.  2.  PHYSICAL THERAPY:  Discussed the importance of core strengthening, ROM, stretching exercises with the patient and how each of these entities is important in decreasing pain.  Explained to the patient that the purpose of physical therapy is to teach the patient a home exercise program.  These exercises need to be performed every day in order to decrease pain and prevent future occurrences of pain.  Likened it to brushing one's teeth.  Patient will benefit from physical therapy program.   3.  MEDICATIONS: Patient will start on gabapentin 100 mg tablet 1 tablet 3 times daily.  4.  INTERVENTIONS: Left L4-L5 transforaminal epidural steroid injection.   5.  PATIENT EDUCATION: I thoroughly discussed the plan with the patient today.  She verbalizes understanding and agrees with the plan.      FOLLOW-UP:   Patient will follow up with me in 2-3 weeks.  All questions were answered.      SHAKILA Malloy, MPA-C          SUBJECTIVE:  Odalys Miles  Is a 72 y.o. female who presents today for new patient evaluation of low back pain.  Patient reports low back pain radiating to the left gluteal, left anterior thigh for the last 2 years.  Patient stated that she attended physical therapy in the past without significant pain relief.  She denies trauma, motor vehicle accidents or back surgeries.  At this time she reports 4-5 out of 10 intensity pain in the low back radiating to the left lower extremity.  Her symptoms are aggravated by standing, walking for long periods of times and rates it at 8 out of 10 intensity on its worse.  Her symptoms are alleviated by rest.  Patient reports urinary incontinence for the last 2 years.  Patient was seen by a neurologist in the past and tried Keagle exercises without significant improvement of the urinary incontinence.  She reports occasional diarrhea.  She had a lumbar MRI that was done back in April 1 of 2017 that showed disc extrusion at the left L4-L5 foraminal  "contributing to compression of the left L4 dorsal root ganglion and severe left foraminal stenosis.  There is moderate to severe central canal stenosis at the L3-L4.  Mild to moderate central canal stenosis at L2-L3.  Patient states that she never had gabapentin in the past.  She had a kidney transplant in the past.Patient denies urinary or bowel incontinence, unintentional weight loss, saddle anesthesia, fever or chills, balance difficulties.      Medications:    Current Outpatient Prescriptions   Medication Sig Dispense Refill     alendronate (FOSAMAX) 35 MG tablet Take 35 mg by mouth once a week. Take in the morning with a full glass of water, on an empty stomach, and do not take anything else by mouth or lie down for the next 30 min.  Sundays       amLODIPine (NORVASC) 10 MG tablet Take 1 tablet (10 mg) by mouth daily. 90 tablet 2     aspirin 81 MG EC tablet Take 1 tablet (81 mg total) by mouth daily.  99     azaTHIOprine (IMURAN) 50 mg tablet Take 100 mg by mouth bedtime.        cholecalciferol, vitamin D3, 2,000 unit Tab Take 2,000 Units by mouth daily.       CONTOUR NEXT STRIPS strips Test blood sugar 4-6 times daily 550 each 3     cycloSPORINE modified (GENGRAF) 25 MG capsule Take 50 mg by mouth 2 (two) times a day.        insulin regular (NOVOLIN R) 100 unit/mL injection Inject 2-14 Units under the skin 3 (three) times a day before meals.       lancets (ACCU-CHEK FASTCLIX) Misc Check blood sugar up to 5 times per day 102 each 3     levETIRAcetam (KEPPRA) 500 MG tablet One tablet in the morning. Two tablets in the afternoon 90 tablet 0     pantoprazole (PROTONIX) 40 MG tablet Take 40 mg by mouth 2 (two) times a day.       pen needle, diabetic (BD ULTRA-FINE TWILA PEN NEEDLES) 32 gauge x 5/32\" Ndle Use 1 per day. 200 each 4     predniSONE (DELTASONE) 5 MG tablet Take 5 mg by mouth daily.       rosuvastatin (CRESTOR) 5 MG tablet Take 1 tablet (5 mg total) by mouth at bedtime. 90 tablet 2     TOUJEO SOLOSTAR 300 " unit/mL (1.5 mL) injection Inject 38 Units under the skin daily. 4.5 mL 0     traMADol (ULTRAM) 50 mg tablet Take 1 tablet (50 mg total) by mouth every 8 (eight) hours as needed for pain. 40 tablet 0     traZODone (DESYREL) 50 MG tablet Take 1 tablet (50 mg) by mouth at bedtime. 90 tablet 1     venlafaxine (EFFEXOR-XR) 150 MG 24 hr capsule Take 1 capsule (150 mg) by mouth daily. 90 capsule 3     gabapentin (NEURONTIN) 100 MG capsule Take 100 mg by mouth 3 (three) times a day. 180 capsule 2     No current facility-administered medications for this encounter.        Allergies:   Allergies   Allergen Reactions     Latex      Lisinopril Cough     Resolved after discontinuation     Mold/Mildew      Other Allergy (See Comments) Nausea And Vomiting     Patient states allergy to Pantopin.       PMH:    Past Medical History:   Diagnosis Date     Anemia      Anxiety      Arthritis      CAD (coronary artery disease)      CVA (cerebral vascular accident)      Depression      DM (diabetes mellitus)      Epilepsy      ESRD (end stage renal disease)      History of renal transplant      Hyperlipidemia      Hypertension      Neuropathy      Vertigo        PSH:    Past Surgical History:   Procedure Laterality Date     CARDIAC PACEMAKER PLACEMENT       HYSTERECTOMY      Age 28     NEPHRECTOMY TRANSPLANTED ORGAN  06/2011     UT APPENDECTOMY      Description: Appendectomy;  Recorded: 11/13/2007;     UT CABG, VEIN, SINGLE      Description: CABG (CABG);  Proc Date: 01/26/2011;  Comments: x 4     UT INS NEW/RPLC PRM PACEMAKER W/TRANSV ELTRD VENTR      Description: Permanent Pacemaker Placement;  Recorded: 08/01/2012;     UT TOTAL ABDOM HYSTERECTOMY      Description: Total Abdominal Hysterectomy;  Recorded: 11/13/2007;     UT TOTAL KNEE ARTHROPLASTY      Description: Total Knee Arthroplasty;  Recorded: 11/13/2007;       Family History:    Family History   Problem Relation Age of Onset     Cancer Sister      Diabetes Sister      Cancer  Father        Social History:   Social History     Social History     Marital status:      Spouse name: N/A     Number of children: N/A     Years of education: N/A     Occupational History     Not on file.     Social History Main Topics     Smoking status: Former Smoker     Packs/day: 1.50     Years: 20.00     Smokeless tobacco: Never Used     Alcohol use Yes      Comment: occasional     Drug use: No     Sexual activity: Not on file     Other Topics Concern     Not on file     Social History Narrative       ROS: Left-sided low back pain radiating to the left lower extremity.  Specifically negative for bowel/bladder dysfunction, fevers,chills, appetite changes, unexplained weight loss.   Otherwise 13 systems reviewed are negative.  Please see the patient's intake questionnaire from today for details.      OBJECTIVE:  PHYSICAL EXAMINATION:    CONSTITUTIONAL:  Vital signs as above.  No acute distress.  The patient is well nourished and well groomed.  PSYCHIATRIC:  The patient is awake, alert, oriented to person, place, time and answering questions appropriately with clear speech.    SKIN:  Skin over the face, bilateral lower extremities, and posterior torso is clean, dry, intact without rashes.    GAIT:  Gait is non-antalgic.  The patient is able to heel and toe walk without significant difficulty.    STANDING EXAMINATION: Tenderness present at the left SI joint.  Mild tenderness at the left L4-L5, L5-S1 paraspinal muscle.  MUSCLE STRENGTH:  The patient has 5/5 strength for the bilateral hip flexors, knee flexors/extensors, ankle dorsiflexors/plantar flexors, great toe extensors, ankle evertors/invertors.  NEUROLOGICAL:  2/4 patellar, medial hamstring, and achilles reflexes bilaterally.  Negative Babinski's bilaterally.  No ankle clonus bilaterally. Sensation to light touch is intact in the bilateral L4, L5, and S1 dermatomes.  VASCULAR:  2/4  pulses bilaterally.  Bilateral lower extremities are warm.  There is  no pitting edema of the bilateral lower extremities.  ABDOMINAL:  Soft, non-distended, non-tender throughout all quadrants.  No pulsatile mass palpated in the left lower quadrant.  LYMPH NODES:  No palpable or tender inguinal/popliteal lymph nodes.  MUSCULOSKELETAL: Positive left straight leg raise.  Positive left hip impingement.  Negative right hip impingement.  Negative right straight leg raise.  Negative hip impingement bilaterally.      RESULTS:      [facility]  MR LUMBAR SPINE WO CONTRAST  4/1/2017 7:08 PM      INDICATION: Low back pain, >6 weeks / red flag(s) / radiculopathy.  TECHNIQUE: Routine.  CONTRAST: None  COMPARISON: 5/26/2015.     FINDINGS: Nomenclature is based on 5 lumbar type vertebral bodies. Relatively stable 30 degrees of levoconvex scoliosis with an apex at the L2-L3 level. The somewhat limits evaluation. Lumbar vertebra are normal in height. The underlying bone marrow   pattern is within normal limits. There are mild alterations in the lateral alignment which may relate to the scoliosis. There is also a baseline congenital narrowing of the central canal and foramina in the lumbar region. No pars defect. The conus tip is   identified at T12-L1. Grossly normal paraspinal soft tissues. No abdominal aortic aneurysm. The visualized portions of the bony pelvis are normal for age.     T12-L1: Normal disc height and signal. No herniation. Moderate facet arthropathy. No spinal canal stenosis. No right neural foraminal stenosis. No left neural foraminal stenosis.     L1-L2: Mild to moderate loss of disc height and signal. Generalized posterior disc bulge with superimposed small central disc extrusion. This appears stable. Moderate facet arthropathy. No spinal canal stenosis. Mild right neural foraminal stenosis. No   left neural foraminal stenosis.     L2-L3: Severe loss of disc height and signal. There is a posterior disc osteophyte ridge. There is prominence of the epidural fat within the  left aspect of the spinal canal contributing to some mass effect upon the thecal sac. Moderate facet arthropathy.   Slightly increased mild to moderate spinal canal stenosis. Stable mild to moderate right neural foraminal stenosis. No left neural foraminal stenosis.     L3-L4: Mild loss of disc height and signal. Generalized posterior disc bulge. There is a moderate right facet joint effusion and evidence of a new extraspinal synovial cyst measuring approximately 15 mm extending posterior to the right facet. Underlying   severe facet arthropathy. Increased moderate to severe spinal canal stenosis. Stable moderate right neural foraminal stenosis. Stable mild to moderate left neural foraminal stenosis.     L4-L5: Stable moderate loss of disc height and signal. Generalized posterior disc bulge with superimposed left paracentral and left foraminal disc extrusions. The left foraminal disc extrusion is slightly larger. There is associated compression of the   left L4 dorsal root ganglion. Moderate to severe facet arthropathy. There is increased narrowing of the left lateral recess with potential mass effect on the traversing left L5 nerve roots. There is stable mild central canal stenosis . Stable mild right   neural foraminal stenosis. Increased severe left neural foraminal stenosis.     L5-S1: Normal disc height and signal. No herniation. Severe facet arthropathy. No spinal canal stenosis. No right neural foraminal stenosis. No left neural foraminal stenosis.     IMPRESSION:   CONCLUSION:  1.  Relatively stable 30 degrees of levoconvex scoliosis centered at the L2-L3 level. This somewhat limits evaluation.  2.  At L4-L5, increase in size of a left foraminal disc extrusion contributing to compression of the left L4 dorsal root ganglion and increased, now severe left foraminal stenosis. There is also new or increased narrowing of the left lateral recess at   this level with potential mass effect on the traversing left L5  nerve roots.  3.  At L3-L4, progression of degenerative facet arthropathy, particularly on the right where there is an increased now moderate right facet joint effusion and a 15 mm extraspinal synovial cyst projecting posterior to the facet.  4.  Also at L3-L4, there is increased now moderate to severe central canal stenosis.  5.  At L2-L3, there is slightly increased now mild to moderate central canal stenosis.  6.  Degenerative changes at additional levels as detailed above without significant progression.

## 2021-06-15 NOTE — PROGRESS NOTES
Progress Note    Reason for Visit:  Chief Complaint     Diabetes Mellitus          Progress Note:    HPI:      This pleasant 72-year-old female patient is here for follow-up for follow-up because of type 2 diabetes insulin-dependent.    She is currently on Toujeo 42 units at bedtime and Novolin R she takes 3 units for every 15 g of carbs.    The patient does not take R with breakfast but take 9 with lunch and 15 with supper.    She did have renal transplant because of medullary sponge kidney back in 2011 and had coronary artery bypass graft in 2010.    I reviewed her blood sugar she tends to run high blood sugar at suppertime.    Component      Latest Ref Rng & Units 10/24/2017 11/28/2017   Sodium      136 - 145 mmol/L 140 145   Potassium      3.5 - 5.0 mmol/L 4.8 4.6   Chloride      98 - 107 mmol/L 103 108 (H)   CO2      22 - 31 mmol/L 24 23   Anion Gap, Calculation      5 - 18 mmol/L 13 14   Glucose      70 - 125 mg/dL 223 (H) 120   Calcium      8.5 - 10.5 mg/dL 9.6 8.8   BUN      8 - 28 mg/dL 31 (H) 30 (H)   Creatinine      0.60 - 1.10 mg/dL 2.18 (H) 1.62 (H)   GFR MDRD Af Amer      >60 mL/min/1.73m2 27 (L) 38 (L)   GFR MDRD Non Af Amer      >60 mL/min/1.73m2 22 (L) 31 (L)   Cholesterol      <=199 mg/dL     Triglycerides      <=149 mg/dL     HDL Cholesterol      >=50 mg/dL     LDL Calculated      <=129 mg/dL     Patient Fasting > 8hrs?           TSH      0.30 - 5.00 uIU/mL     Free T4      0.7 - 1.8 ng/dL     Hemoglobin A1c      3.5 - 6.0 % 8.8 (H) 8.3 (H)   T3, Total      45 - 175 ng/dL     Vitamin D, Total (25-Hydroxy)      30.0 - 80.0 ng/mL     PTH      10 - 86 pg/mL         Review of Systems:    Nervous System: No headache, dizziness, fainting or memory loss. No tingling sensation of hand or feet.  Ears: No hearing loss or ringing in the ears  Eyes: No blurring of vision, redness, itching or dryness.  Nose: No nosebleed or loss of smell  Mouth: No mouth sores or loss of taste  Throat: No hoarseness or  "difficulty swallowing  Neck: No enlarged thyroid or lymph nodes.  Heart: No chest pain, palpitation or irregular heartbeat. No swelling of hands or feet  Lungs: No shortness of breath, cough, night sweats, wheezing or hemoptysis.  Gastrointestinal: No nausea or vomiting, constipation or diarrhea.  No acid reflux, abdominal pain or blood in stools.  Kidney/Bladdr: No polyuria, polydipsia, nocturia or hematuria.  Genital/Sexual: No loss of libido  Skin: No rash, hair loss or hirsutism.  No abnormal striae  Muscles/Joints/Bones: No morning stiffness, muscle aches and pain or loss of height.    Current Medications:  Current Outpatient Prescriptions   Medication Sig     alendronate (FOSAMAX) 35 MG tablet Take 35 mg by mouth once a week. Take in the morning with a full glass of water, on an empty stomach, and do not take anything else by mouth or lie down for the next 30 min.  Sundays     amLODIPine (NORVASC) 10 MG tablet Take 1 tablet (10 mg) by mouth daily.     aspirin 81 MG EC tablet Take 1 tablet (81 mg total) by mouth daily.     azaTHIOprine (IMURAN) 50 mg tablet Take 100 mg by mouth bedtime.      cholecalciferol, vitamin D3, 2,000 unit Tab Take 2,000 Units by mouth daily.     CONTOUR NEXT STRIPS strips Test blood sugar 4-6 times daily     cycloSPORINE modified (GENGRAF) 25 MG capsule Take 50 mg by mouth 2 (two) times a day.      insulin regular (NOVOLIN R) 100 unit/mL injection Inject 2-14 Units under the skin 3 (three) times a day before meals.     lancets (ACCU-CHEK FASTCLIX) Misc Check blood sugar up to 5 times per day     levETIRAcetam (KEPPRA) 500 MG tablet One tablet in the morning. Two tablets in the afternoon     pantoprazole (PROTONIX) 40 MG tablet Take 40 mg by mouth 2 (two) times a day.     pen needle, diabetic (BD ULTRA-FINE TWILA PEN NEEDLES) 32 gauge x 5/32\" Ndle Use 1 per day.     predniSONE (DELTASONE) 5 MG tablet Take 5 mg by mouth daily.     rosuvastatin (CRESTOR) 5 MG tablet Take 1 tablet (5 mg " total) by mouth at bedtime.     TOUJEO SOLOSTAR 300 unit/mL (1.5 mL) injection Inject 38 Units under the skin daily.     traMADol (ULTRAM) 50 mg tablet Take 1 tablet (50 mg total) by mouth every 8 (eight) hours as needed for pain.     traZODone (DESYREL) 50 MG tablet Take 1 tablet (50 mg) by mouth at bedtime.     venlafaxine (EFFEXOR-XR) 150 MG 24 hr capsule Take 1 capsule (150 mg) by mouth daily.       Patients Active Problems:  Patient Active Problem List   Diagnosis     Essential Thrombocytosis     Osteoarthritis     Insomnia     Thrombophlebitis Of Deep Vessels Of The Lower Extremity     Medullary Sponge Kidney Bilaterally     CULLEN on CPAP     Mixed hyperlipidemia     End Stage Renal Disease     Renal Transplant Recipient     Coronary Artery Stenosis     Chronic Diarrhea Of Unknown Origin     Chronic Reflux Esophagitis     Hypertension     Chronic Gout     Congestive Heart Failure     Ischemic Stroke     Type 2 diabetes mellitus     Dysthymic Disorder     Diabetic Peripheral Neuropathy     Anemia     Sensorineural Hearing Loss     Hyperlipidemia     Ataxia     Cardiac pacemaker, dual, in situ     Adrenal insufficiency       History:   reports that she has quit smoking. She has a 30.00 pack-year smoking history. She has never used smokeless tobacco. She reports that she drinks alcohol. She reports that she does not use illicit drugs.   reports that she has quit smoking. She has a 30.00 pack-year smoking history. She has never used smokeless tobacco. She reports that she drinks alcohol. She reports that she does not use illicit drugs.  History   Smoking Status     Former Smoker     Packs/day: 1.50     Years: 20.00   Smokeless Tobacco     Never Used      reports that she has quit smoking. She has a 30.00 pack-year smoking history. She has never used smokeless tobacco. She reports that she drinks alcohol. She reports that she does not use illicit drugs.  History   Sexual Activity     Sexual activity: Not on file  "    Past Medical History:   Diagnosis Date     Anemia      Anxiety      Arthritis      CAD (coronary artery disease)      CVA (cerebral vascular accident)      Depression      DM (diabetes mellitus)      Epilepsy      ESRD (end stage renal disease)      History of renal transplant      Hyperlipidemia      Hypertension      Neuropathy      Vertigo      Family History   Problem Relation Age of Onset     Cancer Sister      Diabetes Sister      Cancer Father      Past Medical History:   Diagnosis Date     Anemia      Anxiety      Arthritis      CAD (coronary artery disease)      CVA (cerebral vascular accident)      Depression      DM (diabetes mellitus)      Epilepsy      ESRD (end stage renal disease)      History of renal transplant      Hyperlipidemia      Hypertension      Neuropathy      Vertigo      Past Surgical History:   Procedure Laterality Date     CARDIAC PACEMAKER PLACEMENT       HYSTERECTOMY      Age 28     NEPHRECTOMY TRANSPLANTED ORGAN  06/2011     [place] APP[place]OM[place]      Description: Appendectomy;  Recorded: 11/13/2007;     [place] CABG, VEIN, SINGLE      Description: CABG (CABG);  Proc Date: 01/26/2011;  Comments: x 4     [place] NEW/RPLC PRM PACEMAKER W/TRANSV ELTRD VENTR      Description: Permanent Pacemaker Placement;  Recorded: 08/01/2012;     [place] TOTAL ABDOM HYSTERECTOMY      Description: Total Abdominal Hysterectomy;  Recorded: 11/13/2007;     [place] TOTAL KNEE ARTHROPLASTY      Description: Total Knee Arthroplasty;  Recorded: 11/13/2007;       Vitals   height is 5' 2.25\" (1.581 m) and weight is 232 lb 11.2 oz (105.6 kg) (abnormal). Her blood pressure is 110/60.         Exam  General appearance: The patient looked well, not in acute distress.  Eyes: no evidence of thyroid eye disease.   Retinal exam: No evidence of diabetic retinopathy.  Mouth and Throat: Normal  Neck: No evidence of thyromegaly, enlarged lymph node or tenderness  Chest: Trachea is central. Chest is clear to auscultation and percussion. Breat " sounds are normal.  Cardiovascular exam: JVP is not raised. Heart sounds are normal, no murmurs or rub  Peripheral pulses are palpable.   Abdomen: No masses or tenderness.    Back: No vertebral tenderness or kyphosis.  Extremities: No evidence of leg edema.   Skin: Normal to touch.  No abnormal striae  Neurologic exam:  Visual fields are intact by confrontation, grossly intact. No evidence of peripheral neuropathy.  Detailed foot exam normal.        Diagnosis:  No diagnosis found.    Orders:   No orders of the defined types were placed in this encounter.        Assessment and Plan: Type 2 diabetes I reviewed the blood sugar she tends to run high blood sugar at supper times I did advise her to increase Novolin R to, 4 units for every 15 g of carb with supper and continue with 3 units for every 15 g of carb with breakfast and lunch and I cannot encourage her to take Humulin R with breakfast.    DiJulio was not covered by insurance I given her samples of Vesicare and she will switch her to basaglar if that is not covered and given her a minute and 20 units twice a day.    She takes Fosamax 35 mg once a week she has nausea she does have some GI side effects and on on Protonix her creatinine is 1.62 I did advise her to discontinue that would do a bone DEXA scan and will switch her to Prolia if required.    Hypertension on amlodipine 10 mg aspirin 81 mg    She takes at the thyroid clean and Prograf or Gengraf for the renal transplant and she takes vitamin D 2000 units a day.    Patient has peripheral neuropathy but skin of the foot is intact.    Patient return to clinic in 6 month I did spend 40 minutes with the patient more than 50% was spent on counseling and managing her care.

## 2021-06-15 NOTE — PROGRESS NOTES
Subjective findings: The patient returns to clinic today for continued diabetic foot care.      Objective findings:  Nails bilateral feet are elongated and  2-3 times normal thickness.   Skin bilateral feet warm, dry  and intact.  DP and PT pulses +1 over 4 bilateral feet. Capillary refill less than 2 seconds bilateral feet.  Negative clonus, negative Babinski bilateral feet.  Range of motion within normal limits bilateral feet. Muscle power +5 over 5 bilaterally in all compartments.      Assessment:  Onychomycosis, onychauxis, diabetes mellitus      Plan: I debrided  nails 1 through 5 both feet today.   I have  recommended that the patient return to the clinic every 3 months for continued diabetic foot care.

## 2021-06-15 NOTE — PROGRESS NOTES
Assessment / Plan:     Diagnoses and all orders for this visit:    Lumbalgia  -     Ambulatory referral to Neurosurgery  -     Ambulatory referral to Physical Therapy    Lumbar disc herniation  -     Ambulatory referral to Neurosurgery  -     Ambulatory referral to Physical Therapy    Myofascial pain  -     Ambulatory referral to Neurosurgery  -     Ambulatory referral to Physical Therapy    SI (sacroiliac) pain  -     Ambulatory referral to Neurosurgery  -     Ambulatory referral to Physical Therapy    Spinal stenosis of lumbar region with neurogenic claudication  -     Ambulatory referral to Neurosurgery  -     Ambulatory referral to Physical Therapy          Odalys Miles is a 72 y.o. y.o. female with past medical history significant for osteoarthritis, who presents today for follow-up regarding chronic low back pain with radicular pain to the left gluteal, left anterior thigh for the last 2 years.  Patient responded well to left L4-L5 transforaminal epidural steroid injection on January 22, 2018.  Patient symptoms are due to the generalized posterior disc bulge at the L4-L5 contributing to compression of the left L4 there is root ganglion and causing severe left foraminal stenosis .  Patient has mild to moderate spinal canal stenosis at the L2-L3 that is not significant enough to cause urinary incontinence.    Patient will follow up with Dr. Gentile to discuss her concern about the urinary incontinence and the narrowing in the lumbar canal at the L2-L3.    A shared decision making plan was used.  The patient's values and choices were respected.  The following represents what was discussed and decided upon by the physician and the patient.      1.  DIAGNOSTIC TESTS: I reviewed the lumbar MRI imaging and the report with the patient today.  He verbalizes understanding.  2.  PHYSICAL THERAPY: Patient will start on physical therapy program.  3.  MEDICATIONS: Patient will continue on gabapentin 100 mg, 1 tablet 3 times  a day.  4.  INTERVENTIONS: No therapeutic injection at this time.  5.  PATIENT EDUCATION: I thoroughly discussed the plan with the patient today.  She verbalizes understanding and agrees with the plan.  6.  FOLLOW-UP: Patient will follow up with me in 8  weeks.  All questions were answered.      SHAKILA Malloy, MPA-C      Subjective:     Odalys Miles is a 72 y.o. female who presents today for follow-up regarding low back pain radiating to the left gluteal, left anterior thigh.  Today patient returns to the clinic reporting improvement in her symptoms after 8 days of the procedure.  At this time she reports 3-4 out of 10 intensity pain in the lower back with a list radicular pain to the lower extremity.  Patient still reports ongoing urinary incontinence that is uncontrolled.  Patient stated that she underwent urology evaluation and it was determined that the floor muscles are intact.  Lumbar MRI showed central canal narrowing.  Patient is concerned about the ongoing urinary incontinence.Patient denies  bowel incontinence, unintentional weight loss, saddle anesthesia, fever or chills, balance difficulties.      Review of Systems:   Left-sided low back pain radiating to the left lower extremity.  Specifically negative for bowel/bladder dysfunction, fevers,chills, appetite changes, unexplained weight loss.      Objective:   CONSTITUTIONAL:  Vital signs as above.  No acute distress.  The patient is well nourished and well groomed.    PSYCHIATRIC:  The patient is awake, alert, oriented to person, place and time.  The patient is answering questions appropriately with clear speech.  Normal affect.  SKIN:  Skin over the face, posterior torso, bilateral upper and lower extremities is clean, dry, intact without rashes.  MUSCULOSKELETAL:  Gait is non-antalgic.  The patient is able to heel and toe walk without any difficulty.  Mild tenderness over the L4-L5, L5-S1 lumbar paraspinal muscles.      The patient has 5/5  strength for the bilateral hip flexors, knee flexors/extensors, ankle dorsiflexors/plantar flexors, ankle evertors/invertors.    NEUROLOGICAL:  2/4 patellar, medial hamstring, achilles reflexes which are symmetric bilaterally.  No ankle clonus bilaterally.  Sensation to light touch is intact in the bilateral L4, L5, and S1 dermatomes.  Negative right straight leg raise.  Negative hip impingement bilaterally.     RESULTS:      [facility]  MR LUMBAR SPINE WO CONTRAST  4/1/2017 7:08 PM       INDICATION: Low back pain, >6 weeks / red flag(s) / radiculopathy.  TECHNIQUE: Routine.  CONTRAST: None  COMPARISON: 5/26/2015.      FINDINGS: Nomenclature is based on 5 lumbar type vertebral bodies. Relatively stable 30 degrees of levoconvex scoliosis with an apex at the L2-L3 level. The somewhat limits evaluation. Lumbar vertebra are normal in height. The underlying bone marrow   pattern is within normal limits. There are mild alterations in the lateral alignment which may relate to the scoliosis. There is also a baseline congenital narrowing of the central canal and foramina in the lumbar region. No pars defect. The conus tip is   identified at T12-L1. Grossly normal paraspinal soft tissues. No abdominal aortic aneurysm. The visualized portions of the bony pelvis are normal for age.      T12-L1: Normal disc height and signal. No herniation. Moderate facet arthropathy. No spinal canal stenosis. No right neural foraminal stenosis. No left neural foraminal stenosis.      L1-L2: Mild to moderate loss of disc height and signal. Generalized posterior disc bulge with superimposed small central disc extrusion. This appears stable. Moderate facet arthropathy. No spinal canal stenosis. Mild right neural foraminal stenosis. No   left neural foraminal stenosis.      L2-L3: Severe loss of disc height and signal. There is a posterior disc osteophyte ridge. There is prominence of the epidural fat within the left aspect of the spinal  canal contributing to some mass effect upon the thecal sac. Moderate facet arthropathy.   Slightly increased mild to moderate spinal canal stenosis. Stable mild to moderate right neural foraminal stenosis. No left neural foraminal stenosis.      L3-L4: Mild loss of disc height and signal. Generalized posterior disc bulge. There is a moderate right facet joint effusion and evidence of a new extraspinal synovial cyst measuring approximately 15 mm extending posterior to the right facet. Underlying   severe facet arthropathy. Increased moderate to severe spinal canal stenosis. Stable moderate right neural foraminal stenosis. Stable mild to moderate left neural foraminal stenosis.      L4-L5: Stable moderate loss of disc height and signal. Generalized posterior disc bulge with superimposed left paracentral and left foraminal disc extrusions. The left foraminal disc extrusion is slightly larger. There is associated compression of the   left L4 dorsal root ganglion. Moderate to severe facet arthropathy. There is increased narrowing of the left lateral recess with potential mass effect on the traversing left L5 nerve roots. There is stable mild central canal stenosis . Stable mild right   neural foraminal stenosis. Increased severe left neural foraminal stenosis.      L5-S1: Normal disc height and signal. No herniation. Severe facet arthropathy. No spinal canal stenosis. No right neural foraminal stenosis. No left neural foraminal stenosis.      IMPRESSION:   CONCLUSION:  1.  Relatively stable 30 degrees of levoconvex scoliosis centered at the L2-L3 level. This somewhat limits evaluation.  2.  At L4-L5, increase in size of a left foraminal disc extrusion contributing to compression of the left L4 dorsal root ganglion and increased, now severe left foraminal stenosis. There is also new or increased narrowing of the left lateral recess at   this level with potential mass effect on the traversing left L5 nerve roots.  3.  At  L3-L4, progression of degenerative facet arthropathy, particularly on the right where there is an increased now moderate right facet joint effusion and a 15 mm extraspinal synovial cyst projecting posterior to the facet.  4.  Also at L3-L4, there is increased now moderate to severe central canal stenosis.  5.  At L2-L3, there is slightly increased now mild to moderate central canal stenosis.  6.  Degenerative changes at additional levels as detailed above without significant progression.

## 2021-06-15 NOTE — PROGRESS NOTES
Clinic Note    Assessment:     Assessment and Plan:  1. Lumbar pain with radiation down left leg  Sounds as though this is a chronic issue that has become acutely worse over the past couple of weeks.  Patient has tried physical therapy in the past without much success.  Otherwise, she has not been worked up extensively for her chronic lumbar back pain that she reports having for the past few years.  She is interested in meeting with the spine clinic for a more detailed and thorough assessment.  In the meantime we will try tramadol for pain relief.  I also told her that she needs to avoid bedrest as best as possible, and to resume her ADLs the best that she can.  - Ambulatory referral to Spine Care     Patient Instructions   Call the spine clinic to set up an appointment for your back. Their number is [phone].    Take the tramadol every 8 hours as needed for pain.     Use heat on your back 3 times per day for thirty minutes.              Subjective:      Odalys Miles is a 72 y.o. female who comes to the clinic with back pain.     She has been having chronic back pain for 2 years. Sometimes it gets to the point that it is not bearable. Pain radiates to left butt cheek and left thigh. She has had therapy in the past but this made it worse so she stopped going. No numbness or tingling in her left leg or thigh. She has tried heat and Ice for the pain. She says that due to her history she can only take tylenol.  No bowel or bladder control issues.  No night sweats or fevers.  Denies saddle anesthesia.    The following portions of the patient's history were reviewed and updated as appropriate: Allergies, medications, problem list, prior notes.    Review of Systems:    Review is negative except for what is mentioned above.     Social Hx:    History   Smoking Status     Former Smoker     Packs/day: 1.50     Years: 20.00   Smokeless Tobacco     Never Used         Objective:     Vitals:    01/03/18 1617   BP: 130/80    Patient Site: Right Arm   Patient Position: Sitting   Weight: (!) 227 lb (103 kg)       Exam:    General: No apparent distress. Calm. Alert and Oriented X3. Pt behavior is appropriate..   Musculoskeletal: Pain with palpation to left lower lumbar region.  Limited ROM.      Patient Active Problem List   Diagnosis     Essential Thrombocytosis     Osteoarthritis     Insomnia     Thrombophlebitis Of Deep Vessels Of The Lower Extremity     Medullary Sponge Kidney Bilaterally     CULLEN on CPAP     Mixed hyperlipidemia     End Stage Renal Disease     Renal Transplant Recipient     Coronary Artery Stenosis     Chronic Diarrhea Of Unknown Origin     Chronic Reflux Esophagitis     Hypertension     Chronic Gout     Congestive Heart Failure     Ischemic Stroke     Type 2 diabetes mellitus     Dysthymic Disorder     Diabetic Peripheral Neuropathy     Anemia     Sensorineural Hearing Loss     Hyperlipidemia     Ataxia     Cardiac pacemaker, dual, in situ     Adrenal insufficiency     Current Outpatient Prescriptions   Medication Sig Dispense Refill     alendronate (FOSAMAX) 35 MG tablet Take 35 mg by mouth once a week. Take in the morning with a full glass of water, on an empty stomach, and do not take anything else by mouth or lie down for the next 30 min.  Sundays       amLODIPine (NORVASC) 10 MG tablet Take 1 tablet (10 mg) by mouth daily. 90 tablet 2     aspirin 81 MG EC tablet Take 1 tablet (81 mg total) by mouth daily.  99     azaTHIOprine (IMURAN) 50 mg tablet Take 100 mg by mouth bedtime.        cholecalciferol, vitamin D3, 2,000 unit Tab Take 2,000 Units by mouth daily.       CONTOUR NEXT STRIPS strips Test blood sugar 4-6 times daily 550 each 3     cycloSPORINE modified (GENGRAF) 25 MG capsule Take 50 mg by mouth 2 (two) times a day.        insulin regular (NOVOLIN R) 100 unit/mL injection Inject 2-14 Units under the skin 3 (three) times a day before meals.       lancets (ACCU-CHEK FASTCLIX) Misc Check blood sugar up to  "5 times per day 102 each 3     levETIRAcetam (KEPPRA) 500 MG tablet One tablet in the morning. Two tablets in the afternoon 90 tablet 0     pantoprazole (PROTONIX) 40 MG tablet Take 40 mg by mouth 2 (two) times a day.       pen needle, diabetic (BD ULTRA-FINE TWILA PEN NEEDLES) 32 gauge x 5/32\" Ndle Use 1 per day. 200 each 4     predniSONE (DELTASONE) 5 MG tablet Take 5 mg by mouth daily.       sulfamethoxazole-trimethoprim (SEPTRA) 400-80 mg per tablet Take 1 tablet by mouth daily.       TOUJEO SOLOSTAR 300 unit/mL (1.5 mL) injection Inject 38 Units under the skin daily. 4.5 mL 0     traZODone (DESYREL) 50 MG tablet Take 1 tablet (50 mg) by mouth at bedtime. 90 tablet 1     venlafaxine (EFFEXOR-XR) 150 MG 24 hr capsule Take 1 capsule (150 mg) by mouth daily. 90 capsule 3     rosuvastatin (CRESTOR) 5 MG tablet Take 1 tablet (5 mg total) by mouth at bedtime. 90 tablet 2     No current facility-administered medications for this visit.        Total time spent with patient was 15 minutes with >50% of time spent in face-to-face counseling regarding the above plan       Rakan العلي CNP (Rob)    1/3/2018           "

## 2021-06-16 PROBLEM — N17.0 ACUTE RENAL FAILURE WITH ACUTE TUBULAR NECROSIS SUPERIMPOSED ON STAGE 4 CHRONIC KIDNEY DISEASE (H): Chronic | Status: ACTIVE | Noted: 2020-01-01

## 2021-06-16 PROBLEM — J96.01 ACUTE HYPOXEMIC RESPIRATORY FAILURE (H): Chronic | Status: ACTIVE | Noted: 2020-01-01

## 2021-06-16 PROBLEM — D50.0 IRON DEFICIENCY ANEMIA DUE TO CHRONIC BLOOD LOSS: Status: ACTIVE | Noted: 2020-01-01

## 2021-06-16 PROBLEM — K31.9 DISORDER OF STOMACH: Status: ACTIVE | Noted: 2019-03-06

## 2021-06-16 PROBLEM — Z87.898 HISTORY OF SEIZURE: Status: ACTIVE | Noted: 2019-01-01

## 2021-06-16 PROBLEM — G45.9 TIA (TRANSIENT ISCHEMIC ATTACK): Status: ACTIVE | Noted: 2019-01-22

## 2021-06-16 PROBLEM — B96.4 URINARY TRACT INFECTION DUE TO PROTEUS: Chronic | Status: ACTIVE | Noted: 2020-01-01

## 2021-06-16 PROBLEM — N12 PYELONEPHRITIS: Status: ACTIVE | Noted: 2020-01-01

## 2021-06-16 PROBLEM — I44.2 ATRIOVENTRICULAR BLOCK, COMPLETE (H): Status: ACTIVE | Noted: 2019-01-01

## 2021-06-16 PROBLEM — K31.7 POLYP OF DUODENUM: Status: ACTIVE | Noted: 2019-03-06

## 2021-06-16 PROBLEM — K52.9 CHRONIC DIARRHEA: Status: ACTIVE | Noted: 2017-07-17

## 2021-06-16 PROBLEM — D63.1 ANEMIA DUE TO CHRONIC KIDNEY DISEASE: Chronic | Status: ACTIVE | Noted: 2020-01-01

## 2021-06-16 PROBLEM — I50.1 ACUTE PULMONARY EDEMA WITH CONGESTIVE HEART FAILURE (H): Status: ACTIVE | Noted: 2020-01-01

## 2021-06-16 PROBLEM — Z94.0 RENAL TRANSPLANT RECIPIENT: Status: ACTIVE | Noted: 2020-01-01

## 2021-06-16 PROBLEM — S42.212A CLOSED DISPLACED FRACTURE OF SURGICAL NECK OF LEFT HUMERUS: Status: ACTIVE | Noted: 2019-04-15

## 2021-06-16 PROBLEM — N18.9 ANEMIA DUE TO CHRONIC KIDNEY DISEASE: Chronic | Status: ACTIVE | Noted: 2020-01-01

## 2021-06-16 PROBLEM — M53.3 PAIN IN THE COCCYX: Status: ACTIVE | Noted: 2019-04-15

## 2021-06-16 PROBLEM — E11.52 DIABETIC WET GANGRENE OF THE FOOT (H): Status: ACTIVE | Noted: 2020-01-01

## 2021-06-16 PROBLEM — Z92.89 HISTORY OF RECENT BLOOD TRANSFUSION: Status: ACTIVE | Noted: 2020-01-01

## 2021-06-16 PROBLEM — N39.0 URINARY TRACT INFECTION DUE TO PROTEUS: Chronic | Status: ACTIVE | Noted: 2020-01-01

## 2021-06-16 PROBLEM — F41.9 ANXIETY: Status: ACTIVE | Noted: 2019-01-01

## 2021-06-16 PROBLEM — I50.21 ACUTE SYSTOLIC CONGESTIVE HEART FAILURE (H): Chronic | Status: ACTIVE | Noted: 2020-01-01

## 2021-06-16 PROBLEM — N18.4 ACUTE RENAL FAILURE WITH ACUTE TUBULAR NECROSIS SUPERIMPOSED ON STAGE 4 CHRONIC KIDNEY DISEASE (H): Chronic | Status: ACTIVE | Noted: 2020-01-01

## 2021-06-16 PROBLEM — G40.909 NONINTRACTABLE EPILEPSY WITHOUT STATUS EPILEPTICUS, UNSPECIFIED EPILEPSY TYPE (H): Status: ACTIVE | Noted: 2018-08-29

## 2021-06-16 PROBLEM — E66.01 MORBID OBESITY (H): Status: ACTIVE | Noted: 2018-08-29

## 2021-06-16 PROBLEM — E87.5 HYPERKALEMIA: Status: ACTIVE | Noted: 2020-01-01

## 2021-06-16 PROBLEM — I44.1 2ND DEGREE AV BLOCK: Status: ACTIVE | Noted: 2020-01-01

## 2021-06-16 PROBLEM — I50.43: Chronic | Status: ACTIVE | Noted: 2020-01-01

## 2021-06-16 NOTE — TELEPHONE ENCOUNTER
Telephone Encounter by Christina Holland at 3/30/2020  8:16 AM     Author: Christina Holland Service: -- Author Type: Patient Access    Filed: 3/30/2020  8:18 AM Encounter Date: 3/30/2020 Status: Signed    : Christina Holland (Patient Access)

## 2021-06-16 NOTE — PROGRESS NOTES
Neurosurgery consultation was requested by: NOEL Malloy  Pain:Left lower back pain   Radicular Pain is present: Left buttock and down lateral thigh and around to front thigh to the knee   Lhermitte sign: No  Motor complaints: Weakness in both legs   Sensory complaints: Denies numbness and tingling  Gait and balance issues: Balance issues   Bowel or bladder issues: loss of bladder control and some bowel issues   Duration of SX is: April 2017  The symptoms are worse with: Walking and standing   The symptoms are better with: Sitting   Injury: Denies   Severity is: Moderate  Patient has tried the following conservative measures: Pt has done PT and has not helped. She had Left L4-5 TFESI and had 100% relief for about 2 weeks.   CARLITOS score is: 38%  DANISH Villalobos

## 2021-06-16 NOTE — TELEPHONE ENCOUNTER
Telephone Encounter by Margot Vazquez at 3/7/2019  8:11 AM     Author: Margot Vazquez Service: -- Author Type: --    Filed: 3/7/2019  8:11 AM Encounter Date: 3/6/2019 Status: Signed    : Margot Vazquez APPROVED:    Approval start date: 2/4/2019  Approval end date: 3/5/2020    Pharmacy has been notified of approval and will contact patient when medication is ready for pickup.

## 2021-06-16 NOTE — PROGRESS NOTES
NEUROSURGERY CONSULTATION NOTE:    Assessment:  1.  Lumbar spinal stenosis with neurogenic claudication and radiculopathy  2.  Possible cervical spinal stenosis, tandem stenosis, with myelopathy    Plan: I have asked that she have a cervical MRI scan and return to clinic for further discussion.  Cannot explain her leg weakness and intermittent bladder bowel symptoms by the findings on her lumbar spine, exclusively.    Odalys Miles   [address]  [place] [address]  72 y.o. female is sent to me in consultation   by Jamila Lechuga MD     CC:    Chief Complaint   Patient presents with     Back Pain       HPI:  Neurosurgery consultation was requested by: NOEL Malloy  Pain:Left lower back pain   Radicular Pain is present: Left buttock and down lateral thigh and around to front thigh to the knee   Lhermitte sign: No  Motor complaints: Weakness in both legs   Sensory complaints: Denies numbness and tingling  Gait and balance issues: Balance issues   Bowel or bladder issues: loss of bladder control and some bowel issues   Duration of SX is: April 2017  The symptoms are worse with: Walking and standing   The symptoms are better with: Sitting   Injury: Denies   Severity is: Moderate  Patient has tried the following conservative measures: Pt has done PT and has not helped. She had Left L4-5 TFESI and had 100% relief for about 2 weeks.   CARLITOS score is: 38%      PROBLEM LIST:  1.  lumbar spinal stenosis with neurogenic claudication  2.  Left L4-5 foraminal herniation with left L4 radiculopathy  3.  Left L4-5 lateral recess stenosis with left L5 nerve root compression  4.  Multilevel facet arthropathy  5.  Mild to moderate canal stenosis at L2-3 with a levocurvature  6.  Left sacroiliitis    REVIEW OF SYSTEMS:  A 12 point review of systems has been completed and reviewed.  She denies saddle anesthesia.  She does have personal history of arthritis.  The neurological review as above.  Otherwise, the review  is negative per      Past Medical History:   Diagnosis Date     Anemia      Anxiety      Arthritis      CAD (coronary artery disease)      CVA (cerebral vascular accident)      Depression      DM (diabetes mellitus)      Epilepsy      ESRD (end stage renal disease)      History of renal transplant      Hyperlipidemia      Hypertension      Neuropathy      Vertigo          Past Surgical History:   Procedure Laterality Date     CARDIAC PACEMAKER PLACEMENT       HYSTERECTOMY      Age 28     NEPHRECTOMY TRANSPLANTED ORGAN  06/2011     SD APPENDECTOMY      Description: Appendectomy;  Recorded: 11/13/2007;     SD CABG, VEIN, SINGLE      Description: CABG (CABG);  Proc Date: 01/26/2011;  Comments: x 4     SD INS NEW/RPLC PRM PACEMAKER W/TRANSV ELTRD VENTR      Description: Permanent Pacemaker Placement;  Recorded: 08/01/2012;     SD TOTAL ABDOM HYSTERECTOMY      Description: Total Abdominal Hysterectomy;  Recorded: 11/13/2007;     SD TOTAL KNEE ARTHROPLASTY      Description: Total Knee Arthroplasty;  Recorded: 11/13/2007;         MEDICATIONS:  Current Outpatient Prescriptions   Medication Sig Dispense Refill     alendronate (FOSAMAX) 35 MG tablet Take 35 mg by mouth once a week. Take in the morning with a full glass of water, on an empty stomach, and do not take anything else by mouth or lie down for the next 30 min.  Sundays       amLODIPine (NORVASC) 10 MG tablet Take 1 tablet (10 mg) by mouth daily. 90 tablet 2     aspirin 81 MG EC tablet Take 1 tablet (81 mg total) by mouth daily.  99     azaTHIOprine (IMURAN) 50 mg tablet Take 100 mg by mouth bedtime.        carvedilol (COREG) 12.5 MG tablet Take 1 tablet (12.5 mg total) by mouth 2 (two) times a day with meals. 180 tablet 3     cholecalciferol, vitamin D3, 2,000 unit Tab Take 2,000 Units by mouth daily.       CONTOUR NEXT STRIPS strips Test blood sugar 4-6 times daily 550 each 3     cycloSPORINE modified (GENGRAF) 25 MG capsule Take 50 mg by mouth 2 (two) times a day.  "       gabapentin (NEURONTIN) 100 MG capsule Take 100 mg by mouth 3 (three) times a day. 180 capsule 2     insulin regular (NOVOLIN R) 100 unit/mL injection Inject 2-14 Units under the skin 3 (three) times a day before meals.       lancets (ACCU-CHEK FASTCLIX) Misc Check blood sugar up to 5 times per day 102 each 3     levETIRAcetam (KEPPRA) 500 MG tablet One tablet in the morning. Two tablets in the afternoon 90 tablet 0     pantoprazole (PROTONIX) 40 MG tablet Take 40 mg by mouth 2 (two) times a day.       pen needle, diabetic (BD ULTRA-FINE TWILA PEN NEEDLES) 32 gauge x 5/32\" Ndle Use 1 per day. 200 each 4     predniSONE (DELTASONE) 5 MG tablet Take 5 mg by mouth daily.       rosuvastatin (CRESTOR) 5 MG tablet Take 1 tablet (5 mg total) by mouth at bedtime. 90 tablet 2     traZODone (DESYREL) 50 MG tablet Take 1 tablet (50 mg) by mouth at bedtime. 90 tablet 1     venlafaxine (EFFEXOR-XR) 150 MG 24 hr capsule Take 1 capsule (150 mg) by mouth daily. 90 capsule 3     No current facility-administered medications for this visit.          ALLERGIES/SENSITIVITIES:     Allergies   Allergen Reactions     Latex      Lisinopril Cough     Resolved after discontinuation     Mold/Mildew      Other Allergy (See Comments) Nausea And Vomiting     Patient states allergy to Pantopin.       PERTINENT SOCIAL HISTORY:   Social History     Social History     Marital status:      Spouse name: N/A     Number of children: N/A     Years of education: N/A     Social History Main Topics     Smoking status: Former Smoker     Packs/day: 1.50     Years: 20.00     Smokeless tobacco: Never Used     Alcohol use Yes      Comment: occasional     Drug use: No     Sexual activity: Not Asked       FAMILY HISTORY:  Family History   Problem Relation Age of Onset     Cancer Sister      Diabetes Sister      Cancer Father         PHYSICAL EXAM:   Constitutional: BP (!) 219/85  Pulse 68  Ht 5' 2.5\" (1.588 m)  Wt (!) 230 lb 6.4 oz (104.5 kg)  SpO2 " 95%  BMI 41.47 kg/m2    General appearance: Appropriately groomed.  No acute distress.  Interactive.     Mental Status: Mental status: Alert and oriented, mood and affect appropriate, language reception and expression normal, recent and remote memory is normal, higher cortical function normal. Attention span, concentration and ability to follow commands is normal.       Cranial Nerves: Face is symmetric.  Extraocular movements are full, conjugate and without nystagmus.  Hearing is preserved.  Shoulder position is symmetric.  Tongue is midline with normal motion.       Motor: She has diffuse weakness in the lower extremities and has to push herself up out of the chair.  To confrontation testing, tone nl, bulk nl and strength 5/5 all groups.      Sensory: Sensory exam by subjective report intact to LT,PP,Position and Vib. in the UE and  LE.     Station and Gait: Wide stance with some imbalance     Reflexes; reflexes are present in the lower extremity and do spread.    IMAGING:  I have personally reviewed the images and discussed the findings with Odalys Miles.  She has advanced changes in the lumbar spine throughout.  He has moderate facet arthropathy T12-L1, without foraminal or central stenosis.  She has loss of disc height at L1-2 with generalized posterior bulge and small central extrusion.  There is no spinal canal stenosis.  There is moderate facet arthropathy.  At L2-3 there is also disc height and posterior osteophytic ridge there is prominence of epidural fat within the left aspect of the spinal canal that does compress the left aspect of the spinal canal.  There is moderate facet arthropathy at this level.    At L3-4 there is again loss of disc height with generalized posterior disc bulge.  There is a right facet joint effusion.  There is a new extraspinal synovial cyst at this level.  Severe facet arthropathy is present.  There is moderate to severe spinal canal stenosis at this level.  At L4-5 there is  generalized posterior bulge with a superimposed left paracentral and left foraminal disc extrusion.  This herniation is larger and there is some mass-effect on the left L4 dorsal root ganglion.  There is moderate to severe facet arthropathy.  There is left lateral recess stenosis with potential mass-effect on the traversing left L5 nerve root.  There is increased severe left neuroforaminal stenosis at this level.  At L5-S1 there is facet arthropathy without foraminal stenosis.  She does have a stable 30  of levoconvex scoliosis centered at L2-3.    CC:     [place] [address]

## 2021-06-17 NOTE — PROGRESS NOTES
Neurosurgery consultation was requested by: NOEL Malloy  Pain:Minor neck pain and back pain   Radicular Pain is present: Left buttock and down lateral thigh and around to front thigh to the knee   Lhermitte sign: No  Motor complaints: Weakness in both legs   Sensory complaints: Numbness in both hands and feet, but states she is diabetic   Gait and balance issues: Balance issues   Bowel or bladder issues: loss of bladder control and some bowel issues   Duration of SX is: April 2017  The symptoms are worse with: Walking and standing   The symptoms are better with: Sitting   Injury: Denies   Severity is: Moderate  Patient has tried the following conservative measures: Pt has done PT for back  and has not helped. She had Left L4-5 TFESI and had 100% relief for about 2 weeks.   NDI today is 56%  DANISH Villalobos

## 2021-06-17 NOTE — PROGRESS NOTES
She has a significant multilevel cervical spinal stenosis.  She also has reversal of the normal cervical lordosis.  I spoke with my partner, Dr. Goldberg.  He is willing to see her for consideration of a cervical laminoplasty.  There will be no charge for this visit.    She also has lumbar spinal stenosis.  Her BMI does create problems for surgery, regarding the lumbar spine.    Shwetha Gentile MD, FACS, FAANS

## 2021-06-17 NOTE — TELEPHONE ENCOUNTER
Telephone Encounter by Renata Cheema RN at 6/18/2020  3:41 PM     Author: Renata Cheema RN Service: -- Author Type: Registered Nurse    Filed: 6/18/2020  3:44 PM Encounter Date: 6/18/2020 Status: Signed    : Renata Cheema RN (Registered Nurse)       All information below discussed with Nurse Aiyana at patients TCU. She will update the pt on the plan- pt could not be reached after telephone encounter. Aiyana will call back on 6/19 to confirm if COVID test will be done at our facility or [facility] before 6/23. Dr. Au will perform the pre-op the AM of the procedure.  F/U appts discussed. AVS faxed to [phone].  Angiogram date and time confirmed with Al in  and [facility].     You have been scheduled for the following procedure:    Procedure: Angiogram of the R leg     Date: 6/23/20    Arrival Time:830    Procedure Time: 10    Location: [facility]    Please report to the information desk located in the [place] entrance on [address] at [facility] or the Main Radiology desk on the ground level at [facility].  Tell them you are scheduled for an appointment in Interventional Radiology.  They will then direct you to the Surgical Admit Unit or the Main Radiology desk.    Please arrive at the hospital by 830 am on the day of your procedure.  (Note your scheduled arrival time will be earlier than your scheduled procedure start time to allow for your pre-procedure exam, lab work, preparation and consent.)    Other Instructions:    Do not eat or drink anything after midnight before your procedure.    You may take your normal medications on the morning of your procedure with a few sips of water (unless otherwise instructed)    Please inform us if you are taking insulin, Glucophage (Metformin), Coumadin, Arixtra, or Lovenox.    Please bring a current list of medications with    If you are having an angiogram:    You will need a responsible adult to stay with you at home your first night.    You will need a      You will need to hold your Coumadin 5 days before your angiogram and should consult with your primary regarding this.    Hold your Metformin the night before and morning of angiogram    Take only 1/2 of your bedtime insulin dose and hold your morning dose but bring it with you to the angiogram.    It is ok  to stay on your aspirin unless the Vascular surgery directs you differently.        If you have any questions regarding your procedure, your instructions or should you need to reschedule or cancel your procedure, please contact Luna at the [facility].       Peripheral Angiography  Peripheral angiography is an outpatient procedure that makes a map of the vessels (arteries) in your lower body, legs, and arms, using X-ray and dye.This map can show where blood flow may be blocked.  Talk with your healthcare provider about the risks and complications of angiography.   Before the procedure  Prepare for the peripheral angiography as follows:     Tell your healthcare provider about all medicines you take and any allergies you may have.    Follow any directions youre given for not eating or drinking before the procedure. If your provider says to take your normal medicines, swallow them with only small sips of water.    Arrange for a family member or friend to drive you home.  During the procedure  Here is what to expect:      You may get medicine through an IV (intravenous) line to relax you. Youre given an injection to numb the insertion site. Then, a tiny skin cut (incision) is made near an artery in your groin.    Your provider inserts a thin tube (catheter) through the incision. He or she then threads the catheter into an artery while looking at a video monitor.    Contrast dye is injected into the catheter to confirm position. You may feel warmth or pressure in your legs and back. You lie still as X-rays are taken. The catheter is then taken out.  After the procedure  Youll be taken to a  recovery area. A healthcare provider will apply pressure to the site for about 10 minutes. Your healthcare provider will tell you how long to lie down and keep the insertion site still. Your healthcare provider will discuss the results with you soon after the procedure.  Back at home  On the day you get home, dont drive, dont exercise, avoid walking and taking stairs, and avoid bending and lifting. Your healthcare provider may give you other care instructions.  Call your healthcare provider  Call your healthcare provider right away if:    You notice a lump or bleeding at the insertion site    You feel pain at the insertion site    You become lightheaded or dizzy    You have leg pain or numbness    You do not urinate in 8 hours    Date Last Reviewed: 5/1/2016 2000-2017 [employer]. [address] [place] [address]. All rights reserved. This information is not intended as a substitute for professional medical care. Always follow your healthcare professional's instructions.    Angiogram Procedure Discharge Instructions:     1. If you received sedation for your procedure: Do not drive or operate heavy machinery for the rest of the day.     2. Avoid strenuous activity for 72 hours (3 days):                        - Do not lift greater than 10 pounds.                        - Excessive exercise                        - Straining                        - Return to your normal activities as you tolerate after the 3 days restriction     3. Avoid tub baths, Jacuzzis, hot tubs and pools for 72 hours (3 days) or until puncture site is will healed.     4. You may shower beginning tomorrow. Do not scrub puncture site(s) until well healed, pat dry.     5. You can expect to return to work 1-2 days after your procedure - depending on the nature of your profession.     6. It is normal to have some tenderness and minimal swelling at puncture site. A small area of discoloration may be present. Tenderness  typically subsides in 24-48 hours. A small knot may also be present at puncture site for 6-8 weeks, this can be a normal part of the healing process.     After the angiogram If you:      1. Experience any bleeding or active swelling from puncture site: Lie down, firmly apply pressure to puncture site and CALL 9-1-1     2. Fever greater than 101 degrees Fahrenheit.     3. Redness, swelling, warmth to touch, or purulent (yellow/green/foul smelling) drainage from the puncture site.     4. Increasing pain, tenderness or swelling at puncture site OR of arm/leg near puncture site.     5. Feeling weak or faint.     6. Change in color, temperature, or sensation of arm/leg where puncture was made.     Call us with any other questions or concerns after your procedure: [phone]    You will need to have an ultrasound 2-3 weeks after your angiogram and should be scheduled at the time of your follow up appt.  Further follow up will be based on ultrasound results. Typical follow up is every 3 months for the first year, then every 6 months to one year thereafter.

## 2021-06-17 NOTE — PATIENT INSTRUCTIONS - HE
Patient Instructions by Alina Escamilla MD at 12/27/2019 12:50 PM     Author: Alina Escamilla MD Service: -- Author Type: Physician    Filed: 12/27/2019  1:17 PM Encounter Date: 12/27/2019 Status: Signed    : Alina Escamilla MD (Physician)       Ms. Odalys Miles,     It was a pleasure to see you in the office today. My recommendations for you include:   1. Check on diuretics.  Should be on bumex 1mg once daily  2. Daily weights, record, low sodium diet     Please do not hesitate to call the [facility] clinic with any questions or concerns at [phone].    Sincerely,

## 2021-06-17 NOTE — PROGRESS NOTES
This is a level 2 office consult.  Consult was requested by Dr. Gentile.  Odalys Miles  is a 72 y.o. female     Chief complaint: Low back pain    HPI: Pain goes into the left thigh.  She also complains of weakness in both arms and both legs, equal bilaterally.  She complains of numbness in her hands and feet, equal bilaterally.  She complains of incontinence of both bladder and bowel control.  She gets dizzy and falls down frequently.  She has poor balance.  She does not have right leg pain.    PMH/ROS: Poor health.  Multiple risk factors if we are considering surgery.  Pacemaker.  Congestive heart failure.  Triple coronary artery bypass.  Sleep apnea.  Diabetes.  On insulin.  Kidney transplant.  Morbid obesity.    Exam: Morbidly obese.  BMI 41.  Able to walk on tiptoes and heels.  On range of motion testing neck movement does not cause any symptoms in the arms or legs.  Reflexes are not hyperactive.  No clonus.  Toes are downgoing.  Strength okay.  Maybe some atrophy of the first dorsal interosseous bilaterally.  Sensation okay.    Studies/imaging: Cervical MRI shows a kyphotic posture.  Cervical canal stenosis.  Poor study because of motion artifact.    Impression: Cervical canal stenosis.  Kyphosis.  Multiple high surgical risk factors.    Plan: Cervical spine film with oblique views and flexion-extension views.  Neurology consult.  Possibly consider a cervical laminoplasty.  High risk.

## 2021-06-18 NOTE — PATIENT INSTRUCTIONS - HE
Patient Instructions by Renata Cheema RN at 6/18/2020 12:45 PM     Author: Renata Cheema RN Service: -- Author Type: Registered Nurse    Filed: 6/18/2020  3:46 PM Encounter Date: 6/18/2020 Status: Addendum    : Renata Cheema RN (Registered Nurse)    Related Notes: Original Note by Renata Cheema RN (Registered Nurse) filed at 6/18/2020  3:35 PM       You have been scheduled for the following procedure:    Procedure: Angiogram of the R leg     Date: 6/23/20    Arrival Time:830    Procedure Time: 10    Location: [facility]    Please report to the information desk located in the [place] entrance on [address] at [facility] or the Main Radiology desk on the ground level at [facility].  Tell them you are scheduled for an appointment in Interventional Radiology.  They will then direct you to the Surgical Admit Unit or the Main Radiology desk.    Please arrive at the hospital by 830 am on the day of your procedure.  (Note your scheduled arrival time will be earlier than your scheduled procedure start time to allow for your pre-procedure exam, lab work, preparation and consent.)    Other Instructions:    Do not eat or drink anything after midnight before your procedure.    You may take your normal medications on the morning of your procedure with a few sips of water (unless otherwise instructed)    Please inform us if you are taking insulin, Glucophage (Metformin), Coumadin, Arixtra, or Lovenox.    Please bring a current list of medications with    If you are having an angiogram:    You will need a responsible adult to stay with you at home your first night.    You will need a     You will need to hold your Coumadin 5 days before your angiogram and should consult with your primary regarding this.    Hold your Metformin the night before and morning of angiogram    Take only 1/2 of your bedtime insulin dose and hold your morning dose but bring it with you to the angiogram.    It is ok  to stay on your  aspirin unless the Vascular surgery directs you differently.        If you have any questions regarding your procedure, your instructions or should you need to reschedule or cancel your procedure, please contact Luna at the [facility].       Peripheral Angiography  Peripheral angiography is an outpatient procedure that makes a map of the vessels (arteries) in your lower body, legs, and arms, using X-ray and dye.This map can show where blood flow may be blocked.  Talk with your healthcare provider about the risks and complications of angiography.   Before the procedure  Prepare for the peripheral angiography as follows:     Tell your healthcare provider about all medicines you take and any allergies you may have.    Follow any directions youre given for not eating or drinking before the procedure. If your provider says to take your normal medicines, swallow them with only small sips of water.    Arrange for a family member or friend to drive you home.  During the procedure  Here is what to expect:      You may get medicine through an IV (intravenous) line to relax you. Youre given an injection to numb the insertion site. Then, a tiny skin cut (incision) is made near an artery in your groin.    Your provider inserts a thin tube (catheter) through the incision. He or she then threads the catheter into an artery while looking at a video monitor.    Contrast dye is injected into the catheter to confirm position. You may feel warmth or pressure in your legs and back. You lie still as X-rays are taken. The catheter is then taken out.  After the procedure  Youll be taken to a recovery area. A healthcare provider will apply pressure to the site for about 10 minutes. Your healthcare provider will tell you how long to lie down and keep the insertion site still. Your healthcare provider will discuss the results with you soon after the procedure.  Back at home  On the day you get home, dont drive, dont exercise,  avoid walking and taking stairs, and avoid bending and lifting. Your healthcare provider may give you other care instructions.  Call your healthcare provider  Call your healthcare provider right away if:    You notice a lump or bleeding at the insertion site    You feel pain at the insertion site    You become lightheaded or dizzy    You have leg pain or numbness    You do not urinate in 8 hours    Date Last Reviewed: 5/1/2016 2000-2017 [employer]. [address] [place] [address]. All rights reserved. This information is not intended as a substitute for professional medical care. Always follow your healthcare professional's instructions.    Angiogram Procedure Discharge Instructions:     1. If you received sedation for your procedure: Do not drive or operate heavy machinery for the rest of the day.     2. Avoid strenuous activity for 72 hours (3 days):                        - Do not lift greater than 10 pounds.                        - Excessive exercise                        - Straining                        - Return to your normal activities as you tolerate after the 3 days restriction     3. Avoid tub baths, Jacuzzis, hot tubs and pools for 72 hours (3 days) or until puncture site is will healed.     4. You may shower beginning tomorrow. Do not scrub puncture site(s) until well healed, pat dry.     5. You can expect to return to work 1-2 days after your procedure - depending on the nature of your profession.     6. It is normal to have some tenderness and minimal swelling at puncture site. A small area of discoloration may be present. Tenderness typically subsides in 24-48 hours. A small knot may also be present at puncture site for 6-8 weeks, this can be a normal part of the healing process.     After the angiogram If you:      1. Experience any bleeding or active swelling from puncture site: Lie down, firmly apply pressure to puncture site and CALL 9-1-1     2. Fever greater than  101 degrees Fahrenheit.     3. Redness, swelling, warmth to touch, or purulent (yellow/green/foul smelling) drainage from the puncture site.     4. Increasing pain, tenderness or swelling at puncture site OR of arm/leg near puncture site.     5. Feeling weak or faint.     6. Change in color, temperature, or sensation of arm/leg where puncture was made.     Call us with any other questions or concerns after your procedure: [phone]    You will need to have an ultrasound 2-3 weeks after your angiogram and should be scheduled at the time of your follow up appt.  Further follow up will be based on ultrasound results. Typical follow up is every 3 months for the first year, then every 6 months to one year thereafter.       Instructions for Patients Scheduled for Vascular or Podiatry Procedures During the COVID 19 Pandemic    Your Provider has determined that your condition warrants going ahead with your procedure at this time.  You will need to be tested for COVID19 within 72 hours of your procedure. We highly encourage patients to get tested for COVID-19 at one of our designated [facility] testing sites. We process the tests in our lab, which allows us to get the results quickly. If you choose to get tested at a non-[facility] location, you will need to contact your primary care provider to make those arrangements and ensure the results are available to your surgeon before you are arriving for your procedure. If we do not receive the results in time, your procedure may be postponed or canceled.  Please make sure your test is collected 3-days prior to your procedure date.  The results will need to get faxed to [phone].  After testing, you will need to remain in self-quarantine until your procedure.  If you are notified of a positive COVID-19 result; you will need to call your provider for further recommendations.    Please call [phone] and press option 2 to speak to a nurse.  If the test is  positive; DO NOT PRESENT FOR YOUR PROCEDURE until you have been given further instructions.  You will not be called with negative results so arrive as instructed unless otherwise notified.  Don't:    Don't invite visitors or friends into your home.    Don't leave your home unless absolutely necessary.    Don't share utensils and other household items with others in the home.  Do:    Wash your hands regularly with soap and water and use hand  with at least 60% alcohol if you don't have easy access to soap and water.     Disinfect surface areas daily, including doorknobs, electronics - especially phones, laptops and other devices.     Wash utensils and other items thoroughly.     Separate yourself from others in the household as best you can, including pets.    Keep your hands away from your face.     Practice all other prevention tips the CDC recommends, including covering your coughs and sneezes with a tissue or your sleeve and immediately throwing the tissue into the trash and washing your hands.    Call your hospital if you develop the following signs before your surgery:    Fever.    Cough.    Shortness of breath.    Sore throat.    Runny or stuffy nose.    Muscle or body aches.    Headaches.    Fatigue.    Vomiting and diarrhea.    These steps will help keep you & your family, other patients, and hospital staff safe from COVID19.    [facility] Virtual Check-In Number    [phone]    When you arrive for your IN OFFICE visit. Please call this number from the parking lot.      The staff will then virtually check you in and meet you at the door to escort you to a room.    If you arrive early and a room is not yet ready for you staff may have you wait can call you back when they are ready.     Please remember to wear a mask into your appointment     No Visitors Allowed    If you are having any symptoms- cough, fever, rash, loss of taste and smell or shortness of breath we ask that you  please call and reschedule your appointment.

## 2021-06-18 NOTE — LETTER
Letter by Gisel Bustillo RN at      Author: Gisel Bustillo RN Service: -- Author Type: --    Filed:  Encounter Date: 2/1/2019 Status: (Other)       Odalys Miles  [address]  [place] [address]      February 1, 2019       Dear Odalys Miles,  Welcome to the [employer] Home Monitoring Program!  Per our discussion, below is a summary of our conversation today.  Enclosed, you will find the attached instructions on how to access, complete, and submit your daily surveys, as well as detailed instructions for accurate daily weight recording (please refer to these instructions to assure you are measuring accurate and true weights).  Please keep a copy of this information for your records; you may also want to keep a copy of the attached instructions near your phone or computer, for reference, should you need them.        General Program Information:    Please complete your survey daily, between 6:00 am and 12:00 pm (noon).     You may complete your survey via touch-tone phone or by logging into your P[employer] account (please see attached instructions for either option).     After submitting your survey, a Registered Nurse (RN) from your care team will review your responses and contact you for any necessary follow up needed, based on survey entries.  Please note, any surveys entered on weekends or holidays will be reviewed the next business day.     Should you miss 3 consecutive daily surveys, you will receive a reminder to complete your survey message, sent to your email.    For general questions about your survey, please call [phone].    Vacations: If you will be out of town and will miss your surveys, please notify the RN from your care team by calling [phone], and a hold will be placed until your return.    If you are experiencing symptoms, please call your Primary Care Provider  (Jamila Lechuga MD at [phone]) or cardiologist ([facility] at [phone]).       Again, we want to welcome you to the program and look forward to assisting you with your care.         Gisel Bustillo RN                            Instructions for Accurate Daily Weight Recording:    You will need a scale at home    Weigh yourself every day, first thing in the morning.    Weigh after going to the bathroom and before getting dressed.     Write down your weight in your daily weight log, and enter this weight in your MyHealth Tracker survey.    Call your doctor if you gain 2 pounds a day for 2 days in a row, or 5 pounds in 1 week.     Daily Survey Instructions (Touch-Tone Phone):    Call [phone] to access the survey.     Follow the prompts in answering the six questions.  Please note, if you call from a number that is not on file, you may be asked for your phone number and/or date of birth followed by the # sign, to verify your identity.     When prompted, enter your weight - as measured this morning - using the numbers on your phone, followed by the # sign.     Answer the remaining 5 survey questions, using 1 for Yes or 2 for No, for each question.     Daily Survey Instructions ([employer]):    Login to your [employer] Account at [email or web address]    From the My Medical Record dropdown menu, select Track My Health     On the Track My Health screen, click on 'My CHF Diary'     Click 'Add New Data' button (located directly below the 'My CHF Diary' header)    Click on the 'Now' button, to automatically pull in today's date and current time in your survey (if necessary, please change the time to reflect the exact time you checked your weight)      Record your daily weight (please refer to the Instructions for Accurate Daily Weight Recording, above) and answer the survey questions.    Click the 'Submit for Review' button and review your answers for accuracy (please note, once you submit your survey, your answers cannot be changed)     Click the 'Submit' button to save and submit  your survey

## 2021-06-18 NOTE — LETTER
Letter by Gisel Bustillo RN at      Author: Gisel Bustillo RN Service: -- Author Type: --    Filed:  Encounter Date: 2/1/2019 Status: (Other)       Odalys Miles  [address]  [place] [address]      February 1, 2019       Dear Odalys Miles,  Welcome to the [employer]acker Home Monitoring Program!  Per our discussion, below is a summary of our conversation today.  Enclosed, you will find the attached instructions on how to access, complete, and submit your daily surveys, as well as detailed instructions for accurate daily weight recording (please refer to these instructions to assure you are measuring accurate and true weights).  Please keep a copy of this information for your records; you may also want to keep a copy of the attached instructions near your phone or computer, for reference, should you need them.        General Program Information:    Please complete your survey daily, between 6:00 am and 12:00 pm (noon).     You may complete your survey via touch-tone phone or by logging into your [employer] account (please see attached instructions for either option).     After submitting your survey, a Registered Nurse (RN) from your care team will review your responses and contact you for any necessary follow up needed, based on survey entries.  Please note, any surveys entered on weekends or holidays will be reviewed the next business day.     Should you miss 3 consecutive daily surveys, you will receive a reminder to complete your survey message, sent to your email.    For general questions about your survey, please call [phone].    Vacations: If you will be out of town and will miss your surveys, please notify the RN from your care team by calling [phone], and a hold will be placed until your return.    If you are experiencing symptoms, please call your Primary Care Provider  (Jamila Lechuga MD at [phone]) or cardiologist ([facility] at [phone]).       Again, we want to welcome you to the program and look forward to assisting you with your care.         Gisel Bustillo RN          Instructions for Accurate Daily Weight Recording:    You will need a scale at home    Weigh yourself every day, first thing in the morning.    Weigh after going to the bathroom and before getting dressed.     Write down your weight in your daily weight log, and enter this weight in your MyHealth Tracker survey.    Call your doctor if you gain 2 pounds a day for 2 days in a row, or 5 pounds in 1 week.     Daily Survey Instructions (Touch-Tone Phone):    Call [phone] to access the survey.     Follow the prompts in answering the six questions.  Please note, if you call from a number that is not on file, you may be asked for your phone number and/or date of birth followed by the # sign, to verify your identity.     When prompted, enter your weight - as measured this morning - using the numbers on your phone, followed by the # sign.     Answer the remaining 5 survey questions, using 1 for Yes or 2 for No, for each question.     Daily Survey Instructions ([employer]):    Login to your [employer] Account at [email or web address]    From the My Medical Record dropdown menu, select Track My Health     On the Track My Health screen, click on 'My CHF Diary'     Click 'Add New Data' button (located directly below the 'My CHF Diary' header)    Click on the 'Now' button, to automatically pull in today's date and current time in your survey (if necessary, please change the time to reflect the exact time you checked your weight)      Record your daily weight (please refer to the Instructions for Accurate Daily Weight Recording, above) and answer the survey questions.    Click the 'Submit for Review' button and review your answers for accuracy (please note, once you submit your survey, your answers cannot be changed)     Click the 'Submit' button to save and submit your survey

## 2021-06-18 NOTE — LETTER
Letter by Jamila Lechuga at      Author: Jamila Lechuga Service: -- Author Type: --    Filed:  Encounter Date: 1/1/2019 Status: (Other)       Odalys Miles  [address] [address]             January 1, 2019         Dear Ms. Miles,    Below are the results from your recent visit:    Resulted Orders   US Carotid Bilateral    Narrative    US CAROTID BILATERAL  11/20/2018 10:06 AM    INDICATION: Hx plaque in carotids, increased lightheadedness  TECHNIQUE: Duplex exam performed utilizing 2D gray-scale imaging, Doppler interrogation with color-flow and spectral waveform analysis.  COMPARISON: None.    FINDINGS:  RIGHT: There is mild atheromatous plaque. Normal waveforms with no significant stenosis.    LEFT: There is mild atheromatous plaque. Normal waveforms with no significant stenosis.    Both vertebral arteries and subclavian artery waveforms are normal.    VELOCITY CHART:   The following velocities were obtained in the RIGHT carotid system.  CCA: 60/15 cm/s  ICA: 89/18 cm/s  ECA: 111 cm/s  ICA/CCA: PS 1.48    The following velocities were obtained in the LEFT carotid system.  CCA: 78/13 cm/s  ICA: 119/27 cm/s  ECA: 107 cm/s  ICA/CCA: PS 1.5 to                             Impression    CONCLUSION:  1.  Mild atheromatous plaque in the carotid arteries.  2.  No significant stenosis on either side.    Evaluation based on velocities and NASCET criteria.       Here's your copy of the carotid ultrasounds.  No stenosis seen; good.    Please call with questions or contact us using [employer].    Sincerely,        Electronically signed by Jamila Lechuga MD

## 2021-06-19 NOTE — LETTER
Letter by Mika Ha MD at      Author: Mika Ha MD Service: -- Author Type: --    Filed:  Encounter Date: 5/14/2019 Status: (Other)         Odalys Miles  [address]  [place] [address]             May 14, 2019         Dear Ms. Miles,    Below are the results from your recent visit:    Resulted Orders   Comprehensive Metabolic Panel   Result Value Ref Range    Sodium 140 136 - 145 mmol/L    Potassium 3.5 3.5 - 5.0 mmol/L    Chloride 100 98 - 107 mmol/L    CO2 27 22 - 31 mmol/L    Anion Gap, Calculation 13 5 - 18 mmol/L    Glucose 165 (H) 70 - 125 mg/dL    BUN 52 (H) 8 - 28 mg/dL    Creatinine 2.45 (H) 0.60 - 1.10 mg/dL    GFR MDRD Af Amer 23 (L) >60 mL/min/1.73m2    GFR MDRD Non Af Amer 19 (L) >60 mL/min/1.73m2    Bilirubin, Total 0.3 0.0 - 1.0 mg/dL    Calcium 9.6 8.5 - 10.5 mg/dL    Protein, Total 6.0 6.0 - 8.0 g/dL    Albumin 3.2 (L) 3.5 - 5.0 g/dL    Alkaline Phosphatase 64 45 - 120 U/L    AST 12 0 - 40 U/L    ALT 13 0 - 45 U/L    Narrative    Fasting Glucose reference range is 70-99 mg/dL per  American Diabetes Association (ADA) guidelines.   Glycosylated Hemoglobin A1c   Result Value Ref Range    Hemoglobin A1c 8.5 (H) 3.5 - 6.0 %   Lipid Cascade   Result Value Ref Range    Cholesterol 159 <=199 mg/dL    Triglycerides 175 (H) <=149 mg/dL    HDL Cholesterol 48 (L) >=50 mg/dL    LDL Calculated 76 <=129 mg/dL    Patient Fasting > 8hrs? Yes        Mildly higher creatinine, but within your baseline range.  Potassium level is normal.    Liver tests are normal.    Blood sugar is just moderately high.    Hemoglobin A1c is significantly higher at 8.5%.  Increase insulin and follow-up with the diabetic educator, as planned.    Cholesterol levels are still well controlled.  Mildly elevated triglycerides.            Please call with questions or contact us using [employer].    Sincerely,        Electronically signed by Mika Ha MD

## 2021-06-19 NOTE — LETTER
Letter by Swapna Arzola EPS at      Author: Swapna Arzola EPS Service: -- Author Type: --    Filed:  Encounter Date: 6/7/2019 Status: (Other)         Odalys Miles  [address]  [place] [address]      June 7, 2019      Dear MsTristin Renteriadaysi,    RE: Remote Results    We are writing to you regarding your recent Remote Pacemaker check from home. Your transmission was received successfully. Battery status is satisfactory at this time.     Your results are within normal limits.    Your next device appointment will be a remote check on September 11th, 2019.  You can choose the time of day you wish to transmit.    To schedule or reschedule, please call [phone] and press 1.    NOTE: If you would like to do an extra transmission, please call [phone] and press 3 to speak to a nurse BEFORE transmitting. This ensures that the Device Clinic staff is aware of the reason you are sending a transmission, and can follow-up with you after it has been reviewed.    We will be checking your implanted device from home (remotely) every three months unless otherwise instructed. We will need to see you in the clinic at least once a year. You may need to be seen in the clinic sooner depending on the results of your check.    Please be aware:    The follow-up schedule is like a Physician prescription.    Your remote monitor is paired to your specific implanted device.      Sincerely,    [facility]

## 2021-06-19 NOTE — LETTER
Letter by Desirae Puente RDCS at      Author: Desirae Puente RDCS Service: -- Author Type: --    Filed:  Encounter Date: 10/15/2019 Status: Signed         Odalys Miles  [address]  [place] [address]      October 15, 2019      Dear Ms. Dexterdaysi,    RE: Remote Results    We are writing to you regarding your recent Remote Pacemaker check from home. Your transmission was received successfully. Battery status is satisfactory at this time.     Your results are showing no significant changes.    Your next device appointment will be a remote check on January 16, 2020.  You can choose the time of day you wish to transmit.    To schedule or reschedule, please call [phone] and press 1.    NOTE: If you would like to do an extra transmission, please call [phone] and press 3 to speak to a nurse BEFORE transmitting. This ensures that the Device Clinic staff is aware of the reason you are sending a transmission, and can follow-up with you after it has been reviewed.    We will be checking your implanted device from home (remotely) every three months unless otherwise instructed. We will need to see you in the clinic at least once a year. You may need to be seen in the clinic sooner depending on the results of your check.    Please be aware:    The follow-up schedule is like a Physician prescription.    Your remote monitor is paired to your specific implanted device.      Sincerely,    [facility]
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
## 2021-06-20 NOTE — LETTER
Letter by Brandy Oliver CHW at      Author: Brandy Oliver CHW Service: -- Author Type: --    Filed:  Encounter Date: 2/3/2020 Status: (Other)       Dear Odalys,                                                                         You were recently referred to the [facility] Care Coordination service.  This is a service offered through your Primary Care Clinic which can help you access resources, services in regard to your health and well-being goals. The clinic Community Health Worker has placed two calls to you to discuss the nature of this service and to offer enrollment.  If you are interested in learning more about Clinic Care Coordination, please call your Primary Care Clinic's Community Health Worker, Brandy, at [phone].                                                    Sincerely,                                                                              JESSICA Kearns                                                                                          Clinic Care Coordination                                                  [facility]

## 2021-06-20 NOTE — LETTER
Letter by Jagdish Cardenas NP at      Author: Jagdish Cardenas NP Service: -- Author Type: --    Filed:  Encounter Date: 3/12/2020 Status: (Other)         Patient: Odalys Miles   MR Number: [id number]   YOB: 1945   Date of Visit: 3/12/2020     [facility]      Facility:    [facility] [[id number]]  Code Status: DNR      Chief Complaint/Reason for Visit:   Chief Complaint   Patient presents with   ? Discharge Summary       HPI:   Odalys is a 74 y.o. female who is a transfer from [facility] with an admission on 2/27/20 and discharge on 3/6/20. She has a PMH of DM, CKD stage 4, s/p renal transplant, anemia, PM, chronic CHF per EF 33%, htn who was in the TCU from 2/13/20 to 2/26/20. Apparently feeling more shortness of breath, again presented to the hospital with acute shortness of breath and CP. She was diagnosed with cardiogenic pulmonary edema with decreased EF, given medical management with diuresing.  Was not a candidate for angiogram given declining renal function, required BiPAP and then weaned off.  She developed acute on chronic renal failure with a baseline creatinine around 2.5 though increased to 3.5 per recent addition of losartan (which was decreased), patient not too keen on going through dialysis again as she had initially done in 2011.  Found to have osteomyelitis per MRU in the right great toe, seen by podiatry, started on Bactrim and Omnicef.  Also found to have Proteus UTI. Per anemia, she was given IV iron and started on epo as well. She was then discharged to the TCU again for additional rehab.    She has concluded her Tcu stay and will be discharged to home with services on 3/13/20.    Past Medical History:  Past Medical History:   Diagnosis Date   ? Adrenal insufficiency (H)    ? Anemia    ? Anxiety    ? Arthritis    ? Ataxia 5/12/2015   ? CAD (coronary artery disease)    ? Cardiac pacemaker, dual, in situ 12/7/2016     [employer] Revo MRI DOI: 12/15/2011 Dr. Aishwarya Treviño   ? Chronic Diarrhea Of Unknown Origin     Created by Conversion    ? Chronic Gout     Created by Conversion  Replacement Utility updated for latest IMO load   ? Chronic Reflux Esophagitis     Created by Conversion    ? Congestive Heart Failure     Created by Conversion  Replacement Utility updated for latest IMO load   ? Coronary Artery Stenosis     Created by Conversion [facility] Annotation: Feb 27 2011 11:37PM - Alina Zapien: s/p CABGx4  1/11  Replacement Utility updated for latest IMO load   ? CVA (cerebral vascular accident) (H)    ? Depression    ? Diabetic Peripheral Neuropathy     Created by Conversion    ? DM (diabetes mellitus) (H)    ? Dysthymic disorder     Created by Conversion    ? Epilepsy (H)    ? ESRD (end stage renal disease) (H)    ? Essential Thrombocytosis     Created by Conversion    ? History of renal transplant    ? Hyperlipidemia    ? Hypertension    ? Insomnia     Created by Conversion    ? Ischemic Stroke     Created by Conversion  Replacement Utility updated for latest IMO load   ? Medullary Sponge Kidney Bilaterally     Created by Conversion    ? Morbid obesity (H) 8/29/2018   ? Neuropathy    ? Nonintractable epilepsy without status epilepticus, unspecified epilepsy type (H) 8/29/2018   ? CULLEN on CPAP     Created by Conversion    ? Osteoarthritis     Created by Conversion  Replacement Utility updated for latest IMO load   ? Renal Transplant Recipient     Created by Conversion    ? Sensorineural hearing loss     Created by Conversion  Replacement Utility updated for latest IMO load   ? Thrombophlebitis Of Deep Vessels Of The Lower Extremity     Created by Conversion    ? Type 2 diabetes mellitus (H)     Created by Conversion    ? Vertigo            Surgical History:  Past Surgical History:   Procedure Laterality Date   ? APPENDECTOMY     ? CARDIAC PACEMAKER PLACEMENT     ? CORONARY ARTERY BYPASS GRAFT  01/26/2011    Comments: x  4   ? HYSTERECTOMY  1973   ? IR EXTREMITY ANGIOGRAM LEFT  1/30/2019   ? NEPHRECTOMY TRANSPLANTED ORGAN  06/2011   ? MT TOTAL KNEE ARTHROPLASTY Bilateral        Family History:   Family History   Problem Relation Age of Onset   ? Diabetes Sister    ? Breast cancer Sister 62   ? Cancer Father        Social History:    Social History     Socioeconomic History   ? Marital status:      Spouse name: Not on file   ? Number of children: Not on file   ? Years of education: Not on file   ? Highest education level: Not on file   Occupational History     Employer: RETIRED   Social Needs   ? Financial resource strain: Not on file   ? Food insecurity     Worry: Not on file     Inability: Not on file   ? Transportation needs     Medical: Not on file     Non-medical: Not on file   Tobacco Use   ? Smoking status: Former Smoker     Packs/day: 1.50     Years: 20.00     Pack years: 30.00     Types: Cigarettes   ? Smokeless tobacco: Never Used   Substance and Sexual Activity   ? Alcohol use: Yes     Comment: occasional   ? Drug use: No   ? Sexual activity: Not on file   Lifestyle   ? Physical activity     Days per week: Not on file     Minutes per session: Not on file   ? Stress: Not on file   Relationships   ? Social connections     Talks on phone: Not on file     Gets together: Not on file     Attends Synagogue service: Not on file     Active member of club or organization: Not on file     Attends meetings of clubs or organizations: Not on file     Relationship status: Not on file   ? Intimate partner violence     Fear of current or ex partner: Not on file     Emotionally abused: Not on file     Physically abused: Not on file     Forced sexual activity: Not on file   Other Topics Concern   ? Not on file   Social History Narrative    .  Chas passed away 2009 from cancer. Lives independently in 1 level town home in [place]. 2 sons- Ben and Av. She has a dog-Isaura. Enjoys knitting.           Review of  Systems   Constitutional: Positive for activity change, appetite change and fatigue. Negative for chills, diaphoresis and fever.        No concerns   HENT: Negative for congestion and hearing loss.    Eyes: Negative.    Respiratory: Positive for shortness of breath.         2L NC   Cardiovascular: Negative.    Gastrointestinal: Negative for abdominal distention, abdominal pain, blood in stool, constipation, diarrhea and nausea.   Endocrine: Negative.    Genitourinary: Negative for difficulty urinating.   Musculoskeletal:        Denies pain   Skin: Positive for wound.        R great toe    Allergic/Immunologic: Negative.    Neurological: Negative for dizziness, tremors, speech difficulty and light-headedness.   Hematological: Negative.    Psychiatric/Behavioral: Negative for agitation, confusion, hallucinations and sleep disturbance. The patient is not nervous/anxious.        Vitals:    03/12/20 1204   BP: 128/68   Pulse: 63   Resp: 16   Temp: 97  F (36.1  C)   SpO2: 91%   Weight: 190 lb (86.2 kg)       Physical Exam  Vitals signs reviewed.   Constitutional:       Comments: Unable to be weaned off O2, will f/u with nephrology   HENT:      Head: Normocephalic and atraumatic.      Right Ear: External ear normal.      Left Ear: External ear normal.      Nose: No congestion or rhinorrhea.      Mouth/Throat:      Pharynx: No oropharyngeal exudate or posterior oropharyngeal erythema.   Eyes:      General:         Right eye: No discharge.         Left eye: No discharge.   Neck:      Musculoskeletal: Normal range of motion and neck supple.   Cardiovascular:      Rate and Rhythm: Normal rate.      Heart sounds: No murmur. No friction rub. No gallop.       Comments: PM  Pulmonary:      Effort: No respiratory distress.      Breath sounds: No wheezing or rales.      Comments: Dim, 2L NC, RUFFIN  Abdominal:      General: There is distension.      Palpations: Abdomen is soft.      Tenderness: There is no abdominal tenderness. There  is no guarding.      Comments: Denies constipation or diarrhea   Genitourinary:     Comments: No limitations  Musculoskeletal:      Right lower leg: No edema.      Left lower leg: No edema.      Comments: WBAT, denies pain   Skin:     General: Skin is warm.      Comments: R great toe wound   Neurological:      Mental Status: She is alert and oriented to person, place, and time.   Psychiatric:         Mood and Affect: Mood normal.      Comments: Denies depression or anxiety         Medication List:  Current Outpatient Medications   Medication Sig   ? acetaminophen (TYLENOL) 500 MG tablet Take 1 tablet (500 mg total) by mouth every 6 (six) hours as needed.   ? alendronate (FOSAMAX) 35 MG tablet Take 35 mg by mouth once a week. Weekly on Sundays. Take in the morning with a full glass of water, on an empty stomach, and do not take anything else by mouth or lie down for the next 30 min.  Sundays         ? amLODIPine (NORVASC) 5 MG tablet Take 1 tablet (5 mg total) by mouth daily.   ? aspirin 81 MG EC tablet Take 1 tablet (81 mg total) by mouth daily.   ? azaTHIOprine (IMURAN) 50 mg tablet Take 100 mg by mouth bedtime.    ? carvediloL (COREG) 25 MG tablet Take 2 tablets (50 mg total) by mouth 2 (two) times a day with meals.   ? cefdinir (OMNICEF) 300 MG capsule Take 1 capsule (300 mg total) by mouth every morning for 20 days. (or per podiatrist) for osteomyelitis toe   ? cholecalciferol, vitamin D3, 2,000 unit Tab Take 2,000 Units by mouth daily.   ? clopidogreL (PLAVIX) 75 mg tablet Take 1 tablet (75 mg total) by mouth daily.   ? cyanocobalamin, vitamin B-12, 2,500 mcg Tab Take 2,500 mcg by mouth daily.    ? cycloSPORINE modified (GENGRAF) 25 MG capsule Take 50 mg by mouth 2 (two) times a day.    ? flash glucose scanning reader (SpectrawattSTYLE FELA 14 DAY READER) Misc Use 1 application As Directed daily.   ? flash glucose sensor (FREESTYLE FELA 14 DAY SENSOR) Kit Use 1 application As Directed every 14 (fourteen) days.   ?  "hydrALAZINE (APRESOLINE) 100 MG tablet Take 1 tablet (100 mg total) by mouth 3 (three) times a day.   ? insulin NPH-insulin regular (NOVOLIN 70-30) 100 unit/mL (70-30) injection Inject 15 Units under the skin daily before breakfast.   ? insulin NPH-insulin regular (NOVOLIN 70-30) 100 unit/mL (70-30) injection Inject 14 Units under the skin daily before supper.    ? isosorbide mononitrate (IMDUR) 60 MG 24 hr tablet Take 1 tablet (60 mg total) by mouth 2 (two) times a day.   ? lamoTRIgine (LAMICTAL) 100 MG tablet Take 1 tablet (100 mg total) by mouth 2 (two) times a day.   ? miconazole (MICOTIN) 2 % powder Twice daily as needed for rash/dermatitis   ? NOVOLOG U-100 INSULIN ASPART 100 unit/mL injection Three times a day with meals  BG < 70 mg/dL: Treat, and call MD  BG  mg/dL: None - 0 Units  -180 mg/dL: 1 unit  -220 mg/dL: 2 units  -260 mg/dL: 3 units  -300 mg/dL: 4 units  -340 mg/dL: 5 units  -400 mg/dL: 6 units  BG > 400 mg/dL: 7 units and Call MD   ? pantoprazole (PROTONIX) 40 MG tablet Take 40 mg by mouth 2 (two) times a day.   ? pen needle, diabetic (BD ULTRA-FINE TWILA PEN NEEDLES) 32 gauge x 5/32\" Ndle Use 1 per day.   ? polyethylene glycol (MIRALAX) 17 gram packet Take 1 packet (17 g total) by mouth daily.   ? polyvinyl alcohol (LIQUIFILM TEARS) 1.4 % ophthalmic solution Use 4 times daily as needed for dry eyes   ? prazosin (MINIPRESS) 2 MG capsule Take 2 capsules (4 mg total) by mouth 3 (three) times a day.   ? predniSONE (DELTASONE) 5 MG tablet Take 5 mg by mouth daily.   ? rosuvastatin (CRESTOR) 5 MG tablet Take 5 mg by mouth at bedtime.   ? senna-docusate (PERICOLACE) 8.6-50 mg tablet Take 1 tablet by mouth 2 (two) times a day.   ? spironolactone (ALDACTONE) 25 MG tablet Take 0.5 tablets (12.5 mg total) by mouth 2 (two) times a day at 9am and 6pm.   ? sulfamethoxazole-trimethoprim (SEPTRA) 400-80 mg per tablet Take 1 tablet by mouth 3 (three) times a week for 10 " days.   ? torsemide (DEMADEX) 20 MG tablet Take 4 tablets (80 mg total) by mouth 2 (two) times a day at 9am and 6pm. (Patient taking differently: Take 60 mg by mouth 2 (two) times a day at 9am and 6pm. )   ? traZODone (DESYREL) 50 MG tablet Take 100 mg by mouth at bedtime.   ? venlafaxine (EFFEXOR-XR) 75 MG 24 hr capsule Take 1 capsule (75 mg total) by mouth daily with breakfast.       Labs:  Results for orders placed or performed in visit on 03/10/20   Basic Metabolic Panel   Result Value Ref Range    Sodium 136 136 - 145 mmol/L    Potassium 3.9 3.5 - 5.0 mmol/L    Chloride 96 (L) 98 - 107 mmol/L    CO2 27 22 - 31 mmol/L    Anion Gap, Calculation 13 5 - 18 mmol/L    Glucose 119 70 - 125 mg/dL    Calcium 8.6 8.5 - 10.5 mg/dL    BUN 82 (H) 8 - 28 mg/dL    Creatinine 4.02 (H) 0.60 - 1.10 mg/dL    GFR MDRD Af Amer 13 (L) >60 mL/min/1.73m2    GFR MDRD Non Af Amer 11 (L) >60 mL/min/1.73m2     Lab Results   Component Value Date    WBC 6.9 03/10/2020    HGB 8.6 (L) 03/10/2020    HCT 28.3 (L) 03/10/2020    MCV 99 03/10/2020     03/10/2020     Lab Results   Component Value Date    TSH 3.57 11/21/2019     Vitamin D, Total (25-Hydroxy)   Date Value Ref Range Status   02/17/2020 37.5 30.0 - 80.0 ng/mL Final     Lab Results   Component Value Date    HGBA1C 6.3 (H) 01/26/2020       Assessment/Plan:    NSTEMI: No angiogram performed per renal dysfunction, probably due to demand ischemia per cardiogenic overload with decreased EF of 33%, will wean O2 as tolerated. Currently 2L NC. Continue ASA and plavix daily. F/u with cardiology    HFrEF: last 33% (decreased), last 189lb, bumex increased to torsemide to 80mg two times a day and now decreased to 60mg two times a day per worsening renal function. She also has spironolactone 12.5mg two times a day. Last 187lb    Osteomyelitis of right great toe: started on Omnicef, end 3/26 and bactrim, end 3/16. Drsg changes as ordered    CULLEN: using CPAP    IDDM: last A1c 6.3, continue 70/30  14U at HS and 15U in AM, with SS three times a day. Increased BS checks to QID    Poor PO intake: start glucerna     Hx of renal transplant with CKD stage 4: last Cr 4.02 with GFR 11 on 3/10/20 (worsened), continue gengraf 50mg two times a day and imuran 100mg at HS, f/u with neurology next wk    Pain control: continue tylenol 500mg q6h PRN, denies pain    Osteopenia: continue fosamax 35mg weekly    HTN: continue coreg 50mg two times a day, hydralazine 100mg three times a day, imdur 60mg two times a day, start on prazosin 4mg TID and amlodipine on 5mg daily per cardiology. SBP <130    Normocytic hypochromic anemia: Last hemoglobin 8.6, f/u with nephrology    Vit D def: continue D3 2000U daily    Vit B12 def: continue cyanocobalamin 2500mcg daily    Seizure disorder: continue lamotrigine 100mg two times a day    GERD: pantoprazole 40mg two times a day    Constipation: continue miralax daily and senna S 1 tab two times a day, denies issue    CAD: continue statin and ASA    Insomnia: continue trazodone 100mg at HS    Depression: continue Effexor 75mg daily    Disposition: plans to return home, wants to move to [facility] after selling house. She will be again be discharged to home with services.    MEDICAL EQUIPMENT NEEDS:  na    DISCHARGE PLAN/FACE TO FACE:  I certify that services are/were furnished while this patient was under the care of a physician and that a physician or an allowed non-physician practitioner (NPP), had a face-to-face encounter that meets the physician face-to-face encounter requirements. The encounter was in whole, or in part, related to the primary reason for home health. The patient is confined to his/her home and needs intermittent skilled nursing, physical therapy, speech-language pathology, or the continued need for occupational therapy. A plan of care has been established by a physician and is periodically reviewed by a physician.  Date of Face-to-Face Encounter: 3/13/20    I certify that, based  on my findings, the following services are medically necessary home health services: C RN and PT/OT to evaluate and treat.    Odalys attended PT this morning 3/12/2020.  Her saturation on room air was at rest was 90%.  During embolization, exercise saturation without oxygen was 84%.  During ambulation, exercise saturation with oxygen was 87% on 2L NC.  Odalys requires oxygen due to acute on chronic diastolic congestive heart failure and acute pulmonary edema.  Alternate treatments such as deep breathing, medication regiment and daily weights were tried but oxygen is still required.  Patient is mobile in the home with the use of a walker and requires portability.    My clinical findings support the need for the above skilled services because: patient will be discharging to her home. Patient will need assistance with medication management and performing IADLs and ADLs effectively and safely.    Patient to re-establish plan of care with their PCP within 7 days after leaving TCU.     The care plan has been reviewed and all orders signed. Changes to care plan, if any, as noted. Otherwise, continue care plan of care.  The total time spent with this patient was 31 minutes, with greater than 50% spent in counseling and coordination of care that included multiple issues per ongoing renal and cardiology concerns, continuing therapy and discharge, which lasted 18 minutes.      Electronically signed by: Jagdish Cardenas NP

## 2021-06-20 NOTE — LETTER
Letter by Jagdish Cardenas NP at      Author: Jagdish Cardenas NP Service: -- Author Type: --    Filed:  Encounter Date: 2/19/2020 Status: (Other)         Patient: Odalys Miles   MR Number: [id number]   YOB: 1945   Date of Visit: 2/19/2020     [facility]      Facility:    [facility] [[id number]]  Code Status: DNR      Chief Complaint/Reason for Visit:   Chief Complaint   Patient presents with   ? Review Of Multiple Medical Conditions       HPI:   Odalys is a 74 y.o. female who is a transfer from [facility] with an admission on 2/6/20 and discharge on 2/12/20. She has a PMH of renal transplant, CKD stage 4, CVA, CAD with CABG, DM2, seizure disorder, Htn, CULLEN on CPAP, chronic CHF, recent CHF exacerbation discharged on 1/28/20 with acute shortness of breath d/t CHF.  She improved with diuresis, BNP 2519 as it was 2275 the previous week. Echo showed EF 55%, resumed bumex 2mg two times a day, was on 1mg daily. She was weaned off O2. Baseline Cr 2-2.5, followed with nephrology. Her immunosuppression was restarted on Imuran, cyclosporine and prednisone.  Her BP was significantly elevated, she resumed on hydralazine 75 mg 3 times daily, carvedilol 25 mg twice daily, losartan 25 mg daily and terazosin 5mg daily.  She also has chronic A. fib with pacemaker in place, heart rate was stable.  Not on anticoagulation treatment.  Has history of ischemic stroke without residual weakness on aspirin and statin.  Glucose numbers improved with Lantus 10 units daily with sliding scale.  Resumed Elekta mental for seizure disorder.  Also developed iron deficiency anemia received IV replacement and resumed on oral Fe replacement.  She was then discharged to TCU for additional rehab.    Today will assess weaning of O2, CHF, vitals, recent labs and therapy progress.    Past Medical History:  Past Medical History:   Diagnosis Date   ? Adrenal insufficiency (H)     ? Anemia    ? Anxiety    ? Arthritis    ? Ataxia 5/12/2015   ? CAD (coronary artery disease)    ? Cardiac pacemaker, dual, in situ 12/7/2016    [employer] Revo MRI DOI: 12/15/2011 Dr. Aishwarya Treviño   ? Chronic Diarrhea Of Unknown Origin     Created by Conversion    ? Chronic Gout     Created by Conversion  Replacement Utility updated for latest IMO load   ? Chronic Reflux Esophagitis     Created by Conversion    ? Congestive Heart Failure     Created by Conversion  Replacement Utility updated for latest IMO load   ? Coronary Artery Stenosis     Created by Conversion [facility] Annotation: Feb 27 2011 11:37PM - Alina Zapien: s/p CABGx4  1/11  Replacement Utility updated for latest IMO load   ? CVA (cerebral vascular accident) (H)    ? Depression    ? Diabetic Peripheral Neuropathy     Created by Conversion    ? DM (diabetes mellitus) (H)    ? Dysthymic disorder     Created by Conversion    ? Epilepsy (H)    ? ESRD (end stage renal disease) (H)    ? Essential Thrombocytosis     Created by Conversion    ? History of renal transplant    ? Hyperlipidemia    ? Hypertension    ? Insomnia     Created by Conversion    ? Ischemic Stroke     Created by Conversion  Replacement Utility updated for latest IMO load   ? Medullary Sponge Kidney Bilaterally     Created by Conversion    ? Morbid obesity (H) 8/29/2018   ? Neuropathy    ? Nonintractable epilepsy without status epilepticus, unspecified epilepsy type (H) 8/29/2018   ? CULLEN on CPAP     Created by Conversion    ? Osteoarthritis     Created by Conversion  Replacement Utility updated for latest IMO load   ? Renal Transplant Recipient     Created by Conversion    ? Sensorineural hearing loss     Created by Conversion  Replacement Utility updated for latest IMO load   ? Thrombophlebitis Of Deep Vessels Of The Lower Extremity     Created by Conversion    ? Type 2 diabetes mellitus (H)     Created by Conversion    ? Vertigo            Surgical History:  Past Surgical  History:   Procedure Laterality Date   ? APPENDECTOMY     ? CARDIAC PACEMAKER PLACEMENT     ? CORONARY ARTERY BYPASS GRAFT  01/26/2011    Comments: x 4   ? HYSTERECTOMY  1973   ? IR EXTREMITY ANGIOGRAM LEFT  1/30/2019   ? NEPHRECTOMY TRANSPLANTED ORGAN  06/2011   ? MD TOTAL KNEE ARTHROPLASTY Bilateral        Family History:   Family History   Problem Relation Age of Onset   ? Diabetes Sister    ? Breast cancer Sister 62   ? Cancer Father        Social History:    Social History     Socioeconomic History   ? Marital status:      Spouse name: Not on file   ? Number of children: Not on file   ? Years of education: Not on file   ? Highest education level: Not on file   Occupational History     Employer: RETIRED   Social Needs   ? Financial resource strain: Not on file   ? Food insecurity:     Worry: Not on file     Inability: Not on file   ? Transportation needs:     Medical: Not on file     Non-medical: Not on file   Tobacco Use   ? Smoking status: Former Smoker     Packs/day: 1.50     Years: 20.00     Pack years: 30.00     Types: Cigarettes   ? Smokeless tobacco: Never Used   Substance and Sexual Activity   ? Alcohol use: Yes     Comment: occasional   ? Drug use: No   ? Sexual activity: Not on file   Lifestyle   ? Physical activity:     Days per week: Not on file     Minutes per session: Not on file   ? Stress: Not on file   Relationships   ? Social connections:     Talks on phone: Not on file     Gets together: Not on file     Attends Faith service: Not on file     Active member of club or organization: Not on file     Attends meetings of clubs or organizations: Not on file     Relationship status: Not on file   ? Intimate partner violence:     Fear of current or ex partner: Not on file     Emotionally abused: Not on file     Physically abused: Not on file     Forced sexual activity: Not on file   Other Topics Concern   ? Not on file   Social History Narrative   ? Not on file          Review of Systems  "  Constitutional: Positive for activity change and fatigue. Negative for appetite change, chills, diaphoresis and fever.        Wants to wean off O2   HENT: Negative for congestion and hearing loss.    Eyes: Negative.    Respiratory: Positive for shortness of breath. Negative for wheezing.         1.5L NC   Cardiovascular: Negative.    Gastrointestinal: Negative for abdominal distention, abdominal pain, blood in stool, constipation, diarrhea and nausea.   Endocrine: Negative.    Genitourinary: Negative for difficulty urinating.   Musculoskeletal:        Denies pain   Skin:        intact   Allergic/Immunologic: Negative.    Neurological: Negative for dizziness, tremors, speech difficulty and light-headedness.   Hematological: Negative.    Psychiatric/Behavioral: Negative for agitation, confusion, hallucinations and sleep disturbance. The patient is not nervous/anxious.        Vitals:    02/19/20 1200   BP: 146/53   Pulse: 72   Resp: 20   Temp: 98  F (36.7  C)   SpO2: 93%   Weight: 197 lb (89.4 kg)   Height: 5' 4\" (1.626 m)       Physical Exam  Vitals signs reviewed.   Constitutional:       Appearance: She is obese.      Comments: Continue RUFFIN, attempting to wean off O2   HENT:      Head: Normocephalic and atraumatic.      Right Ear: External ear normal.      Left Ear: External ear normal.      Nose: No congestion or rhinorrhea.      Mouth/Throat:      Mouth: Mucous membranes are dry.      Pharynx: No oropharyngeal exudate or posterior oropharyngeal erythema.   Eyes:      General:         Right eye: No discharge.         Left eye: No discharge.   Neck:      Musculoskeletal: Normal range of motion and neck supple.      Comments: Intact  Cardiovascular:      Rate and Rhythm: Rhythm irregular.      Heart sounds: Murmur present.      Comments: IRR, 2/6 CYRUS RSB and LSB, PM  Pulmonary:      Effort: No respiratory distress.      Breath sounds: Rales present. No wheezing.      Comments: Bibasilar crackles, RUFFIN, 1.5L " NC  Abdominal:      General: Bowel sounds are normal. There is no distension.      Palpations: Abdomen is soft.      Tenderness: There is no abdominal tenderness. There is no guarding.      Comments: Denies constipation or diarrhea   Genitourinary:     Comments: No limitations  Musculoskeletal:      Right lower leg: No edema.      Left lower leg: No edema.      Comments: Denies pain   Skin:     General: Skin is warm and dry.      Comments: Intact   Neurological:      Mental Status: She is alert and oriented to person, place, and time. Mental status is at baseline.   Psychiatric:         Mood and Affect: Mood normal.      Comments: Has history of depression         Medication List:  Current Outpatient Medications   Medication Sig   ? acetaminophen (TYLENOL) 500 MG tablet Take 1 tablet (500 mg total) by mouth every 6 (six) hours as needed.   ? alendronate (FOSAMAX) 35 MG tablet Take 35 mg by mouth once a week. Weekly on Sundays. Take in the morning with a full glass of water, on an empty stomach, and do not take anything else by mouth or lie down for the next 30 min.  Sundays         ? aspirin 81 MG EC tablet Take 1 tablet (81 mg total) by mouth daily.   ? azaTHIOprine (IMURAN) 50 mg tablet Take 100 mg by mouth bedtime.    ? bumetanide (BUMEX) 2 MG tablet Take 1 tablet (2 mg total) by mouth 2 (two) times a day at 9am and 6pm. (Patient taking differently: Take 2 mg by mouth daily. )   ? carvediloL (COREG) 25 MG tablet Take 1 tablet (25 mg total) by mouth 2 (two) times a day with meals.   ? cholecalciferol, vitamin D3, 2,000 unit Tab Take 2,000 Units by mouth daily.   ? cyanocobalamin, vitamin B-12, 2,500 mcg Tab Take 2,500 mcg by mouth daily.    ? cycloSPORINE modified (GENGRAF) 25 MG capsule Take 50 mg by mouth 2 (two) times a day.    ? ferrous sulfate 325 (65 FE) MG tablet Take 1 tablet (325 mg total) by mouth every other day.   ? flash glucose scanning reader (Check FELA 14 DAY READER) Misc Use 1 application As  "Directed daily.   ? flash glucose sensor (FREESTYLE FELA 14 DAY SENSOR) Kit Use 1 application As Directed every 14 (fourteen) days.   ? hydrALAZINE (APRESOLINE) 25 MG tablet Take 3 tablets (75 mg total) by mouth 3 (three) times a day.   ? isosorbide mononitrate (IMDUR) 30 MG 24 hr tablet Take 1 tablet (30 mg total) by mouth 2 (two) times a day.   ? lamoTRIgine (LAMICTAL) 25 MG tablet Take 25 mg by mouth see administration instructions. Titrate up to target dose of 100mg two times a day  2/1-2/7 25mg Daily  2/8-2/14 50mg Daily  2/15-2/21 50mg Twice daily  2/22-2/28 75mg Twice daily  2/29 100mg Twice daily   ? LANTUS U-100 INSULIN 100 unit/mL vial 10 unit daily at bedtime   ? losartan (COZAAR) 25 MG tablet Take 1 tablet (25 mg total) by mouth daily.   ? miconazole (MICOTIN) 2 % powder Apply on skin folds until rashes are resolved   ? NOVOLOG U-100 INSULIN ASPART 100 unit/mL injection Three times a day with meals  BG < 70 mg/dL: Treat, and call MD  BG  mg/dL: None - 0 Units  -180 mg/dL: 1 unit  -220 mg/dL: 2 units  -260 mg/dL: 3 units  -300 mg/dL: 4 units  -340 mg/dL: 5 units  -400 mg/dL: 6 units  BG > 400 mg/dL: 7 units and Call MD   ? pantoprazole (PROTONIX) 40 MG tablet Take 40 mg by mouth 2 (two) times a day.   ? pen needle, diabetic (BD ULTRA-FINE TWILA PEN NEEDLES) 32 gauge x 5/32\" Ndle Use 1 per day.   ? polyethylene glycol (MIRALAX) 17 gram packet Take 1 packet (17 g total) by mouth daily as needed.   ? predniSONE (DELTASONE) 5 MG tablet Take 5 mg by mouth daily.   ? rosuvastatin (CRESTOR) 5 MG tablet Take 1 tablet (5 mg) by mouth at bedtime.   ? terazosin (HYTRIN) 5 MG capsule Take 1 capsule (5 mg total) by mouth at bedtime.   ? traZODone (DESYREL) 50 MG tablet Take 100 mg by mouth at bedtime.   ? venlafaxine (EFFEXOR-XR) 150 MG 24 hr capsule Take 1 capsule (150 mg) by mouth daily.       Labs:  Results for orders placed or performed in visit on 02/17/20   Basic " Metabolic Panel   Result Value Ref Range    Sodium 140 136 - 145 mmol/L    Potassium 3.8 3.5 - 5.0 mmol/L    Chloride 101 98 - 107 mmol/L    CO2 27 22 - 31 mmol/L    Anion Gap, Calculation 12 5 - 18 mmol/L    Glucose 127 (H) 70 - 125 mg/dL    Calcium 8.8 8.5 - 10.5 mg/dL    BUN 56 (H) 8 - 28 mg/dL    Creatinine 3.21 (H) 0.60 - 1.10 mg/dL    GFR MDRD Af Amer 17 (L) >60 mL/min/1.73m2    GFR MDRD Non Af Amer 14 (L) >60 mL/min/1.73m2     Lab Results   Component Value Date    WBC 8.7 02/17/2020    HGB 8.9 (L) 02/17/2020    HCT 30.1 (L) 02/17/2020    MCV 98 02/17/2020     02/17/2020     Lab Results   Component Value Date    TSH 3.57 11/21/2019     Lab Results   Component Value Date    HGBA1C 6.3 (H) 01/26/2020     Vitamin D, Total (25-Hydroxy)   Date Value Ref Range Status   02/17/2020 37.5 30.0 - 80.0 ng/mL Final     Lab Results   Component Value Date    BNP 2,519 (H) 02/06/2020         Assessment/Plan:    HFrEF: last EF 55%, frequent exacerbations, last BNP +2500, following with cardiology and heart failure clinic.  Recently  Bumex decreased to 2 mg daily per hypotension, last weight 197lbs (possible 3lb weight loss). Now on a 2L/day FW restriction     HTN: Continue carvedilol 25 mg twice daily, hydralazine 75 mg 3 times daily, imdur 30mg two times a day and Hytrin 5 mg at bedtime.  Per monitoring systolic blood pressure <150    CULLEN: using CPAP well    Hx of renal transplant: continue Imuran 100mg at HS, cephalosporin 50 mg twice daily and prednisone 5 mg daily    CKD stage 4: last Cr 3.21 with GFR 14, has worsened, labs sent to nephrology    DM2: continue lantus 10U at HS, SS and monitor BS     Pain control: Continue Tylenol 500 mg every 6 hours PRN, denies pain    Insomnia: Continue trazodone 100 mg at bedtime    Depression: Continue venlafaxine 150 mg daily    Osteopenia: Continue Fosamax 35 mg weekly on Sunday    GERD: Continue pantoprazole 40 mg twice daily    Seizure disorder: Continue Lamictal per  escalating dose    Morbid obesity: last 37.8    History of CVA: Continue aspirin 81 mg daily and statin    Vitamin B deficiency: Continue cyanocobalamin 2500mcg daily    Vitamin D deficiency: Continue D3 2000 units daily    Normocytic hypochromic anemia: Last hemoglobin 8.9 on 2/17/20, received IV iron in hospital, continues ferrous sulfate 325 mg daily    Disposition: Has determined will be discharged to [facility]. Pending cognitive assessment      Electronically signed by: Jagdish Cardenas NP

## 2021-06-20 NOTE — LETTER
Letter by Jagdish Cardenas NP at      Author: Jagdish Cardenas NP Service: -- Author Type: --    Filed:  Encounter Date: 3/9/2020 Status: (Other)         Patient: Odalys Miles   MR Number: [id number]   YOB: 1945   Date of Visit: 3/9/2020     [facility]      Facility:    [facility] [[id number]]  Code Status: DNR      Chief Complaint/Reason for Visit:   Chief Complaint   Patient presents with   ? Review Of Multiple Medical Conditions       HPI:   Odalys is a 74 y.o. female who is a transfer from [facility] with an admission on 2/27/20 and discharge on 3/6/20. She has a PMH of DM, CKD stage 4, s/p renal transplant, anemia, PM, chronic CHF per EF 33%, htn who was in the TCU from 2/13/20 to 2/26/20. Apparently feeling more shortness of breath, again presented to the hospital with acute shortness of breath and CP. She was diagnosed with cardiogenic pulmonary edema with decreased EF, given medical management with diuresing.  Was not a candidate for angiogram given declining renal function, required BiPAP and then weaned off.  She developed acute on chronic renal failure with a baseline creatinine around 2.5 though increased to 3.5 per recent addition of losartan (which was decreased), patient not too keen on going through dialysis again as she had initially done in 2011.  Found to have osteomyelitis per MRU in the right great toe, seen by podiatry, started on Bactrim and Omnicef.  Also found to have Proteus UTI. Per anemia, she was given IV iron and started on epo as well. She was then discharged to the TCU again for additional rehab.    Past Medical History:  Past Medical History:   Diagnosis Date   ? Adrenal insufficiency (H)    ? Anemia    ? Anxiety    ? Arthritis    ? Ataxia 5/12/2015   ? CAD (coronary artery disease)    ? Cardiac pacemaker, dual, in situ 12/7/2016    [employer] Revo MRI DOI: 12/15/2011 Dr. Aishwarya Treviño   ? Chronic  Diarrhea Of Unknown Origin     Created by Conversion    ? Chronic Gout     Created by Conversion  Replacement Utility updated for latest IMO load   ? Chronic Reflux Esophagitis     Created by Conversion    ? Congestive Heart Failure     Created by Conversion  Replacement Utility updated for latest IMO load   ? Coronary Artery Stenosis     Created by Conversion [facility] Annotation: Feb 27 2011 11:37PM - ShenAlina rodriguez: s/p CABGx4  1/11  Replacement Utility updated for latest IMO load   ? CVA (cerebral vascular accident) (H)    ? Depression    ? Diabetic Peripheral Neuropathy     Created by Conversion    ? DM (diabetes mellitus) (H)    ? Dysthymic disorder     Created by Conversion    ? Epilepsy (H)    ? ESRD (end stage renal disease) (H)    ? Essential Thrombocytosis     Created by Conversion    ? History of renal transplant    ? Hyperlipidemia    ? Hypertension    ? Insomnia     Created by Conversion    ? Ischemic Stroke     Created by Conversion  Replacement Utility updated for latest IMO load   ? Medullary Sponge Kidney Bilaterally     Created by Conversion    ? Morbid obesity (H) 8/29/2018   ? Neuropathy    ? Nonintractable epilepsy without status epilepticus, unspecified epilepsy type (H) 8/29/2018   ? CULLEN on CPAP     Created by Conversion    ? Osteoarthritis     Created by Conversion  Replacement Utility updated for latest IMO load   ? Renal Transplant Recipient     Created by Conversion    ? Sensorineural hearing loss     Created by Conversion  Replacement Utility updated for latest IMO load   ? Thrombophlebitis Of Deep Vessels Of The Lower Extremity     Created by Conversion    ? Type 2 diabetes mellitus (H)     Created by Conversion    ? Vertigo            Surgical History:  Past Surgical History:   Procedure Laterality Date   ? APPENDECTOMY     ? CARDIAC PACEMAKER PLACEMENT     ? CORONARY ARTERY BYPASS GRAFT  01/26/2011    Comments: x 4   ? HYSTERECTOMY  1973   ? IR EXTREMITY ANGIOGRAM LEFT   1/30/2019   ? NEPHRECTOMY TRANSPLANTED ORGAN  06/2011   ? DC TOTAL KNEE ARTHROPLASTY Bilateral        Family History:   Family History   Problem Relation Age of Onset   ? Diabetes Sister    ? Breast cancer Sister 62   ? Cancer Father        Social History:    Social History     Socioeconomic History   ? Marital status:      Spouse name: Not on file   ? Number of children: Not on file   ? Years of education: Not on file   ? Highest education level: Not on file   Occupational History     Employer: RETIRED   Social Needs   ? Financial resource strain: Not on file   ? Food insecurity     Worry: Not on file     Inability: Not on file   ? Transportation needs     Medical: Not on file     Non-medical: Not on file   Tobacco Use   ? Smoking status: Former Smoker     Packs/day: 1.50     Years: 20.00     Pack years: 30.00     Types: Cigarettes   ? Smokeless tobacco: Never Used   Substance and Sexual Activity   ? Alcohol use: Yes     Comment: occasional   ? Drug use: No   ? Sexual activity: Not on file   Lifestyle   ? Physical activity     Days per week: Not on file     Minutes per session: Not on file   ? Stress: Not on file   Relationships   ? Social connections     Talks on phone: Not on file     Gets together: Not on file     Attends Rastafarian service: Not on file     Active member of club or organization: Not on file     Attends meetings of clubs or organizations: Not on file     Relationship status: Not on file   ? Intimate partner violence     Fear of current or ex partner: Not on file     Emotionally abused: Not on file     Physically abused: Not on file     Forced sexual activity: Not on file   Other Topics Concern   ? Not on file   Social History Narrative    .  Chas passed away 2009 from cancer. Lives independently in 1 level town home in [place]. 2 sons- Ben and Av. She has a dog-Isaura. Enjoys knitting.           Review of Systems   Constitutional: Positive for activity change,  "appetite change and fatigue. Negative for chills, diaphoresis and fever.   HENT: Negative for congestion and hearing loss.    Eyes: Negative.    Respiratory: Positive for shortness of breath.    Cardiovascular: Negative.    Gastrointestinal: Positive for abdominal distention. Negative for abdominal pain, blood in stool, constipation, diarrhea and nausea.   Endocrine: Negative.    Genitourinary: Negative for difficulty urinating.   Musculoskeletal:        Denies pain   Skin: Positive for wound.        R great toe    Allergic/Immunologic: Negative.    Neurological: Negative for dizziness, tremors, speech difficulty and light-headedness.   Hematological: Negative.    Psychiatric/Behavioral: Negative for agitation, confusion, hallucinations and sleep disturbance. The patient is not nervous/anxious.        Vitals:    03/09/20 0901   BP: 144/56   Pulse: 61   Resp: 20   Temp: 97  F (36.1  C)   SpO2: 90%   Weight: 189 lb (85.7 kg)   Height: 5' 4\" (1.626 m)       Physical Exam  Vitals signs reviewed.   HENT:      Head: Normocephalic and atraumatic.      Right Ear: External ear normal.      Left Ear: External ear normal.      Nose: No congestion or rhinorrhea.      Mouth/Throat:      Pharynx: No oropharyngeal exudate or posterior oropharyngeal erythema.   Eyes:      General:         Right eye: No discharge.         Left eye: No discharge.   Neck:      Musculoskeletal: Normal range of motion and neck supple.   Cardiovascular:      Rate and Rhythm: Normal rate.      Heart sounds: No murmur. No friction rub. No gallop.       Comments: PM  Pulmonary:      Effort: No respiratory distress.      Breath sounds: No wheezing or rales.      Comments: Dim, 1.5L NC, RUFFIN  Abdominal:      General: There is no distension.      Palpations: Abdomen is soft.      Tenderness: There is no abdominal tenderness. There is no guarding.      Comments: Denies constipation or diarrhea   Genitourinary:     Comments: No limitations  Musculoskeletal:      " Right lower leg: No edema.      Left lower leg: No edema.      Comments: WBAT, denies pain   Skin:     General: Skin is warm.      Comments: R great toe wound   Neurological:      Mental Status: She is alert and oriented to person, place, and time.   Psychiatric:         Mood and Affect: Mood normal.      Comments: Denies depression or anxiety         Medication List:  Current Outpatient Medications   Medication Sig   ? acetaminophen (TYLENOL) 500 MG tablet Take 1 tablet (500 mg total) by mouth every 6 (six) hours as needed.   ? alendronate (FOSAMAX) 35 MG tablet Take 35 mg by mouth once a week. Weekly on Sundays. Take in the morning with a full glass of water, on an empty stomach, and do not take anything else by mouth or lie down for the next 30 min.  Sundays         ? amLODIPine (NORVASC) 5 MG tablet Take 1 tablet (5 mg total) by mouth daily.   ? aspirin 81 MG EC tablet Take 1 tablet (81 mg total) by mouth daily.   ? azaTHIOprine (IMURAN) 50 mg tablet Take 100 mg by mouth bedtime.    ? carvediloL (COREG) 25 MG tablet Take 2 tablets (50 mg total) by mouth 2 (two) times a day with meals.   ? cefdinir (OMNICEF) 300 MG capsule Take 1 capsule (300 mg total) by mouth every morning for 20 days. (or per podiatrist) for osteomyelitis toe   ? cholecalciferol, vitamin D3, 2,000 unit Tab Take 2,000 Units by mouth daily.   ? clopidogreL (PLAVIX) 75 mg tablet Take 1 tablet (75 mg total) by mouth daily.   ? cyanocobalamin, vitamin B-12, 2,500 mcg Tab Take 2,500 mcg by mouth daily.    ? cycloSPORINE modified (GENGRAF) 25 MG capsule Take 50 mg by mouth 2 (two) times a day.    ? flash glucose scanning reader (FREESTYLE FELA 14 DAY READER) Misc Use 1 application As Directed daily.   ? flash glucose sensor (FREESTYLE FELA 14 DAY SENSOR) Kit Use 1 application As Directed every 14 (fourteen) days.   ? hydrALAZINE (APRESOLINE) 100 MG tablet Take 1 tablet (100 mg total) by mouth 3 (three) times a day.   ? insulin NPH-insulin regular  "(NOVOLIN 70-30) 100 unit/mL (70-30) injection Inject 15 Units under the skin daily before breakfast.   ? insulin NPH-insulin regular (NOVOLIN 70-30) 100 unit/mL (70-30) injection Inject 14 Units under the skin daily before supper.    ? isosorbide mononitrate (IMDUR) 60 MG 24 hr tablet Take 1 tablet (60 mg total) by mouth 2 (two) times a day.   ? lamoTRIgine (LAMICTAL) 100 MG tablet Take 1 tablet (100 mg total) by mouth 2 (two) times a day.   ? miconazole (MICOTIN) 2 % powder Twice daily as needed for rash/dermatitis   ? NOVOLOG U-100 INSULIN ASPART 100 unit/mL injection Three times a day with meals  BG < 70 mg/dL: Treat, and call MD  BG  mg/dL: None - 0 Units  -180 mg/dL: 1 unit  -220 mg/dL: 2 units  -260 mg/dL: 3 units  -300 mg/dL: 4 units  -340 mg/dL: 5 units  -400 mg/dL: 6 units  BG > 400 mg/dL: 7 units and Call MD   ? pantoprazole (PROTONIX) 40 MG tablet Take 40 mg by mouth 2 (two) times a day.   ? pen needle, diabetic (BD ULTRA-FINE TWILA PEN NEEDLES) 32 gauge x 5/32\" Ndle Use 1 per day.   ? polyethylene glycol (MIRALAX) 17 gram packet Take 1 packet (17 g total) by mouth daily.   ? polyvinyl alcohol (LIQUIFILM TEARS) 1.4 % ophthalmic solution Use 4 times daily as needed for dry eyes   ? prazosin (MINIPRESS) 2 MG capsule Take 2 capsules (4 mg total) by mouth 3 (three) times a day.   ? predniSONE (DELTASONE) 5 MG tablet Take 5 mg by mouth daily.   ? rosuvastatin (CRESTOR) 5 MG tablet Take 5 mg by mouth at bedtime.   ? senna-docusate (PERICOLACE) 8.6-50 mg tablet Take 1 tablet by mouth 2 (two) times a day.   ? spironolactone (ALDACTONE) 25 MG tablet Take 0.5 tablets (12.5 mg total) by mouth 2 (two) times a day at 9am and 6pm.   ? sulfamethoxazole-trimethoprim (SEPTRA) 400-80 mg per tablet Take 1 tablet by mouth 3 (three) times a week for 10 days.   ? torsemide (DEMADEX) 20 MG tablet Take 4 tablets (80 mg total) by mouth 2 (two) times a day at 9am and 6pm.   ? traZODone " (DESYREL) 50 MG tablet Take 100 mg by mouth at bedtime.   ? venlafaxine (EFFEXOR-XR) 75 MG 24 hr capsule Take 1 capsule (75 mg total) by mouth daily with breakfast.       Labs:  Results for orders placed or performed during the hospital encounter of 02/27/20   Basic Metabolic Panel   Result Value Ref Range    Sodium 141 136 - 145 mmol/L    Potassium 3.7 3.5 - 5.0 mmol/L    Chloride 101 98 - 107 mmol/L    CO2 26 22 - 31 mmol/L    Anion Gap, Calculation 14 5 - 18 mmol/L    Glucose 97 70 - 125 mg/dL    Calcium 9.3 8.5 - 10.5 mg/dL    BUN 78 (H) 8 - 28 mg/dL    Creatinine 3.62 (H) 0.60 - 1.10 mg/dL    GFR MDRD Af Amer 15 (L) >60 mL/min/1.73m2    GFR MDRD Non Af Amer 12 (L) >60 mL/min/1.73m2     Lab Results   Component Value Date    WBC 6.1 03/03/2020    HGB 8.1 (L) 03/03/2020    HCT 26.2 (L) 03/03/2020    MCV 96 03/03/2020     03/04/2020     Lab Results   Component Value Date    TSH 3.57 11/21/2019     Vitamin D, Total (25-Hydroxy)   Date Value Ref Range Status   02/17/2020 37.5 30.0 - 80.0 ng/mL Final     Lab Results   Component Value Date    HGBA1C 6.3 (H) 01/26/2020       Assessment/Plan:    NSTEMI: No angiogram performed per renal dysfunction, probably due to demand ischemia per cardiogenic overload with decreased EF of 33%, will wean O2 as tolerated. Continue ASA and plavix daily. F/u with cardiology    HFrEF: last 33% (decreased), last 189lb, bumex increased to torsemide to 80mg two times a day and spironolactone 12.5mg BID    Osteomyelitis of right great toe: started on Omnicef, end 3/26 and bactrim, end 3/16. Drsg changes     CULLEN: using CPAP    IDDM: last A1c 6.3, decrease 70/30 to 14U at HS (per BS <90 in AM) and continue 15U in AM    Poor PO intake: start glucerna     Hx of renal transplant with CKD stage 4: last Cr 3.62 with 12 on 3/6/20, continue gengraf 50mg two times a day and imuran 100mg at HS, f/u with neurology in 2 wks    Pain control: continue tylenol 500mg q6h PRN, denies pain    Osteopenia:  continue fosamax 35mg weekly    HTN: continue coreg 50mg two times a day, hydralazine 100mg three times a day, imdur 60mg two times a day, start on prazosin 4mg TID and amlodipine on 5mg daily per cardiology. SBP <130    Vit D def: continue D3 2000U daily    Vit B12 def: continue cyanocobalamin 2500mcg daily    Seizure disorder: continue lamotrigine 100mg two times a day    GERD: pantoprazole 40mg two times a day    Constipation: continue miralax daily and senna S 1 tab two times a day, denies issue    CAD: continue statin and ASA    Insomnia: continue trazodone 100mg at HS    Depression: continue Effexor 75mg daily    Disposition: plans to return home, wants to move to [facility] after selling house    The care plan has been reviewed and all orders signed. Changes to care plan, if any, as noted. Otherwise, continue care plan of care.  The total time spent with this patient was 35 minutes, with greater than 50% spent in counseling and coordination of care that included multiple issues per f/u with cardiology/nephrology/podiatry, weaning off O2 and therapy, which lasted 20 minutes.    Post Discharge Medication Reconciliation Status: discharge medications reconciled and changed, per note/orders (see AVS)      Electronically signed by: Jagdish Cardenas NP

## 2021-06-20 NOTE — LETTER
Letter by Manju Fernandez MBBS at      Author: Manju Fernandez MBBS Service: -- Author Type: --    Filed:  Encounter Date: 2/24/2020 Status: (Other)         Patient: Odalys Miles   MR Number: [id number]   YOB: 1945   Date of Visit: 2/24/2020       [facility] Admission note      Patient: Odalys Miles  MRN: [id number]  Date of Service: 2/24/2020      [facility] [[id number]]  Reason for Visit     Chief Complaint   Patient presents with   ? Review Of Multiple Medical Conditions       Code Status     Full code    Assessment     -Acute kidney injury in the setting of chronic kidney disease baseline creatinine 2-2.5  Creatinine noted to 3.16 on recheck in the TCU  -History of depression with patient reporting situational stressors with worsening mood and behavior  -Acute on chronic CHF exacerbation; patient still appears to be fluid overloaded  -Underlying history of renal transplant on chronic immunosuppressants  -History of hypertension poorly controlled in spite of multiple agents.  -History of coronary artery disease status post CABG  -History of atrial fibrillation chronic  -status post pacemaker placement  -History of diabetes currently on Lantus  -History of CVA on aspirin and statin  -History of seizure disorder on Lamictal  -History of iron deficiency anemia status post IV Venofer  - Obstructive sleep apnea  - Hypoxic respiratory failure currently on 3 L of oxygen by nasal cannula  - Morbid obesity.  -Generalized weakness    Plan     Patient admitted to the TCU.  She had a complex and prolonged stay in the hospital she was admitted on 1/28/2020 and has now been discharged to the TCU for strengthening and rehab.  She has been weaned off oxygen  Weights will need to be monitored  Due to worsening renal impairment with a recheck creatinine of 3.4 she was put on a low-dose of Bumex at 2 mg daily.  Recheck BMP today is unfortunately still high at 3.16   her baseline  creatinine is between 2 and 3 as per her.  I have again counseled her extensively that she needs to watch her fluid and salt intake.  She is also on losartan   we will plan on discontinuing this medication   she takes low-dose of 25 mg daily.  Recheck BMP in 3 days.  If no improvement will restart her losartan   but in the meantime we will give her some permissive hypertension also to see if that helps her with her  renal failure  She has significant lymphedema and weights need to be monitored.  She has been weaned off oxygen.  She has a follow-up monthly with the transplant clinic and will follow them this week to monitor her medications and was advised to follow-up with her labs to see if they had any new recommendations regarding her advancing renal failure  Blood sugars have been stable mostly under 200.  Blood pressure review done and almost all her blood pressures are under 140 with an occasional high which patient believes is taken inaccurately while she was walking around  Patient's labs were reviewed with her and extensive education done regarding salt and fluid restriction  Total time spent is 35 minutes more than 20 minutes of which were spent face-to-face talking to patient reviewing her kidney functions including recent labs weights and counseling patient about salt and fluid restriction as outlined in my note above.  She is a renal transplant patient and understands the gravity of the situation.  She needs to see the transplant nephrologist closely and has made an appointment this week    History     Patient is a very pleasant 74 y.o. female who is admitted to TCU  Patient presented to the hospital with acute shortness of breath.  She was diagnosed with acute on chronic CHF exacerbations.  BNP on admission was 2519.  She had an echocardiogram revealing an EF of 55%.  She was given IV diuretics with improvement.  Currently discharged on oral Bumex with close monitoring of weights recommended.  Due to  progressive renal failure she is on a lower dose of diuretics with Bumex 2 mg daily weights have been stable at 198 pounds    She had hypoxic respiratory failure felt to be secondary to CHF exacerbation this has resolved prior to discharge she is no longer needing oxygen.  Patient also was noted to have CKD with acute exacerbation of creatinine however prior to discharge her creatinine discharged was hovering between 2 and 2.5 with mild proteinuria this will need to be closely followed if she continues to have elevated creatinine she understands she will need to see nephrology.  Unfortunately recheck electrolytes and kidney functions remain impaired with a creatinine of greater than 3 since admission she has not had any improvement even on a low-dose of Bumex recheck creatinine today is 3.14    Patient has underlying history of chronic renal transplant and is on multiple immunosuppressants agents.  These were continued in the hospital.  She also had significantly elevated blood pressures.  She continues on hydralazine Coreg terra Zosyn losartan and close monitoring of blood pressures recommended.  Blood pressures appear to have improved somewhat on the low side with low diastolic pressures noted in the TCU  She also has a history of coronary artery disease and a pacemaker placement and is on medical management    Past Medical History     Active Ambulatory (Non-Hospital) Problems    Diagnosis   ? Encounter for palliative care   ? Muscle weakness (generalized)   ? Acute decompensated heart failure (H)   ? Acute renal failure, unspecified acute renal failure type (H)   ? Acute pulmonary edema with congestive heart failure (H)   ? Atrioventricular block, complete (H)   ? History of seizure   ? Anxiety   ? Closed displaced fracture of surgical neck of left humerus   ? Pain in joint, lower leg   ? Disorder of stomach   ? Polyp of duodenum   ? Atherosclerosis of native artery of left lower extremity with ulceration of  other part of foot (H)   ? TIA (transient ischemic attack)   ? (HFpEF) heart failure with preserved ejection fraction (H)   ? Nonintractable epilepsy without status epilepticus, unspecified epilepsy type (H)   ? Morbid obesity (H)   ? Chronic diarrhea   ? Adrenal insufficiency (H)   ? Cardiac pacemaker, dual, in situ   ? Arteriosclerotic vascular disease   ? Bacteremia   ? Seizure disorder (H)   ? Ataxia   ? Hyperlipidemia   ? Noninfectious gastroenteritis   ? Diverticular disease of colon   ? Essential Thrombocytosis   ? Osteoarthritis   ? Thrombophlebitis Of Deep Vessels Of The Lower Extremity   ? Medullary Sponge Kidney Bilaterally   ? CULLEN on CPAP   ? Mixed hyperlipidemia   ? End Stage Renal Disease   ? Status post kidney transplant   ? Benign Essential Hypertension   ? Coronary artery disease due to calcified coronary lesion   ? Chronic Reflux Esophagitis   ? Chronic Gout   ? Type 2 diabetes mellitus (H)   ? Dysthymic Disorder   ? Diabetic Peripheral Neuropathy   ? Anemia   ? Sensorineural Hearing Loss   ? Immunosuppressive management encounter following kidney transplant   ? PTSD (post-traumatic stress disorder)   ? Major depressive disorder, recurrent episode, in partial or unspecified remission   ? Iron deficiency anemia     Past Medical History:   Diagnosis Date   ? Adrenal insufficiency (H)    ? Anemia    ? Anxiety    ? Arthritis    ? Ataxia 5/12/2015   ? CAD (coronary artery disease)    ? Cardiac pacemaker, dual, in situ 12/7/2016   ? Chronic Diarrhea Of Unknown Origin    ? Chronic Gout    ? Chronic Reflux Esophagitis    ? Congestive Heart Failure    ? Coronary Artery Stenosis    ? CVA (cerebral vascular accident) (H)    ? Depression    ? Diabetic Peripheral Neuropathy    ? DM (diabetes mellitus) (H)    ? Dysthymic disorder    ? Epilepsy (H)    ? ESRD (end stage renal disease) (H)    ? Essential Thrombocytosis    ? History of renal transplant    ? Hyperlipidemia    ? Hypertension    ? Insomnia    ?  Ischemic Stroke    ? Medullary Sponge Kidney Bilaterally    ? Morbid obesity (H) 8/29/2018   ? Neuropathy    ? Nonintractable epilepsy without status epilepticus, unspecified epilepsy type (H) 8/29/2018   ? CULLEN on CPAP    ? Osteoarthritis    ? Renal Transplant Recipient    ? Sensorineural hearing loss    ? Thrombophlebitis Of Deep Vessels Of The Lower Extremity    ? Type 2 diabetes mellitus (H)    ? Vertigo        Past Social History     Reviewed, and she  reports that she has quit smoking. Her smoking use included cigarettes. She has a 30.00 pack-year smoking history. She has never used smokeless tobacco. She reports current alcohol use. She reports that she does not use drugs.    Family History     Reviewed, and family history includes Breast cancer (age of onset: 62) in her sister; Cancer in her father; Diabetes in her sister.   Her older son also has kidney cancer    Medication List   Post Discharge Medication Reconciliation Status: discharge medications reconciled and changed, per note/orders (see AVS)   Current Outpatient Medications on File Prior to Visit   Medication Sig Dispense Refill   ? acetaminophen (TYLENOL) 500 MG tablet Take 1 tablet (500 mg total) by mouth every 6 (six) hours as needed.  0   ? alendronate (FOSAMAX) 35 MG tablet Take 35 mg by mouth once a week. Weekly on Sundays. Take in the morning with a full glass of water, on an empty stomach, and do not take anything else by mouth or lie down for the next 30 min.  Sundays           ? aspirin 81 MG EC tablet Take 1 tablet (81 mg total) by mouth daily.     ? azaTHIOprine (IMURAN) 50 mg tablet Take 100 mg by mouth bedtime.      ? bumetanide (BUMEX) 2 MG tablet Take 1 tablet (2 mg total) by mouth 2 (two) times a day at 9am and 6pm. (Patient taking differently: Take 2 mg by mouth daily. )  0   ? carvediloL (COREG) 25 MG tablet Take 1 tablet (25 mg total) by mouth 2 (two) times a day with meals.  0   ? cholecalciferol, vitamin D3, 2,000 unit Tab Take  "2,000 Units by mouth daily.     ? cyanocobalamin, vitamin B-12, 2,500 mcg Tab Take 2,500 mcg by mouth daily.      ? cycloSPORINE modified (GENGRAF) 25 MG capsule Take 50 mg by mouth 2 (two) times a day.      ? ferrous sulfate 325 (65 FE) MG tablet Take 1 tablet (325 mg total) by mouth every other day.  0   ? flash glucose scanning reader (FREESTYLE FELA 14 DAY READER) Misc Use 1 application As Directed daily. 1 each 0   ? flash glucose sensor (FREESTYLE FELA 14 DAY SENSOR) Kit Use 1 application As Directed every 14 (fourteen) days. 6 kit 3   ? hydrALAZINE (APRESOLINE) 25 MG tablet Take 3 tablets (75 mg total) by mouth 3 (three) times a day.  0   ? isosorbide mononitrate (IMDUR) 30 MG 24 hr tablet Take 1 tablet (30 mg total) by mouth 2 (two) times a day.  0   ? lamoTRIgine (LAMICTAL) 25 MG tablet Take 25 mg by mouth see administration instructions. Titrate up to target dose of 100mg two times a day  2/1-2/7 25mg Daily  2/8-2/14 50mg Daily  2/15-2/21 50mg Twice daily  2/22-2/28 75mg Twice daily  2/29 100mg Twice daily     ? LANTUS U-100 INSULIN 100 unit/mL vial 10 unit daily at bedtime 10 mL PRN   ? losartan (COZAAR) 25 MG tablet Take 1 tablet (25 mg total) by mouth daily.  0   ? NOVOLOG U-100 INSULIN ASPART 100 unit/mL injection Three times a day with meals  BG < 70 mg/dL: Treat, and call MD  BG  mg/dL: None - 0 Units  -180 mg/dL: 1 unit  -220 mg/dL: 2 units  -260 mg/dL: 3 units  -300 mg/dL: 4 units  -340 mg/dL: 5 units  -400 mg/dL: 6 units  BG > 400 mg/dL: 7 units and Call MD 10 mL PRN   ? pantoprazole (PROTONIX) 40 MG tablet Take 40 mg by mouth 2 (two) times a day.     ? pen needle, diabetic (BD ULTRA-FINE TWILA PEN NEEDLES) 32 gauge x 5/32\" Ndle Use 1 per day. 200 each 4   ? polyethylene glycol (MIRALAX) 17 gram packet Take 1 packet (17 g total) by mouth daily as needed.  0   ? predniSONE (DELTASONE) 5 MG tablet Take 5 mg by mouth daily.     ? rosuvastatin (CRESTOR) 5 MG " "tablet Take 1 tablet (5 mg) by mouth at bedtime. 90 tablet 3   ? terazosin (HYTRIN) 5 MG capsule Take 1 capsule (5 mg total) by mouth at bedtime.  0   ? traZODone (DESYREL) 50 MG tablet Take 100 mg by mouth at bedtime.     ? venlafaxine (EFFEXOR-XR) 150 MG 24 hr capsule Take 1 capsule (150 mg) by mouth daily. 90 capsule 3     No current facility-administered medications on file prior to visit.        Allergies     Allergies   Allergen Reactions   ? Lisinopril Cough     Resolved after discontinuation   ? Mold/Mildew    ? Latex Rash       Review of Systems   A comprehensive review of 14 systems was done. Pertinent findings noted here and in history of present illness. All the rest negative.  Constitutional: Negative.  Negative for fever, chills, she reports activity change, appetite change and fatigue.   HENT: Negative for congestion and facial swelling.    Eyes: Negative for photophobia, redness and visual disturbance.   Respiratory: Negative for cough and chest tightness.    Cardiovascular: Negative for chest pain, palpitations and has leg swelling.   Gastrointestinal: Negative for nausea, diarrhea, constipation, blood in stool and abdominal distention.   Genitourinary: Negative.    Musculoskeletal: Negative.  Reports weakness with limited endurance  Skin: Negative.    Neurological: Negative for dizziness, tremors, syncope, weakness, light-headedness and headaches.   Hematological: Does not bruise/bleed easily.   Psychiatric/Behavioral: Negative.  Reporting some situational stressors which are exacerbating her mood and depression.  She is moving to an assisted living and selling her home which is causing her a lot of stress      Physical Exam     Recent Vitals 2/19/2020   Height 5' 4\"   Weight 197 lbs   BSA (m2) 2.01 m2   /53   Pulse 72   Temp 98   Temp src -   SpO2 93   Some recent data might be hidden       Constitutional: Oriented to person, place, and time and appears well-developed.    HEENT:  " Normocephalic and atraumatic.  Eyes: Conjunctivae and EOM are normal. Pupils are equal, round, and reactive to light. No discharge.  No scleral icterus. Nose normal. Mouth/Throat: Oropharynx is clear and moist. No oropharyngeal exudate.    NECK: Normal range of motion. Neck supple. No JVD present. No tracheal deviation present. No thyromegaly present.   CARDIOVASCULAR: Normal rate, regular rhythm and intact distal pulses.  Exam reveals no gallop and no friction rub.  Systolic murmur present.  HAS A PACEMAKER  PULMONARY: Effort normal and breath sounds normal. No respiratory distress.No Wheezing or rales.  Bibasilar crackles heard  ABDOMEN: Soft. Bowel sounds are normal. No distension and no mass.  There is no tenderness. There is no rebound and no guarding. No HSM.  MUSCULOSKELETAL: Normal range of motion.  Plus leg edema and no tenderness. Mild kyphosis, no tenderness.  LYMPH NODES: Has no cervical, supraclavicular, axillary and groin adenopathy.   NEUROLOGICAL: Alert and oriented to person, place, and time. No cranial nerve deficit.  Normal muscle tone. Coordination normal.   GENITOURINARY: Deferred exam.  SKIN: Skin is warm and dry. No rash noted. No erythema. No pallor.   EXTREMITIES: No cyanosis, no clubbing, has 2+ leg  edema. No Deformity.  PSYCHIATRIC: Normal mood, affect and behavior.      Lab Results     Recent Results (from the past 240 hour(s))   Basic Metabolic Panel    Collection Time: 02/17/20  9:24 AM   Result Value Ref Range    Sodium 140 136 - 145 mmol/L    Potassium 3.8 3.5 - 5.0 mmol/L    Chloride 101 98 - 107 mmol/L    CO2 27 22 - 31 mmol/L    Anion Gap, Calculation 12 5 - 18 mmol/L    Glucose 127 (H) 70 - 125 mg/dL    Calcium 8.8 8.5 - 10.5 mg/dL    BUN 56 (H) 8 - 28 mg/dL    Creatinine 3.21 (H) 0.60 - 1.10 mg/dL    GFR MDRD Af Amer 17 (L) >60 mL/min/1.73m2    GFR MDRD Non Af Amer 14 (L) >60 mL/min/1.73m2   Vitamin D, Total (25-Hydroxy)    Collection Time: 02/17/20  9:24 AM   Result Value Ref  Range    Vitamin D, Total (25-Hydroxy) 37.5 30.0 - 80.0 ng/mL   HM2(CBC w/o Differential)    Collection Time: 02/17/20  9:24 AM   Result Value Ref Range    WBC 8.7 4.0 - 11.0 thou/uL    RBC 3.08 (L) 3.80 - 5.40 mill/uL    Hemoglobin 8.9 (L) 12.0 - 16.0 g/dL    Hematocrit 30.1 (L) 35.0 - 47.0 %    MCV 98 80 - 100 fL    MCH 28.9 27.0 - 34.0 pg    MCHC 29.6 (L) 32.0 - 36.0 g/dL    RDW 16.8 (H) 11.0 - 14.5 %    Platelets 254 140 - 440 thou/uL    MPV 12.1 8.5 - 12.5 fL   Basic Metabolic Panel    Collection Time: 02/19/20  9:07 AM   Result Value Ref Range    Sodium 140 136 - 145 mmol/L    Potassium 3.9 3.5 - 5.0 mmol/L    Chloride 102 98 - 107 mmol/L    CO2 28 22 - 31 mmol/L    Anion Gap, Calculation 10 5 - 18 mmol/L    Glucose 96 70 - 125 mg/dL    Calcium 8.7 8.5 - 10.5 mg/dL    BUN 62 (H) 8 - 28 mg/dL    Creatinine 3.40 (H) 0.60 - 1.10 mg/dL    GFR MDRD Af Amer 16 (L) >60 mL/min/1.73m2    GFR MDRD Non Af Amer 13 (L) >60 mL/min/1.73m2   Basic Metabolic Panel    Collection Time: 02/24/20  6:50 AM   Result Value Ref Range    Sodium 144 136 - 145 mmol/L    Potassium 3.9 3.5 - 5.0 mmol/L    Chloride 105 98 - 107 mmol/L    CO2 28 22 - 31 mmol/L    Anion Gap, Calculation 11 5 - 18 mmol/L    Glucose 97 70 - 125 mg/dL    Calcium 9.0 8.5 - 10.5 mg/dL    BUN 61 (H) 8 - 28 mg/dL    Creatinine 3.16 (H) 0.60 - 1.10 mg/dL    GFR MDRD Af Amer 17 (L) >60 mL/min/1.73m2    GFR MDRD Non Af Amer 14 (L) >60 mL/min/1.73m2                SHAKILA Griffin

## 2021-06-20 NOTE — LETTER
Letter by Mika Ha MD at      Author: Mika Ha MD Service: -- Author Type: --    Filed:  Encounter Date: 3/19/2020 Status: (Other)         Odalys Miles  [address]  [place] [address]             March 19, 2020         Dear Ms. Miles,    Below are the results from your recent visit:    Resulted Orders   Comprehensive Metabolic Panel   Result Value Ref Range    Sodium 141 136 - 145 mmol/L    Potassium 5.0 3.5 - 5.0 mmol/L    Chloride 102 98 - 107 mmol/L    CO2 23 22 - 31 mmol/L    Anion Gap, Calculation 16 5 - 18 mmol/L    Glucose 118 70 - 125 mg/dL    BUN 96 (H) 8 - 28 mg/dL    Creatinine 3.39 (H) 0.60 - 1.10 mg/dL    GFR MDRD Af Amer 16 (L) >60 mL/min/1.73m2    GFR MDRD Non Af Amer 13 (L) >60 mL/min/1.73m2    Bilirubin, Total 0.7 0.0 - 1.0 mg/dL    Calcium 9.1 8.5 - 10.5 mg/dL    Protein, Total 5.8 (L) 6.0 - 8.0 g/dL    Albumin 3.3 (L) 3.5 - 5.0 g/dL    Alkaline Phosphatase 47 45 - 120 U/L    AST 10 0 - 40 U/L    ALT <9 0 - 45 U/L    Narrative    Fasting Glucose reference range is 70-99 mg/dL per  American Diabetes Association (ADA) guidelines.   HM2(CBC w/o Differential)   Result Value Ref Range    WBC 10.0 4.0 - 11.0 thou/uL    RBC 3.08 (L) 3.80 - 5.40 mill/uL    Hemoglobin 9.5 (L) 12.0 - 16.0 g/dL    Hematocrit 29.5 (L) 35.0 - 47.0 %    MCV 96 80 - 100 fL    MCH 30.8 27.0 - 34.0 pg    MCHC 32.2 32.0 - 36.0 g/dL    RDW 16.9 (H) 11.0 - 14.5 %    Platelets 215 140 - 440 thou/uL    MPV 8.6 7.0 - 10.0 fL   Ferritin   Result Value Ref Range    Ferritin 147 (H) 10 - 130 ng/mL   Iron and Transferrin Iron Binding Capacity   Result Value Ref Range    Iron 20 (L) 42 - 175 ug/dL    Transferrin 175 (L) 212 - 360 mg/dL    Transferrin Saturation, Calculated 9 (L) 20 - 50 %    Transferrin IBC, Calculated 219 (L) 313 - 563 ug/dL       Slight improvement in creatinine level but BUN level is higher.  Potassium level is borderline high.    Reduce spironolactone down to 12.5 mg once a day.    Liver  tests are normal.    Blood sugar was normal on the day of your visit.    Hemoglobin level is improving, but still low.  Mildly low iron levels.  Improving nutrition/eating should help with hemoglobin.      Please call with questions or contact us using [employer].    Sincerely,        Electronically signed by Mika Ha MD

## 2021-06-20 NOTE — LETTER
Letter by Jagdish Cardenas NP at      Author: Jagdish Cardenas NP Service: -- Author Type: --    Filed:  Encounter Date: 3/11/2020 Status: (Other)         Patient: Odalys Miles   MR Number: [id number]   YOB: 1945   Date of Visit: 3/11/2020     [facility]      Facility:    [facility] [[id number]]  Code Status: DNR      Chief Complaint/Reason for Visit:   Chief Complaint   Patient presents with   ? Review Of Multiple Medical Conditions       HPI:   Odalys is a 74 y.o. female who is a transfer from [facility] with an admission on 2/27/20 and discharge on 3/6/20. She has a PMH of DM, CKD stage 4, s/p renal transplant, anemia, PM, chronic CHF per EF 33%, htn who was in the TCU from 2/13/20 to 2/26/20. Apparently feeling more shortness of breath, again presented to the hospital with acute shortness of breath and CP. She was diagnosed with cardiogenic pulmonary edema with decreased EF, given medical management with diuresing.  Was not a candidate for angiogram given declining renal function, required BiPAP and then weaned off.  She developed acute on chronic renal failure with a baseline creatinine around 2.5 though increased to 3.5 per recent addition of losartan (which was decreased), patient not too keen on going through dialysis again as she had initially done in 2011.  Found to have osteomyelitis per MRU in the right great toe, seen by podiatry, started on Bactrim and Omnicef.  Also found to have Proteus UTI. Per anemia, she was given IV iron and started on epo as well. She was then discharged to the TCU again for additional rehab.    Today will assess her vitals, BS control, f/u with nephrology and therapy progress.    Past Medical History:  Past Medical History:   Diagnosis Date   ? Adrenal insufficiency (H)    ? Anemia    ? Anxiety    ? Arthritis    ? Ataxia 5/12/2015   ? CAD (coronary artery disease)    ? Cardiac pacemaker, dual, in  situ 12/7/2016    [employer] Revo MRI DOI: 12/15/2011 Dr. Aishwarya Treviño   ? Chronic Diarrhea Of Unknown Origin     Created by Conversion    ? Chronic Gout     Created by Conversion  Replacement Utility updated for latest IMO load   ? Chronic Reflux Esophagitis     Created by Conversion    ? Congestive Heart Failure     Created by Conversion  Replacement Utility updated for latest IMO load   ? Coronary Artery Stenosis     Created by Conversion [facility] Annotation: Feb 27 2011 11:37PM - Alina Zapien: s/p CABGx4  1/11  Replacement Utility updated for latest IMO load   ? CVA (cerebral vascular accident) (H)    ? Depression    ? Diabetic Peripheral Neuropathy     Created by Conversion    ? DM (diabetes mellitus) (H)    ? Dysthymic disorder     Created by Conversion    ? Epilepsy (H)    ? ESRD (end stage renal disease) (H)    ? Essential Thrombocytosis     Created by Conversion    ? History of renal transplant    ? Hyperlipidemia    ? Hypertension    ? Insomnia     Created by Conversion    ? Ischemic Stroke     Created by Conversion  Replacement Utility updated for latest IMO load   ? Medullary Sponge Kidney Bilaterally     Created by Conversion    ? Morbid obesity (H) 8/29/2018   ? Neuropathy    ? Nonintractable epilepsy without status epilepticus, unspecified epilepsy type (H) 8/29/2018   ? CULLEN on CPAP     Created by Conversion    ? Osteoarthritis     Created by Conversion  Replacement Utility updated for latest IMO load   ? Renal Transplant Recipient     Created by Conversion    ? Sensorineural hearing loss     Created by Conversion  Replacement Utility updated for latest IMO load   ? Thrombophlebitis Of Deep Vessels Of The Lower Extremity     Created by Conversion    ? Type 2 diabetes mellitus (H)     Created by Conversion    ? Vertigo            Surgical History:  Past Surgical History:   Procedure Laterality Date   ? APPENDECTOMY     ? CARDIAC PACEMAKER PLACEMENT     ? CORONARY ARTERY BYPASS GRAFT  01/26/2011     Comments: x 4   ? HYSTERECTOMY  1973   ? IR EXTREMITY ANGIOGRAM LEFT  1/30/2019   ? NEPHRECTOMY TRANSPLANTED ORGAN  06/2011   ? NV TOTAL KNEE ARTHROPLASTY Bilateral        Family History:   Family History   Problem Relation Age of Onset   ? Diabetes Sister    ? Breast cancer Sister 62   ? Cancer Father        Social History:    Social History     Socioeconomic History   ? Marital status:      Spouse name: Not on file   ? Number of children: Not on file   ? Years of education: Not on file   ? Highest education level: Not on file   Occupational History     Employer: RETIRED   Social Needs   ? Financial resource strain: Not on file   ? Food insecurity     Worry: Not on file     Inability: Not on file   ? Transportation needs     Medical: Not on file     Non-medical: Not on file   Tobacco Use   ? Smoking status: Former Smoker     Packs/day: 1.50     Years: 20.00     Pack years: 30.00     Types: Cigarettes   ? Smokeless tobacco: Never Used   Substance and Sexual Activity   ? Alcohol use: Yes     Comment: occasional   ? Drug use: No   ? Sexual activity: Not on file   Lifestyle   ? Physical activity     Days per week: Not on file     Minutes per session: Not on file   ? Stress: Not on file   Relationships   ? Social connections     Talks on phone: Not on file     Gets together: Not on file     Attends [employer] service: Not on file     Active member of club or organization: Not on file     Attends meetings of clubs or organizations: Not on file     Relationship status: Not on file   ? Intimate partner violence     Fear of current or ex partner: Not on file     Emotionally abused: Not on file     Physically abused: Not on file     Forced sexual activity: Not on file   Other Topics Concern   ? Not on file   Social History Narrative    .  Chas passed away 2009 from cancer. Lives independently in 1 level town home in [place]. 2 sons- Ben and Av. She has a dog-Isaura. Enjoys knitting.            Review of Systems   Constitutional: Positive for activity change, appetite change and fatigue. Negative for chills, diaphoresis and fever.        No concerns   HENT: Negative for congestion and hearing loss.    Eyes: Negative.    Respiratory: Positive for shortness of breath.         2L NC   Cardiovascular: Negative.    Gastrointestinal: Negative for abdominal distention, abdominal pain, blood in stool, constipation, diarrhea and nausea.   Endocrine: Negative.    Genitourinary: Negative for difficulty urinating.   Musculoskeletal:        Denies pain   Skin: Positive for wound.        R great toe    Allergic/Immunologic: Negative.    Neurological: Negative for dizziness, tremors, speech difficulty and light-headedness.   Hematological: Negative.    Psychiatric/Behavioral: Negative for agitation, confusion, hallucinations and sleep disturbance. The patient is not nervous/anxious.        There were no vitals filed for this visit.    Physical Exam  Vitals signs reviewed.   HENT:      Head: Normocephalic and atraumatic.      Right Ear: External ear normal.      Left Ear: External ear normal.      Nose: No congestion or rhinorrhea.      Mouth/Throat:      Pharynx: No oropharyngeal exudate or posterior oropharyngeal erythema.   Eyes:      General:         Right eye: No discharge.         Left eye: No discharge.   Neck:      Musculoskeletal: Normal range of motion and neck supple.   Cardiovascular:      Rate and Rhythm: Normal rate.      Heart sounds: No murmur. No friction rub. No gallop.       Comments: PM  Pulmonary:      Effort: No respiratory distress.      Breath sounds: No wheezing or rales.      Comments: Dim, 2L NC, RUFFIN  Abdominal:      General: There is no distension.      Palpations: Abdomen is soft.      Tenderness: There is no abdominal tenderness. There is no guarding.      Comments: Denies constipation or diarrhea   Genitourinary:     Comments: No limitations  Musculoskeletal:      Right lower leg:  No edema.      Left lower leg: No edema.      Comments: WBAT, denies pain   Skin:     General: Skin is warm.      Comments: R great toe wound   Neurological:      Mental Status: She is alert and oriented to person, place, and time.   Psychiatric:         Mood and Affect: Mood normal.      Comments: Denies depression or anxiety         Medication List:  Current Outpatient Medications   Medication Sig   ? acetaminophen (TYLENOL) 500 MG tablet Take 1 tablet (500 mg total) by mouth every 6 (six) hours as needed.   ? alendronate (FOSAMAX) 35 MG tablet Take 35 mg by mouth once a week. Weekly on Sundays. Take in the morning with a full glass of water, on an empty stomach, and do not take anything else by mouth or lie down for the next 30 min.  Sundays         ? amLODIPine (NORVASC) 5 MG tablet Take 1 tablet (5 mg total) by mouth daily.   ? aspirin 81 MG EC tablet Take 1 tablet (81 mg total) by mouth daily.   ? azaTHIOprine (IMURAN) 50 mg tablet Take 100 mg by mouth bedtime.    ? carvediloL (COREG) 25 MG tablet Take 2 tablets (50 mg total) by mouth 2 (two) times a day with meals.   ? cefdinir (OMNICEF) 300 MG capsule Take 1 capsule (300 mg total) by mouth every morning for 20 days. (or per podiatrist) for osteomyelitis toe   ? cholecalciferol, vitamin D3, 2,000 unit Tab Take 2,000 Units by mouth daily.   ? clopidogreL (PLAVIX) 75 mg tablet Take 1 tablet (75 mg total) by mouth daily.   ? cyanocobalamin, vitamin B-12, 2,500 mcg Tab Take 2,500 mcg by mouth daily.    ? cycloSPORINE modified (GENGRAF) 25 MG capsule Take 50 mg by mouth 2 (two) times a day.    ? flash glucose scanning reader (FREESTYLE FELA 14 DAY READER) Misc Use 1 application As Directed daily.   ? flash glucose sensor (FREESTYLE FELA 14 DAY SENSOR) Kit Use 1 application As Directed every 14 (fourteen) days.   ? hydrALAZINE (APRESOLINE) 100 MG tablet Take 1 tablet (100 mg total) by mouth 3 (three) times a day.   ? insulin NPH-insulin regular (NOVOLIN 70-30)  "100 unit/mL (70-30) injection Inject 15 Units under the skin daily before breakfast.   ? insulin NPH-insulin regular (NOVOLIN 70-30) 100 unit/mL (70-30) injection Inject 14 Units under the skin daily before supper.    ? isosorbide mononitrate (IMDUR) 60 MG 24 hr tablet Take 1 tablet (60 mg total) by mouth 2 (two) times a day.   ? lamoTRIgine (LAMICTAL) 100 MG tablet Take 1 tablet (100 mg total) by mouth 2 (two) times a day.   ? miconazole (MICOTIN) 2 % powder Twice daily as needed for rash/dermatitis   ? NOVOLOG U-100 INSULIN ASPART 100 unit/mL injection Three times a day with meals  BG < 70 mg/dL: Treat, and call MD  BG  mg/dL: None - 0 Units  -180 mg/dL: 1 unit  -220 mg/dL: 2 units  -260 mg/dL: 3 units  -300 mg/dL: 4 units  -340 mg/dL: 5 units  -400 mg/dL: 6 units  BG > 400 mg/dL: 7 units and Call MD   ? pantoprazole (PROTONIX) 40 MG tablet Take 40 mg by mouth 2 (two) times a day.   ? pen needle, diabetic (BD ULTRA-FINE TWILA PEN NEEDLES) 32 gauge x 5/32\" Ndle Use 1 per day.   ? polyethylene glycol (MIRALAX) 17 gram packet Take 1 packet (17 g total) by mouth daily.   ? polyvinyl alcohol (LIQUIFILM TEARS) 1.4 % ophthalmic solution Use 4 times daily as needed for dry eyes   ? prazosin (MINIPRESS) 2 MG capsule Take 2 capsules (4 mg total) by mouth 3 (three) times a day.   ? predniSONE (DELTASONE) 5 MG tablet Take 5 mg by mouth daily.   ? rosuvastatin (CRESTOR) 5 MG tablet Take 5 mg by mouth at bedtime.   ? senna-docusate (PERICOLACE) 8.6-50 mg tablet Take 1 tablet by mouth 2 (two) times a day.   ? spironolactone (ALDACTONE) 25 MG tablet Take 0.5 tablets (12.5 mg total) by mouth 2 (two) times a day at 9am and 6pm.   ? sulfamethoxazole-trimethoprim (SEPTRA) 400-80 mg per tablet Take 1 tablet by mouth 3 (three) times a week for 10 days.   ? torsemide (DEMADEX) 20 MG tablet Take 4 tablets (80 mg total) by mouth 2 (two) times a day at 9am and 6pm.   ? traZODone (DESYREL) 50 MG " tablet Take 100 mg by mouth at bedtime.   ? venlafaxine (EFFEXOR-XR) 75 MG 24 hr capsule Take 1 capsule (75 mg total) by mouth daily with breakfast.       Labs:  Results for orders placed or performed in visit on 03/10/20   Basic Metabolic Panel   Result Value Ref Range    Sodium 136 136 - 145 mmol/L    Potassium 3.9 3.5 - 5.0 mmol/L    Chloride 96 (L) 98 - 107 mmol/L    CO2 27 22 - 31 mmol/L    Anion Gap, Calculation 13 5 - 18 mmol/L    Glucose 119 70 - 125 mg/dL    Calcium 8.6 8.5 - 10.5 mg/dL    BUN 82 (H) 8 - 28 mg/dL    Creatinine 4.02 (H) 0.60 - 1.10 mg/dL    GFR MDRD Af Amer 13 (L) >60 mL/min/1.73m2    GFR MDRD Non Af Amer 11 (L) >60 mL/min/1.73m2     Lab Results   Component Value Date    WBC 6.9 03/10/2020    HGB 8.6 (L) 03/10/2020    HCT 28.3 (L) 03/10/2020    MCV 99 03/10/2020     03/10/2020     Lab Results   Component Value Date    TSH 3.57 11/21/2019     Vitamin D, Total (25-Hydroxy)   Date Value Ref Range Status   02/17/2020 37.5 30.0 - 80.0 ng/mL Final     Lab Results   Component Value Date    HGBA1C 6.3 (H) 01/26/2020       Assessment/Plan:    NSTEMI: No angiogram performed per renal dysfunction, probably due to demand ischemia per cardiogenic overload with decreased EF of 33%, will wean O2 as tolerated. Currently 2L NC. Continue ASA and plavix daily. F/u with cardiology    HFrEF: last 33% (decreased), last 189lb, bumex increased to torsemide to 80mg two times a day and spironolactone 12.5mg two times a day. Last 187lb    Osteomyelitis of right great toe: started on Omnicef, end 3/26 and bactrim, end 3/16. Drsg changes as ordered    CULLEN: using CPAP    IDDM: last A1c 6.3, continue 70/30 14U at HS and 15U in AM, with SS three times a day. Increase BS checks to QID    Poor PO intake: start glucerna     Hx of renal transplant with CKD stage 4: last Cr 4.02 with GFR 11 on 3/10/20 (worsened), continue gengraf 50mg two times a day and imuran 100mg at HS, f/u with neurology in 2 wks    Pain control:  continue tylenol 500mg q6h PRN, denies pain    Osteopenia: continue fosamax 35mg weekly    HTN: continue coreg 50mg two times a day, hydralazine 100mg three times a day, imdur 60mg two times a day, start on prazosin 4mg TID and amlodipine on 5mg daily per cardiology. SBP <130    Normocytic hypochromic anemia: Last hemoglobin 8.6, f/u with nephrology    Vit D def: continue D3 2000U daily    Vit B12 def: continue cyanocobalamin 2500mcg daily    Seizure disorder: continue lamotrigine 100mg two times a day    GERD: pantoprazole 40mg two times a day    Constipation: continue miralax daily and senna S 1 tab two times a day, denies issue    CAD: continue statin and ASA    Insomnia: continue trazodone 100mg at HS    Depression: continue Effexor 75mg daily    Disposition: plans to return home, wants to [facility] after selling house      Electronically signed by: Jagdish Cardenas NP

## 2021-06-20 NOTE — LETTER
Letter by Jagdish Cardenas NP at      Author: Jagdish Cardenas NP Service: -- Author Type: --    Filed:  Encounter Date: 2/26/2020 Status: (Other)         Patient: Odalys Miles   MR Number: [id number]   YOB: 1945   Date of Visit: 2/26/2020     [facility]      Facility:    [facility] [[id number]]  Code Status: DNR      Chief Complaint/Reason for Visit:  007  Chief Complaint   Patient presents with   ? Discharge Summary       HPI:   Odalys is a 74 y.o. female who is a transfer from [facility] with an admission on 2/6/20 and discharge on 2/12/20. She has a PMH of renal transplant, CKD stage 4, CVA, CAD with CABG, DM2, seizure disorder, Htn, CULLEN on CPAP, chronic CHF, recent CHF exacerbation discharged on 1/28/20 with acute shortness of breath d/t CHF.  She improved with diuresis, BNP 2519 as it was 2275 the previous week. Echo showed EF 55%, resumed bumex 2mg two times a day, was on 1mg daily. She was weaned off O2. Baseline Cr 2-2.5, followed with nephrology. Her immunosuppression was restarted on Imuran, cyclosporine and prednisone.  Her BP was significantly elevated, she resumed on hydralazine 75 mg 3 times daily, carvedilol 25 mg twice daily, losartan 25 mg daily and terazosin 5mg daily.  She also has chronic A. fib with pacemaker in place, heart rate was stable.  Not on anticoagulation treatment.  Has history of ischemic stroke without residual weakness on aspirin and statin.  Glucose numbers improved with Lantus 10 units daily with sliding scale.  Resumed Elekta mental for seizure disorder.  Also developed iron deficiency anemia received IV replacement and resumed on oral Fe replacement.  She was then discharged to TCU for additional rehab.    She has concluded her TCU stay and we discharged back to home with services without oxygen on 2/26/2020.    Past Medical History:  Past Medical History:   Diagnosis Date   ? Adrenal insufficiency  (H)    ? Anemia    ? Anxiety    ? Arthritis    ? Ataxia 5/12/2015   ? CAD (coronary artery disease)    ? Cardiac pacemaker, dual, in situ 12/7/2016    [employer] Revo MRI DOI: 12/15/2011 Dr. Aishwarya Treviño   ? Chronic Diarrhea Of Unknown Origin     Created by Conversion    ? Chronic Gout     Created by Conversion  Replacement Utility updated for latest IMO load   ? Chronic Reflux Esophagitis     Created by Conversion    ? Congestive Heart Failure     Created by Conversion  Replacement Utility updated for latest IMO load   ? Coronary Artery Stenosis     Created by Conversion [facility] Annotation: Feb 27 2011 11:37PM - Alina Zapien: s/p CABGx4  1/11  Replacement Utility updated for latest IMO load   ? CVA (cerebral vascular accident) (H)    ? Depression    ? Diabetic Peripheral Neuropathy     Created by Conversion    ? DM (diabetes mellitus) (H)    ? Dysthymic disorder     Created by Conversion    ? Epilepsy (H)    ? ESRD (end stage renal disease) (H)    ? Essential Thrombocytosis     Created by Conversion    ? History of renal transplant    ? Hyperlipidemia    ? Hypertension    ? Insomnia     Created by Conversion    ? Ischemic Stroke     Created by Conversion  Replacement Utility updated for latest IMO load   ? Medullary Sponge Kidney Bilaterally     Created by Conversion    ? Morbid obesity (H) 8/29/2018   ? Neuropathy    ? Nonintractable epilepsy without status epilepticus, unspecified epilepsy type (H) 8/29/2018   ? CULLEN on CPAP     Created by Conversion    ? Osteoarthritis     Created by Conversion  Replacement Utility updated for latest IMO load   ? Renal Transplant Recipient     Created by Conversion    ? Sensorineural hearing loss     Created by Conversion  Replacement Utility updated for latest IMO load   ? Thrombophlebitis Of Deep Vessels Of The Lower Extremity     Created by Conversion    ? Type 2 diabetes mellitus (H)     Created by Conversion    ? Vertigo            Surgical History:  Past Surgical  History:   Procedure Laterality Date   ? APPENDECTOMY     ? CARDIAC PACEMAKER PLACEMENT     ? CORONARY ARTERY BYPASS GRAFT  01/26/2011    Comments: x 4   ? HYSTERECTOMY  1973   ? IR EXTREMITY ANGIOGRAM LEFT  1/30/2019   ? NEPHRECTOMY TRANSPLANTED ORGAN  06/2011   ? MO TOTAL KNEE ARTHROPLASTY Bilateral        Family History:   Family History   Problem Relation Age of Onset   ? Diabetes Sister    ? Breast cancer Sister 62   ? Cancer Father        Social History:    Social History     Socioeconomic History   ? Marital status:      Spouse name: Not on file   ? Number of children: Not on file   ? Years of education: Not on file   ? Highest education level: Not on file   Occupational History     Employer: RETIRED   Social Needs   ? Financial resource strain: Not on file   ? Food insecurity:     Worry: Not on file     Inability: Not on file   ? Transportation needs:     Medical: Not on file     Non-medical: Not on file   Tobacco Use   ? Smoking status: Former Smoker     Packs/day: 1.50     Years: 20.00     Pack years: 30.00     Types: Cigarettes   ? Smokeless tobacco: Never Used   Substance and Sexual Activity   ? Alcohol use: Yes     Comment: occasional   ? Drug use: No   ? Sexual activity: Not on file   Lifestyle   ? Physical activity:     Days per week: Not on file     Minutes per session: Not on file   ? Stress: Not on file   Relationships   ? Social connections:     Talks on phone: Not on file     Gets together: Not on file     Attends Holiness service: Not on file     Active member of club or organization: Not on file     Attends meetings of clubs or organizations: Not on file     Relationship status: Not on file   ? Intimate partner violence:     Fear of current or ex partner: Not on file     Emotionally abused: Not on file     Physically abused: Not on file     Forced sexual activity: Not on file   Other Topics Concern   ? Not on file   Social History Narrative   ? Not on file          Review of Systems  "  Constitutional: Positive for activity change and fatigue. Negative for appetite change, chills, diaphoresis and fever.        No concerns   HENT: Negative for congestion and hearing loss.    Eyes: Negative.    Respiratory: Positive for shortness of breath. Negative for wheezing.         Weaned off O2, RUFFIN   Cardiovascular: Negative.    Gastrointestinal: Negative for abdominal distention, abdominal pain, blood in stool, constipation, diarrhea and nausea.   Endocrine: Negative.    Genitourinary: Negative for difficulty urinating.   Musculoskeletal:        Denies pain   Skin:        intact   Allergic/Immunologic: Negative.    Neurological: Negative for dizziness, tremors, speech difficulty and light-headedness.   Hematological: Negative.    Psychiatric/Behavioral: Negative for agitation, confusion, hallucinations and sleep disturbance. The patient is not nervous/anxious.        Vitals:    02/26/20 0841   BP: 136/72   Pulse: 69   Resp: 18   Temp: 98  F (36.7  C)   SpO2: 92%   Weight: 196 lb (88.9 kg)   Height: 5' 4\" (1.626 m)       Physical Exam  Vitals signs reviewed.   Constitutional:       Appearance: She is obese.      Comments: Continue RUFFIN, weaned off O2   HENT:      Head: Normocephalic and atraumatic.      Right Ear: External ear normal.      Left Ear: External ear normal.      Nose: No congestion or rhinorrhea.      Mouth/Throat:      Mouth: Mucous membranes are dry.      Pharynx: No oropharyngeal exudate or posterior oropharyngeal erythema.   Eyes:      General:         Right eye: No discharge.         Left eye: No discharge.   Neck:      Musculoskeletal: Normal range of motion and neck supple.      Comments: Intact  Cardiovascular:      Rate and Rhythm: Rhythm irregular.      Heart sounds: Murmur present.      Comments: IRR, 2/6 CYRUS RSB and LSB, PM  Pulmonary:      Effort: No respiratory distress.      Breath sounds: No wheezing or rales.      Comments: Dim, RUFFIN, weaned off O2  Abdominal:      General: Bowel " sounds are normal. There is no distension.      Palpations: Abdomen is soft.      Tenderness: There is no abdominal tenderness. There is no guarding.      Comments: Denies constipation or diarrhea   Genitourinary:     Comments: No limitations  Musculoskeletal:      Right lower leg: No edema.      Left lower leg: No edema.      Comments: Denies pain   Skin:     General: Skin is warm and dry.      Comments: Intact   Neurological:      Mental Status: She is alert and oriented to person, place, and time. Mental status is at baseline.   Psychiatric:         Mood and Affect: Mood normal.      Comments: Has history of depression         Medication List:  Current Outpatient Medications   Medication Sig   ? acetaminophen (TYLENOL) 500 MG tablet Take 1 tablet (500 mg total) by mouth every 6 (six) hours as needed.   ? alendronate (FOSAMAX) 35 MG tablet Take 35 mg by mouth once a week. Weekly on Sundays. Take in the morning with a full glass of water, on an empty stomach, and do not take anything else by mouth or lie down for the next 30 min.  Sundays         ? aspirin 81 MG EC tablet Take 1 tablet (81 mg total) by mouth daily.   ? azaTHIOprine (IMURAN) 50 mg tablet Take 100 mg by mouth bedtime.    ? bumetanide (BUMEX) 2 MG tablet Take 1 tablet (2 mg total) by mouth 2 (two) times a day at 9am and 6pm. (Patient taking differently: Take 2 mg by mouth daily. )   ? carvediloL (COREG) 25 MG tablet Take 1 tablet (25 mg total) by mouth 2 (two) times a day with meals.   ? cholecalciferol, vitamin D3, 2,000 unit Tab Take 2,000 Units by mouth daily.   ? cyanocobalamin, vitamin B-12, 2,500 mcg Tab Take 2,500 mcg by mouth daily.    ? cycloSPORINE modified (GENGRAF) 25 MG capsule Take 50 mg by mouth 2 (two) times a day.    ? ferrous sulfate 325 (65 FE) MG tablet Take 1 tablet (325 mg total) by mouth every other day.   ? flash glucose scanning reader (Milk FELA 14 DAY READER) Misc Use 1 application As Directed daily.   ? flash glucose  "sensor (FREESTYLE FELA 14 DAY SENSOR) Kit Use 1 application As Directed every 14 (fourteen) days.   ? hydrALAZINE (APRESOLINE) 25 MG tablet Take 3 tablets (75 mg total) by mouth 3 (three) times a day.   ? isosorbide mononitrate (IMDUR) 30 MG 24 hr tablet Take 1 tablet (30 mg total) by mouth 2 (two) times a day.   ? lamoTRIgine (LAMICTAL) 25 MG tablet Take 25 mg by mouth see administration instructions. Titrate up to target dose of 100mg two times a day  2/1-2/7 25mg Daily  2/8-2/14 50mg Daily  2/15-2/21 50mg Twice daily  2/22-2/28 75mg Twice daily  2/29 100mg Twice daily   ? LANTUS U-100 INSULIN 100 unit/mL vial 10 unit daily at bedtime   ? NOVOLOG U-100 INSULIN ASPART 100 unit/mL injection Three times a day with meals  BG < 70 mg/dL: Treat, and call MD  BG  mg/dL: None - 0 Units  -180 mg/dL: 1 unit  -220 mg/dL: 2 units  -260 mg/dL: 3 units  -300 mg/dL: 4 units  -340 mg/dL: 5 units  -400 mg/dL: 6 units  BG > 400 mg/dL: 7 units and Call MD   ? pantoprazole (PROTONIX) 40 MG tablet Take 40 mg by mouth 2 (two) times a day.   ? pen needle, diabetic (BD ULTRA-FINE TWILA PEN NEEDLES) 32 gauge x 5/32\" Ndle Use 1 per day.   ? polyethylene glycol (MIRALAX) 17 gram packet Take 1 packet (17 g total) by mouth daily as needed.   ? predniSONE (DELTASONE) 5 MG tablet Take 5 mg by mouth daily.   ? rosuvastatin (CRESTOR) 5 MG tablet Take 1 tablet (5 mg) by mouth at bedtime.   ? terazosin (HYTRIN) 5 MG capsule Take 1 capsule (5 mg total) by mouth at bedtime.   ? traZODone (DESYREL) 50 MG tablet Take 100 mg by mouth at bedtime.   ? venlafaxine (EFFEXOR-XR) 150 MG 24 hr capsule Take 1 capsule (150 mg) by mouth daily.       Labs:  Results for orders placed or performed in visit on 02/24/20   Basic Metabolic Panel   Result Value Ref Range    Sodium 144 136 - 145 mmol/L    Potassium 3.9 3.5 - 5.0 mmol/L    Chloride 105 98 - 107 mmol/L    CO2 28 22 - 31 mmol/L    Anion Gap, Calculation 11 5 - 18 " mmol/L    Glucose 97 70 - 125 mg/dL    Calcium 9.0 8.5 - 10.5 mg/dL    BUN 61 (H) 8 - 28 mg/dL    Creatinine 3.16 (H) 0.60 - 1.10 mg/dL    GFR MDRD Af Amer 17 (L) >60 mL/min/1.73m2    GFR MDRD Non Af Amer 14 (L) >60 mL/min/1.73m2     Lab Results   Component Value Date    WBC 8.7 02/17/2020    HGB 8.9 (L) 02/17/2020    HCT 30.1 (L) 02/17/2020    MCV 98 02/17/2020     02/17/2020     Lab Results   Component Value Date    TSH 3.57 11/21/2019     Lab Results   Component Value Date    HGBA1C 6.3 (H) 01/26/2020     Vitamin D, Total (25-Hydroxy)   Date Value Ref Range Status   02/17/2020 37.5 30.0 - 80.0 ng/mL Final     Lab Results   Component Value Date    BNP 2,519 (H) 02/06/2020         Assessment/Plan:    HFrEF: last EF 55%, frequent exacerbations, last BNP +2500, following with cardiology and heart failure clinic.  Recently  Bumex decreased to 2 mg daily per hypotension, last weight 196lbs (possible 6lb weight loss). Now on a 2L/day FW restriction and plans to continue at home    HTN: Continue carvedilol 25 mg twice daily, hydralazine 75 mg 3 times daily, imdur 30mg two times a day and Hytrin 5 mg at bedtime.  Per monitoring systolic blood pressure <150    CULLEN: using CPAP well    Hx of renal transplant: continue Imuran 100mg at HS, cephalosporin 50 mg twice daily and prednisone 5 mg daily    CKD stage 4: last Cr 3.16, slight improvement, following with neurology on 2/26/20    DM2: continue lantus 10U at HS, SS three times a day     Pain control: Continue Tylenol 500 mg every 6 hours PRN, denies pain    Insomnia: Continue trazodone 100 mg at bedtime    Depression: Continue venlafaxine 150 mg daily    Osteopenia: Continue Fosamax 35 mg weekly on Sunday    GERD: Continue pantoprazole 40 mg twice daily    Seizure disorder: Continue Lamictal per escalating dose    Morbid obesity: last 33.6    History of CVA: Continue aspirin 81 mg daily and statin    Vitamin B deficiency: Continue cyanocobalamin 2500mcg  daily    Vitamin D deficiency: Continue D3 2000 units daily    Normocytic hypochromic anemia: Last hemoglobin 8.9 on 2/17/20, received IV iron in hospital, continues ferrous sulfate 325 mg daily    Disposition: She will be discharged back to her home, will seek DEE DEE placement after selling house, etc.    MEDICAL EQUIPMENT NEEDS:  na    DISCHARGE PLAN/FACE TO FACE:  I certify that services are/were furnished while this patient was under the care of a physician and that a physician or an allowed non-physician practitioner (NPP), had a face-to-face encounter that meets the physician face-to-face encounter requirements. The encounter was in whole, or in part, related to the primary reason for home health. The patient is confined to his/her home and needs intermittent skilled nursing, physical therapy, speech-language pathology, or the continued need for occupational therapy. A plan of care has been established by a physician and is periodically reviewed by a physician.  Date of Face-to-Face Encounter: 2/26/20    I certify that, based on my findings, the following services are medically necessary home health services: C RN/SW and PT/OT to evaluate and treat.    My clinical findings support the need for the above skilled services because: patient will be discharging to home.  She will need assistance with medication management, resource allocation and performing IADLs and ADLs effectively and safely at home.    Patient to re-establish plan of care with their PCP within 7 days after leaving TCU.       The care plan has been reviewed and all orders signed. Changes to care plan, if any, as noted. Otherwise, continue care plan of care.  The total time spent with this patient was 31 minutes, with greater than 50% spent in counseling and coordination of care that included multiple issues per f/u with transplant hx, DM monitoring, continuing therapy, eventual long term placment and discharge, which lasted 16  minutes.      Electronically signed by: Jagdish Cardenas NP

## 2021-06-20 NOTE — LETTER
Letter by Brandy Oliver CHW at      Author: Brandy Oliver CHW Service: -- Author Type: --    Filed:  Encounter Date: 12/20/2019 Status: Signed       Dear Odalys,                                                                         You were recently referred to the [facility]'s Clinic Care Coordination service.  This is a service offered through your Primary Care Clinic which can help you access resources, services in regard to your health and well-being goals. The clinic Community Health Worker has placed two calls to you to discuss the nature of this service and to offer enrollment.  If you are interested in learning more about Clinic Care Coordination, please call your Primary Care Clinic's Community Health Worker, Brandy, at [phone] .                                                    Sincerely,                                                                              JESSICA Kearns                                                                                          Clinic Care Coordination                                                  [facility]

## 2021-06-20 NOTE — LETTER
Letter by Jagdish Cardenas NP at      Author: Jagdish Cardenas NP Service: -- Author Type: --    Filed:  Encounter Date: 2/13/2020 Status: (Other)         Patient: Odalys Miles   MR Number: [id number]   YOB: 1945   Date of Visit: 2/13/2020     [facility]      Facility:    [facility] [[id number]]  Code Status: DNR      Chief Complaint/Reason for Visit:   Chief Complaint   Patient presents with   ? Review Of Multiple Medical Conditions       HPI:   Odalys is a 74 y.o. female who is a transfer from [facility] with an admission on 2/6/20 and discharge on 2/12/20. She has a PMH of renal transplant, CKD stage 4, CVA, CAD with CABG, DM2, seizure disorder, Htn, CULLEN on CPAP, chronic CHF, recent CHF exacerbation discharged on 1/28/20 with acute shortness of breath d/t CHF.  She improved with diuresis, BNP 2519 as it was 2275 the previous week. Echo showed EF 55%, resumed bumex 2mg two times a day, was on 1mg daily. She was weaned off O2. Baseline Cr 2-2.5, followed with nephrology. Her immunosuppression was restarted on Imuran, cyclosporine and prednisone.  Her BP was significantly elevated, she resumed on hydralazine 75 mg 3 times daily, carvedilol 25 mg twice daily, losartan 25 mg daily and terazosin 5mg daily.  She also has chronic A. fib with pacemaker in place, heart rate was stable.  Not on anticoagulation treatment.  Has history of ischemic stroke without residual weakness on aspirin and statin.  Glucose numbers improved with Lantus 10 units daily with sliding scale.  Resumed Elekta mental for seizure disorder.  Also developed iron deficiency anemia received IV replacement and resumed on oral Fe replacement.  She was then discharged to TCU for additional rehab.    Past Medical History:  Past Medical History:   Diagnosis Date   ? Adrenal insufficiency (H)    ? Anemia    ? Anxiety    ? Arthritis    ? Ataxia 5/12/2015   ? CAD (coronary  artery disease)    ? Cardiac pacemaker, dual, in situ 12/7/2016    [employer] Revo MRI DOI: 12/15/2011 Dr. Aishwarya Treviño   ? Chronic Diarrhea Of Unknown Origin     Created by Conversion    ? Chronic Gout     Created by Conversion  Replacement Utility updated for latest IMO load   ? Chronic Reflux Esophagitis     Created by Conversion    ? Congestive Heart Failure     Created by Conversion  Replacement Utility updated for latest IMO load   ? Coronary Artery Stenosis     Created by Conversion [facility] Annotation: Feb 27 2011 11:37PM - Alina Zapien: s/p CABGx4  1/11  Replacement Utility updated for latest IMO load   ? CVA (cerebral vascular accident) (H)    ? Depression    ? Diabetic Peripheral Neuropathy     Created by Conversion    ? DM (diabetes mellitus) (H)    ? Dysthymic disorder     Created by Conversion    ? Epilepsy (H)    ? ESRD (end stage renal disease) (H)    ? Essential Thrombocytosis     Created by Conversion    ? History of renal transplant    ? Hyperlipidemia    ? Hypertension    ? Insomnia     Created by Conversion    ? Ischemic Stroke     Created by Conversion  Replacement Utility updated for latest IMO load   ? Medullary Sponge Kidney Bilaterally     Created by Conversion    ? Morbid obesity (H) 8/29/2018   ? Neuropathy    ? Nonintractable epilepsy without status epilepticus, unspecified epilepsy type (H) 8/29/2018   ? CULLEN on CPAP     Created by Conversion    ? Osteoarthritis     Created by Conversion  Replacement Utility updated for latest IMO load   ? Renal Transplant Recipient     Created by Conversion    ? Sensorineural hearing loss     Created by Conversion  Replacement Utility updated for latest IMO load   ? Thrombophlebitis Of Deep Vessels Of The Lower Extremity     Created by Conversion    ? Type 2 diabetes mellitus (H)     Created by Conversion    ? Vertigo            Surgical History:  Past Surgical History:   Procedure Laterality Date   ? APPENDECTOMY     ? CARDIAC PACEMAKER  PLACEMENT     ? CORONARY ARTERY BYPASS GRAFT  01/26/2011    Comments: x 4   ? HYSTERECTOMY  1973   ? IR EXTREMITY ANGIOGRAM LEFT  1/30/2019   ? NEPHRECTOMY TRANSPLANTED ORGAN  06/2011   ? NM TOTAL KNEE ARTHROPLASTY Bilateral        Family History:   Family History   Problem Relation Age of Onset   ? Diabetes Sister    ? Breast cancer Sister 62   ? Cancer Father        Social History:    Social History     Socioeconomic History   ? Marital status:      Spouse name: Not on file   ? Number of children: Not on file   ? Years of education: Not on file   ? Highest education level: Not on file   Occupational History     Employer: RETIRED   Social Needs   ? Financial resource strain: Not on file   ? Food insecurity:     Worry: Not on file     Inability: Not on file   ? Transportation needs:     Medical: Not on file     Non-medical: Not on file   Tobacco Use   ? Smoking status: Former Smoker     Packs/day: 1.50     Years: 20.00     Pack years: 30.00     Types: Cigarettes   ? Smokeless tobacco: Never Used   Substance and Sexual Activity   ? Alcohol use: Yes     Comment: occasional   ? Drug use: No   ? Sexual activity: Not on file   Lifestyle   ? Physical activity:     Days per week: Not on file     Minutes per session: Not on file   ? Stress: Not on file   Relationships   ? Social connections:     Talks on phone: Not on file     Gets together: Not on file     Attends [employer] service: Not on file     Active member of club or organization: Not on file     Attends meetings of clubs or organizations: Not on file     Relationship status: Not on file   ? Intimate partner violence:     Fear of current or ex partner: Not on file     Emotionally abused: Not on file     Physically abused: Not on file     Forced sexual activity: Not on file   Other Topics Concern   ? Not on file   Social History Narrative   ? Not on file          Review of Systems   Constitutional: Positive for activity change and fatigue. Negative for appetite  change, chills, diaphoresis and fever.   HENT: Negative for congestion and hearing loss.    Eyes: Negative.    Respiratory: Positive for shortness of breath. Negative for wheezing.    Cardiovascular: Negative.    Gastrointestinal: Negative for abdominal distention, abdominal pain, blood in stool, constipation, diarrhea and nausea.   Endocrine: Negative.    Genitourinary: Negative for difficulty urinating.   Musculoskeletal:        Denies pain   Skin:        intact   Allergic/Immunologic: Negative.    Neurological: Negative for dizziness, tremors, speech difficulty and light-headedness.   Hematological: Negative.    Psychiatric/Behavioral: Negative for agitation, confusion, hallucinations and sleep disturbance. The patient is not nervous/anxious.        Vitals:    02/13/20 0759   BP: 167/67   Pulse: 64   Resp: 18   Temp: 97  F (36.1  C)   SpO2: 90%   Weight: 200 lb (90.7 kg)       Physical Exam  Vitals signs reviewed.   Constitutional:       Appearance: She is obese.      Comments: Continue RUFFIN    HENT:      Head: Normocephalic and atraumatic.      Right Ear: External ear normal.      Left Ear: External ear normal.      Nose: No congestion or rhinorrhea.      Mouth/Throat:      Mouth: Mucous membranes are dry.      Pharynx: No oropharyngeal exudate or posterior oropharyngeal erythema.   Eyes:      General:         Right eye: No discharge.         Left eye: No discharge.   Neck:      Musculoskeletal: Normal range of motion and neck supple.      Comments: Intact  Cardiovascular:      Rate and Rhythm: Rhythm irregular.      Heart sounds: Murmur present.      Comments: IRR, 2/6 CYRUS RSB and LSB, PM  Pulmonary:      Effort: No respiratory distress.      Breath sounds: Rales present. No wheezing.      Comments: Bibasilar crackles, RUFFIN, 2L NC  Abdominal:      General: Bowel sounds are normal. There is no distension.      Palpations: Abdomen is soft.      Tenderness: There is no abdominal tenderness. There is no guarding.       Comments: Denies constipation or diarrhea   Genitourinary:     Comments: No limitations  Musculoskeletal:      Right lower leg: No edema.      Left lower leg: No edema.      Comments: Denies pain   Skin:     General: Skin is warm and dry.      Comments: Intact   Neurological:      Mental Status: She is alert and oriented to person, place, and time. Mental status is at baseline.   Psychiatric:         Mood and Affect: Mood normal.      Comments: Has history of depression         Medication List:  Current Outpatient Medications   Medication Sig   ? acetaminophen (TYLENOL) 500 MG tablet Take 1 tablet (500 mg total) by mouth every 6 (six) hours as needed.   ? alendronate (FOSAMAX) 35 MG tablet Take 35 mg by mouth once a week. Weekly on Sundays. Take in the morning with a full glass of water, on an empty stomach, and do not take anything else by mouth or lie down for the next 30 min.  Sundays         ? aspirin 81 MG EC tablet Take 1 tablet (81 mg total) by mouth daily.   ? azaTHIOprine (IMURAN) 50 mg tablet Take 100 mg by mouth bedtime.    ? bumetanide (BUMEX) 2 MG tablet Take 1 tablet (2 mg total) by mouth 2 (two) times a day at 9am and 6pm.   ? carvediloL (COREG) 25 MG tablet Take 1 tablet (25 mg total) by mouth 2 (two) times a day with meals.   ? cholecalciferol, vitamin D3, 2,000 unit Tab Take 2,000 Units by mouth daily.   ? cyanocobalamin, vitamin B-12, 2,500 mcg Tab Take 2,500 mcg by mouth daily.    ? cycloSPORINE modified (GENGRAF) 25 MG capsule Take 50 mg by mouth 2 (two) times a day.    ? ferrous sulfate 325 (65 FE) MG tablet Take 1 tablet (325 mg total) by mouth every other day.   ? flash glucose scanning reader (FREESTYLE FELA 14 DAY READER) Misc Use 1 application As Directed daily.   ? flash glucose sensor (FREESTYLE FELA 14 DAY SENSOR) Kit Use 1 application As Directed every 14 (fourteen) days.   ? hydrALAZINE (APRESOLINE) 25 MG tablet Take 3 tablets (75 mg total) by mouth 3 (three) times a day.   ?  "isosorbide mononitrate (IMDUR) 30 MG 24 hr tablet Take 1 tablet (30 mg total) by mouth 2 (two) times a day.   ? lamoTRIgine (LAMICTAL) 25 MG tablet Take 25 mg by mouth see administration instructions. Titrate up to target dose of 100mg two times a day  2/1-2/7 25mg Daily  2/8-2/14 50mg Daily  2/15-2/21 50mg Twice daily  2/22-2/28 75mg Twice daily  2/29 100mg Twice daily   ? LANTUS U-100 INSULIN 100 unit/mL vial 10 unit daily at bedtime   ? losartan (COZAAR) 25 MG tablet Take 1 tablet (25 mg total) by mouth daily.   ? miconazole (MICOTIN) 2 % powder Apply on skin folds until rashes are resolved   ? NOVOLOG U-100 INSULIN ASPART 100 unit/mL injection Three times a day with meals  BG < 70 mg/dL: Treat, and call MD  BG  mg/dL: None - 0 Units  -180 mg/dL: 1 unit  -220 mg/dL: 2 units  -260 mg/dL: 3 units  -300 mg/dL: 4 units  -340 mg/dL: 5 units  -400 mg/dL: 6 units  BG > 400 mg/dL: 7 units and Call MD   ? pantoprazole (PROTONIX) 40 MG tablet Take 40 mg by mouth 2 (two) times a day.   ? pen needle, diabetic (BD ULTRA-FINE TWILA PEN NEEDLES) 32 gauge x 5/32\" Ndle Use 1 per day.   ? polyethylene glycol (MIRALAX) 17 gram packet Take 1 packet (17 g total) by mouth daily as needed.   ? predniSONE (DELTASONE) 5 MG tablet Take 5 mg by mouth daily.   ? rosuvastatin (CRESTOR) 5 MG tablet Take 1 tablet (5 mg) by mouth at bedtime.   ? terazosin (HYTRIN) 5 MG capsule Take 1 capsule (5 mg total) by mouth at bedtime.   ? traZODone (DESYREL) 50 MG tablet Take 100 mg by mouth at bedtime.   ? venlafaxine (EFFEXOR-XR) 150 MG 24 hr capsule Take 1 capsule (150 mg) by mouth daily.       Labs:  Results for orders placed or performed during the hospital encounter of 02/06/20   Basic Metabolic Panel   Result Value Ref Range    Sodium 140 136 - 145 mmol/L    Potassium 4.0 3.5 - 5.0 mmol/L    Chloride 109 (H) 98 - 107 mmol/L    CO2 20 (L) 22 - 31 mmol/L    Anion Gap, Calculation 11 5 - 18 mmol/L    Glucose " 90 70 - 125 mg/dL    Calcium 8.7 8.5 - 10.5 mg/dL    BUN 47 (H) 8 - 28 mg/dL    Creatinine 2.97 (H) 0.60 - 1.10 mg/dL    GFR MDRD Af Amer 19 (L) >60 mL/min/1.73m2    GFR MDRD Non Af Amer 15 (L) >60 mL/min/1.73m2     Lab Results   Component Value Date    WBC 8.5 02/07/2020    HGB 9.0 (L) 02/07/2020    HCT 29.2 (L) 02/07/2020    MCV 92 02/07/2020     02/12/2020     Lab Results   Component Value Date    TSH 3.57 11/21/2019     Lab Results   Component Value Date    HGBA1C 6.3 (H) 01/26/2020     Vitamin D, Total (25-Hydroxy)   Date Value Ref Range Status   04/24/2017 31.4 30.0 - 80.0 ng/mL Final     Lab Results   Component Value Date    BNP 2,519 (H) 02/06/2020         Assessment/Plan:    HFrEF: last EF 55%, frequent exacerbations, last BNP +2500, following with cardiology and heart failure clinic.  Continue Bumex 2 mg twice daily, last weight 200 pounds. Now needs O2 when in therapy    HTN: Continue carvedilol 25 mg twice daily, hydralazine 75 mg 3 times daily, imdur 30mg two times a day and Hytrin 5 mg at bedtime.  Monitor blood pressure 3 times daily    CULLEN: using CPAP well    Hx of renal transplant: continue Imuran 100mg at HS, cephalosporin 50 mg twice daily and prednisone 5 mg daily    CKD stage 4: last Cr 2.68 with GFR 17, recheck BMP    DM2: continue lanuts 10U at HS, SS and monitor BS     Pain control: Continue Tylenol 500 mg every 6 hours PRN, denies pain    Insomnia: Continue trazodone 100 mg at bedtime    Depression: Continue venlafaxine 150 mg daily    Osteopenia: Continue Fosamax 35 mg weekly on Sunday    GERD: Continue pantoprazole 40 mg twice daily    Seizure disorder: Continue Lamictal per escalating dose    Morbid obesity: last 37.8    History of CVA: Continue aspirin 81 mg daily and statin    Vitamin B deficiency: Continue cyanocobalamin 2500mcg daily    Vitamin D deficiency: Continue D3 2000 units daily    Normocytic hypochromic anemia: Last hemoglobin 9.0, received IV iron in hospital,  continues ferrous sulfate 325 mg daily, recheck CBC    Labs: BMP, CBC, vit d    Disposition: Has determined will be discharged to custodial. Pending cognitive assessment    The care plan has been reviewed and all orders signed. Changes to care plan, if any, as noted. Otherwise, continue care plan of care.  The total time spent with this patient was 36 minutes, with greater than 50% spent in counseling and coordination of care that included multiple issues per CHF, needing O2, labs and therapy, which lasted 20 minutes.    Post Discharge Medication Reconciliation Status: discharge medications reconciled and changed, per note/orders (see AVS)      Electronically signed by: Jagdish Cardenas NP

## 2021-06-20 NOTE — LETTER
Letter by Manju Fernandez MBBS at      Author: Manju Fernandez MBBS Service: -- Author Type: --    Filed:  Encounter Date: 2/17/2020 Status: (Other)         Patient: Odalys Miles   MR Number: [id number]   YOB: 1945   Date of Visit: 2/17/2020       [facility] Admission note      Patient: Odalys Miles  MRN: [id number]  Date of Service: 2/17/2020      [facility] [[id number]]  Reason for Visit     Chief Complaint   Patient presents with   ? H & P       Code Status     Full code    Assessment     -Acute kidney injury in the setting of chronic kidney disease baseline creatinine 2-2.5  Creatinine noted to 3.21 on recheck in the TCU  -History of depression with patient reporting situational stressors with worsening mood and behavior  -Acute on chronic CHF exacerbation; patient still appears to be fluid overloaded  -Underlying history of renal transplant on chronic immunosuppressants  -History of hypertension poorly controlled in spite of multiple agents.  -History of coronary artery disease status post CABG  -History of atrial fibrillation chronic  -status post pacemaker placement  -History of diabetes currently on Lantus  -History of CVA on aspirin and statin  -History of seizure disorder on Lamictal  -History of iron deficiency anemia status post IV Venofer  - Obstructive sleep apnea  - Hypoxic respiratory failure currently on 3 L of oxygen by nasal cannula  - Morbid obesity.  -Generalized weakness    Plan     Patient admitted to the TCU.  She had a complex and prolonged stay in the hospital she was admitted on 1/28/2020 and has now been discharged to the TCU for strengthening and rehab.  CHF exacerbation was treated aggressively with diuretics.  Discharged on Bumex 2 mg in the morning /HS daily   hypoxic respiratory failure -she is on 3 L of oxygen by nasal cannula and will be weaned as tolerated  Weights will need to be monitored  .  She has been advised a cardiac  diet  Advised fluid restriction she still appears to be somewhat volume overloaded she has chronic edema in her lower extremities but does not use any compression hose she is not tolerated them well in the past  Unfortunately she had recheck BMP done today which shows significant jump in her creatinine to 3.21 from her discharge of 2.68 suspected to suspect secondary to diuretics.  Plan is to DC her current Bumex 2 mg twice daily.  Continue Bumex 2 mg in the morning only.  Advised compliance with fluid and salt restriction.  Patient was given education regarding fluid restriction.  She will be put on a low-salt cardiac diet and also 1.5 L of fluid restriction daily  Recheck a BMP closely in 3 days.  All labs to be faxed urgently to nephrology today.  Patient advised to make an appointment to see nephrology also within a week.  She will need close monitoring of her BMPs and if they continue to worsen she understands nephrology may need to see her sooner  Monitor weights as she is at high risk of going into volume overload as she already appears somewhat volume overloaded.  Blood sugar review done and that appears to be stable  Recheck hemoglobin is stable at 8.9 with no concerns   she does have chronic anemia and did get IV Venofer in the hospital this will need to be monitored to  Behavior she does take Effexor and high doses along with trazodone.  She has been voicing a lot of situational stressors due to the fact that she is leaving her home and moving into an assisted living.  She will need psychological support of her depression becomes an issue  Consider increasing dosage of her Effexor if no improvement noted monitor sleep and behaviors    History     Patient is a very pleasant 74 y.o. female who is admitted to TCU  Patient presented to the hospital with acute shortness of breath.  She was diagnosed with acute on chronic CHF exacerbations.  BNP on admission was 2519.  She had an echocardiogram revealing an EF  of 55%.  She was given IV diuretics with improvement.  Currently discharged on oral Bumex with close monitoring of weights recommended.  She had hypoxic respiratory failure felt to be secondary to CHF exacerbation this has resolved prior to discharge she is no longer needing oxygen.  Patient also was noted to have CKD with acute exacerbation of creatinine however prior to discharge her creatinine discharged was hovering between 2 and 2.5 with mild proteinuria this will need to be closely followed if she continues to have elevated creatinine she understands she will need to see nephrology.  Patient has underlying history of chronic renal transplant and is on multiple immunosuppressants agents.  These were continued in the hospital.  She also had significantly elevated blood pressures.  She continues on hydralazine Coreg terra Zosyn losartan and close monitoring of blood pressures recommended.  She also has a history of coronary artery disease and a pacemaker placement and is on medical management    Past Medical History     Active Ambulatory (Non-Hospital) Problems    Diagnosis   ? Encounter for palliative care   ? Muscle weakness (generalized)   ? Acute decompensated heart failure (H)   ? Acute renal failure, unspecified acute renal failure type (H)   ? Acute pulmonary edema with congestive heart failure (H)   ? Atrioventricular block, complete (H)   ? History of seizure   ? Anxiety   ? Closed displaced fracture of surgical neck of left humerus   ? Pain in joint, lower leg   ? Disorder of stomach   ? Polyp of duodenum   ? Atherosclerosis of native artery of left lower extremity with ulceration of other part of foot (H)   ? TIA (transient ischemic attack)   ? (HFpEF) heart failure with preserved ejection fraction (H)   ? Nonintractable epilepsy without status epilepticus, unspecified epilepsy type (H)   ? Morbid obesity (H)   ? Chronic diarrhea   ? Adrenal insufficiency (H)   ? Cardiac pacemaker, dual, in situ   ?  Arteriosclerotic vascular disease   ? Bacteremia   ? Seizure disorder (H)   ? Ataxia   ? Hyperlipidemia   ? Noninfectious gastroenteritis   ? Diverticular disease of colon   ? Essential Thrombocytosis   ? Osteoarthritis   ? Thrombophlebitis Of Deep Vessels Of The Lower Extremity   ? Medullary Sponge Kidney Bilaterally   ? CULLEN on CPAP   ? Mixed hyperlipidemia   ? End Stage Renal Disease   ? Status post kidney transplant   ? Benign Essential Hypertension   ? Coronary artery disease due to calcified coronary lesion   ? Chronic Reflux Esophagitis   ? Chronic Gout   ? Type 2 diabetes mellitus (H)   ? Dysthymic Disorder   ? Diabetic Peripheral Neuropathy   ? Anemia   ? Sensorineural Hearing Loss   ? Immunosuppressive management encounter following kidney transplant   ? PTSD (post-traumatic stress disorder)   ? Major depressive disorder, recurrent episode, in partial or unspecified remission   ? Iron deficiency anemia     Past Medical History:   Diagnosis Date   ? Adrenal insufficiency (H)    ? Anemia    ? Anxiety    ? Arthritis    ? Ataxia 5/12/2015   ? CAD (coronary artery disease)    ? Cardiac pacemaker, dual, in situ 12/7/2016   ? Chronic Diarrhea Of Unknown Origin    ? Chronic Gout    ? Chronic Reflux Esophagitis    ? Congestive Heart Failure    ? Coronary Artery Stenosis    ? CVA (cerebral vascular accident) (H)    ? Depression    ? Diabetic Peripheral Neuropathy    ? DM (diabetes mellitus) (H)    ? Dysthymic disorder    ? Epilepsy (H)    ? ESRD (end stage renal disease) (H)    ? Essential Thrombocytosis    ? History of renal transplant    ? Hyperlipidemia    ? Hypertension    ? Insomnia    ? Ischemic Stroke    ? Medullary Sponge Kidney Bilaterally    ? Morbid obesity (H) 8/29/2018   ? Neuropathy    ? Nonintractable epilepsy without status epilepticus, unspecified epilepsy type (H) 8/29/2018   ? CULLEN on CPAP    ? Osteoarthritis    ? Renal Transplant Recipient    ? Sensorineural hearing loss    ? Thrombophlebitis Of  Deep Vessels Of The Lower Extremity    ? Type 2 diabetes mellitus (H)    ? Vertigo        Past Social History     Reviewed, and she  reports that she has quit smoking. Her smoking use included cigarettes. She has a 30.00 pack-year smoking history. She has never used smokeless tobacco. She reports current alcohol use. She reports that she does not use drugs.    Family History     Reviewed, and family history includes Breast cancer (age of onset: 62) in her sister; Cancer in her father; Diabetes in her sister.   Her older son also has kidney cancer    Medication List   Post Discharge Medication Reconciliation Status: discharge medications reconciled and changed, per note/orders (see AVS)   Current Outpatient Medications on File Prior to Visit   Medication Sig Dispense Refill   ? acetaminophen (TYLENOL) 500 MG tablet Take 1 tablet (500 mg total) by mouth every 6 (six) hours as needed.  0   ? alendronate (FOSAMAX) 35 MG tablet Take 35 mg by mouth once a week. Weekly on Sundays. Take in the morning with a full glass of water, on an empty stomach, and do not take anything else by mouth or lie down for the next 30 min.  Sundays           ? aspirin 81 MG EC tablet Take 1 tablet (81 mg total) by mouth daily.     ? azaTHIOprine (IMURAN) 50 mg tablet Take 100 mg by mouth bedtime.      ? bumetanide (BUMEX) 2 MG tablet Take 1 tablet (2 mg total) by mouth 2 (two) times a day at 9am and 6pm.  0   ? carvediloL (COREG) 25 MG tablet Take 1 tablet (25 mg total) by mouth 2 (two) times a day with meals.  0   ? cholecalciferol, vitamin D3, 2,000 unit Tab Take 2,000 Units by mouth daily.     ? cyanocobalamin, vitamin B-12, 2,500 mcg Tab Take 2,500 mcg by mouth daily.      ? cycloSPORINE modified (GENGRAF) 25 MG capsule Take 50 mg by mouth 2 (two) times a day.      ? ferrous sulfate 325 (65 FE) MG tablet Take 1 tablet (325 mg total) by mouth every other day.  0   ? flash glucose scanning reader (Ichiba FELA 14 DAY READER) [facility] Use 1  "application As Directed daily. 1 each 0   ? flash glucose sensor (FREESTYLE FELA 14 DAY SENSOR) Kit Use 1 application As Directed every 14 (fourteen) days. 6 kit 3   ? hydrALAZINE (APRESOLINE) 25 MG tablet Take 3 tablets (75 mg total) by mouth 3 (three) times a day.  0   ? isosorbide mononitrate (IMDUR) 30 MG 24 hr tablet Take 1 tablet (30 mg total) by mouth 2 (two) times a day.  0   ? lamoTRIgine (LAMICTAL) 25 MG tablet Take 25 mg by mouth see administration instructions. Titrate up to target dose of 100mg two times a day  2/1-2/7 25mg Daily  2/8-2/14 50mg Daily  2/15-2/21 50mg Twice daily  2/22-2/28 75mg Twice daily  2/29 100mg Twice daily     ? LANTUS U-100 INSULIN 100 unit/mL vial 10 unit daily at bedtime 10 mL PRN   ? losartan (COZAAR) 25 MG tablet Take 1 tablet (25 mg total) by mouth daily.  0   ? miconazole (MICOTIN) 2 % powder Apply on skin folds until rashes are resolved  0   ? NOVOLOG U-100 INSULIN ASPART 100 unit/mL injection Three times a day with meals  BG < 70 mg/dL: Treat, and call MD  BG  mg/dL: None - 0 Units  -180 mg/dL: 1 unit  -220 mg/dL: 2 units  -260 mg/dL: 3 units  -300 mg/dL: 4 units  -340 mg/dL: 5 units  -400 mg/dL: 6 units  BG > 400 mg/dL: 7 units and Call MD 10 mL PRN   ? pantoprazole (PROTONIX) 40 MG tablet Take 40 mg by mouth 2 (two) times a day.     ? pen needle, diabetic (BD ULTRA-FINE TWILA PEN NEEDLES) 32 gauge x 5/32\" Ndle Use 1 per day. 200 each 4   ? polyethylene glycol (MIRALAX) 17 gram packet Take 1 packet (17 g total) by mouth daily as needed.  0   ? predniSONE (DELTASONE) 5 MG tablet Take 5 mg by mouth daily.     ? rosuvastatin (CRESTOR) 5 MG tablet Take 1 tablet (5 mg) by mouth at bedtime. 90 tablet 3   ? terazosin (HYTRIN) 5 MG capsule Take 1 capsule (5 mg total) by mouth at bedtime.  0   ? traZODone (DESYREL) 50 MG tablet Take 100 mg by mouth at bedtime.     ? venlafaxine (EFFEXOR-XR) 150 MG 24 hr capsule Take 1 capsule (150 mg) by " mouth daily. 90 capsule 3     No current facility-administered medications on file prior to visit.        Allergies     Allergies   Allergen Reactions   ? Lisinopril Cough     Resolved after discontinuation   ? Mold/Mildew    ? Latex Rash       Review of Systems   A comprehensive review of 14 systems was done. Pertinent findings noted here and in history of present illness. All the rest negative.  Constitutional: Negative.  Negative for fever, chills, she reports activity change, appetite change and fatigue.   HENT: Negative for congestion and facial swelling.    Eyes: Negative for photophobia, redness and visual disturbance.   Respiratory: Negative for cough and chest tightness.    Cardiovascular: Negative for chest pain, palpitations and has leg swelling.   Gastrointestinal: Negative for nausea, diarrhea, constipation, blood in stool and abdominal distention.   Genitourinary: Negative.    Musculoskeletal: Negative.  Reports weakness with limited endurance  Skin: Negative.    Neurological: Negative for dizziness, tremors, syncope, weakness, light-headedness and headaches.   Hematological: Does not bruise/bleed easily.   Psychiatric/Behavioral: Negative.  Reporting some situational stressors which are exacerbating her mood and depression.  She is moving to an assisted living and selling her home which is causing her a lot of stress      Physical Exam     Recent Vitals 2/13/2020   Height -   Weight 200 lbs   BSA (m2) 1.98 m2   /67   Pulse 64   Temp 97   Temp src -   SpO2 90   Some recent data might be hidden       Constitutional: Oriented to person, place, and time and appears well-developed.  On 3 L of oxygen by nasal cannula  HEENT:  Normocephalic and atraumatic.  Eyes: Conjunctivae and EOM are normal. Pupils are equal, round, and reactive to light. No discharge.  No scleral icterus. Nose normal. Mouth/Throat: Oropharynx is clear and moist. No oropharyngeal exudate.    NECK: Normal range of motion. Neck  supple. No JVD present. No tracheal deviation present. No thyromegaly present.   CARDIOVASCULAR: Normal rate, regular rhythm and intact distal pulses.  Exam reveals no gallop and no friction rub.  Systolic murmur present.  HAS A PACEMAKER  PULMONARY: Effort normal and breath sounds normal. No respiratory distress.No Wheezing or rales.  Bibasilar crackles heard  ABDOMEN: Soft. Bowel sounds are normal. No distension and no mass.  There is no tenderness. There is no rebound and no guarding. No HSM.  MUSCULOSKELETAL: Normal range of motion.  Plus leg edema and no tenderness. Mild kyphosis, no tenderness.  LYMPH NODES: Has no cervical, supraclavicular, axillary and groin adenopathy.   NEUROLOGICAL: Alert and oriented to person, place, and time. No cranial nerve deficit.  Normal muscle tone. Coordination normal.   GENITOURINARY: Deferred exam.  SKIN: Skin is warm and dry. No rash noted. No erythema. No pallor.   EXTREMITIES: No cyanosis, no clubbing, has 2+ leg  edema. No Deformity.  PSYCHIATRIC: Normal mood, affect and behavior.      Lab Results     Recent Results (from the past 240 hour(s))   POCT Glucose    Collection Time: 02/07/20 11:55 AM   Result Value Ref Range    Glucose 134 70 - 139 mg/dL   POCT Glucose    Collection Time: 02/07/20  5:24 PM   Result Value Ref Range    Glucose 128 70 - 139 mg/dL   POCT Glucose    Collection Time: 02/07/20  9:20 PM   Result Value Ref Range    Glucose 189 (H) 70 - 139 mg/dL   Magnesium    Collection Time: 02/08/20  6:20 AM   Result Value Ref Range    Magnesium 2.2 1.8 - 2.6 mg/dL   Basic Metabolic Panel    Collection Time: 02/08/20  6:20 AM   Result Value Ref Range    Sodium 140 136 - 145 mmol/L    Potassium 4.3 3.5 - 5.0 mmol/L    Chloride 110 (H) 98 - 107 mmol/L    CO2 20 (L) 22 - 31 mmol/L    Anion Gap, Calculation 10 5 - 18 mmol/L    Glucose 88 70 - 125 mg/dL    Calcium 8.4 (L) 8.5 - 10.5 mg/dL    BUN 47 (H) 8 - 28 mg/dL    Creatinine 2.84 (H) 0.60 - 1.10 mg/dL    GFR MDRD Af  Amer 20 (L) >60 mL/min/1.73m2    GFR MDRD Non Af Amer 16 (L) >60 mL/min/1.73m2   Platelet Count - every other day x 3    Collection Time: 02/08/20  6:20 AM   Result Value Ref Range    Platelets 285 140 - 440 thou/uL   POCT Glucose    Collection Time: 02/08/20  6:34 AM   Result Value Ref Range    Glucose 82 70 - 139 mg/dL   POCT Glucose    Collection Time: 02/08/20 12:20 PM   Result Value Ref Range    Glucose 172 (H) 70 - 139 mg/dL   POCT Glucose    Collection Time: 02/08/20  5:03 PM   Result Value Ref Range    Glucose 176 (H) 70 - 139 mg/dL   POCT Glucose    Collection Time: 02/08/20  8:21 PM   Result Value Ref Range    Glucose 153 (H) 70 - 139 mg/dL   Magnesium    Collection Time: 02/09/20  5:32 AM   Result Value Ref Range    Magnesium 2.1 1.8 - 2.6 mg/dL   Potassium - Next AM    Collection Time: 02/09/20  5:32 AM   Result Value Ref Range    Potassium 4.0 3.5 - 5.0 mmol/L   Basic Metabolic Panel    Collection Time: 02/09/20  5:32 AM   Result Value Ref Range    Sodium 140 136 - 145 mmol/L    Potassium 4.0 3.5 - 5.0 mmol/L    Chloride 109 (H) 98 - 107 mmol/L    CO2 20 (L) 22 - 31 mmol/L    Anion Gap, Calculation 11 5 - 18 mmol/L    Glucose 90 70 - 125 mg/dL    Calcium 8.7 8.5 - 10.5 mg/dL    BUN 47 (H) 8 - 28 mg/dL    Creatinine 2.97 (H) 0.60 - 1.10 mg/dL    GFR MDRD Af Amer 19 (L) >60 mL/min/1.73m2    GFR MDRD Non Af Amer 15 (L) >60 mL/min/1.73m2   POCT Glucose    Collection Time: 02/09/20  6:24 AM   Result Value Ref Range    Glucose 86 70 - 139 mg/dL   POCT Glucose    Collection Time: 02/09/20 11:34 AM   Result Value Ref Range    Glucose 183 (H) 70 - 139 mg/dL   POCT Glucose    Collection Time: 02/09/20  4:55 PM   Result Value Ref Range    Glucose 144 (H) 70 - 139 mg/dL   POCT Glucose    Collection Time: 02/09/20  5:47 PM   Result Value Ref Range    Glucose 134 70 - 139 mg/dL   POCT Glucose    Collection Time: 02/09/20  9:07 PM   Result Value Ref Range    Glucose 200 (H) 70 - 139 mg/dL   Magnesium    Collection  Time: 02/10/20  5:37 AM   Result Value Ref Range    Magnesium 2.0 1.8 - 2.6 mg/dL   Renal Function Profile    Collection Time: 02/10/20  5:37 AM   Result Value Ref Range    Albumin 2.7 (L) 3.5 - 5.0 g/dL    Calcium 9.0 8.5 - 10.5 mg/dL    Phosphorus 4.3 2.5 - 4.5 mg/dL    Glucose 110 70 - 125 mg/dL    BUN 48 (H) 8 - 28 mg/dL    Creatinine 2.85 (H) 0.60 - 1.10 mg/dL    Sodium 142 136 - 145 mmol/L    Potassium 4.0 3.5 - 5.0 mmol/L    Chloride 108 (H) 98 - 107 mmol/L    CO2 24 22 - 31 mmol/L    Anion Gap, Calculation 10 5 - 18 mmol/L    GFR MDRD Af Amer 20 (L) >60 mL/min/1.73m2    GFR MDRD Non Af Amer 16 (L) >60 mL/min/1.73m2   Platelet Count - every other day x 3    Collection Time: 02/10/20  5:37 AM   Result Value Ref Range    Platelets 262 140 - 440 thou/uL   POCT Glucose    Collection Time: 02/10/20  6:22 AM   Result Value Ref Range    Glucose 93 70 - 139 mg/dL   POCT Glucose    Collection Time: 02/10/20 12:02 PM   Result Value Ref Range    Glucose 144 (H) 70 - 139 mg/dL   Iron and Transferrin Iron Binding Capacity    Collection Time: 02/10/20  2:50 PM   Result Value Ref Range    Iron 25 (L) 42 - 175 ug/dL    Transferrin 177 (L) 212 - 360 mg/dL    Transferrin Saturation, Calculated 11 (L) 20 - 50 %    Transferrin IBC, Calculated 221 (L) 313 - 563 ug/dL   POCT Glucose    Collection Time: 02/10/20  5:19 PM   Result Value Ref Range    Glucose 178 (H) 70 - 139 mg/dL   POCT Glucose    Collection Time: 02/10/20  8:35 PM   Result Value Ref Range    Glucose 186 (H) 70 - 139 mg/dL   Renal Function Profile    Collection Time: 02/11/20  5:46 AM   Result Value Ref Range    Albumin 2.6 (L) 3.5 - 5.0 g/dL    Calcium 8.9 8.5 - 10.5 mg/dL    Phosphorus 4.3 2.5 - 4.5 mg/dL    Glucose 111 70 - 125 mg/dL    BUN 48 (H) 8 - 28 mg/dL    Creatinine 2.77 (H) 0.60 - 1.10 mg/dL    Sodium 143 136 - 145 mmol/L    Potassium 4.1 3.5 - 5.0 mmol/L    Chloride 109 (H) 98 - 107 mmol/L    CO2 23 22 - 31 mmol/L    Anion Gap, Calculation 11 5 - 18  mmol/L    GFR MDRD Af Amer 20 (L) >60 mL/min/1.73m2    GFR MDRD Non Af Amer 17 (L) >60 mL/min/1.73m2   POCT Glucose    Collection Time: 02/11/20  6:16 AM   Result Value Ref Range    Glucose 116 70 - 139 mg/dL   POCT Glucose    Collection Time: 02/11/20 11:53 AM   Result Value Ref Range    Glucose 137 70 - 139 mg/dL   POCT Glucose    Collection Time: 02/11/20  4:48 PM   Result Value Ref Range    Glucose 162 (H) 70 - 139 mg/dL   POCT Glucose    Collection Time: 02/11/20  8:45 PM   Result Value Ref Range    Glucose 151 (H) 70 - 139 mg/dL   Renal Function Profile    Collection Time: 02/12/20  5:11 AM   Result Value Ref Range    Albumin 2.6 (L) 3.5 - 5.0 g/dL    Calcium 8.9 8.5 - 10.5 mg/dL    Phosphorus 4.2 2.5 - 4.5 mg/dL    Glucose 86 70 - 125 mg/dL    BUN 47 (H) 8 - 28 mg/dL    Creatinine 2.68 (H) 0.60 - 1.10 mg/dL    Sodium 142 136 - 145 mmol/L    Potassium 4.1 3.5 - 5.0 mmol/L    Chloride 107 98 - 107 mmol/L    CO2 22 22 - 31 mmol/L    Anion Gap, Calculation 13 5 - 18 mmol/L    GFR MDRD Af Amer 21 (L) >60 mL/min/1.73m2    GFR MDRD Non Af Amer 17 (L) >60 mL/min/1.73m2   Platelet Count - every other day x 3    Collection Time: 02/12/20  5:23 AM   Result Value Ref Range    Platelets 257 140 - 440 thou/uL   POCT Glucose    Collection Time: 02/12/20  6:17 AM   Result Value Ref Range    Glucose 81 70 - 139 mg/dL   POCT Glucose    Collection Time: 02/12/20 11:47 AM   Result Value Ref Range    Glucose 145 (H) 70 - 139 mg/dL            Imaging Results     Xr Chest 2 Views    Result Date: 2/6/2020  EXAM: XR CHEST 2 VIEWS LOCATION: [facility] DATE/TIME: 2/6/2020 2:43 PM INDICATION: dyspnea COMPARISON: Lateral views of the chest 1/25/2020     Left subclavian approach dual chamber pacemaker is right atrial appendage and right ventricular leads. Mitral annular calcifications. Unchanged cardiac silhouette enlargement. Findings of previous median sternotomy and coronary revascularization. Unchanged aortic atheromatous  calcification. Unchanged scarring and pleural thickening in the left lateral costophrenic sulcus. New meniscus in the right lateral sulcus suggests a small pleural effusion. Small area of hazy parenchymal attenuation in the lateral right base is unchanged. There are no  new alveolar or interstitial lung opacities. Rightward convexity curvature of the lower thoracic spine and associated degenerative osteophytes. Sternal wires are intact and aligned.    Xr Chest 2 Views    Result Date: 1/25/2020  EXAM: XR CHEST 2 VIEWS LOCATION: [facility] DATE/TIME: 1/25/2020 4:35 PM INDICATION: shortness of breath COMPARISON: 10/28/2019     Scoliosis. Cardiomegaly with mild pulmonary vascular congestion. Sternotomy wires and dual lead cardiac pacemaker are stable. Small bilateral pleural effusions are new. Findings most likely represent CHF.            SHAKILA Griffin

## 2021-06-20 NOTE — PROGRESS NOTES
Assessment and Plan:     1. Poorly controlled type 2 diabetes mellitus with complication (H)  She's working on this.  She's shown some improvement.    - Microalbumin, Random Urine  - Lipid Cascade    2. Nonintractable epilepsy without status epilepticus, unspecified epilepsy type (H)  Stable w/ current meds.    3. Essential thrombocythemia (H)  Stable.    4. Morbid obesity (H)  She is working on her diet.    5. Osteoarthritis  She has severe back pain.  Her PEG-3 score today was a 7.  She has tried typical conservative measures for management.  She is limited by her renal tx.  She uses a hospital bed.  She's also going to be certified for the medical cannabis program.  - Bed, [facility]    6. CULLEN on CPAP  She is going to replace her hospital bed so that she can sleep in a more upright position.  - Bed, [facility]    7. Renal Transplant Recipient  Stable and follows w/ nephrology.  - Basic Metabolic Panel  - HM1 (CBC and Differential)  - HM1 (CBC with Diff)    8. Routine general medical examination at a health care facility  She's UTD on her cancer screens and immunizations.    The patient's current medical problems were reviewed.    The following high BMI interventions were performed this visit: weight monitoring  The following health maintenance schedule was reviewed with the patient and provided in printed form in the after visit summary:   Health Maintenance   Topic Date Due     DXA SCAN  09/03/2009     DIABETES OPHTHALMOLOGY EXAM  04/13/2017     DIABETES FOOT EXAM  12/16/2017     DIABETES FOLLOW-UP  02/28/2018     DIABETES HEMOGLOBIN A1C  10/18/2018     DEPRESSION FOLLOW UP  01/18/2019     MAMMOGRAM  07/23/2019     DIABETES URINE MICROALBUMIN  08/29/2019     FALL RISK ASSESSMENT  08/29/2019     ADVANCE DIRECTIVES DISCUSSED WITH PATIENT  02/22/2022     COLONOSCOPY  07/28/2024     TD 18+ HE  06/30/2026     PNEUMOCOCCAL POLYSACCHARIDE VACCINE AGE 65 AND OVER  Completed     INFLUENZA VACCINE RULE  BASED  Completed     PNEUMOCOCCAL CONJUGATE VACCINE FOR ADULTS (PCV13 OR PREVNAR)  Completed     ZOSTER VACCINE  Completed        Subjective:   Chief Complaint: Oadlys Miles is an 73 y.o. female here for an Annual Wellness visit.   HPI: Odalys is here today for her annual exam.  She brought her friend Madeleine with her today.  She wanted to discuss her back pain.  She has a history of renal replacement so she is not a good candidate for NSAID use.  She has done injections as well as physical therapy.  Her nephrologist has told her that she is not a surgical candidate due to the anti-rejection medications that she is on.  They discussed the possibility of Odalys utilizing medical cannabis.  Her pain is so severe that she can't do simple chores around her house including vacuuming.  She utilizes an adjustable bed.  Hers is nonfunctional now and she is going to be purchasing a new one.    She has obstructive sleep apnea.  She utilizes her adjustable bed to sit more upright to help treat her apnea and make her mask more comfortable.    She has type 2 diabetes.  She is eating less and working on managing her diet more.  She said she is not perfect but she is definitely making progress and is more involved than she had been in the past    She recently had her eye exam.  She also recently had a bone density scan and she tells me that her results were worse than they have been previously.    Review of Systems:    Please see above.  The rest of the review of systems are negative for all systems.    Patient Care Team:  Jamila Lechuga MD as PCP - General (Internal Medicine)     Patient Active Problem List   Diagnosis     Essential Thrombocytosis     Osteoarthritis     Insomnia     Thrombophlebitis Of Deep Vessels Of The Lower Extremity     Medullary Sponge Kidney Bilaterally     CULLEN on CPAP     Mixed hyperlipidemia     End Stage Renal Disease     Renal Transplant Recipient     Coronary Artery Stenosis     Chronic Diarrhea Of  Unknown Origin     Chronic Reflux Esophagitis     Hypertension     Chronic Gout     Congestive Heart Failure     Ischemic Stroke     Type 2 diabetes mellitus (H)     Dysthymic Disorder     Diabetic Peripheral Neuropathy     Anemia     Sensorineural Hearing Loss     Hyperlipidemia     Ataxia     Cardiac pacemaker, dual, in situ     Adrenal insufficiency (H)     Nonintractable epilepsy without status epilepticus, unspecified epilepsy type (H)     Morbid obesity (H)     Past Medical History:   Diagnosis Date     Anemia      Anxiety      Arthritis      CAD (coronary artery disease)      CVA (cerebral vascular accident) (H)      Depression      DM (diabetes mellitus) (H)      Epilepsy (H)      ESRD (end stage renal disease) (H)      History of renal transplant      Hyperlipidemia      Hypertension      Neuropathy (H)      Vertigo       Past Surgical History:   Procedure Laterality Date     CARDIAC PACEMAKER PLACEMENT       HYSTERECTOMY      Age 28     NEPHRECTOMY TRANSPLANTED ORGAN  06/2011     PA APPENDECTOMY      Description: Appendectomy;  Recorded: 11/13/2007;     PA CABG, VEIN, SINGLE      Description: CABG (CABG);  Proc Date: 01/26/2011;  Comments: x 4     PA INS NEW/RPLC PRM PACEMAKER W/TRANSV ELTRD VENTR      Description: Permanent Pacemaker Placement;  Recorded: 08/01/2012;     PA TOTAL ABDOM HYSTERECTOMY      Description: Total Abdominal Hysterectomy;  Recorded: 11/13/2007;     PA TOTAL KNEE ARTHROPLASTY      Description: Total Knee Arthroplasty;  Recorded: 11/13/2007;      Family History   Problem Relation Age of Onset     Cancer Sister      Diabetes Sister      Breast cancer Sister 62     Cancer Father       Social History     Social History     Marital status:      Spouse name: N/A     Number of children: N/A     Years of education: N/A     Occupational History     Not on file.     Social History Main Topics     Smoking status: Former Smoker     Packs/day: 1.50     Years: 20.00     Smokeless tobacco:  "Never Used     Alcohol use Yes      Comment: occasional     Drug use: No     Sexual activity: Not on file     Other Topics Concern     Not on file     Social History Narrative      Current Outpatient Prescriptions   Medication Sig Dispense Refill     alendronate (FOSAMAX) 35 MG tablet Take 35 mg by mouth once a week. Take in the morning with a full glass of water, on an empty stomach, and do not take anything else by mouth or lie down for the next 30 min.  Sundays       amLODIPine (NORVASC) 10 MG tablet Take 1 tablet (10 mg) by mouth daily. 90 tablet 2     aspirin 81 MG EC tablet Take 1 tablet (81 mg total) by mouth daily.  99     azaTHIOprine (IMURAN) 50 mg tablet Take 100 mg by mouth bedtime.        blood glucose test strips Test 4-6 times daily Dispense brand per patient's insurance at pharmacy discretion. 540 strip 0     carvedilol (COREG) 12.5 MG tablet Take 1 tablet (12.5 mg total) by mouth 2 (two) times a day with meals. 180 tablet 3     cholecalciferol, vitamin D3, 2,000 unit Tab Take 2,000 Units by mouth daily.       cycloSPORINE modified (GENGRAF) 25 MG capsule Take 50 mg by mouth 2 (two) times a day.        DULoxetine (CYMBALTA) 20 MG capsule Take 1 capsule (20 mg total) by mouth daily. 90 capsule 1     insulin regular (NOVOLIN R) 100 unit/mL injection Inject 2-14 Units under the skin 3 (three) times a day before meals.       lancets 33 gauge Misc Test 4-6 times daily   May use One Touch Delica 600 each 0     lancing device with lancets (ONETOUCH DELICA LANC DEVICE) Kit Use to test blood sugars 500 each 0     levETIRAcetam (KEPPRA) 500 MG tablet One tablet in the morning. Two tablets in the afternoon 90 tablet 0     pantoprazole (PROTONIX) 40 MG tablet Take 40 mg by mouth 2 (two) times a day.       pen needle, diabetic (BD ULTRA-FINE TWILA PEN NEEDLES) 32 gauge x 5/32\" Ndle Use 1 per day. 200 each 4     predniSONE (DELTASONE) 5 MG tablet Take 5 mg by mouth daily.       rosuvastatin (CRESTOR) 5 MG tablet " "Take 1 tablet (5 mg) by mouth at bedtime. 90 tablet 2     traZODone (DESYREL) 50 MG tablet Take 1 tablet (50 mg) by mouth at bedtime. 90 tablet 1     venlafaxine (EFFEXOR-XR) 150 MG 24 hr capsule Take 1 capsule (150 mg) by mouth daily. 90 capsule 3     No current facility-administered medications for this visit.       Objective:   Vital Signs:   Visit Vitals     /70     Pulse 63     Ht 5' 2\" (1.575 m)     Wt (!) 223 lb (101.2 kg)     LMP 07/23/1974     SpO2 97%     BMI 40.79 kg/m2        VisionScreening:  No exam data present     PHYSICAL EXAM  PHYSICAL EXAM:  Constitutional:  Reveals an alert, pleasant adult female.   Vitals:  Noted.   Ears: TM's normal bilaterally   Eyes: PERRL, conjunctiva/corneas clear, EOM's intact   Throat: Lips, mucosa, and tongue normal; teeth and gums normal   Neck: Normal ROM, no carotid bruits, no thyromegaly   Lungs: Clear to auscultation bilaterally, respirations unlabored.   Breast exam:  Normal skin overlying her breasts bilaterally no discrete nodule is palpable in the axilla bilaterally  Heart: Regular rate and rhythm, S1 and S2 normal, no murmur, rub, or gallop,   Abdomen: Soft, non-tender, bowel sounds active, no masses, no organomegaly   Extremities: Extremities normal, atraumatic, no cyanosis or edema   Pelvic:Not examined  Skin: Skin color, texture, turgor normal, no rashes or lesions   Neurologic: Normal       Assessment Results 8/29/2018   Activities of Daily Living 1 - Full function   Instrumental Activities of Daily Living 1 - Full function   Get Up and Go Score 12 seconds or more   Mini Cog Total Score 4   Some recent data might be hidden     A Mini-Cog score of 0-2 suggests the possibility of dementia, score of 3-5 suggests no dementia    Identified Health Risks:     She is at risk for lack of exercise and has been provided with information to increase physical activity for the benefit of her well-being.  The patient was counseled and encouraged to consider " modifying their diet and eating habits. She was provided with information on recommended healthy diet options.  The patient reports that she has difficulty with activities of daily living. I have asked that the patient make a follow up appointment in 12 weeks where this issue will be further evaluated and addressed.  The patient reports that she has difficulty with instrumental activities of daily living.  She was provided with a list of local organizations that provide support services and advised to make a follow up appointment in 12 weeks to address this further.   The patient's PHQ-9 score is consistent with mild depression. She was provided with information regarding depression and was advised to schedule a follow up appointment in 12 weeks to further address this issue.  She is at risk for falling and has been provided with information to reduce the risk of falling at home.  Patient's advanced directive was discussed and I am comfortable with the patient's wishes.

## 2021-06-20 NOTE — PROGRESS NOTES
Subjective findings: The patient returns to clinic today for continued diabetic foot care.      Objective findings:  Nails bilateral feet are elongated and  2-3 times normal thickness.   Skin bilateral feet warm, dry  and intact.  There is a thick hyperkeratotic nucleated lesion subsecond metatarsal head right foot.  DP and PT pulses +1 over 4 bilateral feet. Capillary refill less than 2 seconds bilateral feet.  Negative clonus, negative Babinski bilateral feet.  Range of motion within normal limits bilateral feet. Muscle power +5 over 5 bilaterally in all compartments.      Assessment:  Onychomycosis, onychauxis, tyloma, diabetes mellitus      Plan: I debrided  nails 1 through 5 both feet today.  Also debrided the hyperkeratotic lesion right foot.

## 2021-06-21 NOTE — PROGRESS NOTES
I met with Odalys Miles at the request of Dr. ISABEL Lechuga to recheck her blood pressure.  Blood pressure medications on the MAR were reviewed with patient.    Patient has taken all medications as per usual regimen: Yes  Patient reports tolerating them without any issues or concerns: Yes    Vitals:    11/27/18 1021   BP: 134/74   Patient Site: Right Arm   Patient Position: Sitting   Cuff Size: Adult Regular   Pulse: 68       Blood pressure was taken, previous encounter was reviewed, recorded blood pressure below 140/90.  Patient was discharged and the note will be sent to the provider for final review.

## 2021-06-21 NOTE — PROGRESS NOTES
Impression:  Left leg abrasion with secondary cellulitis in a patient who is immunosuppressed due to immunosuppressive medications related to kidney transplant plus diabetes.    Plan:  Augmentin 875 twice daily, update patient's tetanus status with a booster today.  She should soak the wound 3-4 times per day for 15-20 minutes and then remove any adherent tissue to the ulcer to try and get down to good granulation tissue and then apply bacitracin ointment to keep the area soft and protected.  She should follow-up for recheck in 2 days either with her primary care physician or back here at the walk-in clinic if she cannot get into her primary doctor.  If the redness starts to go up the leg further or she develops other symptoms she should return immediately.      Chief Complaint:  Chief Complaint   Patient presents with     Foot Injury     L/t, sores and bruised heel x 1 week, getting worse         HPI:   Odalys Miles is a 73 y.o. female who presents to this clinic for the evaluation of injury to her left leg.  8 days ago, the patient fell and scraped her left shin on concrete.  It initially appeared to be healing well but then 3 days ago it started to get painful red and swollen.  The pain and swelling has increased.  There is a central area of eschar with surrounding erythema on the anterior left shin.  It does not go down into the foot at all.  She has no fever nausea vomiting abdominal pain dysuria or hematuria.  No other injuries and no other rashes.  Unfortunately she is immunosuppressed because she is on medications for a kidney transplant and also has diabetes.  She does not know when her last tetanus shot was      PMH:   Past Medical History:   Diagnosis Date     Anemia      Anxiety      Arthritis      CAD (coronary artery disease)      CVA (cerebral vascular accident) (H)      Depression      DM (diabetes mellitus) (H)      Epilepsy (H)      ESRD (end stage renal disease) (H)      History of renal  transplant      Hyperlipidemia      Hypertension      Neuropathy (H)      Vertigo      Past Surgical History:   Procedure Laterality Date     CARDIAC PACEMAKER PLACEMENT       HYSTERECTOMY      Age 28     NEPHRECTOMY TRANSPLANTED ORGAN  06/2011     WI APPENDECTOMY      Description: Appendectomy;  Recorded: 11/13/2007;     WI CABG, VEIN, SINGLE      Description: CABG (CABG);  Proc Date: 01/26/2011;  Comments: x 4     WI INS NEW/RPLC PRM PACEMAKER W/TRANSV ELTRD VENTR      Description: Permanent Pacemaker Placement;  Recorded: 08/01/2012;     WI TOTAL ABDOM HYSTERECTOMY      Description: Total Abdominal Hysterectomy;  Recorded: 11/13/2007;     WI TOTAL KNEE ARTHROPLASTY      Description: Total Knee Arthroplasty;  Recorded: 11/13/2007;         ROS:  All other systems negative    Meds:    Current Outpatient Prescriptions:      alendronate (FOSAMAX) 35 MG tablet, Take 35 mg by mouth once a week. Take in the morning with a full glass of water, on an empty stomach, and do not take anything else by mouth or lie down for the next 30 min. Sundays, Disp: , Rfl:      amLODIPine (NORVASC) 10 MG tablet, Take 1 tablet (10 mg) by mouth daily., Disp: 90 tablet, Rfl: 3     aspirin 81 MG EC tablet, Take 1 tablet (81 mg total) by mouth daily., Disp: , Rfl: 99     azaTHIOprine (IMURAN) 50 mg tablet, Take 100 mg by mouth bedtime. , Disp: , Rfl:      blood glucose test strips, Test 4-6 times daily Dispense brand per patient's insurance at pharmacy discretion., Disp: 540 strip, Rfl: 0     carvedilol (COREG) 12.5 MG tablet, Take 1 tablet (12.5 mg) by mouth twice daily with meals., Disp: 180 tablet, Rfl: 3     cholecalciferol, vitamin D3, 2,000 unit Tab, Take 2,000 Units by mouth daily., Disp: , Rfl:      cycloSPORINE modified (GENGRAF) 25 MG capsule, Take 50 mg by mouth 2 (two) times a day. , Disp: , Rfl:      DULoxetine (CYMBALTA) 20 MG capsule, Take 1 capsule (20 mg total) by mouth daily., Disp: 90 capsule, Rfl: 1     insulin regular  "(NOVOLIN R) 100 unit/mL injection, Inject 2-14 Units under the skin 3 (three) times a day before meals., Disp: , Rfl:      lancets 33 gauge Misc, Test 4-6 times daily   May use One Touch Delica, Disp: 600 each, Rfl: 0     lancing device with lancets (ONETOUCH DELICA LANC DEVICE) Kit, Use to test blood sugars, Disp: 500 each, Rfl: 0     levETIRAcetam (KEPPRA) 500 MG tablet, One tablet in the morning. Two tablets in the afternoon, Disp: 90 tablet, Rfl: 0     pantoprazole (PROTONIX) 40 MG tablet, Take 40 mg by mouth 2 (two) times a day., Disp: , Rfl:      pen needle, diabetic (BD ULTRA-FINE TWILA PEN NEEDLES) 32 gauge x 5/32\" Ndle, Use 1 per day., Disp: 200 each, Rfl: 4     predniSONE (DELTASONE) 5 MG tablet, Take 5 mg by mouth daily., Disp: , Rfl:      rosuvastatin (CRESTOR) 5 MG tablet, Take 1 tablet (5 mg) by mouth at bedtime., Disp: 90 tablet, Rfl: 3     traZODone (DESYREL) 50 MG tablet, Take 1 tablet (50 mg) by mouth at bedtime., Disp: 90 tablet, Rfl: 1     venlafaxine (EFFEXOR-XR) 150 MG 24 hr capsule, Take 1 capsule (150 mg) by mouth daily., Disp: 90 capsule, Rfl: 3     amoxicillin-clavulanate (AUGMENTIN) 875-125 mg per tablet, Take 1 tablet by mouth 2 (two) times a day for 7 days., Disp: 14 tablet, Rfl: 0    Current Facility-Administered Medications:      Tdap injection 0.5 mL, 0.5 mL, Intramuscular, Once, Rashid Simeon MD        Social:  Social History     Social History     Marital status:      Spouse name: N/A     Number of children: N/A     Years of education: N/A     Occupational History     Not on file.     Social History Main Topics     Smoking status: Former Smoker     Packs/day: 1.50     Years: 20.00     Smokeless tobacco: Never Used     Alcohol use Yes      Comment: occasional     Drug use: No     Sexual activity: Not on file     Other Topics Concern     Not on file     Social History Narrative         Physical Exam:  Patient is sitting up in a chair and appears comfortable in no distress.  " Left leg there is a crusted area of ulceration over the left anterior shin that is 1 x 3 cm in diameter.  There is a surrounding area of erythema that is 10 cm in diameter.  The erythema and swelling do not go down into the foot or up above the mid shin.      Results:    No results found for this or any previous visit (from the past 24 hour(s)).    No results found.      Rashid Simeon MD

## 2021-06-21 NOTE — PROGRESS NOTES
Clinic Note    Assessment:     Assessment and Plan:  1. Visit for wound check  Without knowing what her wound looks like before, I would say that it is healing appropriately.  She is doing a good job with her foot soaks and appears to be doing her wound cares as she should.  The only change I am going to make today is having her switch from bacitracin to Vaseline.  I also encouraged her to do wet-to-dry dressing changes in between her foot soaks.  She is going to follow-up with Dr. Jamila Lechuga next week for a diabetic check, I encouraged her to have her PCP check her wound at that time.  See patient instructions below for plan of care.     Patient Instructions   Perform wet-to-dry dressing changes on your leg wound like we discussed in between your leg soaks.    Tomorrow, stop using bacitracin and switch to Vaseline.    Make sure to have Dr. Jamila Lechuga look at your leg wound next week at your diabetic check.    For your toe, apply a small amount of bacitracin to the wound with a Band-Aid over the next few days, make sure to change the Band-Aid at least twice daily and in-between foot soaks.    If you start to develop worsening pain, fever, worsening redness with streaking, you need to come back and see me right away or go to the walk-in clinic.    No Follow-up on file.         Subjective:      Patient comes to the clinic today for follow-up of a foot injury she sustained approximately 11 days ago.    Patient was originally seen in our walk-in clinic 2 days ago.  She apparently fell and scraped her shin on some concrete.  It initially appeared to be healing well but then started to become painful and red with some swelling.  She presented to the walk-in clinic and was diagnosed with a left leg abrasion with secondary cellulitis.  The patient is immunosuppressed due to some medications she uses for her kidney transplant.  She also has diabetes.  She was placed on Augmentin 875 mg twice daily.  She was also  instructed on wound care strategies.  She was instructed to see her PCP for follow-up wound check.    Today, the patient states that she has been doing wound soaks 4 times daily.  She has been keeping the leg elevated.  She feels as though the swelling is getting better.  The redness is starting to dissipate.  She has not had any fevers.    The following portions of the patient's history were reviewed and updated as appropriate: Allergies, medications, problems, prior note.    Review of Systems:    Review is otherwise negative except for what is mentioned above.     Social Hx:    History   Smoking Status     Former Smoker     Packs/day: 1.50     Years: 20.00   Smokeless Tobacco     Never Used         Objective:     Vitals:    11/07/18 1331   BP: 118/60   Pulse: 64   Weight: (!) 229 lb 1.6 oz (103.9 kg)       Exam:    General: No apparent distress. Calm. Alert and Oriented X3. Pt behavior is appropriate.  Skin: There is a 3 cm ulcerated lesion on the anterior surface of the patient's distal shin.  There is healthy granulation tissue at the wound bed.  Borders are not inflamed or reddened.  No streaking.  She does have a mild erythema surrounding the wound but this does not seem to be serious.  No streaking.  No bleeding.  No bruising.      Patient Active Problem List   Diagnosis     Essential Thrombocytosis     Osteoarthritis     Insomnia     Thrombophlebitis Of Deep Vessels Of The Lower Extremity     Medullary Sponge Kidney Bilaterally     CULLEN on CPAP     Mixed hyperlipidemia     End Stage Renal Disease     Renal Transplant Recipient     Coronary Artery Stenosis     Chronic Diarrhea Of Unknown Origin     Chronic Reflux Esophagitis     Hypertension     Chronic Gout     Congestive Heart Failure     Ischemic Stroke     Type 2 diabetes mellitus (H)     Dysthymic Disorder     Diabetic Peripheral Neuropathy     Anemia     Sensorineural Hearing Loss     Hyperlipidemia     Ataxia     Cardiac pacemaker, dual, in situ      "Adrenal insufficiency (H)     Nonintractable epilepsy without status epilepticus, unspecified epilepsy type (H)     Morbid obesity (H)     Current Outpatient Prescriptions   Medication Sig Dispense Refill     alendronate (FOSAMAX) 35 MG tablet Take 35 mg by mouth once a week. Take in the morning with a full glass of water, on an empty stomach, and do not take anything else by mouth or lie down for the next 30 min.  Sundays       amLODIPine (NORVASC) 10 MG tablet Take 1 tablet (10 mg) by mouth daily. 90 tablet 3     amoxicillin-clavulanate (AUGMENTIN) 875-125 mg per tablet Take 1 tablet by mouth 2 (two) times a day for 7 days. 14 tablet 0     aspirin 81 MG EC tablet Take 1 tablet (81 mg total) by mouth daily.  99     azaTHIOprine (IMURAN) 50 mg tablet Take 100 mg by mouth bedtime.        blood glucose test strips Test 4-6 times daily Dispense brand per patient's insurance at pharmacy discretion. 540 strip 0     carvedilol (COREG) 12.5 MG tablet Take 1 tablet (12.5 mg) by mouth twice daily with meals. 180 tablet 3     cholecalciferol, vitamin D3, 2,000 unit Tab Take 2,000 Units by mouth daily.       cycloSPORINE modified (GENGRAF) 25 MG capsule Take 50 mg by mouth 2 (two) times a day.        DULoxetine (CYMBALTA) 20 MG capsule Take 1 capsule (20 mg total) by mouth daily. 90 capsule 1     insulin regular (NOVOLIN R) 100 unit/mL injection Inject 2-14 Units under the skin 3 (three) times a day before meals.       lancets 33 gauge Misc Test 4-6 times daily   May use One Touch Delica 600 each 0     lancing device with lancets (ONETOUCH DELICA LANC DEVICE) Kit Use to test blood sugars 500 each 0     levETIRAcetam (KEPPRA) 500 MG tablet One tablet in the morning. Two tablets in the afternoon 90 tablet 0     pantoprazole (PROTONIX) 40 MG tablet Take 40 mg by mouth 2 (two) times a day.       pen needle, diabetic (BD ULTRA-FINE TWILA PEN NEEDLES) 32 gauge x 5/32\" Ndle Use 1 per day. 200 each 4     predniSONE (DELTASONE) 5 MG " tablet Take 5 mg by mouth daily.       rosuvastatin (CRESTOR) 5 MG tablet Take 1 tablet (5 mg) by mouth at bedtime. 90 tablet 3     traZODone (DESYREL) 50 MG tablet Take 1 tablet (50 mg) by mouth at bedtime. 90 tablet 1     venlafaxine (EFFEXOR-XR) 150 MG 24 hr capsule Take 1 capsule (150 mg) by mouth daily. 90 capsule 3     Current Facility-Administered Medications   Medication Dose Route Frequency Provider Last Rate Last Dose     Tdap injection 0.5 mL  0.5 mL Intramuscular Once Rashid Simeon MD           I spent 25 minutes with patient face to face, of which >50% was counseling regarding the above plan       Rakan العلي CNP (Rob)    11/7/2018

## 2021-06-21 NOTE — PROGRESS NOTES
I met with Odalys Miles at the request of Dr. ISABEL Lechuga to recheck her blood pressure.  Blood pressure medications on the MAR were reviewed with patient.    Patient has taken all medications as per usual regimen: Yes  Patient reports tolerating them without any issues or concerns: Yes    There were no vitals filed for this visit.    Blood pressure was taken, previous encounter was reviewed, recorded blood pressure below 140/90.  Patient was discharged and the note will be sent to the provider for final review.

## 2021-06-22 NOTE — PATIENT INSTRUCTIONS - HE
You need to go back and see your cardiologist.  You see Dr. Escamilla.  Please call [phone] to see your cardiologist about your blood pressure and heart failure.    In the meantime, we will help you get set up for an appointment with our clinical pharmacist to review your diabetes medications and blood sugars.  Bring a log of blood sugars to your appointment with Chayito Sotelo, Pharm.D.    You need to stop taking your Avapro (irbesartan).    No changes to your Bumex today, prior to your appointment with the kidney doctor on January 16.    Cut your nighttime insulin down from 40 units to 20 units.  Your blood sugar should not be less than 70 in the morning when you are fasting.    Follow-up with Dr. Jamila Lechuga for routine checkup in 1 month.    Follow-up with me right away if you start having any worrisome symptoms such as chest pain, shortness of breath, or fever.

## 2021-06-22 NOTE — PROGRESS NOTES
Clinic Note    Assessment:     Assessment and Plan:    1. Congestive heart failure, unspecified HF chronicity, unspecified heart failure type (H)  Her weight is down 18 pounds since we last checked in November.  Given the fact that her basic metabolic panel from this morning at her transplant doctors appointment showed a creatinine of 2.96, I am hesitant about titrating back up on her Bumex.  I am going to keep her steady at 2 mg daily until she sees her renal doctor on 1/16.  She has not seen her cardiologist in over a year; she has been having some labile blood pressure readings.  I encouraged her to go back to her cardiologist.        2. MARCELA (acute kidney injury) (H)  She has been taking her Avapro despite instructions to discontinue this after her last hospitalization.  Going to hold steady on her Bumex for now.  Going to recheck a BMP in 1 week when she comes back for an appointment with the [facility] pharmacist to review your diabetes; hopefully this is better after discontinuing the Avapro as originally instructed by her hospitalist.      3. Community acquired pneumonia, unspecified laterality  Feeling better in this regard.  No fevers.  No chest pain.  No shortness of breath.    4. Type 2 diabetes mellitus without complication, with long-term current use of insulin (H)  She has been measuring blood sugars in the 50s on most mornings.  Her diabetes is not well controlled.  Going to have her meet with our clinical pharmacist to review/reconcile her medications.  In the meantime, I am going to have her decrease her evening dose of insulin.  See patient instructions below for plan of care.  - Ambulatory referral to Medication Management       Patient Instructions   You need to go back and see your cardiologist.  You see Dr. Escamilla.  Please call [phone] to see your cardiologist about your blood pressure and heart failure.    In the meantime, we will help you get set up for an appointment with our clinical  pharmacist to review your diabetes medications and blood sugars.  Bring a log of blood sugars to your appointment with Chayito Sotelo, Pharm.D.    You need to stop taking your Avapro (irbesartan).    No changes to your Bumex today, prior to your appointment with the kidney doctor on January 16.    Cut your nighttime insulin down from 40 units to 20 units.  Your blood sugar should not be less than 70 in the morning when you are fasting.    Follow-up with Dr. Jamila Lechuga for routine checkup in 1 month.    Follow-up with me right away if you start having any worrisome symptoms such as chest pain, shortness of breath, or fever.    Return in about 1 month (around 2/2/2019).         Subjective:      Patient presents clinic today for hospital discharge follow-up.    Patient was admitted to [facility] on 12/29 and discharged 2 days later.    She presented with acute on chronic diarrhea and intermittent upper abdominal pain since being discharged from the hospital 1 week prior for heart failure exacerbation and community-acquired pneumonia.  Diarrhea began shortly after initiating antibiotics for CAP.  It was accompanied by decreased p.o. intake.  She was seen in the ED and admitted for MARCELA (creatinine 3.15) and further workup.    She was started on Bumex and Irbesartan during her prior admission.  It was thought that her MARCELA was likely multifactorial secondary to volume depletion (diarrhea, Bumex, poor p.o. intake) in setting of ARB and vulnerable kidney.  UA unremarkable and renal US showing no tilted.  Creatinine improved with IV fluids and holding diuretics.    She has an appointment with renal on 1/16-it was recommended that she consider resuming ARB at that appointment.  She is scheduled for follow-up with her PCP today to have a basic metabolic panel rechecked.  She had her Bumex decreased from 2 mg twice daily to every day. If her lower extremity swelling seemed to be getting worse she could have her Bumex  titrated back up.     She had stool testing which was negative.  It was thought that her diarrhea was likely secondary to either infectious gastroenteritis versus antibiotic associated diarrhea.  Symptoms were vastly improved by the time she was discharged.    The following portions of the patient's history were reviewed and updated as appropriate: Allergies, medications, problems, prior note.    Review of Systems:    Review is otherwise negative except for what is mentioned above.     Social Hx:    Social History     Tobacco Use   Smoking Status Former Smoker     Packs/day: 1.50     Years: 20.00     Pack years: 30.00   Smokeless Tobacco Never Used         Objective:     Vitals:    01/02/19 1555   Weight: 211 lb 4.8 oz (95.8 kg)       Exam:    General: No apparent distress. Calm. Alert and Oriented X3. Pt behavior is appropriate.  Patient is morbidly obese  Chest/Lungs: Normal chest wall, clear to auscultation, normal respiratory effort and rate.   Heart/Pulses: Regular rate and rhythm, strong and equal radial pulses, no murmurs. Capillary refill <2 seconds. No edema.   Neurologic: Interactive, alert, no focal findings, CNs intact.   Skin: Warm, dry. Normal hair pattern. Free of lesions. Normal skin turgor.       Patient Active Problem List   Diagnosis     Essential Thrombocytosis     Osteoarthritis     Insomnia     Thrombophlebitis Of Deep Vessels Of The Lower Extremity     Medullary Sponge Kidney Bilaterally     CULLEN on CPAP     Mixed hyperlipidemia     End Stage Renal Disease     Renal Transplant Recipient     Coronary Artery Stenosis     Chronic Diarrhea Of Unknown Origin     Chronic Reflux Esophagitis     Hypertension     Chronic Gout     Congestive Heart Failure     Ischemic Stroke     Type 2 diabetes mellitus (H)     Dysthymic Disorder     Diabetic Peripheral Neuropathy     Anemia     Sensorineural Hearing Loss     Hyperlipidemia     Ataxia     Cardiac pacemaker, dual, in situ     Adrenal insufficiency (H)      "Nonintractable epilepsy without status epilepticus, unspecified epilepsy type (H)     Morbid obesity (H)     Current Outpatient Medications   Medication Sig Dispense Refill     alendronate (FOSAMAX) 35 MG tablet Take 35 mg by mouth once a week. Take in the morning with a full glass of water, on an empty stomach, and do not take anything else by mouth or lie down for the next 30 min.  Sundays       amLODIPine (NORVASC) 10 MG tablet Take 1 tablet (10 mg) by mouth daily. 90 tablet 3     aspirin 81 MG EC tablet Take 1 tablet (81 mg total) by mouth daily.  99     azaTHIOprine (IMURAN) 50 mg tablet Take 100 mg by mouth bedtime.        blood glucose test strips Test 4-6 times daily Dispense brand per patient's insurance at pharmacy discretion.. 540 strip 3     carvedilol (COREG) 12.5 MG tablet Take 1 tablet (12.5 mg) by mouth twice daily with meals. 180 tablet 3     cholecalciferol, vitamin D3, 2,000 unit Tab Take 2,000 Units by mouth daily.       cycloSPORINE modified (GENGRAF) 25 MG capsule Take 50 mg by mouth 2 (two) times a day.        DULoxetine (CYMBALTA) 20 MG capsule Take 1 capsule (20 mg total) by mouth daily. 90 capsule 1     insulin regular (NOVOLIN R) 100 unit/mL injection Inject 2-14 Units under the skin 3 (three) times a day before meals.       lancets 33 gauge Misc Test 4-6 times daily   May use One Touch Delica 600 each 0     lancing device with lancets (ONETOUCH DELICA LANC DEVICE) Kit Use to test blood sugars 500 each 0     levETIRAcetam (KEPPRA) 500 MG tablet One tablet in the morning. Two tablets in the afternoon 90 tablet 0     pantoprazole (PROTONIX) 40 MG tablet Take 40 mg by mouth 2 (two) times a day.       pen needle, diabetic (BD ULTRA-FINE TWILA PEN NEEDLES) 32 gauge x 5/32\" Ndle Use 1 per day. 200 each 4     predniSONE (DELTASONE) 5 MG tablet Take 5 mg by mouth daily.       rosuvastatin (CRESTOR) 5 MG tablet Take 1 tablet (5 mg) by mouth at bedtime. 90 tablet 3     traZODone (DESYREL) 50 MG " tablet Take 1 tablet (50 mg) by mouth at bedtime. 90 tablet 1     venlafaxine (EFFEXOR-XR) 150 MG 24 hr capsule Take 1 capsule (150 mg) by mouth daily. 90 capsule 3     No current facility-administered medications for this visit.        I spent 25 minutes with patient face to face, of which >50% was counseling regarding the above plan       Rakan العلي CNP (Rob)    1/2/2019

## 2021-06-23 NOTE — PROGRESS NOTES
FOOT AND ANKLE SURGERY/PODIATRY Progress Note        ASSESSMENT:   Onychomycosis, onychauxis, ulcer left great toe  HPI: Odalys Miles was seen again today complaining of long thick painful nails both feet.  The patient also stated that she has a small ulcer on the tip of her left great toe.  She has had this for approximately 3 months.  The patient stated that she was seen by vascular and they determined she need vascular stents placed and I believe the femoral artery to increase blood flow to both lower extremities.  The patient stated she is currently taking antibiotics.  She has no pain secondary to her neuropathy.  She has had some drainage and bleeding.  This has slightly improved.      Past Medical History:   Diagnosis Date     Adrenal insufficiency (H)      Anemia      Anemia     Created by Conversion      Anxiety      Arthritis      Ataxia 5/12/2015     CAD (coronary artery disease)      Cardiac pacemaker, dual, in situ 12/7/2016    [employer] Revo MRI DOI: 12/15/2011 Dr. Aishwarya Treviño     Chronic Diarrhea Of Unknown Origin     Created by Conversion      Chronic Gout     Created by Conversion  Replacement Utility updated for latest IMO load     Chronic Reflux Esophagitis     Created by Conversion      Congestive Heart Failure     Created by Conversion  Replacement Utility updated for latest IMO load     Coronary Artery Stenosis     Created by Conversion [facility] Annotation: Feb 27 2011 11:37PM - Alina Zapien: s/p CABGx4  1/11  Replacement Utility updated for latest IMO load     CVA (cerebral vascular accident) (H)      Depression      Diabetic Peripheral Neuropathy     Created by Conversion      DM (diabetes mellitus) (H)      Dysthymic disorder     Created by Conversion      End Stage Renal Disease     Created by Conversion      Epilepsy (H)      ESRD (end stage renal disease) (H)      Essential Thrombocytosis     Created by Conversion      History of renal transplant      Hyperlipidemia       Hypertension      Hypertension     Created by Conversion  Replacement Utility updated for latest IMO load     Insomnia     Created by Conversion      Ischemic Stroke     Created by Conversion  Replacement Utility updated for latest IMO load     Medullary Sponge Kidney Bilaterally     Created by Conversion      Mixed hyperlipidemia     Created by Conversion      Morbid obesity (H) 8/29/2018     Neuropathy (H)      Nonintractable epilepsy without status epilepticus, unspecified epilepsy type (H) 8/29/2018     CULLEN on CPAP     Created by Conversion      Osteoarthritis     Created by Conversion  Replacement Utility updated for latest IMO load     Renal Transplant Recipient     Created by Conversion      Sensorineural hearing loss     Created by Conversion  Replacement Utility updated for latest IMO load     Thrombophlebitis Of Deep Vessels Of The Lower Extremity     Created by Conversion      Type 2 diabetes mellitus (H)     Created by Conversion      Vertigo        Past Surgical History:   Procedure Laterality Date     CARDIAC PACEMAKER PLACEMENT       HYSTERECTOMY      Age 28     IR EXTREMITY ANGIOGRAM LEFT  1/30/2019     NEPHRECTOMY TRANSPLANTED ORGAN  06/2011     ND APPENDECTOMY      Description: Appendectomy;  Recorded: 11/13/2007;     ND CABG, VEIN, SINGLE      Description: CABG (CABG);  Proc Date: 01/26/2011;  Comments: x 4     ND INS NEW/RPLC PRM PACEMAKER W/TRANSV ELTRD VENTR      Description: Permanent Pacemaker Placement;  Recorded: 08/01/2012;     ND TOTAL ABDOM HYSTERECTOMY      Description: Total Abdominal Hysterectomy;  Recorded: 11/13/2007;     ND TOTAL KNEE ARTHROPLASTY      Description: Total Knee Arthroplasty;  Recorded: 11/13/2007;       Allergies   Allergen Reactions     Lisinopril Cough     Resolved after discontinuation     Mold/Mildew      Latex Rash         Current Outpatient Medications:      alendronate (FOSAMAX) 35 MG tablet, Take 35 mg by mouth once a week. Weekly on Sundays. Take in the  morning with a full glass of water, on an empty stomach, and do not take anything else by mouth or lie down for the next 30 min. Sundays   , Disp: , Rfl:      amLODIPine (NORVASC) 10 MG tablet, Take 1 tablet (10 mg) by mouth daily., Disp: 90 tablet, Rfl: 3     aspirin 325 MG EC tablet, Take 1 tablet (325 mg total) by mouth daily., Disp: , Rfl: 0     azaTHIOprine (IMURAN) 50 mg tablet, Take 100 mg by mouth bedtime. , Disp: , Rfl:      blood glucose test strips, Test 4-6 times daily Dispense brand per patient's insurance at pharmacy discretion.., Disp: 540 strip, Rfl: 3     bumetanide (BUMEX) 2 MG tablet, Take 0.5 tablets (1 mg total) by mouth daily., Disp: 20 tablet, Rfl: 0     carvedilol (COREG) 12.5 MG tablet, Take 1 tablet (12.5 mg) by mouth twice daily with meals., Disp: 180 tablet, Rfl: 3     cholecalciferol, vitamin D3, 2,000 unit Tab, Take 2,000 Units by mouth daily., Disp: , Rfl:      cloNIDine HCl (CATAPRES) 0.1 MG tablet, Take 2 tablets (0.2 mg total) by mouth 2 (two) times a day., Disp: 30 tablet, Rfl: 0     cyanocobalamin, vitamin B-12, 2,500 mcg Tab, Take 2,500 mcg by mouth every other day., Disp: , Rfl:      cycloSPORINE modified (GENGRAF) 25 MG capsule, Take 50 mg by mouth 2 (two) times a day. , Disp: , Rfl:      flash glucose scanning reader (FREESTYLE FELA 14 DAY READER) Misc, Use 1 application As Directed daily., Disp: 1 each, Rfl: 0     flash glucose sensor (FREESTYLE FELA 14 DAY SENSOR) Kit, Use 1 application As Directed every 14 (fourteen) days., Disp: 2 kit, Rfl: 11     hydrALAZINE (APRESOLINE) 25 MG tablet, Take 2 tablets (50 mg total) by mouth 4 (four) times a day., Disp: 30 tablet, Rfl: 0     insulin NPH and regular human (NOVOLIN 70-30 FLEXPEN U-100) 100 unit/mL (70-30) pen, Inject 5 units SQ before breakfast and 10 units SQ before dinner., Disp: 5 adj dose pen, Rfl: 3     lancets 33 gauge Misc, Test 4-6 times daily   May use One Touch Delica, Disp: 600 each, Rfl: 0     lancing device with  "lancets (ONEUCH DELICA LANC DEVICE) Kit, Use to test blood sugars, Disp: 500 each, Rfl: 0     levETIRAcetam (KEPPRA) 500 MG tablet, One tablet in the morning. Two tablets in the afternoon (Patient taking differently: One tablet in the morning. Two tablets at bedtime   ), Disp: 270 tablet, Rfl: 3     pantoprazole (PROTONIX) 40 MG tablet, Take 40 mg by mouth 2 (two) times a day., Disp: , Rfl:      pen needle, diabetic (BD ULTRA-FINE TWILA PEN NEEDLES) 32 gauge x 5/32\" Ndle, Use 1 per day., Disp: 200 each, Rfl: 4     predniSONE (DELTASONE) 5 MG tablet, Take 5 mg by mouth daily., Disp: , Rfl:      ranitidine (ZANTAC) 150 MG tablet, Take 150 mg by mouth daily., Disp: , Rfl:      rosuvastatin (CRESTOR) 5 MG tablet, Take 1 tablet (5 mg) by mouth at bedtime., Disp: 90 tablet, Rfl: 3     traZODone (DESYREL) 50 MG tablet, Take 3 tablets (150 mg total) by mouth at bedtime. (Patient taking differently: Take 100 mg by mouth at bedtime.    ), Disp: 180 tablet, Rfl: 1     venlafaxine (EFFEXOR-XR) 150 MG 24 hr capsule, Take 1 capsule (150 mg) by mouth daily., Disp: 90 capsule, Rfl: 3    Family History   Problem Relation Age of Onset     Cancer Sister      Diabetes Sister      Breast cancer Sister 62     Cancer Father        Social History     Socioeconomic History     Marital status:      Spouse name: Not on file     Number of children: Not on file     Years of education: Not on file     Highest education level: Not on file   Social Needs     Financial resource strain: Not on file     Food insecurity - worry: Not on file     Food insecurity - inability: Not on file     Transportation needs - medical: Not on file     Transportation needs - non-medical: Not on file   Occupational History     Not on file   Tobacco Use     Smoking status: Former Smoker     Packs/day: 1.50     Years: 20.00     Pack years: 30.00     Smokeless tobacco: Never Used   Substance and Sexual Activity     Alcohol use: Yes     Comment: occasional     Drug " use: No     Sexual activity: Not on file   Other Topics Concern     Not on file   Social History Narrative     Not on file       10 point Review of Systems is negative except as mentioned below.         Vitals:    02/06/19 1428   BP: 110/60   Pulse: 60   Resp: 16   Temp: 97.6  F (36.4  C)       BMI= There is no height or weight on file to calculate BMI.    OBJECTIVE:  General appearance: Patient is alert and fully cooperative with history & exam.  No sign of distress is noted during the visit.  Vascular: Dorsalis pedis and posterior tibial pulses are palpable. There is no pedal hair growth both lower extremities.  CFT < 3 sec from anterior tibial surface to distal digits bilaterally. There is no appreciable edema noted.  Dermatologic: Turgor and texture are within normal limits. No coloration or temperature changes. No primary or secondary lesions noted.  There is a round necrotic lesion on the distal aspect of the left great toe.  There is some moderate edema and erythema noted left great toe.  No active drainage noted today.  Neurologic: All epicritic and proprioceptive sensations are diminished bilaterally.  .  Musculoskeletal: All active and passive ankle, subtalar, midtarsal, and 1st MPJ range of motion are grossly intact without pain or crepitus. Manual muscle strength is +3/5 bilaterally within all compartments.. All dorsiflexors, plantarflexors, invertors, evertors are intact bilaterally.  No tenderness present to bilateral feet on palpation.  No tenderness to bilateral feet and ankles with range of motion. Calf is soft/non-tender with warmth/induration.    Imaging:     Result Date: 1/22/2019  [facility] RADIOLOGY EXAM: CT HEAD WO CONTRAST LOCATION: [facility] DATE/TIME: 1/22/2019 11:28 AM INDICATION: Stroke code CVA. COMPARISON: 4/1/2017 brain MRI and 4/6/2014 head CT. TECHNIQUE: Routine without IV contrast. Multiplanar reformats. Dose reduction techniques were used. FINDINGS: INTRACRANIAL CONTENTS:  No intracranial hemorrhage, extraaxial collection, or mass effect.  No CT evidence of acute infarct. Small focus of chronic infarction at the posterior left occipital pole is unchanged. Small chronic infarct in the left cerebellar hemisphere posteriorly is also unchanged. Prominent perivascular space in the right insula. Otherwise unremarkable appearance to the brain parenchyma. Mild generalized volume loss. Ventricular size is stable. Calcification at the distal internal carotid arteries and distal vertebral arteries bilaterally. Small lipomatous lesion along the superior falx. VISUALIZED ORBITS/SINUSES/MASTOIDS: Prior bilateral cataract surgery. Visualized portions of the orbits are otherwise unremarkable. No significant paranasal sinus mucosal disease. No significant middle ear or mastoid effusion. OSSEOUS STRUCTURES/SOFT TISSUES: Calvarium intact with no skull fracture. Right-sided nasal piercing.     CONCLUSION: 1.  No acute intracranial abnormality. No findings to suggest recent infarct and no intracranial hemorrhage. 2.  Small focus of chronic infarct in the posterior left occipital pole is grossly stable. Stable small focus of chronic infarct in the posterior left cerebellar hemisphere Findings and impression discussed with Dr. Carmona by phone at 1130 hours on 1/22/2019.    Ir Extremity Angiogram Left    Result Date: 1/30/2019  Brief Operative Note Name:  Odalys Miles Location: Interventional radiology suiteProcedure Date:  01/30/2019. PCP: Jamila Lechuga M.D. Procedure: 1.  Trans-right femoral aortic catheterization with selective catheterization of left common iliac, external iliac, common femoral, superficial femoral and popliteal arteries (third order vessel). 2.  Aortoiliac, left iliofemoral and left femoral-popliteal arteriogram with carbon dioxide. 3.  Left infragenicular tibial arteriogram with one third strength Visipaque. Pre-Procedure Diagnosis: Nonhealing wound left great toe.  Post-Procedure Diagnosis:  Same Findings: Distal aorta, left common iliac artery, external iliac artery, common femoral artery, superficial femoral and popliteal arteries are patent.  Left posterior tibial artery is occluded.  Left anterior tibial artery is heavily diseased with multiple areas of tandem high-grade stenoses.  The dorsalis pedis artery is free of disease.  The peroneal artery is a diminutive vessel which reconstitutes the distal posterior tibial artery. Heparin: None used. Radiation: 370 mGy. Fluoroscopy time: 6.3 mins Contrast: 4.7 mL of Visipaque was used. Access site: Right common femoral artery. Sheath size: 5 Fr Closure:no Bedrest:4 hours Complications:  None Brief Clinical hx: This is a very pleasant 73-year-old female with a nonhealing wound at the tip of the left great toe.  Her medical comorbidities include diabetes, obesity and immunosuppression for a previous renal transplant.  She was also recently admitted with suspicion of infective endocarditis.  Because of her [place] transplanted kidney the plan to perform a diagnostic study of the left lower extremity arterial tree using carbon dioxide and judicious amounts of nonionic contrast.  Because of her recent endocarditis we will not be able to use any anticoagulation.  We do not plan to undertake any endovascular intervention at this time. Procedure note: Patient was identified and then brought to the radiology suite and placed in supine position.  Preprocedure timeout was conducted.  Both groins were prepped in a sterile surgical field was created.  I first interrogated the right common femoral artery to make sure it is accessible without any undue danger to her iliac system.  The right common femoral artery had some posterior calcification but presented itself as an easy access site.  It was further localized with fluoroscopy and marked down anterior to the right femoral head.  With the help of ultrasonography identified a good access  point.  Images were stored.  Local anesthetic was administered in the right groin.  The right common femoral artery was then accessed with a micropuncture needle followed by placement of a microwire.  This was then converted to a 0.035 inch wire system and a short 5 German sheath was placed.  Then a 0.035 inch Glidewire was advanced into the distal aorta followed by the Omni Flush catheter.  Arteriogram was performed with carbon dioxide.  The distal aorta, common iliac arteries, hypogastric arteries and external iliac arteries were patent.  Wire and catheter was then advanced into the distal left external iliac artery.  Arteriogram was performed with carbon dioxide and left anterior oblique view.  The left common femoral artery, superficial femoral artery and deep femoral arteries are widely patent.  Wire and catheter was then advanced into the mid superficial femoral artery.  The Omni Flush catheter was removed and 100 cm angled glide catheter was placed in the mid superficial femoral artery.  Arteriogram at this level was performed with carbon dioxide and showed that the mid and distal superficial femoral arteries and popliteal arteries were widely patent.  Wire and catheter was then advanced into the third portion of the popliteal artery.  Here I attempted to visualize the tibial vasculature with carbon dioxide but the visualization was quite poor.  At this point we switched over to one third dilution Visipaque.  The distal popliteal artery was patent.  The anterior tibial artery was patent but had multiple areas of tandem stenoses.  It still had antegrade flow into the foot.  The posterior tibial artery was occluded.  The peroneal artery was a diminutive vessel.  It did however, because reconstitution of the common plantar artery.  The majority of the flow to the pedal arch and into the toes was coming from the anterior tibial artery. Procedure was concluded at this point as we could not heparinized the patient.   Once she has recovered from his current episode of potential endocarditis she will need to be brought back for a left anterior tibial artery angioplasty.  Pressure was applied in the right groin and adequate hemostasis was obtained.  Patient is to remain in bedrest for 4 hours. Jose Chris Date: 1/30/2019  Time: 11:13 AM     Xr Toe Left 2 Or More Vws    Result Date: 1/25/2019  [facility] RADIOLOGY EXAM: XR TOE LEFT 2 OR MORE VWS LOCATION: [facility] DATE/TIME: 1/25/2019 4:34 PM INDICATION: Ulcer left great toe, possible osteomyelitis. COMPARISON: None. FINDINGS: Contour irregularity involving the tip of the distal phalanx of the great toe. Osteomyelitis possible. No other destructive changes identified. Overlying bandage material. Vascular calcifications.    Nm Ceretec Limited    Result Date: 1/28/2019  NM CERETEC LIMITED 1/28/2019 4:13 PM INDICATION: Osteomyelitis left hallux TECHNIQUE: 24 hours  following the injection of 26.0 microCi technetium 99m labeled WBCs planar spot images of the feet were acquired. COMPARISON: Left great toe x-ray 01/25/2018 FINDINGS: Mild amount of symmetric uptake in each mid foot could be related to degenerative joint disease. No focal activity involving the distal tuft of the distal phalanx of the great toe.     CONCLUSION: X-ray findings of ostial lysis involving the distal tuft of the distal pharynx of the great toe have the appearance of osteomyelitis despite lack of white blood cell labeled radiotracer activity on the WBC scan. Images interpreted in consultation with Dr. Hamilton and Dr. Lechuga.    Us Carotid Bilateral    Result Date: 1/22/2019  US CAROTID BILATERAL 1/22/2019 4:21 PM INDICATION: Tia TECHNIQUE: Duplex exam performed utilizing 2D gray-scale imaging, Doppler interrogation with color-flow and spectral waveform analysis. COMPARISON: 11/20/2018 FINDINGS: RIGHT: There is mild calcified atheromatous plaque. Normal waveforms with no significant stenosis.  LEFT: There is moderate atheromatous plaque. Moderate elevation in peak systolic velocity. Both vertebral arteries and right subclavian artery waveforms are normal. Left subclavian artery was not imaged. VELOCITY CHART: The following velocities were obtained in the RIGHT carotid system. CCA: 69/12 cm/s ICA: 99/23 cm/s ECA: 101/12 cm/s ICA/CCA: PS 1.4 The following velocities were obtained in the LEFT carotid system. CCA: 82/14 cm/s ICA: 145/35 cm/s ECA: 156/0 cm/s ICA/CCA: PS 1.8                            CONCLUSION: 1.  Bilateral atheromatous plaque in the carotid arteries. 2.  Moderate elevation in peak systolic velocity left internal carotid artery. Findings consistent with a moderate 50-69% stenosis. Evaluation based on velocities and NASCET criteria.    Us Ankle Brachial Indices    Result Date: 1/28/2019  [facility] LOCATION: [facility] DATE: 1/28/2019 1. EXAM: RESTING ANKLE-BRACHIAL INDICES (ABIs) 2. DUPLEX ARTERIAL ULTRASOUND OF THE LOWER EXTREMITIES BILATERALLY 1/28/2019 11:16 AM INDICATION: Toe ulcer diabetic. TECHNIQUE: Duplex imaging is performed utilizing gray-scale, two-dimensional images and color-flow imaging. Doppler waveform analysis and spectral Doppler imaging is also performed. COMPARISON: None FINDINGS: SEGMENTAL BP RIGHT (mmHg) Brachial: 188 Ankle (PT): 116; Index 0.62 Ankle (DP): >254; Index -NC- Digit: 19; Index 0.10 LEFT (mmHg) Brachial: -- Ankle (PT): 180; Index 0.96 Ankle (DP): >254; Index -NC- Digit: 40; Index 0.21 DUPLEX ARTERIAL ULTRASOUND FINDINGS: LOWER EXTREMITY ARTERIAL DUPLEX EXAM WITH WAVEFORMS RIGHT (cm/s) EIA: 131 B CFA: 104 B PFA: 109 B SFA-Proximal: 126 B SFA-Mid: 140 B SFA-Distal: 80 B Popliteal: 71 B PTA: 50 B BOLA: 102 B DPA: 57 B (M=monophasic, B=biphasic, T=triphasic) LEFT (cm/s) EIA: 172 B CFA: 93 T PFA: 62 B SFA-Proximal: 101 B SFA-Mid: 111 B SFA-Distal: 136 B Popliteal: 64 T PTA: 24 M BOLA: 90 B DPA: 66 B (M=monophasic, B=biphasic, T=triphasic)  WAVEFORMS/FINDINGS: Right lower extremity ultrasound demonstrates patent vessels throughout, although monophasic waveforms are seen in the infrapopliteal vessels and popliteal artery. The left lower extremity ultrasound demonstrates patent vessels throughout with monophasic waveforms in the infrapopliteal vessels.     1.  RIGHT LOWER EXTREMITY: GLEN at rest measures 0.62 in the posterior tibial artery and unable to be calculated in the dorsalis pedis secondary to atherosclerotic calcified disease. This measurement corresponds with moderate peripheral vascular disease. TBI, however, is markedly abnormal, measuring 0.10 with low digital pressures suggestive of poor wound healing ability. Doppler ultrasound demonstrates patency of the right lower extremity arteries, although abnormal waveforms in the infrapopliteal vessels suggest the presence of hemodynamically significant disease. 2.  LEFT LOWER EXTREMITY: GLEN at rest measures 0.96 in the posterior tibial artery and unable to be calculated in the dorsalis pedis secondary to atherosclerotic calcified disease. This measurement is borderline abnormal. TBI however is markedly abnormal, measuring 0.21 with borderline low digital pressures suggestive of poor wound healing ability. Doppler ultrasound demonstrates patency of the left lower extremity arteries, although abnormal waveforms in the infrapopliteal vessels suggest the presence of hemodynamically significant disease.    Us Arterial Legs Bilateral Complete    Result Date: 1/28/2019  [facility] RADIOLOGY LOCATION: [facility] DATE: 1/28/2019 1. EXAM: RESTING ANKLE-BRACHIAL INDICES (ABIs) 2. DUPLEX ARTERIAL ULTRASOUND OF THE LOWER EXTREMITIES BILATERALLY 1/28/2019 11:16 AM INDICATION: Toe ulcer diabetic. TECHNIQUE: Duplex imaging is performed utilizing gray-scale, two-dimensional images and color-flow imaging. Doppler waveform analysis and spectral Doppler imaging is also performed. COMPARISON: None FINDINGS:  SEGMENTAL BP RIGHT (mmHg) Brachial: 188 Ankle (PT): 116; Index 0.62 Ankle (DP): >254; Index -NC- Digit: 19; Index 0.10 LEFT (mmHg) Brachial: -- Ankle (PT): 180; Index 0.96 Ankle (DP): >254; Index -NC- Digit: 40; Index 0.21 DUPLEX ARTERIAL ULTRASOUND FINDINGS: LOWER EXTREMITY ARTERIAL DUPLEX EXAM WITH WAVEFORMS RIGHT (cm/s) EIA: 131 B CFA: 104 B PFA: 109 B SFA-Proximal: 126 B SFA-Mid: 140 B SFA-Distal: 80 B Popliteal: 71 B PTA: 50 B BOLA: 102 B DPA: 57 B (M=monophasic, B=biphasic, T=triphasic) LEFT (cm/s) EIA: 172 B CFA: 93 T PFA: 62 B SFA-Proximal: 101 B SFA-Mid: 111 B SFA-Distal: 136 B Popliteal: 64 T PTA: 24 M BOLA: 90 B DPA: 66 B (M=monophasic, B=biphasic, T=triphasic) WAVEFORMS/FINDINGS: Right lower extremity ultrasound demonstrates patent vessels throughout, although monophasic waveforms are seen in the infrapopliteal vessels and popliteal artery. The left lower extremity ultrasound demonstrates patent vessels throughout with monophasic waveforms in the infrapopliteal vessels.     1.  RIGHT LOWER EXTREMITY: GLEN at rest measures 0.62 in the posterior tibial artery and unable to be calculated in the dorsalis pedis secondary to atherosclerotic calcified disease. This measurement corresponds with moderate peripheral vascular disease. TBI, however, is markedly abnormal, measuring 0.10 with low digital pressures suggestive of poor wound healing ability. Doppler ultrasound demonstrates patency of the right lower extremity arteries, although abnormal waveforms in the infrapopliteal vessels suggest the presence of hemodynamically significant disease. 2.  LEFT LOWER EXTREMITY: GLEN at rest measures 0.96 in the posterior tibial artery and unable to be calculated in the dorsalis pedis secondary to atherosclerotic calcified disease. This measurement is borderline abnormal. TBI however is markedly abnormal, measuring 0.21 with borderline low digital pressures suggestive of poor wound healing ability. Doppler ultrasound  demonstrates patency of the left lower extremity arteries, although abnormal waveforms in the infrapopliteal vessels suggest the presence of hemodynamically significant disease.    Us Venous Arm Right    Result Date: 1/30/2019  US VENOUS ARM RIGHT 1/30/2019 10:12 AM INDICATION: Swelling and pain at IV site. TECHNIQUE: Duplex exam performed utilizing 2D gray-scale imaging, Doppler interrogation with color-flow and spectral waveform analysis. Exam performed without and with compression when possible. COMPARISON: 08/16/2010 FINDINGS: Ultrasound includes evaluation of the internal jugular veins, innominate veins, subclavian veins, axillary veins, and brachial veins. The superficial cephalic and basilic veins were also evaluated where seen. The opposite subclavian vein medially was also included in the evaluation. RIGHT ARM VEINS: Thrombus is identified in the right cephalic vein in the lower arm and antecubital fossa. Findings consistent with superficial thrombophlebitis. No evidence of DVT.     CONCLUSION: 1.  Superficial thrombophlebitis right cephalic vein at the lower arm/antecubital fossa. 2.  No evidence of DVT.           TREATMENT:  Debrided nails 1 through 5 bilateral feet.  The patient was placed in a postoperative shoe today on the left foot.  I also informed the patient that the wound on the distal aspect of the left great toe would not completely heal until she has her vascular compromise addressed.  The patient is to return to the clinic as needed.        Rakan Castro; GIANCARLO  [facility] Foot & Ankle Surgery/Podiatry

## 2021-06-23 NOTE — PROGRESS NOTES
MTM Initial Encounter  Assessment & Plan                                                     1. Type 2 diabetes mellitus   Not optimally controlled as patient is experiencing hypoglycemia, likely contributed by change in eating habits, so adjustment in insulin dosing is required. She would greatly prefer to use insulin pens over vials, but cost is a barrier. [facility] now offers Novolin 70/30 pens for $46 per box and patient is interested in making that change. Did explain the importance of eating at least 3 times per day with this type of insulin to avoid low blood sugars. She verbalized understanding. I think she would benefit from use of a continuous glucose monitor since she is testing four times daily and using multiple daily doses of insulin. Will submit Rx for FreeStyle Livier and determine coverage.   Plan   1. Stop NPH and regular insulins.   2. Start Novolin 70/30 insulin pens. Inject 5 units before breakfast and inject 10 units before dinner.   3. FreeStyle Livier CGM.     2. Mixed hyperlipidemia  Appropriately on a moderate intensity statin given age and diabetes as well as CVA history per ACC/AHA guidelines. Last LDL of 72 mg/dl. I would not increase dose of rosuvastatin any higher than 5 mg due to interaction with cyclosporine, as cyclosporine can increase rosuvastatin serum levels. Cyclosporine levels are not affected.     3. CHF/CAD   Blood pressure is well controlled and meeting goal of <140/90 mm Hg per JNC-8 hypertension guidelines. Because of CHF exacerbation she was started on diuretic, but dose has been reduced due to decline in renal function. Rechecking BMP today. Newly on clonidine for BP control, but could be contributing to dizziness and increased risk of bradycardia with concurrent beta blocker use. Could consider trial off medication and adjustment of other anti-hypertensives if needed.   Plan   1. Check BMP.      4. Renal Transplant Recipient  Managed by transplant clinic. Recent increase  in creatinine, continuing to monitor. Balancing need for diuretic for CHF vs risk of MARCELA.       5. Dysthymic disorder/Insomnia  Stable on SNRI. Insomnia not well controlled. Recommend trial of higher dose of trazodone. Did  on risk of serotonin syndrome with use of multiple medications affecting serotonin and she verbalized understanding.   Plan   1. Increase trazodone to 150 mg at bedtime.      6. Epilepsy  Well controlled with levetiracetam, but has been out of medication and needs refill. PCP agrees to refill medication. No seizure in several years and tolerating medication.   Plan   1. Refill levetiracetam per PCP.       Follow Up  Return in about 2 weeks (around 1/30/2019).      Subjective & Objective                                                     Odalys Miles is a 73 y.o. female coming in for an initial visit for Medication Therapy Management. She was referred to me from Jamila Lechuga MD. She is here with her sister, who is visiting from [place].     Chief Complaint: diabetes management - two recent hospitalizations for CHF and MARCELA last month  Medication Adherence/Access: Cost is a concern, so generic, low cost medications are preferred options. She sets up her medications in a pillbox every two weeks. She keeps a detailed chart of her medications with details of dosing, indication, etc.     1. Type 2 diabetes mellitus   Odalys is taking regular insulin just with dinner - 3 units for every 15 g carbs, usually 9-12 units, and NPH 20 units at night. She's supposed to take 5 units in the morning too, but has forgot. She hasn't been using the mealtime regular insulin with breakfast and lunch because in the past month or so her appetite has diminished and she's not eating much or at all. Her dose of NPH was recently reduced from 40 to 20 units due to hypoglycemia. She's still having morning numbers in the 50s. She uses a sliding scale with her mealtime regular insulin and tests her BG 4 times a day.  She uses 2 units for every 40 mg/dl over 140. She uses insulin in vial form due to cost. She would much prefer pens.      Lab Results   Component Value Date    HGBA1C 6.6 (H) 11/14/2018    HGBA1C 6.8 (H) 07/18/2018    HGBA1C 8.3 (H) 11/28/2017     Lab Results   Component Value Date    MICROALBUR >50.00 (H) 08/29/2018    LDLCALC 72 08/29/2018    CREATININE 3.10 (H) 01/16/2019        2. Mixed hyperlipidemia  Odalys is taking rosuvastatin 5 mg daily. She had possible muscle aches with atorvastatin. She also takes a low dose aspirin daily. She has a history of stroke about 15 years ago.      The 10-year ASCVD risk score (Senia SPARKS Jr., et al., 2013) is: 30%    Values used to calculate the score:      Age: 73 years      Sex: Female      Is Non- : No      Diabetic: Yes      Tobacco smoker: No      Systolic Blood Pressure: 122 mmHg      Is BP treated: Yes      HDL Cholesterol: 45 mg/dL      Total Cholesterol: 146 mg/dL     3. CHF/CAD  Odalys has diagnosis of chronic heart failure with preserved ejection fraction and coronary artery disease status post three-vessel CABG in 2011.  Last ejection fraction 55%.  She is taking amlodipine 10 mg daily, carvedilol 12.5 mg two times a day, clonidine 0.1 mg twice daily, hydralazine 25 mg three times a day. She is also taking bumetanide 2 mg daily. She does note some increase in dizziness lately, more so since out of the hospital.   Of note she has had 2 hospitalizations within the past month.  She was started on bumetanide and irbesartan during first admission for CHF exacerbation and then was subsequently hospitalized for acute kidney injury.  Her dose of bumetanide was decreased from 2 mg twice daily to once daily.  It was recommended to hold irbesartan. The lower dose of Bumex has helped reduce dry mouth.      4. Renal Transplant Recipient  Odalys is taking Imuran 100 mg at bedtime, prednisone 5 mg daily, and Gengraf 50 mg BID.  She had a renal transplant in  June 2011 and regularly sees the transplant clinic providers every month. She gets bone scans 2-3 times a year and takes alendronate to help prevent fractures. She takes pantoprazole 40 mg BID for gastric protection. Also on Septra for PCP prophylaxis.      5. Dysthymic disorder/Insomnia  Odalys is taking venlafaxine  mg daily.  She is also taking trazodone 100 mg at bedtime for insomnia. She did try duloxetine this past summer, but that was ineffective. She says she is a night-owl and has had trouble sleeping lately.      6. Epilepsy  For her history of seizures, Odalys is taking Keppra 500 mg in the morning and 100 mg at bedtime, although she's been out and needs a refill. She hasn't had a seizure for at least 4 years.       PMH: reviewed in EPIC   Allergies/ADRs: reviewed in EPIC   Alcohol: reviewed in EPIC   Tobacco:   Social History     Tobacco Use   Smoking Status Former Smoker     Packs/day: 1.50     Years: 20.00     Pack years: 30.00   Smokeless Tobacco Never Used     Today's Vitals:   BP Readings from Last 3 Encounters:   01/16/19 122/72   01/02/19 148/52   11/27/18 134/74     Pulse Readings from Last 3 Encounters:   01/02/19 68   11/27/18 68   11/14/18 64     Wt Readings from Last 3 Encounters:   01/16/19 206 lb 1.6 oz (93.5 kg)   01/02/19 211 lb 4.8 oz (95.8 kg)   11/14/18 (!) 229 lb 9.6 oz (104.1 kg)       ----------------    Much or all of the text in this note was generated through the use of Dragon Dictate voice-to-text software. Errors in spelling or words which seem out of context are unintentional. Sound alike errors, in particular, may have escaped editing.    The patient was given a summary of these recommendations as an after visit summary    I spent 60 minutes with this patient today.   All changes were made via collaborative practice agreement with Jamila Lechuga MD. A copy of the visit note was provided to the patient's provider.     Chayito Mccollum, PharmD, BCACP  Medication Management  Pharmacist  [facility]        Current Outpatient Medications   Medication Sig Dispense Refill     albuterol (PROAIR HFA;PROVENTIL HFA;VENTOLIN HFA) 90 mcg/actuation inhaler Inhale 2 puffs every 6 (six) hours as needed for wheezing.       alendronate (FOSAMAX) 35 MG tablet Take 35 mg by mouth once a week. Take in the morning with a full glass of water, on an empty stomach, and do not take anything else by mouth or lie down for the next 30 min.  Sundays       amLODIPine (NORVASC) 10 MG tablet Take 1 tablet (10 mg) by mouth daily. 90 tablet 3     aspirin 81 MG EC tablet Take 1 tablet (81 mg total) by mouth daily.  99     azaTHIOprine (IMURAN) 50 mg tablet Take 100 mg by mouth bedtime.        blood glucose test strips Test 4-6 times daily Dispense brand per patient's insurance at pharmacy discretion.. 540 strip 3     bumetanide (BUMEX) 2 MG tablet Take 2 mg by mouth daily.       carvedilol (COREG) 12.5 MG tablet Take 1 tablet (12.5 mg) by mouth twice daily with meals. 180 tablet 3     cholecalciferol, vitamin D3, 2,000 unit Tab Take 2,000 Units by mouth daily.       cloNIDine HCl (CATAPRES) 0.1 MG tablet Take 0.1 mg by mouth 2 (two) times a day.       cycloSPORINE modified (GENGRAF) 25 MG capsule Take 50 mg by mouth 2 (two) times a day.        flash glucose scanning reader (FREESTYLE FELA 14 DAY READER) Misc Use 1 application As Directed daily. 1 each 0     flash glucose sensor (FREESTYLE FELA 14 DAY SENSOR) Kit Use 1 application As Directed every 14 (fourteen) days. 2 kit 11     hydrALAZINE (APRESOLINE) 25 MG tablet Take 25 mg by mouth 3 (three) times a day.       insulin NPH and regular human (NOVOLIN 70-30 FLEXPEN U-100) 100 unit/mL (70-30) pen Inject 5 units SQ before breakfast and 10 units SQ before dinner. 5 adj dose pen 3     lancets 33 gauge Misc Test 4-6 times daily   May use One Touch Delica 600 each 0     lancing device with lancets (ONETOUCH DELICA LANC DEVICE) Kit Use to test  "blood sugars 500 each 0     levETIRAcetam (KEPPRA) 500 MG tablet One tablet in the morning. Two tablets in the afternoon 270 tablet 3     pantoprazole (PROTONIX) 40 MG tablet Take 40 mg by mouth 2 (two) times a day.       pen needle, diabetic (BD ULTRA-FINE TWILA PEN NEEDLES) 32 gauge x 5/32\" Ndle Use 1 per day. 200 each 4     predniSONE (DELTASONE) 5 MG tablet Take 5 mg by mouth daily.       rosuvastatin (CRESTOR) 5 MG tablet Take 1 tablet (5 mg) by mouth at bedtime. 90 tablet 3     traZODone (DESYREL) 50 MG tablet Take 3 tablets (150 mg total) by mouth at bedtime. 180 tablet 1     venlafaxine (EFFEXOR-XR) 150 MG 24 hr capsule Take 1 capsule (150 mg) by mouth daily. 90 capsule 3     No current facility-administered medications for this visit.              "

## 2021-06-23 NOTE — ANESTHESIA CARE TRANSFER NOTE
Last vitals:   Vitals:    01/25/19 1217   BP: 164/70   Pulse: 60   Resp: 20   Temp:    SpO2: 100%     Patient's level of consciousness is awake and drowsy  Spontaneous respirations: yes  Maintains airway independently: yes  Dentition unchanged: yes  Oropharynx: oropharynx clear of all foreign objects    QCDR Measures:  ASA# 20 - Surgical Safety Checklist: WHO surgical safety checklist completed prior to induction    PQRS# 430 - Adult PONV Prevention: NA - Not adult patient, not GA or 3 or more risk factors NOT present  ASA# 8 - Peds PONV Prevention: NA - Not pediatric patient, not GA or 2 or more risk factors NOT present  PQRS# 424 - Keke-op Temp Management: NA - MAC anesthesia or case < 60 minutes  PQRS# 426 - PACU Transfer Protocol: - Transfer of care checklist used  ASA# 14 - Acute Post-op Pain: ASA14B - Patient did NOT experience pain >= 7 out of 10

## 2021-06-23 NOTE — PROGRESS NOTES
Clinic Note    Assessment:     Assessment and Plan:  1. Epigastric pain  She is already on pantoprazole 40 mg daily.  Going to add Zantac.  She is going to incorporate daily yogurt.  Going to recheck her for H. pylori today.  She is going to follow-up with me in 2 weeks.  If she is continuing to have issues at that time, could consider upper GI endoscopy.  - H. pylori Antigen, Stool(HPSAG); Future       Patient Instructions   Add Zantac (ranitidine) 150 to your daily medication regimen.  Follow the instructions on the package label.    Start eating yogurt every day.    We will have you set up with a stool testing kit before you leave today.  Collect a stool sample and bring it back to lab as soon as possible so we can get it running.    Follow-up with me in 2 weeks.  If you continue to have symptoms at that time, we could consider upper GI endoscopy study.    Return in about 2 weeks (around 2/1/2019).         Subjective:      Patient comes to clinic today for epigastric pain.    She has had the pain for 2 weeks now. She was originally hospitalized for CHF exacerbation along with MARCELA-this was at the end of December, approximately 2 weeks ago.  She was on antibiotics for community-acquired pneumonia.    After the antibiotics, she developed some abdominal pain with diarrhea.  She had stool testing which was negative.  It was thought that her diarrhea was either secondary to viral gastroenteritis or antibiotic associated diarrhea.    She saw her transplant doctor on 1/10 and continued to have abdominal pain.  It was recommended that she start eating yogurt daily.  There is some discussion about possible upper GI endoscopy at that time.    Today, patient states that most of her pain is after she eats something.  She had H. pylori testing back in 2014 but it was negative.  There is a remote history of positive H. pylori gastritis.  She does not remember this much.  She is on Protonix 40 mg daily.  No nausea or vomiting.   No dark stools.    The following portions of the patient's history were reviewed and updated as appropriate: Allergies, medications, problem list, prior note.     Review of Systems:    Review is otherwise negative except for what is mentioned above.     Social Hx:    Social History     Tobacco Use   Smoking Status Former Smoker     Packs/day: 1.50     Years: 20.00     Pack years: 30.00   Smokeless Tobacco Never Used         Objective:     Vitals:    01/18/19 1129   BP: 128/50   Pulse: 66   Weight: 205 lb 11.2 oz (93.3 kg)       Exam:    General: No apparent distress. Calm. Alert and Oriented X3. Pt behavior is appropriate.  Patient is obese  Abdomen: Soft, no palpable masses. No hepatosplenomegaly, no tenderness with palpation noted. Bowel sounds active in all quadrants. No increased tympany.       Patient Active Problem List   Diagnosis     Essential Thrombocytosis     Osteoarthritis     Insomnia     Thrombophlebitis Of Deep Vessels Of The Lower Extremity     Medullary Sponge Kidney Bilaterally     CULLEN on CPAP     Mixed hyperlipidemia     End Stage Renal Disease     Renal Transplant Recipient     Coronary Artery Stenosis     Chronic Diarrhea Of Unknown Origin     Chronic Reflux Esophagitis     Hypertension     Chronic Gout     Congestive Heart Failure     Ischemic Stroke     Type 2 diabetes mellitus (H)     Dysthymic Disorder     Diabetic Peripheral Neuropathy     Anemia     Sensorineural Hearing Loss     Hyperlipidemia     Ataxia     Cardiac pacemaker, dual, in situ     Adrenal insufficiency (H)     Nonintractable epilepsy without status epilepticus, unspecified epilepsy type (H)     Morbid obesity (H)     Current Outpatient Medications   Medication Sig Dispense Refill     albuterol (PROAIR HFA;PROVENTIL HFA;VENTOLIN HFA) 90 mcg/actuation inhaler Inhale 2 puffs every 6 (six) hours as needed for wheezing.       alendronate (FOSAMAX) 35 MG tablet Take 35 mg by mouth once a week. Take in the morning with a full glass  "of water, on an empty stomach, and do not take anything else by mouth or lie down for the next 30 min.  Sundays       amLODIPine (NORVASC) 10 MG tablet Take 1 tablet (10 mg) by mouth daily. 90 tablet 3     aspirin 81 MG EC tablet Take 1 tablet (81 mg total) by mouth daily.  99     azaTHIOprine (IMURAN) 50 mg tablet Take 100 mg by mouth bedtime.        blood glucose test strips Test 4-6 times daily Dispense brand per patient's insurance at pharmacy discretion.. 540 strip 3     bumetanide (BUMEX) 2 MG tablet Take 2 mg by mouth daily.       carvedilol (COREG) 12.5 MG tablet Take 1 tablet (12.5 mg) by mouth twice daily with meals. 180 tablet 3     cholecalciferol, vitamin D3, 2,000 unit Tab Take 2,000 Units by mouth daily.       cloNIDine HCl (CATAPRES) 0.1 MG tablet Take 0.1 mg by mouth 2 (two) times a day.       cycloSPORINE modified (GENGRAF) 25 MG capsule Take 50 mg by mouth 2 (two) times a day.        flash glucose scanning reader (FREESTYLE FELA 14 DAY READER) Misc Use 1 application As Directed daily. 1 each 0     flash glucose sensor (FREESTYLE FELA 14 DAY SENSOR) Kit Use 1 application As Directed every 14 (fourteen) days. 2 kit 11     hydrALAZINE (APRESOLINE) 25 MG tablet Take 25 mg by mouth 3 (three) times a day.       insulin NPH and regular human (NOVOLIN 70-30 FLEXPEN U-100) 100 unit/mL (70-30) pen Inject 5 units SQ before breakfast and 10 units SQ before dinner. 5 adj dose pen 3     lancets 33 gauge Misc Test 4-6 times daily   May use One Touch Delica 600 each 0     lancing device with lancets (ONETOUCH DELICA LANC DEVICE) Kit Use to test blood sugars 500 each 0     levETIRAcetam (KEPPRA) 500 MG tablet One tablet in the morning. Two tablets in the afternoon 270 tablet 3     pantoprazole (PROTONIX) 40 MG tablet Take 40 mg by mouth 2 (two) times a day.       pen needle, diabetic (BD ULTRA-FINE TWILA PEN NEEDLES) 32 gauge x 5/32\" Ndle Use 1 per day. 200 each 4     predniSONE (DELTASONE) 5 MG tablet Take 5 mg " by mouth daily.       rosuvastatin (CRESTOR) 5 MG tablet Take 1 tablet (5 mg) by mouth at bedtime. 90 tablet 3     traZODone (DESYREL) 50 MG tablet Take 3 tablets (150 mg total) by mouth at bedtime. 180 tablet 1     venlafaxine (EFFEXOR-XR) 150 MG 24 hr capsule Take 1 capsule (150 mg) by mouth daily. 90 capsule 3     No current facility-administered medications for this visit.        I spent 25 minutes with patient face to face, of which >50% was counseling regarding the above plan       Rakan العلي (Rob), CNP    1/18/2019

## 2021-06-23 NOTE — TELEPHONE ENCOUNTER
RN Triage:     Patient is calling in stating that patient was eating breakfast and had double vision this morning at 10:20 for 10 minutes. Vision is still blurry up close now. Per patient this has never happened before. Sister there and reported the BP's readings. Patient was advised ED now and have someone drive her.     155/67  176/75  Jocelyn Abbott RN, BSN [facility] Triage Nurse    Reason for Disposition    Blurred vision or visual changes and present now and sudden onset or new (e.g., minutes, hours, days) (Exception: seeing floaters / black specks OR previously diagnosed migraine headaches with same symptoms)    Protocols used: VISION LOSS OR CHANGE-A-OH

## 2021-06-23 NOTE — PROGRESS NOTES
ASSESSMENT and PLAN:  1. Menopause  By our records, she's due for a dexa.  On chronic prednisone.  - DXA Bone Density Scan; Future    2. Type 2 diabetes mellitus with foot ulcer, with long-term current use of insulin (H)  She's looking for a second opinion referral to endocrinology.  [facility]'s looking at [facility] and/or [facility].  - Ambulatory referral to Endocrinology    3. TIA (transient ischemic attack)  Dx still uncertain.  Her neurologist would like her to have an MRI w/ contrast done as an outpt.  She has a pacemaker which limits where she can have it done.  She's still having vision changes and will be seeing her eye drTristin    4. Renal Transplant Recipient  She's seeing her team later this week for f/u.    5. Diabetic ulcer of toe of left foot associated with type 2 diabetes mellitus, unspecified ulcer stage (H)  She has f/u scheduled w/ podiatry.  No osteomyelitis by bone scan.  Blood cx neg off abx and not meeting criteria for endocarditits.  Had ID f/u w/ labs today.    6. Atherosclerosis of native artery of left lower extremity with ulceration of other part of foot (H)  She is schduled to see vascular at [facility].    7.Mobile lesion on echo w/ positive blood cx, concerning for endocarditits.  Repeat cx off abx neg and repeats drawn today w/ CRP by ID.  Not meeting criteria for endocarditis.        Medications Discontinued During This Encounter   Medication Reason     medical cannabis (Patient's own supply. Not a prescription)      doxycycline (VIBRA-TABS) 100 MG tablet        Return in about 3 months (around 5/5/2019) for Routine follow up and est care.    Patient Instructions   I'll get back to you regarding the endocrinology referral; If you don't hear from me w/in a week, please send me a MyChart note.    [phone] DEXA    Take care!      CHIEF COMPLAINT:  Chief Complaint   Patient presents with     Diabetes     Hospital Visit Follow Up       HISTORY OF PRESENT ILLNESS:  Odalys Miles is a 73 y.o. female  " presenting to the clinic today for a hospital follow up.  She was January 22 and discharged on January 30.  She presented with vision abnormalities and was diagnosed with probable TIA.  Workup included an echocardiogram which showed a small mobile density on the posterior mitral valve annulus.  She also had a left great toe ulcer.  She was diagnosed with peripheral arterial disease.  She had one positive blood culture was concerned for endocarditis, however, ID evaluated her and she did not meet criteria.  She's not on abx.  She had f/u w/ ID today.    There was concern for possible osteo of her toe, but bone scan was negative.  Her asa was increased.  She had acute renal insuff, but that improved.    Her sister is in town and has arranged all of her follow up appointments.    She's worked w/ [employer] to find an affordable insulin regimen.  She's pleased w/ her current meds.  She's on 70/30, taking 5 in before breakfast and 10 before dinner.  No recent hypos.    Her sugars were running high after d/c but are now improving.  Her diet is off and she isn't feeling hungry.  She's not moving around much.  She's feeling fatigued.  She doesn't feel depressed.    She denies any fevers.    She sees her transplant team later this week for f/u of her renal tx.    REVIEW OF SYSTEMS:    All other systems are negative.    PFSH:  Family History   Problem Relation Age of Onset     Cancer Sister      Diabetes Sister      Breast cancer Sister 62     Cancer Father      TOBACCO USE:  Social History     Tobacco Use   Smoking Status Former Smoker     Packs/day: 1.50     Years: 20.00     Pack years: 30.00   Smokeless Tobacco Never Used       VITALS:  Vitals:    02/05/19 1351   BP: 126/52   Patient Site: Right Arm   Patient Position: Sitting   Cuff Size: Adult Large   Pulse: 68   Weight: 207 lb 9.6 oz (94.2 kg)   Height: 5' 2\" (1.575 m)     Wt Readings from Last 3 Encounters:   02/05/19 207 lb 9.6 oz (94.2 kg)   02/05/19 207 lb 4.8 oz " (94 kg)   01/31/19 207 lb 0.3 oz (93.9 kg)         PHYSICAL EXAM:  Constitutional:  Reveals an alert, pleasant adult female.   Vitals: notedd  Lungs: Clear to auscultation bilaterally, respirations unlabored.   Heart: Regular rate and rhythm, S1 and S2 normal, no murmur, rub, or gallop,    Extremities:no LE swelling , left great toe lesion is not draining, not open    QUALITY MEASURES:  The following high BMI interventions were performed this visit: encouragement to exercise and weight monitoring    MEDICATIONS:  Current Outpatient Medications   Medication Sig Dispense Refill     alendronate (FOSAMAX) 35 MG tablet Take 35 mg by mouth once a week. Weekly on Sundays. Take in the morning with a full glass of water, on an empty stomach, and do not take anything else by mouth or lie down for the next 30 min.  Sundays             amLODIPine (NORVASC) 10 MG tablet Take 1 tablet (10 mg) by mouth daily. 90 tablet 3     aspirin 325 MG EC tablet Take 1 tablet (325 mg total) by mouth daily.  0     azaTHIOprine (IMURAN) 50 mg tablet Take 100 mg by mouth bedtime.        blood glucose test strips Test 4-6 times daily Dispense brand per patient's insurance at pharmacy discretion.. 540 strip 3     bumetanide (BUMEX) 2 MG tablet Take 0.5 tablets (1 mg total) by mouth daily. 20 tablet 0     carvedilol (COREG) 12.5 MG tablet Take 1 tablet (12.5 mg) by mouth twice daily with meals. 180 tablet 3     cholecalciferol, vitamin D3, 2,000 unit Tab Take 2,000 Units by mouth daily.       cloNIDine HCl (CATAPRES) 0.1 MG tablet Take 2 tablets (0.2 mg total) by mouth 2 (two) times a day. 30 tablet 0     cyanocobalamin, vitamin B-12, 2,500 mcg Tab Take 2,500 mcg by mouth every other day.       cycloSPORINE modified (GENGRAF) 25 MG capsule Take 50 mg by mouth 2 (two) times a day.        flash glucose scanning reader (SeeoSTYLE FELA 14 DAY READER) Misc Use 1 application As Directed daily. 1 each 0     flash glucose sensor (FREESTYLE FELA 14 DAY  "SENSOR) Kit Use 1 application As Directed every 14 (fourteen) days. 2 kit 11     hydrALAZINE (APRESOLINE) 25 MG tablet Take 2 tablets (50 mg total) by mouth 4 (four) times a day. 30 tablet 0     insulin NPH and regular human (NOVOLIN 70-30 FLEXPEN U-100) 100 unit/mL (70-30) pen Inject 5 units SQ before breakfast and 10 units SQ before dinner. 5 adj dose pen 3     lancets 33 gauge Misc Test 4-6 times daily   May use One Touch Delica 600 each 0     lancing device with lancets (ONETOUCH DELICA LANC DEVICE) Kit Use to test blood sugars 500 each 0     levETIRAcetam (KEPPRA) 500 MG tablet One tablet in the morning. Two tablets in the afternoon (Patient taking differently: One tablet in the morning. Two tablets at bedtime      ) 270 tablet 3     pantoprazole (PROTONIX) 40 MG tablet Take 40 mg by mouth 2 (two) times a day.       pen needle, diabetic (BD ULTRA-FINE TWILA PEN NEEDLES) 32 gauge x 5/32\" Ndle Use 1 per day. 200 each 4     predniSONE (DELTASONE) 5 MG tablet Take 5 mg by mouth daily.       ranitidine (ZANTAC) 150 MG tablet Take 150 mg by mouth daily.       rosuvastatin (CRESTOR) 5 MG tablet Take 1 tablet (5 mg) by mouth at bedtime. 90 tablet 3     sulfamethoxazole-trimethoprim (SEPTRA) 400-80 mg per tablet Take 1 tablet by mouth daily.       traZODone (DESYREL) 50 MG tablet Take 3 tablets (150 mg total) by mouth at bedtime. (Patient taking differently: Take 100 mg by mouth at bedtime.       ) 180 tablet 1     venlafaxine (EFFEXOR-XR) 150 MG 24 hr capsule Take 1 capsule (150 mg) by mouth daily. 90 capsule 3     No current facility-administered medications for this visit.    I have reconciled all medications.    "

## 2021-06-23 NOTE — TELEPHONE ENCOUNTER
Ms. Miles called the Device Clinic back.  I explained that since she needs an MRI we need to move up the Device Check portion of her appointment which was previously scheduled for 03/22/19 with Device RN and Dr. Escamilla.  She verbalizes understanding of these instructions and is agreeable to the plan.  I thanked her for calling back and warm transferred the call to [facility] Scheduler.  Nicolette Cast, RN, MA  Device Nurse  [facility]

## 2021-06-23 NOTE — PATIENT INSTRUCTIONS - HE
I'll get back to you regarding the endocrinology referral; If you don't hear from me w/in a week, please send me a MyChart note.    [phone] DIRK    Take care!

## 2021-06-23 NOTE — PROGRESS NOTES
[facility] post-hospital stay follow up.    Name: Odalys Miles  :   1945  MRN:   [id number]  PCP:    Jamila Lechuga MD  DOS:    19      CC: Post Hospital Stay follow up for abnormal echo raising concern for endocarditis in a immunosuppressed patient.    HPI/Interval History:  Odalys Miles is a 73 y.o. female with the history of kidney transplant on immunosuppression, cardiac pacemaker in place, diabetes left great toe ulcer who was admitted with symptoms suggestive of TIA.  On workup she was found on TTE followed by [facility] to have a small echodense mobile structure on the mitral valve concerning for endocarditis.  She did not have any fever, chills, or night sweats.  Her CRP was 10 on 2019.  She had one positive blood culture with coagulase-negative staph.  Follow-up blood cultures were negative off antibiotic.  Because of the absence of any signs suggestive of infection, decision was made not to treat the patient as endocarditis especially after her left great toe was not thought to be associated with osteomyelitis.  Patient was discharged from the hospital about a week ago.  She is in clinic for follow-up.  Seen today, the patient reports feeling much better.  States she is sleeping better.  No fever, chills, night sweats.  Vision is about the same.  She does report decreased appetite but at the same time her fingerstick glucose is in the 200-250 range.    PMH:  Past Medical History:   Diagnosis Date     Adrenal insufficiency (H)      Anemia      Anemia     Created by Conversion      Anxiety      Arthritis      Ataxia 2015     CAD (coronary artery disease)      Cardiac pacemaker, dual, in situ 2016    [employer] Revo MRI DOI: 12/15/2011 Dr. Aishwarya Treviño     Chronic Diarrhea Of Unknown Origin     Created by Conversion      Chronic Gout     Created by Conversion  Replacement Utility updated for latest IMO load     Chronic Reflux Esophagitis      Created by Conversion      Congestive Heart Failure     Created by Conversion  Replacement Utility updated for latest IMO load     Coronary Artery Stenosis     Created by Conversion [facility] Annotation: Feb 27 2011 11:37PM - Alina Zapien: s/p CABGx4  1/11  Replacement Utility updated for latest IMO load     CVA (cerebral vascular accident) (H)      Depression      Diabetic Peripheral Neuropathy     Created by Conversion      DM (diabetes mellitus) (H)      Dysthymic disorder     Created by Conversion      End Stage Renal Disease     Created by Conversion      Epilepsy (H)      ESRD (end stage renal disease) (H)      Essential Thrombocytosis     Created by Conversion      History of renal transplant      Hyperlipidemia      Hypertension      Hypertension     Created by Conversion  Replacement Utility updated for latest IMO load     Insomnia     Created by Conversion      Ischemic Stroke     Created by Conversion  Replacement Utility updated for latest IMO load     Medullary Sponge Kidney Bilaterally     Created by Conversion      Mixed hyperlipidemia     Created by Conversion      Morbid obesity (H) 8/29/2018     Neuropathy (H)      Nonintractable epilepsy without status epilepticus, unspecified epilepsy type (H) 8/29/2018     CULLEN on CPAP     Created by Conversion      Osteoarthritis     Created by Conversion  Replacement Utility updated for latest IMO load     Renal Transplant Recipient     Created by Conversion      Sensorineural hearing loss     Created by Conversion  Replacement Utility updated for latest IMO load     Thrombophlebitis Of Deep Vessels Of The Lower Extremity     Created by Conversion      Type 2 diabetes mellitus (H)     Created by Conversion      Vertigo        PSH:  Past Surgical History:   Procedure Laterality Date     CARDIAC PACEMAKER PLACEMENT       HYSTERECTOMY      Age 28     IR EXTREMITY ANGIOGRAM LEFT  1/30/2019     NEPHRECTOMY TRANSPLANTED ORGAN  06/2011     MD APPENDECTOMY       Description: Appendectomy;  Recorded: 11/13/2007;     [place] CABG, VEIN, SINGLE      Description: CABG (CABG);  Proc Date: 01/26/2011;  Comments: x 4     [place] INS NEW/RPLC PRM PACEMAKER W/TRANSV ELTRD VENTR      Description: Permanent Pacemaker Placement;  Recorded: 08/01/2012;     [place] TOTAL ABDOM HYSTERECTOMY      Description: Total Abdominal Hysterectomy;  Recorded: 11/13/2007;     [place] TOTAL KNEE ARTHROPLASTY      Description: Total Knee Arthroplasty;  Recorded: 11/13/2007;       Social History/Family History:   passed about 9 years ago.  No smoking, drinking, or drug.  No family history of recurrent infection.  Her sister is taking care of her at home.    Allergies:  Allergies   Allergen Reactions     Lisinopril Cough     Resolved after discontinuation     Mold/Mildew      Latex Rash       Medications:  Current Outpatient Medications on File Prior to Visit   Medication Sig Dispense Refill     alendronate (FOSAMAX) 35 MG tablet Take 35 mg by mouth once a week. Weekly on Sundays. Take in the morning with a full glass of water, on an empty stomach, and do not take anything else by mouth or lie down for the next 30 min.  Sundays             amLODIPine (NORVASC) 10 MG tablet Take 1 tablet (10 mg) by mouth daily. 90 tablet 3     aspirin 325 MG EC tablet Take 1 tablet (325 mg total) by mouth daily.  0     azaTHIOprine (IMURAN) 50 mg tablet Take 100 mg by mouth bedtime.        blood glucose test strips Test 4-6 times daily Dispense brand per patient's insurance at pharmacy discretion.. 540 strip 3     bumetanide (BUMEX) 2 MG tablet Take 0.5 tablets (1 mg total) by mouth daily. 20 tablet 0     carvedilol (COREG) 12.5 MG tablet Take 1 tablet (12.5 mg) by mouth twice daily with meals. 180 tablet 3     cholecalciferol, vitamin D3, 2,000 unit Tab Take 2,000 Units by mouth daily.       cloNIDine HCl (CATAPRES) 0.1 MG tablet Take 2 tablets (0.2 mg total) by mouth 2 (two) times a day. 30 tablet 0     cyanocobalamin, vitamin B-12,  "2,500 mcg Tab Take 2,500 mcg by mouth every other day.       cycloSPORINE modified (GENGRAF) 25 MG capsule Take 50 mg by mouth 2 (two) times a day.        doxycycline (VIBRA-TABS) 100 MG tablet Take 100 mg by mouth daily.              flash glucose scanning reader (FREESTYLE FELA 14 DAY READER) Misc Use 1 application As Directed daily. 1 each 0     flash glucose sensor (FREESTYLE FELA 14 DAY SENSOR) Kit Use 1 application As Directed every 14 (fourteen) days. 2 kit 11     hydrALAZINE (APRESOLINE) 25 MG tablet Take 2 tablets (50 mg total) by mouth 4 (four) times a day. 30 tablet 0     insulin NPH and regular human (NOVOLIN 70-30 FLEXPEN U-100) 100 unit/mL (70-30) pen Inject 5 units SQ before breakfast and 10 units SQ before dinner. 5 adj dose pen 3     lancets 33 gauge Misc Test 4-6 times daily   May use One Touch Delica 600 each 0     lancing device with lancets (ONETOUCH DELICA LANC DEVICE) Kit Use to test blood sugars 500 each 0     levETIRAcetam (KEPPRA) 500 MG tablet One tablet in the morning. Two tablets in the afternoon (Patient taking differently: One tablet in the morning. Two tablets at bedtime      ) 270 tablet 3     pantoprazole (PROTONIX) 40 MG tablet Take 40 mg by mouth 2 (two) times a day.       pen needle, diabetic (BD ULTRA-FINE TWILA PEN NEEDLES) 32 gauge x 5/32\" Ndle Use 1 per day. 200 each 4     predniSONE (DELTASONE) 5 MG tablet Take 5 mg by mouth daily.       ranitidine (ZANTAC) 150 MG tablet Take 150 mg by mouth daily.       rosuvastatin (CRESTOR) 5 MG tablet Take 1 tablet (5 mg) by mouth at bedtime. 90 tablet 3     sulfamethoxazole-trimethoprim (SEPTRA) 400-80 mg per tablet Take 1 tablet by mouth daily.       traZODone (DESYREL) 50 MG tablet Take 3 tablets (150 mg total) by mouth at bedtime. (Patient taking differently: Take 100 mg by mouth at bedtime.       ) 180 tablet 1     venlafaxine (EFFEXOR-XR) 150 MG 24 hr capsule Take 1 capsule (150 mg) by mouth daily. 90 capsule 3     medical " cannabis (Patient's own supply. Not a prescription) 1 Dose by Other route see administration instructions. (This is NOT a prescription, and does not certify that the patient has a qualifying medical condition for medical cannabis.  The purpose of this order is  to document that the patient reports taking medical cannabis.)       No current facility-administered medications on file prior to visit.        Review of System:  12 points review of system is negative except for findings in the HPI.    Exam  VS:   Vitals:    02/05/19 1008   BP: 130/64   Temp: 97.6  F (36.4  C)       Gen: Pleasant in no acute distress.  HEENT: NCAT. EOMI.  Neck: No LAD.  Chest: Left ICD/pacemaker in place with no tenderness or other signs of infection.  Lungs: Clear to auscultation bilaterally with no crackles or wheezes.   Card: RRR. No RMG. Peripheral pulses present and symmetrical. No edema.   Abd: Soft NT ND. No hepatomegaly or splenomegaly.  Ext: Left great toe ulcer looks better compared to the hospital.  Lymph: No cervical or supraclavicular adenopathy.  Skin: No rash  Neuro: Alert and oriented to place time and person. Cranial nerves grossly intact.     Labs:  Reviewed.  None new.    Imaging:  Reviewed.  None new.    Assessment:  Odalys Miles is a 73 y.o. female with  1.  Immunosuppressed from kidney transplant on immunosuppression.  2.  Cardiac pacemaker in place.  No signs of infection.  3.  Left great toe ulcer.  Extensive workup inpatient with no evidence of osteomyelitis.  Ulcer looks better today compared to the hospital.  Patient will be seeing Dr. España over the next few days.  4.  Abnormal echo with concern for endocarditis.  Blood cultures with 1 bottle of the coagulase-negative.  Additional blood cultures no growth.  CRP decreased to 5 from 10 off antibiotic.  No other signs of infection.  I will repeat blood cultures and CRP today as well as CBC and BMP.       Recommendations:  -No antibiotic is warranted at this  point.  -Blood cultures, CRP, CBC/Diff, BMP.   -Follow up as needed.       ANNI Perez  [facility]  [facility]  Office Telephone [phone].  Fax [phone]

## 2021-06-23 NOTE — ANESTHESIA POSTPROCEDURE EVALUATION
Patient: Odalys Miles  ECHOCARDIOGRAM, TRANSESOPHAGEAL  Anesthesia type: MAC    Patient location: Telemetry/Step Down Unit  Last vitals:   Vitals:    01/25/19 1217   BP: 164/70   Pulse: 60   Resp: 20   Temp:    SpO2: 100%     Post vital signs: stable  Level of consciousness: awake and responds to simple questions  Post-anesthesia pain: pain controlled  Post-anesthesia nausea and vomiting: no  Pulmonary: unassisted, return to baseline  Cardiovascular: stable and blood pressure at baseline  Hydration: adequate  Anesthetic events: no    QCDR Measures:  ASA# 11 - [place]-op Cardiac Arrest: ASA11B - Patient did NOT experience unanticipated cardiac arrest  ASA# 12 - [place] Mortality Rate: ASA12B - Patient did NOT die  ASA# 13 - PACU Re-Intubation Rate: NA - No ETT / LMA used for case  ASA# 10 - Composite Anes Safety: ASA10A - No serious adverse event    Additional Notes:

## 2021-06-23 NOTE — TELEPHONE ENCOUNTER
Clinic Care Coordination Contact    Situation: Patient chart reviewed by care coordinator.    Background: Routed call for post hospital. From chart review:  [facility] Discharge Summary     Admit date: 1/22/2019  Discharge date: 1/31/2019  Patient YOB: 1945              Discharge Diagnoses  Principal Problem:    TIA (transient ischemic attack)     Small mobile echodensity on the posterior mitral valve annulus     Left great toe ulcer     PAD-Left posterior tibial artery is occluded.  Left anterior tibial artery is heavily diseased with multiple areas of tandem high-grade stenoses.        Active Problems:    Benign Essential Hypertension    History of seizure disorder    MARCELA (acute kidney injury) (H)-resolved    Diabetic ulcer of toe of left foot associated with type 2 diabetes mellitus, unspecified ulcer stage (H)    Atherosclerosis of native artery of left lower extremity with ulceration of other part of foot (H)       Assessment: Patient received RN call from [employer].     Plan/Recommendations: CC will call on 2-4-2019 to complete assessment.     Ingrid Castaneda,   [facility]  [email or web address]  [phone]    Clinic Care Coordination Contact    Clinic Care Coordination Contact  OUTREACH    Referral Information:  Referral Source: IP Report    Primary Diagnosis: Cardiovascular - other    Chief Complaint   Patient presents with     Clinic Care Coordination - Post Hospital     Discharged from [facility] 1/31     Chart Review Please        Universal Utilization: Appropriate utilization.   Working with cardiology, endocrinology, podiatry, vascular, and renal transplant and MTM.     Clinical Concerns:  Current Medical Concerns:  Feeling like her health has taken over her life. This hospitalization scared her and knocked her off her complacency.  Has had her sister with her for a month and she has been helpful. Been attending all her appointments and has podiatrist  appointment today. Met with PCP this week.   Current Behavioral Concerns: apprehensive about doing all the treatments like weighing herself, setting up her meds, etc.   Worried that she will need more help  And does not want to move to care center.   Education Provided to patient: Reviewed role of care coordination.    Pain  Pain (GOAL):: No  Health Maintenance Reviewed: Due/Overdue       Medication Management:  Can set up her own medications if she has to.     Functional Status:  Dependent ADLs:: Independent  Dependent IADLs:: Transportation- now [facility] not [facility].  When her sister is at home in [place], she has a friend who drives her.  Her friend is caring for son with mental health issues and may not be available.  She has gotten [employer] certified, but has not used it yet.   Bed or wheelchair confined:: No  Mobility Status: Independent  Fallen 2 or more times in the past year?: No  Any fall with injury in the past year?: No    Living Situation:  Current living arrangement:: I live alone  Type of residence:: Town home- one level.    Diet/Exercise/Sleep:  Diet:: Diabetic diet  Inadequate nutrition (GOAL):: No  Food Insecurity: No  Tube Feeding: No  Exercise:: Currently not exercising  Inadequate activity/exercise (GOAL):: No  Significant changes in sleep pattern (GOAL): No    Transportation:  Transportation concerns (GOAL):: No  Transportation means:: Friend, [employer]     Psychosocial:  Mental health DX:: No  Mental health management concern (GOAL):: No  Informal Support system:: Family     Financial/Insurance:   Financial/Insurance concerns (GOAL):: No  [employer], no financial concerns identified.      Resources and Interventions:  Current Resources:        Supplies used at home:: Wound Care Supplies  Equipment Currently Used at Home: none    Advance Care Plan/Directive  Advanced Care Plans/Directives on file:: No    Referrals Placed:  None       Patient/Caregiver understanding: Patient's sister is leaving on Saturday and she has been a great help while she recovers.  Patient's kids work during the day. Her kids are starting to worried about her.  Patient feeling like her age is catching up to her and is worried about how things will go. She is trying to accept her new reality.  Agreed to have CC mail access letter and will call next week to see how she is doing after her sister leaves.      Plan: CC to call in one week and patient to call if needs arise prior to next outreach.  Ingrid Castaneda,   [facility]  [email or web address]  [phone]

## 2021-06-23 NOTE — TELEPHONE ENCOUNTER
MTM Transition of Care Encounter  Assessment & Plan                                                     1. TIA (transient ischemic attack)  Recently hospitalized.  Reviewed medication changes with patient, including increasing dose of aspirin per neurology recommendation as well as dose increases and hydralazine and clonidine for elevated blood pressure.  Recommended continuing current dose of rosuvastatin 5 mg daily, as this is the maximum dose recommended while on cyclosporine due to drug interaction.     2. Type 2 diabetes mellitus   Improved patient qualify of life with switching to insulin in pen form and FreeStyle Livier CGM from our last visit. Because of elevated reported BG, recommend increasing morning dose of Novolin 70/30 to 7 units; continue 10 units before dinner.     3. Congestive heart failure  Blood pressure is above goal of <140/90 mm Hg per JNC-8 hypertension guidelines, but there were recent medication changes during hospitalization for hypertension.  Review changes with patient and she verifies following these new doses for hydralazine and clonidine. Will recheck BP next week in clinic.        Follow Up  Return in about 1 week (around 2/11/2019). for full MTM follow up.       Subjective & Objective                                                       Odalys Miles is a 73 y.o. female called for a transitions of care visit. she was discharged from [facility] on 1/31/19 for TIA and toe ulcer. I spoke with the patient briefly to review medication changes    Chief Complaint: Hospital visit follow up  Medication Adherence/Access: Good; no issues identified.     Odalys was recently hospitalized for TIA.  Neurology recommended aggressive risk factor modification and increased dose of aspirin to 325 mg daily.  She is on rosuvastatin. Patient verifies making this change.  She was also noted to have a left great toe ulcer which has been going on for the past several months.  Podiatry recommended local  wound care and serial x-rays to rule out osteomyelitis and follow-up as outpatient.  ID plan to not treat for osteomyelitis as of now and patient was not started on antibiotics.  He also had an abnormal echocardiogram concerning for possible endocarditis, and ID was consulted but recommended no antibiotics due to no clear indication.  Patient is to follow-up in 2 weeks.    She does have a history of kidney transplant in 2011.  In the hospital she had a slight bump in her creatinine, but did not improve. Odalys is taking Imuran 100 mg at bedtime, prednisone 5 mg daily, and Gengraf 50 mg BID.     Her blood pressure was elevated during her hospital stay.  Her dose of hydralazine was increased to 50 mg 4 times a day and clonidine dose was increased to 0.2 mg twice daily.  She continues to take amlodipine 10 mg daily, carvedilol 12.5 mg twice daily and Bumex 2 mg daily.  Prior to this hospitalization she was in the hospital 2 other times with the past several months due to CHF exacerbation and then acute kidney injury.  At the time of AK I, her irbesartan was discontinued.    For history of seizure, she is taking Keppra 500 mg every morning and 1000 mg at bedtime.    At her previous visit we had made changes with her medications for diabetes including stopping NPH and regular insulin and changing to Novolin 70-30 which is available in pen form.  She is very pleased to no longer be using vial and syringe insulin.  We started a conservative dose of Novolin 70/30 5 units before breakfast and 10 units before dinner as she was occasionally having lows with a lack of appetite.  She said that her numbers have been a bit higher although she is been eating differently in and out of the hospital.  Is also now use style av continuous glucose monitor sensor system which she finds extremely helpful at monitoring her blood sugars without finger pokes.     PMH: reviewed in EPIC   Allergies/ADRs: reviewed in EPIC   Alcohol: reviewed  in EPIC   Tobacco:   Social History     Tobacco Use   Smoking Status Former Smoker     Packs/day: 1.50     Years: 20.00     Pack years: 30.00   Smokeless Tobacco Never Used     Recent Vitals:   BP Readings from Last 3 Encounters:   01/31/19 154/71   01/30/19 154/58   01/22/19 168/76      Wt Readings from Last 3 Encounters:   01/31/19 207 lb 0.3 oz (93.9 kg)   01/22/19 205 lb (93 kg)   01/18/19 205 lb 11.2 oz (93.3 kg)     ----------------    Much or all of the text in this note was generated through the use of Dragon Dictate voice-to-text software. Errors in spelling or words which seem out of context are unintentional. Sound alike errors, in particular, may have escaped editing.    The patient was given a summary of these recommendations via [employer] Ar[employer]    I spent 30 minutes with this patient today;  . All changes were made via collaborative practice agreement with Jamila Lechuga MD. A copy of the visit note was provided to the patient's provider.     Chayito Mccollum, PharmD, BCACP  Medication Management Pharmacist  [facility]          Current Outpatient Medications   Medication Sig Dispense Refill     alendronate (FOSAMAX) 35 MG tablet Take 35 mg by mouth once a week. Weekly on Sundays. Take in the morning with a full glass of water, on an empty stomach, and do not take anything else by mouth or lie down for the next 30 min.  Sundays             amLODIPine (NORVASC) 10 MG tablet Take 1 tablet (10 mg) by mouth daily. 90 tablet 3     aspirin 325 MG EC tablet Take 1 tablet (325 mg total) by mouth daily.  0     azaTHIOprine (IMURAN) 50 mg tablet Take 100 mg by mouth bedtime.        blood glucose test strips Test 4-6 times daily Dispense brand per patient's insurance at pharmacy discretion.. 540 strip 3     bumetanide (BUMEX) 2 MG tablet Take 0.5 tablets (1 mg total) by mouth daily. 20 tablet 0     carvedilol (COREG) 12.5 MG tablet Take 1 tablet (12.5 mg) by mouth twice daily with meals. 180  "tablet 3     cholecalciferol, vitamin D3, 2,000 unit Tab Take 2,000 Units by mouth daily.       cloNIDine HCl (CATAPRES) 0.1 MG tablet Take 2 tablets (0.2 mg total) by mouth 2 (two) times a day. 30 tablet 0     cyanocobalamin, vitamin B-12, 2,500 mcg Tab Take 2,500 mcg by mouth every other day.       cycloSPORINE modified (GENGRAF) 25 MG capsule Take 50 mg by mouth 2 (two) times a day.        doxycycline (VIBRA-TABS) 100 MG tablet Take 100 mg by mouth daily.              flash glucose scanning reader (FREESTYLE FELA 14 DAY READER) Misc Use 1 application As Directed daily. 1 each 0     flash glucose sensor (FREESTYLE FELA 14 DAY SENSOR) Kit Use 1 application As Directed every 14 (fourteen) days. 2 kit 11     hydrALAZINE (APRESOLINE) 25 MG tablet Take 2 tablets (50 mg total) by mouth 4 (four) times a day. 30 tablet 0     insulin NPH and regular human (NOVOLIN 70-30 FLEXPEN U-100) 100 unit/mL (70-30) pen Inject 5 units SQ before breakfast and 10 units SQ before dinner. 5 adj dose pen 3     lancets 33 gauge Misc Test 4-6 times daily   May use One Touch Delica 600 each 0     lancing device with lancets (ONETOUCH DELICA LANC DEVICE) Kit Use to test blood sugars 500 each 0     levETIRAcetam (KEPPRA) 500 MG tablet One tablet in the morning. Two tablets in the afternoon (Patient taking differently: One tablet in the morning. Two tablets at bedtime      ) 270 tablet 3     medical cannabis (Patient's own supply. Not a prescription) 1 Dose by Other route see administration instructions. (This is NOT a prescription, and does not certify that the patient has a qualifying medical condition for medical cannabis.  The purpose of this order is  to document that the patient reports taking medical cannabis.)       pantoprazole (PROTONIX) 40 MG tablet Take 40 mg by mouth 2 (two) times a day.       pen needle, diabetic (BD ULTRA-FINE TWILA PEN NEEDLES) 32 gauge x 5/32\" Ndle Use 1 per day. 200 each 4     predniSONE (DELTASONE) 5 MG " tablet Take 5 mg by mouth daily.       ranitidine (ZANTAC) 150 MG tablet Take 150 mg by mouth daily.       rosuvastatin (CRESTOR) 5 MG tablet Take 1 tablet (5 mg) by mouth at bedtime. 90 tablet 3     sulfamethoxazole-trimethoprim (SEPTRA) 400-80 mg per tablet Take 1 tablet by mouth daily.       traZODone (DESYREL) 50 MG tablet Take 3 tablets (150 mg total) by mouth at bedtime. (Patient taking differently: Take 100 mg by mouth at bedtime.       ) 180 tablet 1     venlafaxine (EFFEXOR-XR) 150 MG 24 hr capsule Take 1 capsule (150 mg) by mouth daily. 90 capsule 3     No current facility-administered medications for this visit.

## 2021-06-23 NOTE — TELEPHONE ENCOUNTER
Hospital discharge follow up call made to pt to introduce MyHealth Tracker program for CHF monitoring and offered enrollment.  Pt agrees and will make daily call to MyHealth Tracker line and complete health survey.       New orders placed for the MyHealth Tracker - CHF Flowsheet (order activates the 'My CHF Diary' Episode, where all future encounters will be linked for provider review)    Survey phone number given to pt ([phone]) -> pt will start calling new number tomorrow, 2/2/19.        Welcome packet sent to pt via mail, and [employer].    RN will monitor MyHealth Tracker report for variances, and f/u w/pt accordingly.      Gisel Bustillo RN Care Manager, Population Health

## 2021-06-23 NOTE — ANESTHESIA PREPROCEDURE EVALUATION
Anesthesia Evaluation      Patient summary reviewed   No history of anesthetic complications     Airway   Mallampati: III  Neck ROM: full   Pulmonary - normal exam    breath sounds clear to auscultation  (+) sleep apnea on CPAP, ,   (-) not a smoker (Former, 30 pack years)                         Cardiovascular   Exercise tolerance: > or = 4 METS  (+) pacemaker (Pacemaker, Medtronic), hypertension, CAD, CABG/stent (s/p CABG X 3 2010), CHF, , hypercholesterolemia,     (-) murmur  ECG reviewed  Rhythm: regular  Rate: normal,    no murmur   ROS comment: OBDULIO ordered to eval for possible CVA embolic source  Pt became apneic after sedation for OBDULIO, rescheduled for OBDULIO in the OR.     Neuro/Psych    (+) seizures well controlled, neuromuscular disease (Diabetic peripheral neuropathy),  CVA , anxiety/panic attacks,     Comments: Admitted with double vision, concern for CVA      Endo/Other    (+) diabetes mellitus type 2 using insulin, arthritis, obesity,      Comments: Chronic immunosuppressants for kidney transplant  Secondary adrenal insufficiency, chronic prednisone      GI/Hepatic/Renal    (+)   chronic renal disease (s/p renal transplant) CRI,   (-) GERD     Other findings: 11/20/18 1006 US Carotid Bilateral   Impression:  1. Mild atheromatous plaque in the carotid arteries.  2. No significant stenosis on either side.     1/23/19 OBDULIO  Summary   Procedure was abandoned due to respiratory arrest.  OBDULIO probe was inserted but no images were obtained.     1/22/19 Echo  Summary     1.Left ventricle ejection fraction is normal. The estimated left ventricular ejection fraction is 60%.    2.TAPSE is normal, which is consistent with normal right ventricular systolic function.    3.No hemodynamically significant valvular heart abnormalities.    4.Echodense trileaflet aortic valve with echo dense mitral annulus. Presence of echodensities on aortic valve is not well visualized, do not appear to be mobile but cannot rule out potential  embolic source. Consider OBDULIO if indicated.    5.When compared to the previous study dated 9/11/2014, no significant change.    10/24/18 Device check  Conclusion     Type: routine remote pacemaker transmission.  Presenting rhythm: A-V sequential pacing and atrial synchronous ventricular pacing, rate 70 bpm.  Battery/lead status: stable  Arrhythmias: since 7/16/18, none detected.  Comments: normal pacemaker function. prd            Dental    (+) caps    Comment: Molar crowns                       Anesthesia Plan  Planned anesthetic: MAC  Prn GETA  Consider stress dose steroids    Monitor BS  ASA 4     Anesthetic plan and risks discussed with: patient  Anesthesia plan special considerations: antiemetics,   Post-op plan: routine recovery

## 2021-06-23 NOTE — PATIENT INSTRUCTIONS - HE
Add Zantac (ranitidine) 150 to your daily medication regimen.  Follow the instructions on the package label.    Start eating yogurt every day.    We will have you set up with a stool testing kit before you leave today.  Collect a stool sample and bring it back to lab as soon as possible so we can get it running.    Follow-up with me in 2 weeks.  If you continue to have symptoms at that time, we could consider upper GI endoscopy study.

## 2021-06-23 NOTE — PATIENT INSTRUCTIONS - HE
Thank you for coming today.     I am happy you are doing better and have no signs of infection.  Your left great toe looks better.     Plan:  -No antibiotic is warranted at this point.  -Blood cultures, CRP, CBC/Diff, BMP.   -Follow up as needed.     ANNI Perez MD

## 2021-06-23 NOTE — PROGRESS NOTES
"TCM DISCHARGE FOLLOW UP CALL    Discharge Date:  1/31/2019  Reason for hospital stay (discharge diagnosis)::  TIA  Are you feeling better, the same or worse since your discharge?:  Patient is feeling better (Still tired, but no longer having vision problems, BSs \"back to reasonable rate, 131 after lunch today,\" & BP was 140/59.  Still doesn't have much of an appetite, but eating a little bit.  Arm better, swelling is down.)  Do you feel like you have a plan in the event of a health emergency?: Yes (Call clinic, call transplant coordinator, ER)    As part of your discharge plan, were  home care services ordered for you?: No    Did you receive any new medications, or was there a change to your medications?: Yes    Are you taking those medications, or do you have any established regiment?:  RN reviewed discharge medications w/pt.  Pt states she has been taking ASA 81 mg daily, wasn't aware the dose had increased to 325 mg a day; pt will take another three 81 mg ASA (total of 324 mg) today, & will  325 mg ASA tomorrow.  Pt referenced AVS paperwork during call, and states she is taking all other medications as prescribed.  Pt has an MTM phone f/u 2/4/19 scheduled w/Nathalia RamirezD - per pt, [place] changed appt to be w/Chayito Mccollum PharmD, as she already works with & knows pt.  Do you have any follow up visits scheduled with your PCP or Specialist?:  Yes, with PCP and Yes, with Specialist (Dr. Jamila Lechuga 2/5/19)  (RN) Is PCP appt scheduled soon enough (within 14 days of discharge date)?: Yes    Who are you seeing and when is it scheduled?:  Dr. Perez (ID) 2/5/19, Transplant Coordinator (Nephrology for kidney transplant) 2/6/19, Dr. Castro (Podiatry) 2/6/19, & [place] [facility] [place], does not have date/calendar near her  RN NOTES::  - States she has no pain in her L great toe where the ulcer is, area is \"about the same, looks like the skin is pulled back & dried.\"  She is doing " daily dressing changes.  She monitoring her wts daily, today's wt was 202#.  States prior to her hospitalization her wt was 206-207#.   - RN introduced MyHealth Tracker program for CHF tele-monitoring and offered enrollment; pt agreed to begin calling in daily surveys tomorrow, 2/2/19 (see phone note from today, 2/2/19, for details).       Gisel Bustillo RN Care Manager, Population Health

## 2021-06-23 NOTE — PATIENT INSTRUCTIONS - HE
Recommendations from today's Medication Management (MTM) visit:                                                      1. Stop R and N insulins.     2. Start Novolin 70/30 insulin pens. Inject 5 units before breakfast and inject 10 units before dinner. Check blood sugars at these times as well.     3. I'll look into coverage of a FreeStyle Livier continuous glucose monitor.     4. Try 3 tablets of trazodone (150 mg) at bedtime for sleep.       Next MTM appointment:                                                       Wednesday, January 30th at 1:30 PM     To schedule another MTM appointment, please call the clinic directly or you may call   the MTM scheduling line at [phone] or toll-free at [phone].     My MTM pharmacist's contact information:                                                      It was a pleasure seeing you today. Thank you for your engagement in getting the most out of your medications! Please feel free to contact me with any questions or concerns you have.      Chayito Mccollum, PharmD, [facility]  Medication Management (MTM) Pharmacist  [facility]  [phone]      You may receive a survey about the MTM services you received.  I would appreciate your feedback to help me serve you better in the future. Please fill it out and return it when you can. Your comments will be anonymous.

## 2021-06-24 NOTE — PROGRESS NOTES
MTM Follow Up Encounter  Assessment & Plan                                                     1. Type 2 diabetes mellitus   Improved patient qualify of life with switching to insulin in pen form and FreeStyle Livier CGM from our last visit. Because of elevated reported BG before dinner, recommend increasing evening dose of Novolin 70/30 to 10 units; continue 12 units before dinner.   Plan   1. Increase Novolin 70/30 to 10 units in AM and 12 units in PM.     2. Mixed hyperlipidemia  Appropriately on a moderate intensity statin given age and diabetes as well as CVA history per ACC/AHA guidelines. Last LDL of 72 mg/dl. I would not increase dose of rosuvastatin any higher than 5 mg due to interaction with cyclosporine, as cyclosporine can increase rosuvastatin serum levels. Cyclosporine levels are not affected.     3. CHF/CAD   Blood pressure is well controlled and meeting goal of <140/90 mm Hg per JNC-8 hypertension guidelines. Because of CHF exacerbation she was started on diuretic, but dose has been reduced due to decline in renal function. Agree with discontinuation of clonidine to reduce dizziness and risk of bradycardia with metoprolol.      4. Renal Transplant Recipient  Managed by transplant clinic. Balancing need for diuretic for CHF vs risk of MARCELA.       5. Dysthymic disorder/Insomnia  Stable.      6. Epilepsy  Stable on Keppra, as managed by neurology.       Follow Up  Return in about 1 month (around 3/13/2019).      Subjective & Objective                                                     Odalys Miles is a 73 y.o. female coming in for an initial visit for Medication Therapy Management. She was referred to me from Jamila Lechuga MD.    Chief Complaint: med review - three recent hospitalizations for CHF and MARCELA as well as TIA  Medication Adherence/Access: Cost is a concern, so generic, low cost medications are preferred options. She sets up her medications in a pillbox every two weeks. She keeps a  detailed chart of her medications with details of dosing, indication, etc.     1. Type 2 diabetes mellitus   At her previous visit we had made changes with her medications for diabetes including stopping NPH and regular insulin and changing to Novolin 70-30 which is available in pen form.  She is very pleased to no longer be using vial and syringe insulin.  We started a conservative dose of Novolin 70/30 5 units before breakfast and 10 units before dinner as she was occasionally having lows with a lack of appetite.  She is now on 7 units in the AM and 12 units in the PM. This morning BG was 115. Highest of 130. Before dinner, it can be as high as 175, generally in the 150s. She is happy to report no more episodes of hypoglycemia.   She is also now using Freestyle av continuous glucose monitor sensor system which she finds extremely helpful at monitoring her blood sugars without finger pokes.      Lab Results   Component Value Date    HGBA1C 6.9 (H) 02/05/2019    HGBA1C 6.6 (H) 11/14/2018    HGBA1C 6.8 (H) 07/18/2018     Lab Results   Component Value Date    MICROALBUR >50.00 (H) 08/29/2018    LDLCALC 48 01/23/2019    CREATININE 2.78 (H) 02/05/2019        2. Mixed hyperlipidemia  Odalys is taking rosuvastatin 5 mg daily. She had possible muscle aches with atorvastatin. She also takes aspirin 325 mg daily, as recommended by neuro after last TIA. She has a history of stroke about 15 years ago as well as recent probably TIA last month.      3. CHF/CAD  Odalys has diagnosis of chronic heart failure with preserved ejection fraction and coronary artery disease status post three-vessel CABG in 2011.  Last ejection fraction 55%.  She is taking amlodipine 10 mg daily, carvedilol 12.5 mg two times a day, hydralazine 50 mg 4 times a day. Because of high BP during last hospitalization, her dose of clonidine and hydralazine were increased, but then transplant team recently stopped clonidine. She took her last dose yesterday  morning. She was feeling dizzy, especially upon standing, but that is better. Her mouth is less dry. She is also taking bumetanide 1 mg daily, which is a dose decrease, but weight has been stable around 200 pounds.   BP at home: 140/60, 122/41, 153/58, 120/42, 110/60, 138/58, 123/53  Of note she has had 3 hospitalizations within the past few month.  She was started on bumetanide and irbesartan during first admission for CHF exacerbation and then was subsequently hospitalized for acute kidney injury.  Her dose of bumetanide was decreased from 2 mg twice daily to once daily and most recently down to 1 mg daily.  It was recommended to hold irbesartan.   She was diagnosed with peripheral vascular disease. She will be seeing a vascular specialist at [facility]. She has a wound on her toe that is not healing.      4. Renal Transplant Recipient  Odalys is taking Imuran 100 mg at bedtime, prednisone 5 mg daily, and Gengraf 50 mg BID.  She had a renal transplant in June 2011 and regularly sees the transplant clinic providers every month. She gets bone scans 2-3 times a year and takes alendronate to help prevent fractures. She's having her next DEXA today. She takes pantoprazole 40 mg BID for gastric protection. Also on Septra for PCP prophylaxis.      5. Dysthymic disorder/Insomnia  Odalys is taking venlafaxine  mg daily.  She is also taking trazodone 100 mg at bedtime for insomnia. She did try duloxetine this past summer, but that was ineffective. She says she is a night-owl and has had trouble sleeping lately, so we tried 150 mg of trazodone, which worked well, but she felt too tired the next day and didn't want to wake up, so she went back to 100 mg.      6. Epilepsy  For her history of seizures, Odalys is taking Keppra 500 mg in the morning and 100 mg at bedtime. She hasn't had a seizure for at least 4 years.       PMH: reviewed in EPIC   Allergies/ADRs: reviewed in EPIC   Alcohol: reviewed in EPIC   Tobacco:   Social  History     Tobacco Use   Smoking Status Former Smoker     Packs/day: 1.50     Years: 20.00     Pack years: 30.00   Smokeless Tobacco Never Used     Today's Vitals:   BP Readings from Last 3 Encounters:   02/13/19 136/84   02/11/19 118/56   02/06/19 110/60     Pulse Readings from Last 3 Encounters:   02/11/19 60   02/06/19 60   02/05/19 68     Wt Readings from Last 3 Encounters:   02/11/19 207 lb (93.9 kg)   02/05/19 207 lb 9.6 oz (94.2 kg)   02/05/19 207 lb 4.8 oz (94 kg)       ----------------    Much or all of the text in this note was generated through the use of Dragon Dictate voice-to-text software. Errors in spelling or words which seem out of context are unintentional. Sound alike errors, in particular, may have escaped editing.    The patient was given a summary of these recommendations via [employer]    I spent 30 minutes with this patient today.   All changes were made via collaborative practice agreement with Jamila Lechuga MD. A copy of the visit note was provided to the patient's provider.     Chayito Mccollum, PharmD, BCACP  Medication Management Pharmacist  [facility]        Current Outpatient Medications   Medication Sig Dispense Refill     alendronate (FOSAMAX) 35 MG tablet Take 35 mg by mouth once a week. Weekly on Sundays. Take in the morning with a full glass of water, on an empty stomach, and do not take anything else by mouth or lie down for the next 30 min.  Sundays             amLODIPine (NORVASC) 10 MG tablet Take 1 tablet (10 mg) by mouth daily. 90 tablet 3     aspirin 325 MG EC tablet Take 1 tablet (325 mg total) by mouth daily.  0     azaTHIOprine (IMURAN) 50 mg tablet Take 100 mg by mouth bedtime.        bumetanide (BUMEX) 2 MG tablet Take 0.5 tablets (1 mg total) by mouth daily. 20 tablet 0     carvedilol (COREG) 12.5 MG tablet Take 1 tablet (12.5 mg) by mouth twice daily with meals. 180 tablet 3     cholecalciferol, vitamin D3, 2,000 unit Tab Take 2,000  "Units by mouth daily.       cyanocobalamin, vitamin B-12, 2,500 mcg Tab Take 2,500 mcg by mouth every other day.       cycloSPORINE modified (GENGRAF) 25 MG capsule Take 50 mg by mouth 2 (two) times a day.        flash glucose scanning reader (FREESTYLE FELA 14 DAY READER) Misc Use 1 application As Directed daily. 1 each 0     flash glucose sensor (FREESTYLE FELA 14 DAY SENSOR) Kit Use 1 application As Directed every 14 (fourteen) days. 2 kit 11     hydrALAZINE (APRESOLINE) 25 MG tablet Take 2 tablets (50 mg total) by mouth 4 (four) times a day. 30 tablet 0     insulin NPH and regular human (NOVOLIN 70-30 FLEXPEN U-100) 100 unit/mL (70-30) pen Inject 5 units SQ before breakfast and 10 units SQ before dinner. 5 adj dose pen 3     levETIRAcetam (KEPPRA) 500 MG tablet One tablet in the morning. Two tablets in the afternoon (Patient taking differently: One tablet in the morning. Two tablets at bedtime      ) 270 tablet 3     pantoprazole (PROTONIX) 40 MG tablet Take 40 mg by mouth 2 (two) times a day.       pen needle, diabetic (BD ULTRA-FINE TWILA PEN NEEDLES) 32 gauge x 5/32\" Ndle Use 1 per day. 200 each 4     predniSONE (DELTASONE) 5 MG tablet Take 5 mg by mouth daily.       ranitidine (ZANTAC) 150 MG tablet Take 150 mg by mouth daily.       rosuvastatin (CRESTOR) 5 MG tablet Take 1 tablet (5 mg) by mouth at bedtime. 90 tablet 3     traZODone (DESYREL) 50 MG tablet Take 3 tablets (150 mg total) by mouth at bedtime. (Patient taking differently: Take 100 mg by mouth at bedtime.       ) 180 tablet 1     venlafaxine (EFFEXOR-XR) 150 MG 24 hr capsule Take 1 capsule (150 mg) by mouth daily. 90 capsule 3     No current facility-administered medications for this visit.            "

## 2021-06-24 NOTE — TELEPHONE ENCOUNTER
Detailed message left on team members VM with callback to confirm  DANISH Augustin  8:23 AM  2/28/2019

## 2021-06-24 NOTE — PATIENT INSTRUCTIONS - HE
Increase your ranitidine (zantac) to twice daily - add evening/bedtime dose  Continue the pantoprazole daily as directed before  Labs pending, I will call if very abnormal  Referral to [facility] for EGD - may consider consultation if no findings.   [place] soft diet, small amounts a little more often  Adequate fluids to prevent dehydration  Add simethicone (anti-gas) if needed - can be found in maalox, mylanta, beano, gas-x, etc.   Avoid caffeine, alcohol, ibuprofen  Take aspirin and prednisone with food  To ER if fevers, severe pain, persistent vomiting, bloody stools or vomiting.   Follow up next week with primary care for further evaluation

## 2021-06-24 NOTE — PATIENT INSTRUCTIONS - HE
Start taking 2 scoops of Metamucil with glass of water in the morning or afternoon.  Start eating small bland meals at least 3 times a day.    Continue Protonix twice a day in addition to the ranitidine 150 mg twice a day.    [facility] [facility] will contact you to set up your endoscopy of your stomach.    Follow-up with me next month for an establish care visit.    If you have significant worsening abdominal pain, develop fevers, or black stool or blood in stool, seek medical attention sooner.

## 2021-06-24 NOTE — TELEPHONE ENCOUNTER
Medication Question or Clarification  Who is calling: Pharmacy: [facility]   What medication are you calling about?: ranitidine (ZANTAC) 150 MG tablet  What dose do you take?: Please advise   How often are you taking the medication?: 2xs daily   Who prescribed the medication?: Mika Ha MD  What is your question/concern?: Pharmacy wanted PCP to know that Creatinine level clearance is 26 , last level was noted at 2.13. Does PCP want this Rx to be dispesed as prescribed as this is a high dosage ? Please call in Pharmacy and confirm .  Pharmacy: [facility] - [place], [place] - [address] [place] [address]   [phone]  Okay to leave a detailed message?: No  Site CMT - Please call the pharmacy to obtain any additional needed information.

## 2021-06-24 NOTE — TELEPHONE ENCOUNTER
Spoke with Carroll the pharmacist and relayed below message.    Donna Lechuga CMA ...... 4:59 PM, 2/26/2019

## 2021-06-24 NOTE — TELEPHONE ENCOUNTER
Clinic Care Coordination Contact    Clinic Care Coordination Contact  OUTREACH    Clinical Concerns:  Current Medical Concerns:  Doing much better.  Met with MTM and has other upcoming appointments.  Needs to have another surgery.  Not 100%, but doing well.    Patient/Caregiver understanding: No need for care coordination at this time.        Plan: No further outreach. Patient will call clinic if needs arise.    Ingrid Castaneda,   [facility]  [email or web address] [place]  [phone]

## 2021-06-24 NOTE — TELEPHONE ENCOUNTER
Patient Returning Call  Reason for call:  Provider question   Information relayed to patient:  Dr. Rashid Carrillo staff did receive the message inquiring if Dr. Rashid Carrillo would like the patient's cyclosporine level checked. Dr. Rashid Carrillo does not wish to have the patient's cyclosporine level checked at this time.   Patient has additional questions:  No  If YES, what are your questions/concerns:  N/A  Okay to leave a detailed message?: No call back needed

## 2021-06-24 NOTE — TELEPHONE ENCOUNTER
Pt on [employer] Tracker for CHF tele-monitoring, and hasn't completed a survey since 2/14/19.  Email sent asking patient to complete her survey today.    Gisel Bustillo RN Care Manager, Population Health

## 2021-06-24 NOTE — PROGRESS NOTES
[facility] clinic Follow Up Note    Odalys Miles   73 y.o. female    Date of Visit: 2/26/2019    Chief Complaint   Patient presents with     Follow-up     [facility] (02/22/2019)     Subjective  Odalys is a patient of Dr. Jamila Lechuga who is here to discuss her epigastric pain over the past 2 months.    Patient has a complex past medical history including end-stage renal disease from medullary sponge kidney in 2011 and now a renal transplant recipient on immunosuppression with Imuran, cyclosporine and 5 mg a day prednisone.    She has type 2 diabetes controlled on 70/30 insulin 10 units in the morning and 12 units with hemoglobin A1c earlier this month of 6.9%.  She sees endocrinology on April 12.    Blood sugars remain overall controlled but she has not been eating regularly with her epigastric pain issue and weight loss of 30 pounds over the past few months.    Patient does have a history of bleeding ulcer in 2012.    January 2019 she had a stool study that was negative for H. pylori.    Over the past couple of months she has had a fairly constant but waxing and waning epigastric stabbing type pain.  She has a poor appetite.  She has not been eating regular meals.  She goes for long periods of time without eating meals, sometimes just eating one meal in the evening.    She reports her bowels are is normal, denies diarrhea or constipation.  Denies melena or blood in stool.    She does have a past history of coronary artery disease with coronary artery bypass in 2011.  She has not had chest pain in the epigastric pain is nonexertional.    December 2018 nuclear medicine stress test was negative for infarction or ischemia.  Ejection fraction 55%.    December 2018 abdominal ultrasound showed normal gallbladder and normal liver.    December 2018 renal ultrasound of her transplant kidney was described as normal.    February 22 of this year she was in walk-in care with worsening abdominal pain.  Liver tests  were normal at that time.  Creatinine was stable at 2.1.  Hemoglobin slightly higher at 11.4.  White count 10.4.    Patient was told to start taking Zantac 150 mg twice a day in addition to her Protonix 40 mg twice a day.  She has been eating a bland her diet, drinking ginger ale at times.    Since February 22 she is felt significantly better and over the past couple of days has had nearly resolution of her epigastric pain.    No fevers.  Still swallowing normally.  No mouth sores or throat pain.    No cough or increasing shortness of breath.    She does take a full aspirin 325 mg a day.  Also on Crestor 5 mg a day.    Her hypertension has been controlled in the 120s-140s over 60s-80 generally.  On February 22 her blood pressure was 128/80.  Also this month blood pressure was 153/52.    She has a diagnosis of heart failure but her last echo in January 25, 2019 showed ejection fraction 50-55%.    He just had a possible TIA in January.  Head CT scan did not show a new event, old strokes were seen.  She has a MRI of her brain scheduled for next month.  She had carotid ultrasound last month that showed 50-69% bilateral stenosis.    She does have a nonhealing wound on her left toe, she was seen in the vascular clinic earlier this month.  She had an angiogram last month that showed severe disease of left anterior tibial artery in addition to other severe artery disease.  Bone scan did not show osteomyelitis.  He does have sniffing and neuropathy and numbness of her feet.    Stenting of the left leg was not done because of a question of a mitral valve vegetation at that time while she was in the hospital.    Infectious disease saw patient.  She had repeat blood cultures which were negative.  February 5 blood culture negative x2.  January 27 and 26 blood cultures negative.  January 25 blood culture was only positive 1 out of 2 cultures and it was staph epidermidis.    She has not had any fever.    She has a past history of  epilepsy but no seizures for the past 4 years.  She is on Keppra.    Past history of chronic depression on Effexor and trazodone.  She feels this is stable.    Patient is on Fosamax 35 mg a week with her history of chronic prednisone use.    It was noted in chart that patient uses medical marijuana.    She quit smoking in 1984    PMHx:    Past Medical History:   Diagnosis Date     Adrenal insufficiency (H)      Anemia      Anemia     Created by Conversion      Anxiety      Arthritis      Ataxia 5/12/2015     CAD (coronary artery disease)      Cardiac pacemaker, dual, in situ 12/7/2016    [employer] Revo MRI DOI: 12/15/2011 Dr. Aishwarya Treviño     Chronic Diarrhea Of Unknown Origin     Created by Conversion      Chronic Gout     Created by Conversion  Replacement Utility updated for latest IMO load     Chronic Reflux Esophagitis     Created by Conversion      Congestive Heart Failure     Created by Conversion  Replacement Utility updated for latest IMO load     Coronary Artery Stenosis     Created by Conversion [employer] [facility] Annotation: Feb 27 2011 11:37PM - Alina Zapien: s/p CABGx4  1/11  Replacement Utility updated for latest IMO load     CVA (cerebral vascular accident) (H)      Depression      Diabetic Peripheral Neuropathy     Created by Conversion      DM (diabetes mellitus) (H)      Dysthymic disorder     Created by Conversion      End Stage Renal Disease     Created by Conversion      Epilepsy (H)      ESRD (end stage renal disease) (H)      Essential Thrombocytosis     Created by Conversion      History of renal transplant      Hyperlipidemia      Hypertension      Hypertension     Created by Conversion  Replacement Utility updated for latest IMO load     Insomnia     Created by Conversion      Ischemic Stroke     Created by Conversion  Replacement Utility updated for latest IMO load     Medullary Sponge Kidney Bilaterally     Created by Conversion      Mixed hyperlipidemia     Created by Conversion       Morbid obesity (H) 8/29/2018     Neuropathy (H)      Nonintractable epilepsy without status epilepticus, unspecified epilepsy type (H) 8/29/2018     CULLEN on CPAP     Created by Conversion      Osteoarthritis     Created by Conversion  Replacement Utility updated for latest IMO load     Renal Transplant Recipient     Created by Conversion      Sensorineural hearing loss     Created by Conversion  Replacement Utility updated for latest IMO load     Thrombophlebitis Of Deep Vessels Of The Lower Extremity     Created by Conversion      Type 2 diabetes mellitus (H)     Created by Conversion      Vertigo      PSHx:    Past Surgical History:   Procedure Laterality Date     CARDIAC PACEMAKER PLACEMENT       HYSTERECTOMY      Age 28     IR EXTREMITY ANGIOGRAM LEFT  1/30/2019     NEPHRECTOMY TRANSPLANTED ORGAN  06/2011     TN APPENDECTOMY      Description: Appendectomy;  Recorded: 11/13/2007;     TN CABG, VEIN, SINGLE      Description: CABG (CABG);  Proc Date: 01/26/2011;  Comments: x 4     TN INS NEW/RPLC PRM PACEMAKER W/TRANSV ELTRD VENTR      Description: Permanent Pacemaker Placement;  Recorded: 08/01/2012;     TN TOTAL ABDOM HYSTERECTOMY      Description: Total Abdominal Hysterectomy;  Recorded: 11/13/2007;     TN TOTAL KNEE ARTHROPLASTY      Description: Total Knee Arthroplasty;  Recorded: 11/13/2007;     Immunizations:   Immunization History   Administered Date(s) Administered     DT (pediatric) 01/01/1992     Influenza G5r0-08, 01/25/2010     Influenza high dose, seasonal 10/12/2015, 09/28/2016, 09/26/2017, 08/29/2018     Influenza, inj, historic,unspecified 10/23/2007, 10/27/2008, 11/09/2009     Pneumo Conj 13-V (2010&after) 10/12/2015     Pneumo Polysac 23-V 10/01/1996, 10/27/2008     Td,adult,historic,unspecified 08/08/2002     Tdap 06/30/2016, 11/05/2018     ZOSTER, LIVE 07/03/2012       ROS A comprehensive review of systems was performed and was otherwise negative    Medications, allergies, and problem list were  reviewed and updated    Exam  /66 (Patient Site: Right Arm, Patient Position: Sitting, Cuff Size: Adult Regular)   Pulse 64   Temp 97.7  F (36.5  C) (Oral)   Resp 16   Wt 196 lb (88.9 kg)   LMP 07/23/1974   SpO2 99%   BMI 35.28 kg/m    Patient does not appear acutely ill in no jaundice.  Pupils and irises are equal.  No oral lesions or thrush.  Some mild irritative pharyngitis.  No cervical or supra clavicular adenopathy.  No JVD.  Lungs are clear to auscultation.  No crackles or wheezing.  Heart is regular with no murmur rub or gallop.  I did not hear murmur.  Abdomen is moderately obese with mild to moderate epigastric tenderness to deeper palpation but fairly localized in the epigastric area.  There is no right upper quadrant tenderness and liver edge was palpable and normal.  I did not feel pulsatile mass.  There is no lower abdominal tenderness.  Skin appears normal in the upper abdomen.  No ankle edema.  Patient ambulatory and able to clamp on the exam table.    Assessment/Plan  1. Epigastric pain  I suspect gastritis, likely bile reflux gastritis.    Associated with chronic NSAID use with a full aspirin daily and chronic prednisone use.    She does not eat regular meals, with risk for bile reflux.    I stressed the importance of regular bland meals 3 times a day or more.    She does not like oatmeal.    I recommended Metamucil daily.  She will plan to take that in the late morning or afternoon when she is not eating regular meals.  She was told to take at least 1 hour apart from her immunosuppression medications.    She does feel better with the Zantac twice daily and the Protonix and I will have her continue on that for now.    Follow-up with me next month.    Refer to GI for endoscopy.  I did discuss some risks of endoscopy including esophageal perforation, bleeding and other risks including aspiration.  Patient accepts these risks and wishes to proceed.    I would not suspect cardiac etiology  of this pain as it is nonexertional, and stress test negative last December.      Patient does have severe peripheral vascular disease and possibly this is intestinal ischemia associated with her peripheral vascular disease.  She is describing more epigastric pain rather than periumbilical pain, however.      - ranitidine (ZANTAC) 150 MG tablet; Take 1 tablet (150 mg total) by mouth 2 (two) times a day.  Dispense: 60 tablet; Refill: 2  - Ambulatory referral for Upper GI Endoscopy    2. Type 2 diabetes mellitus with foot ulcer, with long-term current use of insulin (H)  Controlled but some fluctuation in her blood sugars with not eating regular meals.  Continue on current 7030 insulin.  Follows up with endocrinology on April 12.    She does have a FreeStyle continuous glucometer    3. Renal Transplant Recipient  Management per the renal transplant team.  On immunosuppression.    4. Atherosclerosis of native artery of left lower extremity with ulceration of other part of foot (H)  No chest pain.  Continue medical management with Crestor 5 mg a day, she does not tolerate higher doses of that.  Continue full aspirin.    5. Carotid stenosis, bilateral  Recent TIA.  Crestor and aspirin.    MRI scheduled next month.    History of seizure disorder on Keppra.  Managed by neurology.    6. CULLEN on CPAP  Compliant with CPAP    History of chronic dysthymia, controlled on Effexor and trazodone.    Questionable positive blood culture with mitral valve vegetation, it appears that the follow blood cultures are negative and no fever or sign of endocarditis.  This point that is not suspected.  Follow-up next month to determine if further evaluation on this is needed.  Could consider repeat heart echo.    Return in about 3 weeks (around 3/19/2019) for Recheck.   Patient Instructions   Start taking 2 scoops of Metamucil with glass of water in the morning or afternoon.  Start eating small bland meals at least 3 times a day.    Continue  Protonix twice a day in addition to the ranitidine 150 mg twice a day.    [place] [facility] will contact you to set up your endoscopy of your stomach.    Follow-up with me next month for an establish care visit.    If you have significant worsening abdominal pain, develop fevers, or black stool or blood in stool, seek medical attention sooner.    Mika Ha MD  I spent a total time with patient of over 40 minutes and over 50% coord care.  Time all face to face.  Extensive time with patient with extensive review of medical record.  Review of cardiac echo, previous imaging, previous lab work.  History also obtained from her friend, [place].  Who is with her today.    Current Outpatient Medications   Medication Sig Dispense Refill     alendronate (FOSAMAX) 35 MG tablet Take 35 mg by mouth once a week. Weekly on Sundays. Take in the morning with a full glass of water, on an empty stomach, and do not take anything else by mouth or lie down for the next 30 min.  Sundays             amLODIPine (NORVASC) 10 MG tablet Take 1 tablet (10 mg) by mouth daily. 90 tablet 3     aspirin 325 MG EC tablet Take 1 tablet (325 mg total) by mouth daily.  0     azaTHIOprine (IMURAN) 50 mg tablet Take 100 mg by mouth bedtime.        bumetanide (BUMEX) 2 MG tablet Take 0.5 tablets (1 mg total) by mouth daily. 20 tablet 0     carvedilol (COREG) 12.5 MG tablet Take 1 tablet (12.5 mg) by mouth twice daily with meals. 180 tablet 3     cholecalciferol, vitamin D3, 2,000 unit Tab Take 2,000 Units by mouth daily.       cyanocobalamin, vitamin B-12, 2,500 mcg Tab Take 2,500 mcg by mouth every other day.       cycloSPORINE modified (GENGRAF) 25 MG capsule Take 50 mg by mouth 2 (two) times a day.        flash glucose scanning reader (FREESTYLE FELA 14 DAY READER) Misc Use 1 application As Directed daily. 1 each 0     flash glucose sensor (FREESTYLE FELA 14 DAY SENSOR) Kit Use 1 application As Directed every 14 (fourteen) days. 2 kit 11      "hydrALAZINE (APRESOLINE) 25 MG tablet Take 2 tablets (50 mg total) by mouth 4 (four) times a day. 30 tablet 0     insulin NPH and regular human (NOVOLIN 70-30 FLEXPEN U-100) 100 unit/mL (70-30) pen Inject 5 units SQ before breakfast and 10 units SQ before dinner. 5 adj dose pen 3     levETIRAcetam (KEPPRA) 500 MG tablet One tablet in the morning. Two tablets in the afternoon (Patient taking differently: One tablet in the morning. Two tablets at bedtime      ) 270 tablet 3     pantoprazole (PROTONIX) 40 MG tablet Take 40 mg by mouth 2 (two) times a day.       pen needle, diabetic (BD ULTRA-FINE TWILA PEN NEEDLES) 32 gauge x 5/32\" Ndle Use 1 per day. 200 each 4     predniSONE (DELTASONE) 5 MG tablet Take 5 mg by mouth daily.       ranitidine (ZANTAC) 150 MG tablet Take 1 tablet (150 mg total) by mouth 2 (two) times a day. 60 tablet 2     rosuvastatin (CRESTOR) 5 MG tablet Take 1 tablet (5 mg) by mouth at bedtime. 90 tablet 3     traZODone (DESYREL) 50 MG tablet Take 3 tablets (150 mg total) by mouth at bedtime. (Patient taking differently: Take 100 mg by mouth at bedtime.       ) 180 tablet 1     venlafaxine (EFFEXOR-XR) 150 MG 24 hr capsule Take 1 capsule (150 mg) by mouth daily. 90 capsule 3     No current facility-administered medications for this visit.      Allergies   Allergen Reactions     Lisinopril Cough     Resolved after discontinuation     Mold/Mildew      Latex Rash     Social History     Tobacco Use     Smoking status: Former Smoker     Packs/day: 1.50     Years: 20.00     Pack years: 30.00     Smokeless tobacco: Never Used   Substance Use Topics     Alcohol use: Yes     Comment: occasional     Drug use: No           "

## 2021-06-24 NOTE — TELEPHONE ENCOUNTER
Please contact patient's transplant program.  Dr. Rashid Carrillo phone number appears to be [phone].    Please let them know that patient was put on ranitidine 150 mg twice a day.  Determine if they wish to check patient's cyclosporine levels, with this medication added.

## 2021-06-24 NOTE — TELEPHONE ENCOUNTER
[facility] PA team  [phone]  Pool: [facility] PA MED ([id number])          PA has been initiated.       PA form completed and faxed insurance via Cover My Meds     Key:  [id number]      Medication: ALPRAZolam 1MG OR TABS      Insurance: [employer]          Response will be received via fax and may take up to 5-10 business days depending on plan

## 2021-06-24 NOTE — PROGRESS NOTES
Assessment/Plan:   Epigastric pain  Epigastric pain for 2 months, constant and at time severe/stabbing, worse after eating. Associated with recent 30 pound weight loss per patient. Records show she was 229lbs in November 2018 and 207lbs on 2/5/19 and today is 193lbs. Recent H.pylori negative. Taking pantoprazole 40mg and ranitidine (150mg daily). Multiple other medical issues including diabetes, renal transplant and end stage renal disease, CAD, CHF, PVD, recent TIA. These are relatively stable at this moment and she has upcoming follow up visits with Cardiology, Renal, endocrine/DM, neurology. On chronic aspirin and prednisone. Consider pancreatitis though no real N/V, gastritis vs PUD vs malignancy. I think EGD is warranted and should be doable given her relative stability at this time. I will check the CBC since she is worried about her hemoglobin. Also a lipase and repeat CMP. EGD ordered though this could be changed to a consult if primary care prefers. Scheduled recheck for this abdominal pain next week with Dr. Ha who will be her new primary provider since Dr. Jamila Lechuga had no availability. When labs are back, plan will be adjusted if indicated. Meanwhile we will bump up the ranitidine to twice daily. To ER if worse.  - HM1(CBC and Differential)  - Lipase  - Comprehensive Metabolic Panel  - Ambulatory Referral for Upper GI Endoscopy  - HM1 (CBC with Diff)    Increase your ranitidine (zantac) to twice daily - add evening/bedtime dose  Continue the pantoprazole daily as directed before  Labs pending, I will call if very abnormal  Referral to [facility] for EGD - may consider consultation if no findings.   Rooks soft diet, small amounts a little more often  Adequate fluids to prevent dehydration  Add simethicone (anti-gas) if needed - can be found in maalox, mylanta, beano, gas-x, etc.   Avoid caffeine, alcohol, ibuprofen  Take aspirin and prednisone with food  To ER if fevers, severe pain, persistent  vomiting, bloody stools or vomiting.   Follow up next week with primary care for further evaluation      Subjective:      Odalys Miles is a 73 y.o. female with multiple severe medical problems including end-stage renal disease, status post renal transplant, hypertension, coronary artery disease, congestive heart failure, stroke, diabetes, and peripheral vascular disease presents with a friend for evaluation of epigastric abdominal pain.  This has been present for about 2 months.  It is constant.  It is worse after eating but is never gone.  It is not associated with nausea or loss of appetite.  She has not been eating much because when she does so the pain is worse.  Last night she did have some vomiting when the pain became severe.  She describes the pain is sometimes stabbing like a knife at times very intense other times just constant.  She gets some relief after belching.  She has had no diarrhea or constipation no black tarry stools or blood in her stools.  There was no blood or coffee-ground appearance in her emesis last night.  She feels she has lost about 33 pounds due to not eating over the last 2 months.  During this time she has had a couple hospital admissions since [place] time including once for her heart second time for her kidneys.  Her most recent hospitalization at the end of January included evaluation for bacterial endocarditis.  She feels that the abdominal pain has not been addressed at any of these times.  She has however taking pantoprazole and ranitidine.  She had a stool test for H. pylori last month and it was negative.  She has history of having had a bleeding ulcer.  At that time it was not painful but she is worried that she may have an ulcer now.  She denies acid reflux or heartburn.  She has no urinary symptoms or flank pain.  No lower abdominal pain.  The pain is very localized in the epigastric region right where the ribs come together.  It does not radiate to her back.  Does  not change with movement.  She was changed in her last hospitalization to take a full 325 mg aspirin daily.  She cannot tell if her pain has been worse since that or not.  She also takes prednisone low-dose daily.  No chest pain or palpitations no sore throat runny nose cough or shortness of breath.  No increased leg swelling.  No rash.  She has tried Tums on occasion without relief.  She had an endoscopy in the past by Eleni Desir MD from [facility] in approximately 2012 when she had her ulcer and wonders if she should have an endoscopy again.  She is scheduled for a brain MRI in a couple weeks for further evaluation of a possible TIA that she had during her last hospital admission.  She also has follow-ups scheduled with cardiology nephrology and endocrinology for her diabetes.  Her primary care physician will be leaving in March and she will be reestablishing primary care with Dr. Ha.  She is anxious about the weight loss and about the continuous pain and inability to eat even though she feels hungry and wants to eat.  She denies fever or chills and no further vomiting today.  Other than the vomiting last night there is been no recent change in this pain but her friend has been urging her to have it reevaluated and brought her here today.  Former smoker.     Allergies   Allergen Reactions     Lisinopril Cough     Resolved after discontinuation     Mold/Mildew      Latex Rash     Current Outpatient Medications on File Prior to Visit   Medication Sig Dispense Refill     amLODIPine (NORVASC) 10 MG tablet Take 1 tablet (10 mg) by mouth daily. 90 tablet 3     aspirin 325 MG EC tablet Take 1 tablet (325 mg total) by mouth daily.  0     azaTHIOprine (IMURAN) 50 mg tablet Take 100 mg by mouth bedtime.        bumetanide (BUMEX) 2 MG tablet Take 0.5 tablets (1 mg total) by mouth daily. 20 tablet 0     carvedilol (COREG) 12.5 MG tablet Take 1 tablet (12.5 mg) by mouth twice daily with meals. 180 tablet 3      "cholecalciferol, vitamin D3, 2,000 unit Tab Take 2,000 Units by mouth daily.       cyanocobalamin, vitamin B-12, 2,500 mcg Tab Take 2,500 mcg by mouth every other day.       cycloSPORINE modified (GENGRAF) 25 MG capsule Take 50 mg by mouth 2 (two) times a day.        flash glucose scanning reader (FREESTYLE FELA 14 DAY READER) Misc Use 1 application As Directed daily. 1 each 0     flash glucose sensor (FREESTYLE FELA 14 DAY SENSOR) Kit Use 1 application As Directed every 14 (fourteen) days. 2 kit 11     hydrALAZINE (APRESOLINE) 25 MG tablet Take 2 tablets (50 mg total) by mouth 4 (four) times a day. 30 tablet 0     insulin NPH and regular human (NOVOLIN 70-30 FLEXPEN U-100) 100 unit/mL (70-30) pen Inject 5 units SQ before breakfast and 10 units SQ before dinner. 5 adj dose pen 3     levETIRAcetam (KEPPRA) 500 MG tablet One tablet in the morning. Two tablets in the afternoon (Patient taking differently: One tablet in the morning. Two tablets at bedtime      ) 270 tablet 3     pantoprazole (PROTONIX) 40 MG tablet Take 40 mg by mouth 2 (two) times a day.       predniSONE (DELTASONE) 5 MG tablet Take 5 mg by mouth daily.       ranitidine (ZANTAC) 150 MG tablet Take 150 mg by mouth daily.       rosuvastatin (CRESTOR) 5 MG tablet Take 1 tablet (5 mg) by mouth at bedtime. 90 tablet 3     traZODone (DESYREL) 50 MG tablet Take 3 tablets (150 mg total) by mouth at bedtime. (Patient taking differently: Take 100 mg by mouth at bedtime.       ) 180 tablet 1     venlafaxine (EFFEXOR-XR) 150 MG 24 hr capsule Take 1 capsule (150 mg) by mouth daily. 90 capsule 3     alendronate (FOSAMAX) 35 MG tablet Take 35 mg by mouth once a week. Weekly on Sundays. Take in the morning with a full glass of water, on an empty stomach, and do not take anything else by mouth or lie down for the next 30 min.  Sundays             pen needle, diabetic (BD ULTRA-FINE TWILA PEN NEEDLES) 32 gauge x 5/32\" Ndle Use 1 per day. 200 each 4     No current " facility-administered medications on file prior to visit.      Patient Active Problem List   Diagnosis     Essential Thrombocytosis     Osteoarthritis     Thrombophlebitis Of Deep Vessels Of The Lower Extremity     Medullary Sponge Kidney Bilaterally     CULLEN on CPAP     Mixed hyperlipidemia     End Stage Renal Disease     Renal Transplant Recipient     Benign Essential Hypertension     Coronary Artery Stenosis     Chronic Reflux Esophagitis     Chronic Gout     Congestive Heart Failure     Ischemic Stroke     Type 2 diabetes mellitus (H)     Dysthymic Disorder     Diabetic Peripheral Neuropathy     Anemia     Sensorineural Hearing Loss     Hyperlipidemia     Ataxia     Cardiac pacemaker, dual, in situ     Adrenal insufficiency (H)     Nonintractable epilepsy without status epilepticus, unspecified epilepsy type (H)     Morbid obesity (H)     TIA (transient ischemic attack)     Diabetic ulcer of toe of left foot associated with type 2 diabetes mellitus, unspecified ulcer stage (H)     Atherosclerosis of native artery of left lower extremity with ulceration of other part of foot (H)       Objective:     /80   Pulse 66   Temp 98.6  F (37  C)   Resp 19   Wt 193 lb (87.5 kg)   LMP 07/23/1974   SpO2 97%   Breastfeeding? No   BMI 34.74 kg/m      Physical  General Appearance: Alert, cooperative, no distress, AVSS  Head: Normocephalic, without obvious abnormality, atraumatic  Eyes: Conjunctivae are normal.  Nose: No significant congestion.  Throat: lips pink, not overly dry  Lungs: Clear to auscultation bilaterally, respirations unlabored  Heart: Regular rate and rhythm  Abdomen: Soft, protuberant but not tympanitic, tender in the epigastric area only, mild guarding, no rebound, no pain elsewhere with palpation, no masses, no organomegaly  Extremities: No lower extremity edema  Skin: Skin color, texture, turgor normal, no rashes or lesions  Psychiatric: Patient has a normal mood and affect.

## 2021-06-24 NOTE — PROGRESS NOTES
Patient set up for MRI scan with RN and medtronic rep at 0920.  MRI began at 0921.  HR and 02 sats monitored continuously throughout exam.  See VS tab.  Exam complete at 0945. Patient tolerated MRI with no concerns.  Medtronic rep reprogrammed pacer mode at completion ( 0950).

## 2021-06-24 NOTE — TELEPHONE ENCOUNTER
Fax received from [facility], they have started the Prior Authorization Process via Cover My Meds    CoverMyMeds Key: [id number]    Medication Name: Alprazolam 1mg tablet    Insurance Plan:   PBM: [employer]  Patient ID: not provided on fax    Please complete the PA process

## 2021-06-25 NOTE — PROGRESS NOTES
MTM Follow Up Encounter  Assessment & Plan                                                     1. Type 2 diabetes mellitus   Improved patient qualify of life with switching to insulin in pen form and FreeStyle Livier CGM, although she is having trouble applying the CGM sensors. Walked through instructions today and watched video from  website. Her questions were answered.   I am concerned with her only eating 1 meal per day on the mixed insulin two times a day. Increased risk of hypoglycemia. Explained importance of eating regular meals at least twice a day and she verbalized understanding. Blood sugars are reasonable and with priority placed on avoiding hypoglycemia, will not increase insulin doses today.   Plan   1. Education: FreeStyle Livier sensor placement.     2. Mixed hyperlipidemia/CVA  Appropriately on a moderate intensity statin given age and diabetes as well as CVA history per ACC/AHA guidelines. Last LDL of 48 mg/dl. I would not increase dose of rosuvastatin any higher than 5 mg due to interaction with cyclosporine, as cyclosporine can increase rosuvastatin serum levels. Cyclosporine levels are not affected.  Higher dose aspirin may be contributing to gastritis/epigastric pain. Symptoms worsened when aspirin dose was increased a few months ago for possible TIA, although per patient report, that was ruled out per neurology. Of note, she does have PVD. Recommended discussing a dose decrease in aspirin with neurology to see if that improves epigastric pain.   Plan   1. Consider dose decrease in aspirin to  mg daily. Discuss with neurology.      3. CHF/CAD   Blood pressure is well controlled and meeting goal of <140/90 mm Hg per JNC-8 hypertension guidelines.      4. Renal Transplant Recipient  Managed by transplant clinic. Currently on Fosamax for fracture prevention given chronic steroid use, although this may be contributing to gastritis and epigastric pain. Advised patient discuss  alternatives with transplant team, such as IV bisphosphonate or Prolia.   Plan   1. Consider changing from PO to IV bisphosphonate or Prolia.      5. Dysthymic disorder/Insomnia  Insomnia is not optimally controlled. Did not tolerate dose increase in trazodone. Optimal sleep hygiene discussed, but insomnia may be anxiety related. Discussed options to consider including hydroxyzine or mirtazapine. Opted to start hydroxyzine. Medication education and counseling was provided, including indication, expected effect, dosing instructions, and possible side effects. The patient's questions were answered.   Plan   1. Start hydroxyzine pamoate 25 mg at bedtime as needed for anxiety or sleep.      6. Epilepsy  Stable on Keppra, as managed by neurology.     7. GERD/Gastritis   Symptoms improving with dose increase in ranitidine and addition of sucralfate. Continues to take high dose PPI. Medications are likely contributing to stomach upset, including higher dose aspirin, prednisone, and bisphosphonate, alendronate. See above for medication recommendations.     Follow Up  Return in about 6 weeks (around 4/29/2019).      Subjective & Objective                                                     Odalys Miles is a 73 y.o. female coming in for an initial visit for Medication Therapy Management. She was referred to me from Jamila Lechuga MD.    Chief Complaint: med review/follow-up  Medication Adherence/Access: Cost is a concern, so generic, low cost medications are preferred options. She sets up her medications in a pillbox every two weeks. She keeps a detailed chart of her medications with details of dosing, indication, etc.     1. Type 2 diabetes mellitus   She is taking Novolin 70/30 10 units before breakfast and 12 units before dinner. At her previous visit we had made changes with her medications for diabetes including stopping NPH and regular insulin and changing to Novolin 70-30 which is available in pen form.  She is  very pleased to no longer be using vial and syringe insulin. She is struggling with lack of appetite and admits to sometimes eating only once a day. No hypoglycemia.   She is also now using Freestyle av continuous glucose monitor sensor system which she finds extremely helpful at monitoring her blood sugars without finger pokes, although she's been having trouble attaching a new sensor and has broken two.   AM: 137, 111, 110, 91, 133, 132, 140, 118, 110  Lunch: 106, 148, 128, 106, 132, 106, 104  Dinner: 166, 209, 159, 155, 204, 115, 91  Bedtime: 198, 136 189, 168, 163, 142, 130      Lab Results   Component Value Date    HGBA1C 6.9 (H) 02/05/2019    HGBA1C 6.6 (H) 11/14/2018    HGBA1C 6.8 (H) 07/18/2018     Lab Results   Component Value Date    MICROALBUR >50.00 (H) 08/29/2018    LDLCALC 48 01/23/2019    CREATININE 2.13 (H) 02/22/2019        2. Mixed hyperlipidemia  Odalys is taking rosuvastatin 5 mg daily. She had possible muscle aches with atorvastatin. She also takes aspirin 325 mg daily, as recommended by neuro after last TIA, although she says now her neurologist said it was not a stroke/TIA. She has a history of stroke about 15 years ago.     3. CHF/CAD  Odalys has diagnosis of chronic heart failure with preserved ejection fraction and coronary artery disease status post three-vessel CABG in 2011.  Last ejection fraction 55%.  She is taking amlodipine 10 mg daily, carvedilol 12.5 mg two times a day, hydralazine 50 mg 4 times a day and bumetanide 1 mg daily. BP at home: 128/71, 131/57, 123/61, 126/62   Of note she has had 3 hospitalizations within the past few months.  She was started on bumetanide and irbesartan during first admission for CHF exacerbation and then was subsequently hospitalized for acute kidney injury.  Her dose of bumetanide was decreased from 2 mg twice daily to once daily and most recently down to 1 mg daily.  It was recommended to hold irbesartan. Because of high BP during last  hospitalization, her dose of clonidine and hydralazine were increased, but then transplant team stopped clonidine. She was feeling dizzy, especially upon standing, but that is better. Her mouth is less dry.   She was diagnosed with peripheral vascular disease. [place] [facility] [place]      4. Renal Transplant Recipient  Odalys is taking Imuran 100 mg at bedtime, prednisone 5 mg daily, and Gengraf 50 mg BID.  She had a renal transplant in June 2011 and regularly sees the transplant clinic providers every month. She gets bone scans 2-3 times a year and takes alendronate 35 mg weekly to help prevent fractures. She's having her next DEXA today. Also on Septra for PCP prophylaxis.      5. Dysthymic disorder/Insomnia  Odalys is taking venlafaxine  mg daily. She was feeling very depressed the past few months with her epigastric pain and lack of appetite, but as that is starting to improve, her mood is improving too. She has seen a therapist in the past. She did try duloxetine this past summer, but that was ineffective.    She is also taking trazodone 100 mg at bedtime for insomnia. She is not sleeping well at all. Last night she was up all night. We tried 150 mg of trazodone, which worked well, but she felt too tired the next day and didn't want to wake up, so she went back to 100 mg. She has been on melatonin in the past, which was ineffective. She listens to soothing music. Her mind races and she can't stop thinking and worrying. She does use a CPAP.      6. Epilepsy  For her history of seizures, Odalys is taking Keppra 500 mg in the morning and 100 mg at bedtime. She hasn't had a seizure for at least 4 years.     7. GERD/Gastritis   Odalys is taking pantoprazole 40 mg two times a day, Zantac 150 mg two times a day, and sucralfate 1 g four times a day. Because of severe epigastric pain and weight loss, her Zantac dose was increased to two times a day. She did have an endoscopy and was told she has  "an \"irritable stomach\" and sucralfate was added. She is feeling better, but still has low appetite. She finds when she eats smaller meals she feels better.       PMH: reviewed in EPIC   Allergies/ADRs: reviewed in EPIC   Alcohol: reviewed in EPIC   Tobacco:   Social History     Tobacco Use   Smoking Status Former Smoker     Packs/day: 1.50     Years: 20.00     Pack years: 30.00   Smokeless Tobacco Never Used     Today's Vitals:   BP Readings from Last 3 Encounters:   02/26/19 136/66   02/22/19 128/80   02/13/19 136/84     Pulse Readings from Last 3 Encounters:   03/12/19 84   02/26/19 64   02/22/19 66     Wt Readings from Last 3 Encounters:   02/26/19 196 lb (88.9 kg)   02/22/19 193 lb (87.5 kg)   02/11/19 207 lb (93.9 kg)       ----------------    Much or all of the text in this note was generated through the use of Dragon Dictate voice-to-text software. Errors in spelling or words which seem out of context are unintentional. Sound alike errors, in particular, may have escaped editing.    The patient was given a summary of these recommendations as an after visit summary    I spent 45 minutes with this patient today.   All changes were made via collaborative practice agreement with Jamila Lechuga MD. A copy of the visit note was provided to the patient's provider.     Chayito Mccollum, PharmD, BCACP  Medication Management Pharmacist  [facility]        Current Outpatient Medications   Medication Sig Dispense Refill     alendronate (FOSAMAX) 35 MG tablet Take 35 mg by mouth once a week. Weekly on Sundays. Take in the morning with a full glass of water, on an empty stomach, and do not take anything else by mouth or lie down for the next 30 min.  Sundays             amLODIPine (NORVASC) 10 MG tablet Take 1 tablet (10 mg) by mouth daily. 90 tablet 3     aspirin 325 MG EC tablet Take 1 tablet (325 mg total) by mouth daily.  0     azaTHIOprine (IMURAN) 50 mg tablet Take 100 mg by mouth " "bedtime.        bumetanide (BUMEX) 2 MG tablet Take 0.5 tablets (1 mg total) by mouth daily. 20 tablet 0     carvedilol (COREG) 12.5 MG tablet Take 1 tablet (12.5 mg) by mouth twice daily with meals. 180 tablet 3     cholecalciferol, vitamin D3, 2,000 unit Tab Take 2,000 Units by mouth daily.       cyanocobalamin, vitamin B-12, 2,500 mcg Tab Take 2,500 mcg by mouth every other day.       cycloSPORINE modified (GENGRAF) 25 MG capsule Take 50 mg by mouth 2 (two) times a day.        flash glucose scanning reader (FREESTYLE FELA 14 DAY READER) Misc Use 1 application As Directed daily. 1 each 0     flash glucose sensor (FREESTYLE FELA 14 DAY SENSOR) Kit Use 1 application As Directed every 14 (fourteen) days. 2 kit 11     hydrALAZINE (APRESOLINE) 25 MG tablet Take 2 tablets (50 mg total) by mouth 4 (four) times a day. 30 tablet 0     hydrOXYzine pamoate (VISTARIL) 25 MG capsule Take 1 capsule (25 mg total) by mouth at bedtime as needed for anxiety or other (insomnia). 30 capsule 3     insulin NPH and regular human (NOVOLIN 70-30 FLEXPEN U-100) 100 unit/mL (70-30) pen Inject 5 units SQ before breakfast and 10 units SQ before dinner. 5 adj dose pen 3     levETIRAcetam (KEPPRA) 500 MG tablet One tablet in the morning. Two tablets in the afternoon (Patient taking differently: One tablet in the morning. Two tablets at bedtime      ) 270 tablet 3     pantoprazole (PROTONIX) 40 MG tablet Take 40 mg by mouth 2 (two) times a day.       pen needle, diabetic (BD ULTRA-FINE TWILA PEN NEEDLES) 32 gauge x 5/32\" Ndle Use 1 per day. 200 each 4     predniSONE (DELTASONE) 5 MG tablet Take 5 mg by mouth daily.       ranitidine (ZANTAC) 150 MG tablet Take 1 tablet (150 mg total) by mouth 2 (two) times a day. 60 tablet 2     rosuvastatin (CRESTOR) 5 MG tablet Take 1 tablet (5 mg) by mouth at bedtime. 90 tablet 3     sucralfate (CARAFATE) 1 gram tablet Take 1 g by mouth 4 (four) times a day before meals and at bedtime.  1     traZODone " (DESYREL) 50 MG tablet Take 3 tablets (150 mg total) by mouth at bedtime. (Patient taking differently: Take 100 mg by mouth at bedtime.       ) 180 tablet 1     venlafaxine (EFFEXOR-XR) 150 MG 24 hr capsule Take 1 capsule (150 mg) by mouth daily. 90 capsule 3     No current facility-administered medications for this visit.

## 2021-06-25 NOTE — PATIENT INSTRUCTIONS - HE
Recommendations from today's Medication Management (MTM) visit:                                                      1. When you place the FreeStyle Livier sensor, don't do any twisting. You can watch the video again online.     2. Talk to neurologist about decreasing aspirin dose back to 81 mg daily since they don't think you had a TIA and it is likely contributing to your stomach upset/gastritis.     3. Make sure to eat at least something for breakfast.    4. Try hydroxyzine (Vistaril) 25 mg at bedtime as needed for sleep.     5. Based on your bone scan results and if your stomach is still hurting, talk to transplant team about switching Fosamax (alendronate) to the injectable form - either Reclast IV or Prolia - injection in clinic every 6 months.      Next Appointment:    Monday, April 29th at 10:30 AM     To schedule another MTM appointment, please call the clinic directly or you may call   the MTM scheduling line at [phone] or toll-free at [phone].     My MTM pharmacist's contact information:                                                      It was a pleasure seeing you today. Thank you for your engagement in getting the most out of your medications! Please feel free to contact me with any questions or concerns you have.      Chayito Mccollum, PharmD, [facility]  Medication Management (MTM) Pharmacist  [facility]  [phone]    You may receive a survey about the MTM services you received by email and/or US Mail.  I would appreciate your feedback to help me serve you better in the future. Your comments will be anonymous.

## 2021-06-26 NOTE — PROGRESS NOTES
Progress Notes by Alina Escamilla MD at 1/12/2018  1:10 PM     Author: Alina Escamilla MD Service: -- Author Type: Physician    Filed: 1/12/2018  1:55 PM Encounter Date: 1/12/2018 Status: Signed    : Alina Escamilla MD (Physician)           Click to link to [facility] HEART CARE NOTE    Thank you, Dr. Lechuga, for asking us to see Odalys Miles at the [facility].      Assessment/Recommendations   Assessment:    1.  Coronary artery disease: Status post coronary artery bypass grafting with no anginal complaints today.  Continue on daily aspirin.  2.  Hypertension: Well-controlled today.  3. Sinus node dysfunction: status post pacemaker placement functioning well.  4.  Dyslipidemia: Triglycerides elevated on her last fasting lipids done a few months ago.  She is considering starting a study involving fiber therapy.  Continue statin therapy.   Follow up in one year.        History of Present Illness    Ms. Odalys Miles is a 72 y.o. female with history of coronary artery disease status post coronary artery bypass grafting, ESRD status post kidney transplant with chronic kidney disease and sick sinus syndrome status post PPM here for an annual follow up.  She has been doing well since I last saw her.  She underwent hand surgery last month which is taking a little while to heal up for her.  She uses a walker for ambulation.  Her device check today shows that her device is well functioning without any significant events.  Only 2 brief episodes of AT/AF the longest only 2 minutes.  She denies any problems with chest pain or palpitations and has stable shortness of breath on exertion.       Physical Examination Review of Systems   Vitals:    01/12/18 1227   BP: 116/74   Pulse: 68   Resp: 16     Body mass index is 41.55 kg/(m^2).  Wt Readings from Last 3 Encounters:   01/12/18 (!) 229 lb (103.9 kg)   01/08/18 (!) 232 lb 11.2 oz (105.6 kg)   01/03/18  (!) 227 lb (103 kg)       General Appearance:   alert, no apparent distress   HEENT:  no scleral icterus; the mucous membranes are pink and moist                                  Neck: jugular venous pressure normal   Chest: the spine is straight and the chest is symmetric   Lungs:   respirations unlabored; the lungs are clear to auscultation   Cardiovascular:   regular rhythm with normal first and second heart sounds and no murmurs or gallops   Abdomen:  no organomegaly, masses, bruits, or tenderness; bowel sounds are present   Extremities: no edema   Skin: no xanthelasma    General: WNL  Eyes: WNL  Ears/Nose/Throat: WNL  Lungs: WNL  Heart: WNL  Stomach: WNL  Bladder: WNL  Muscle/Joints: Joint Pain  Skin: WNL  Nervous System: Dizziness, Loss of Balance  Mental Health: WNL     Blood: WNL     Medical History  Surgical History Family History Social History   Past Medical History:   Diagnosis Date   ? Anemia    ? Anxiety    ? Arthritis    ? CAD (coronary artery disease)    ? CVA (cerebral vascular accident)    ? Depression    ? DM (diabetes mellitus)    ? Epilepsy    ? ESRD (end stage renal disease)    ? History of renal transplant    ? Hyperlipidemia    ? Hypertension    ? Neuropathy    ? Vertigo     Past Surgical History:   Procedure Laterality Date   ? CARDIAC PACEMAKER PLACEMENT     ? HYSTERECTOMY      Age 28   ? NEPHRECTOMY TRANSPLANTED ORGAN  06/2011   ? LA APPENDECTOMY      Description: Appendectomy;  Recorded: 11/13/2007;   ? LA CABG, VEIN, SINGLE      Description: CABG (CABG);  Proc Date: 01/26/2011;  Comments: x 4   ? LA INS NEW/RPLC PRM PACEMAKER W/TRANSV ELTRD VENTR      Description: Permanent Pacemaker Placement;  Recorded: 08/01/2012;   ? LA TOTAL ABDOM HYSTERECTOMY      Description: Total Abdominal Hysterectomy;  Recorded: 11/13/2007;   ? LA TOTAL KNEE ARTHROPLASTY      Description: Total Knee Arthroplasty;  Recorded: 11/13/2007;    Family History   Problem Relation Age of Onset   ? Cancer Sister    ?  "Diabetes Sister    ? Cancer Father     Social History     Social History   ? Marital status:      Spouse name: N/A   ? Number of children: N/A   ? Years of education: N/A     Occupational History   ? Not on file.     Social History Main Topics   ? Smoking status: Former Smoker     Packs/day: 1.50     Years: 20.00   ? Smokeless tobacco: Never Used   ? Alcohol use Yes      Comment: occasional   ? Drug use: No   ? Sexual activity: Not on file     Other Topics Concern   ? Not on file     Social History Narrative          Medications  Allergies   Current Outpatient Prescriptions   Medication Sig Dispense Refill   ? alendronate (FOSAMAX) 35 MG tablet Take 35 mg by mouth once a week. Take in the morning with a full glass of water, on an empty stomach, and do not take anything else by mouth or lie down for the next 30 min.  Sundays     ? amLODIPine (NORVASC) 10 MG tablet Take 1 tablet (10 mg) by mouth daily. 90 tablet 2   ? aspirin 81 MG EC tablet Take 1 tablet (81 mg total) by mouth daily.  99   ? azaTHIOprine (IMURAN) 50 mg tablet Take 100 mg by mouth bedtime.      ? cholecalciferol, vitamin D3, 2,000 unit Tab Take 2,000 Units by mouth daily.     ? CONTOUR NEXT STRIPS strips Test blood sugar 4-6 times daily 550 each 3   ? cycloSPORINE modified (GENGRAF) 25 MG capsule Take 50 mg by mouth 2 (two) times a day.      ? insulin regular (NOVOLIN R) 100 unit/mL injection Inject 2-14 Units under the skin 3 (three) times a day before meals.     ? lancets (ACCU-CHEK FASTCLIX) Misc Check blood sugar up to 5 times per day 102 each 3   ? levETIRAcetam (KEPPRA) 500 MG tablet One tablet in the morning. Two tablets in the afternoon 90 tablet 0   ? pantoprazole (PROTONIX) 40 MG tablet Take 40 mg by mouth 2 (two) times a day.     ? pen needle, diabetic (BD ULTRA-FINE TWILA PEN NEEDLES) 32 gauge x 5/32\" Ndle Use 1 per day. 200 each 4   ? predniSONE (DELTASONE) 5 MG tablet Take 5 mg by mouth daily.     ? rosuvastatin (CRESTOR) 5 MG " tablet Take 1 tablet (5 mg total) by mouth at bedtime. 90 tablet 2   ? TOUJEO SOLOSTAR 300 unit/mL (1.5 mL) injection Inject 38 Units under the skin daily. 4.5 mL 0   ? traMADol (ULTRAM) 50 mg tablet Take 1 tablet (50 mg total) by mouth every 8 (eight) hours as needed for pain. 40 tablet 0   ? traZODone (DESYREL) 50 MG tablet Take 1 tablet (50 mg) by mouth at bedtime. 90 tablet 1   ? venlafaxine (EFFEXOR-XR) 150 MG 24 hr capsule Take 1 capsule (150 mg) by mouth daily. 90 capsule 3     No current facility-administered medications for this visit.       Allergies   Allergen Reactions   ? Latex    ? Lisinopril Cough     Resolved after discontinuation   ? Mold/Mildew    ? Other Allergy (See Comments) Nausea And Vomiting     Patient states allergy to Pantopin.         Lab Results    Chemistry/lipid CBC Cardiac Enzymes/BNP/TSH/INR   Lab Results   Component Value Date    CHOL 193 09/26/2017    HDL 56 09/26/2017    LDLCALC 78 09/26/2017    TRIG 297 (H) 09/26/2017    CREATININE 1.62 (H) 11/28/2017    BUN 30 (H) 11/28/2017    K 4.6 11/28/2017     11/28/2017     (H) 11/28/2017    CO2 23 11/28/2017    Lab Results   Component Value Date    WBC 9.4 10/24/2017    HGB 12.8 10/24/2017    HCT 39.8 10/24/2017    MCV 92 10/24/2017     10/24/2017    Lab Results   Component Value Date    CKTOTAL 76 04/06/2014    CKMB 1 02/01/2011    TROPONINI 0.08 05/12/2015     (H) 01/31/2011    TSH 3.40 04/24/2017    INR 0.96 04/01/2017

## 2021-06-27 NOTE — PROGRESS NOTES
Progress Notes by Alina Escamilla MD at 3/22/2019  2:10 PM     Author: Alina Escamilla MD Service: -- Author Type: Physician    Filed: 3/22/2019  3:40 PM Encounter Date: 3/22/2019 Status: Signed    : Alina Escamilla MD (Physician)           Click to link to [facility] HEART CARE NOTE    Thank you, Dr. Lechuga, for asking us to see Odalys Miles at the [facility].      Assessment/Recommendations   Assessment:    1.  Coronary artery disease: Status post coronary artery bypass surgery.  No anginal complaints.  Continue daily aspirin.  2.  End-stage renal disease: Secondary to medullary sponge kidney status post kidney transplant on chronic immunosuppression therapy  3.  Sinus node dysfunction status post pacemaker placement  4.  Peripheral arterial disease: Left anterior tibial artery multiple areas of stenoses, occluded posterior tibial artery  5.  Hypertension: Well-controlled on multiple medications. Recommend taking hydralazine 3 times a day rather than 4 times a day.  6.  TIA followed by neurology who recommended increased Aspirin dose  7.  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  ID felt less likely to be vegetation. Repeat limited echo prior to next appt.   Follow-up in 6 months       History of Present Illness    Ms. Odalys Miles is a 73 y.o. female with history of end-stage renal disease secondary to medullary sponge kidney in 2011 status post kidney transplant on chronic immunosuppression therapy, diabetes mellitus type 2, coronary artery disease status post coronary artery bypass surgery, sinus node dysfunction status post pacemaker placement and hypertension who I am seeing today in follow-up.  Since I last saw her she was hospitalized for a week at the end of January.  She is admitted with double vision as well as foot ulcer.  She underwent a OBDULIO and that showed a small mass on the posterior  mitral valve annulus.  Blood cultures were unremarkable and ID did not recommend antibiotics and felt that it was not a true vegetation.  She had her aspirin dose increased for a TIA.  No findings on head imaging.  She denies any further neurologic symptoms since that time.  She does have known peripheral arterial disease and is scheduled for angiogram and stent placement by Dr. Au on her left lower extremity.    Transesophageal echocardiogram 1/25/2019    Transesophageal echocardiogram performed in the operating room with the help of anesthesia given history of apnea with prior attempted transesophageal echocardiogram.    Normal left ventricular size.    Left ventricular systolic function appears lower limits of normal. Left ventricular ejection fraction estimated 50-55%.    Normal right ventricular systolic function.    Pacemaker catheter identified within the right heart.    Small mobile echodensity on the posterior mitral valve annulus measuring 0.9 cm in length concerning for vegetation.    Aortic valve sclerosis. Cannot exclude tiny mobile echodensities on the aortic valve leaflets.    Moderate tricuspid insufficiency. Estimate of RV systolic pressure 35 mmHg plus right atrial pressure    Results called to             Physical Examination Review of Systems   Vitals:    03/22/19 1427   BP: 130/48   Pulse: 74   Resp: 18     Body mass index is 34.92 kg/m .  Wt Readings from Last 3 Encounters:   03/22/19 194 lb (88 kg)   02/26/19 196 lb (88.9 kg)   02/22/19 193 lb (87.5 kg)       General Appearance:   alert, no apparent distress   HEENT:  no scleral icterus; the mucous membranes are pink and moist                                  Neck: jugular venous pressure normal   Chest: the spine is straight and the chest is symmetric   Lungs:   respirations unlabored; the lungs are clear to auscultation   Cardiovascular:   regular rhythm with normal first and second heart sounds and no murmurs or gallops    Abdomen:  no organomegaly, masses, bruits, or tenderness; bowel sounds are present   Extremities: Mild edema   Skin: no xanthelasma    General: WNL  Eyes: WNL  Ears/Nose/Throat: WNL  Lungs: WNL  Heart: WNL  Stomach: WNL  Bladder: WNL  Muscle/Joints: WNL  Skin: WNL  Nervous System: WNL  Mental Health: WNL     Blood: WNL     Medical History  Surgical History Family History Social History   Past Medical History:   Diagnosis Date   ? Adrenal insufficiency (H)    ? Anemia    ? Anemia     Created by Conversion    ? Anxiety    ? Arthritis    ? Ataxia 5/12/2015   ? CAD (coronary artery disease)    ? Cardiac pacemaker, dual, in situ 12/7/2016    [employer] Revo MRI DOI: 12/15/2011 Dr. Aishwarya Treviño   ? Chronic Diarrhea Of Unknown Origin     Created by Conversion    ? Chronic Gout     Created by Conversion  Replacement Utility updated for latest IMO load   ? Chronic Reflux Esophagitis     Created by Conversion    ? Congestive Heart Failure     Created by Conversion  Replacement Utility updated for latest IMO load   ? Coronary Artery Stenosis     Created by Conversion [facility] Annotation: Feb 27 2011 11:37PM - Alina Zapien: s/p CABGx4  1/11  Replacement Utility updated for latest IMO load   ? CVA (cerebral vascular accident) (H)    ? Depression    ? Diabetic Peripheral Neuropathy     Created by Conversion    ? DM (diabetes mellitus) (H)    ? Dysthymic disorder     Created by Conversion    ? End Stage Renal Disease     Created by Conversion    ? Epilepsy (H)    ? ESRD (end stage renal disease) (H)    ? Essential Thrombocytosis     Created by Conversion    ? History of renal transplant    ? Hyperlipidemia    ? Hypertension    ? Hypertension     Created by Conversion  Replacement Utility updated for latest IMO load   ? Insomnia     Created by Conversion    ? Ischemic Stroke     Created by Conversion  Replacement Utility updated for latest IMO load   ? Medullary Sponge Kidney Bilaterally     Created by Conversion    ?  Mixed hyperlipidemia     Created by Conversion    ? Morbid obesity (H) 8/29/2018   ? Neuropathy (H)    ? Nonintractable epilepsy without status epilepticus, unspecified epilepsy type (H) 8/29/2018   ? CULLEN on CPAP     Created by Conversion    ? Osteoarthritis     Created by Conversion  Replacement Utility updated for latest IMO load   ? Renal Transplant Recipient     Created by Conversion    ? Sensorineural hearing loss     Created by Conversion  Replacement Utility updated for latest IMO load   ? Thrombophlebitis Of Deep Vessels Of The Lower Extremity     Created by Conversion    ? Type 2 diabetes mellitus (H)     Created by Conversion    ? Vertigo     Past Surgical History:   Procedure Laterality Date   ? CARDIAC PACEMAKER PLACEMENT     ? HYSTERECTOMY      Age 28   ? IR EXTREMITY ANGIOGRAM LEFT  1/30/2019   ? NEPHRECTOMY TRANSPLANTED ORGAN  06/2011   ? AZ APPENDECTOMY      Description: Appendectomy;  Recorded: 11/13/2007;   ? AZ CABG, VEIN, SINGLE      Description: CABG (CABG);  Proc Date: 01/26/2011;  Comments: x 4   ? AZ INS NEW/RPLC PRM PACEMAKER W/TRANSV ELTRD VENTR      Description: Permanent Pacemaker Placement;  Recorded: 08/01/2012;   ? AZ TOTAL ABDOM HYSTERECTOMY      Description: Total Abdominal Hysterectomy;  Recorded: 11/13/2007;   ? AZ TOTAL KNEE ARTHROPLASTY      Description: Total Knee Arthroplasty;  Recorded: 11/13/2007;    Family History   Problem Relation Age of Onset   ? Cancer Sister    ? Diabetes Sister    ? Breast cancer Sister 62   ? Cancer Father     Social History     Socioeconomic History   ? Marital status:      Spouse name: Not on file   ? Number of children: Not on file   ? Years of education: Not on file   ? Highest education level: Not on file   Occupational History   ? Not on file   Social Needs   ? Financial resource strain: Not on file   ? Food insecurity:     Worry: Not on file     Inability: Not on file   ? Transportation needs:     Medical: Not on file     Non-medical:  Not on file   Tobacco Use   ? Smoking status: Former Smoker     Packs/day: 1.50     Years: 20.00     Pack years: 30.00   ? Smokeless tobacco: Never Used   Substance and Sexual Activity   ? Alcohol use: Yes     Comment: occasional   ? Drug use: No   ? Sexual activity: Not on file   Lifestyle   ? Physical activity:     Days per week: Not on file     Minutes per session: Not on file   ? Stress: Not on file   Relationships   ? Social connections:     Talks on phone: Not on file     Gets together: Not on file     Attends [employer] service: Not on file     Active member of club or organization: Not on file     Attends meetings of clubs or organizations: Not on file     Relationship status: Not on file   ? Intimate partner violence:     Fear of current or ex partner: Not on file     Emotionally abused: Not on file     Physically abused: Not on file     Forced sexual activity: Not on file   Other Topics Concern   ? Not on file   Social History Narrative   ? Not on file          Medications  Allergies   Current Outpatient Medications   Medication Sig Dispense Refill   ? alendronate (FOSAMAX) 35 MG tablet Take 35 mg by mouth once a week. Weekly on Sundays. Take in the morning with a full glass of water, on an empty stomach, and do not take anything else by mouth or lie down for the next 30 min.  Sundays           ? amLODIPine (NORVASC) 10 MG tablet Take 1 tablet (10 mg) by mouth daily. 90 tablet 3   ? aspirin 325 MG EC tablet Take 1 tablet (325 mg total) by mouth daily.  0   ? azaTHIOprine (IMURAN) 50 mg tablet Take 100 mg by mouth bedtime.      ? bumetanide (BUMEX) 2 MG tablet Take 0.5 tablets (1 mg total) by mouth daily. 20 tablet 0   ? carvedilol (COREG) 12.5 MG tablet Take 1 tablet (12.5 mg) by mouth twice daily with meals. 180 tablet 3   ? cholecalciferol, vitamin D3, 2,000 unit Tab Take 2,000 Units by mouth daily.     ? cyanocobalamin, vitamin B-12, 2,500 mcg Tab Take 2,500 mcg by mouth every other day.     ?  "cycloSPORINE modified (GENGRAF) 25 MG capsule Take 50 mg by mouth 2 (two) times a day.      ? flash glucose scanning reader (FREESTYLE FELA 14 DAY READER) Misc Use 1 application As Directed daily. 1 each 0   ? flash glucose sensor (FREESTYLE FELA 14 DAY SENSOR) Kit Use 1 application As Directed every 14 (fourteen) days. 2 kit 11   ? hydrALAZINE (APRESOLINE) 25 MG tablet Take 2 tablets (50 mg total) by mouth 3 (three) times a day. 270 tablet 2   ? hydrOXYzine pamoate (VISTARIL) 25 MG capsule Take 1 capsule (25 mg total) by mouth at bedtime as needed for anxiety or other (insomnia). 30 capsule 3   ? insulin NPH and regular human (NOVOLIN 70-30 FLEXPEN U-100) 100 unit/mL (70-30) pen Inject 5 units SQ before breakfast and 10 units SQ before dinner. 5 adj dose pen 3   ? levETIRAcetam (KEPPRA) 500 MG tablet One tablet in the morning. Two tablets in the afternoon (Patient taking differently: One tablet in the morning. Two tablets at bedtime      ) 270 tablet 3   ? pantoprazole (PROTONIX) 40 MG tablet Take 40 mg by mouth 2 (two) times a day.     ? pen needle, diabetic (BD ULTRA-FINE TWILA PEN NEEDLES) 32 gauge x 5/32\" Ndle Use 1 per day. 200 each 4   ? predniSONE (DELTASONE) 5 MG tablet Take 5 mg by mouth daily.     ? ranitidine (ZANTAC) 150 MG tablet Take 1 tablet (150 mg total) by mouth 2 (two) times a day. 60 tablet 2   ? rosuvastatin (CRESTOR) 5 MG tablet Take 1 tablet (5 mg) by mouth at bedtime. 90 tablet 3   ? sucralfate (CARAFATE) 1 gram tablet Take 1 g by mouth 4 (four) times a day before meals and at bedtime.  1   ? traZODone (DESYREL) 50 MG tablet Take 3 tablets (150 mg total) by mouth at bedtime. (Patient taking differently: Take 100 mg by mouth at bedtime.       ) 180 tablet 1   ? venlafaxine (EFFEXOR-XR) 150 MG 24 hr capsule Take 1 capsule (150 mg) by mouth daily. 90 capsule 3     No current facility-administered medications for this visit.       Allergies   Allergen Reactions   ? Lisinopril Cough     Resolved " after discontinuation   ? Mold/Mildew    ? Latex Rash         Lab Results    Chemistry/lipid CBC Cardiac Enzymes/BNP/TSH/INR   Lab Results   Component Value Date    CHOL 129 01/23/2019    HDL 43 (L) 01/23/2019    LDLCALC 48 01/23/2019    TRIG 189 (H) 01/23/2019    CREATININE 2.13 (H) 02/22/2019    BUN 32 (H) 02/22/2019    K 4.6 02/22/2019     02/22/2019     02/22/2019    CO2 24 02/22/2019    Lab Results   Component Value Date    WBC 10.4 02/22/2019    HGB 11.4 (L) 02/22/2019    HCT 36.1 02/22/2019    MCV 92 02/22/2019     02/22/2019    Lab Results   Component Value Date    CKTOTAL 76 04/06/2014    CKMB 1 02/01/2011    TROPONINI 0.08 05/12/2015     (H) 01/31/2011    TSH 3.40 04/24/2017    INR 1.14 (H) 01/30/2019

## 2021-06-27 NOTE — PROGRESS NOTES
Progress Notes by Symone Welch RN at 2/6/2019  4:50 PM     Author: Symone Welch RN Service: -- Author Type: Registered Nurse    Filed: 3/25/2019  5:09 PM Encounter Date: 2/6/2019 Status: Signed    : Symone Welch RN (Registered Nurse)

## 2021-06-28 NOTE — PROGRESS NOTES
Progress Notes by Brodie Way MD (Ted) at 10/30/2019 10:20 AM     Author: Brodie Way MD (Ted) Service: -- Author Type: Physician    Filed: 10/30/2019 10:55 AM Encounter Date: 10/30/2019 Status: Signed    : Brodie Way MD (Ted) (Physician)           Click to link to [facility] NOTE    Thank you, Dr. Dumont, for asking the [facility] to evaluate Ms. Odalys Miles.      Assessment/Recommendations   Assessment:    Acute on chronic heart failure with preserved ejection fraction precipitated by discontinuation of diuretics  Chronic kidney disease status post kidney transplant  Coronary artery disease with remote history of bypass surgery, stable no angina  Permanent pacemaker, normal function  History of mitral valve mass presumably ectopic calcification  Hypertension  Peripheral arterial disease  Diabetes mellitus  Obesity    Plan:  Restart Bumex 1 mg once a day  Return to clinic in 2 weeks for reassessment by her primary cardiologist Dr. Mcintosh or  CHF nurse practitioner       History of Present Illness    Ms. Odalys Miles is a 74 y.o. female who comes into rapid access clinic because of worsening shortness of breath.  She gets breathless with minimal physical activity.  She denies PND and orthopnea.  She has not had exertional chest pains or heart racing.  She has had 5 pounds weight gain.  She admits that she ran out of Bumex about 3 weeks ago.  She went to the emergency room.  She was given 1 dose of Bumex.  She had good urine output but still feels short of breath.  She has a history of heart failure with preserved ejection fraction.  LVEF estimated 50 to 55% by most recent echo.  In 2011 she had bypass surgery.  ECG: Personally reviewed.  10/28/2019 normal sinus rhythm V paced    Echocardiogram: January 2019    Normal left ventricular size.    Left ventricular systolic function appears lower limits of normal.  Left ventricular ejection fraction estimated 50-55%.    Normal right ventricular systolic function.    Pacemaker catheter identified within the right heart.    Small mobile echodensity on the posterior mitral valve annulus measuring 0.9 cm in length concerning for vegetation.    Aortic valve sclerosis. Cannot exclude tiny mobile echodensities on the aortic valve leaflets.    Moderate tricuspid insufficiency. Estimate of RV systolic pressure 35 mmHg plus right atrial pressure         Physical Examination Review of Systems   Vitals:    10/30/19 1018   BP: 142/66   Pulse: 64   Resp: 16     Body mass index is 36.58 kg/m .  Wt Readings from Last 3 Encounters:   10/30/19 200 lb (90.7 kg)   10/28/19 204 lb (92.5 kg)   10/18/19 205 lb 14.4 oz (93.4 kg)     General Appearance:   Alert, cooperative, no distress, appears stated age   Head/ENT: Normocephalic, without obvious abnormality. Membranes moist      EYES:  no scleral icterus, normal conjunctivae   Neck: Supple, symmetrical, trachea midline, no adenopathy, thyroid: not enlarged, symmetric, no carotid bruit.  Elevated JVD   Chest/Lungs:   Lungs are clear to auscultation, respirations unlabored. No tenderness or deformity    Cardiovascular:   Regular rhythm, S1, S2 normal, no  rub or gallop.  2/6 systolic ejection murmur at the aortic area   Abdomen:  Soft, non-tender, bowel sounds active all four quadrants,  no masses, no organomegaly   Extremities: no cyanosis or clubbing.  1+ edema   Skin: Skin color, texture, turgor normal, no rashes or lesions.    Psychiatric: Normal affect, calm   Neurologic: Alert and oriented x 3, moving all four extremities.     General: WNL  Eyes: Visual Distubance  Ears/Nose/Throat: WNL  Lungs: Cough, Shortness of Breath, Snoring  Heart: Shortness of Breath with activity  Stomach: Diarrhea  Bladder: WNL  Muscle/Joints: Joint Pain, Muscle Weakness, Muscle Pain  Skin: Poor Wound Healing  Nervous System: Falls, Daytime Sleepiness, Dizziness, Loss  of Balance  Mental Health: Confusion     Blood: Easy Bleeding, Easy Bruising     Medical History  Surgical History Family History Social History   Past Medical History:   Diagnosis Date   ? Adrenal insufficiency (H)    ? Anemia    ? Anemia     Created by Conversion    ? Anxiety    ? Arthritis    ? Ataxia 5/12/2015   ? CAD (coronary artery disease)    ? Cardiac pacemaker, dual, in situ 12/7/2016    [employer] Revo MRI DOI: 12/15/2011 Dr. Aishwarya Treviño   ? Chronic Diarrhea Of Unknown Origin     Created by Conversion    ? Chronic Gout     Created by Conversion  Replacement Utility updated for latest IMO load   ? Chronic Reflux Esophagitis     Created by Conversion    ? Congestive Heart Failure     Created by Conversion  Replacement Utility updated for latest IMO load   ? Coronary Artery Stenosis     Created by Conversion [facility] Annotation: Feb 27 2011 11:37PM - Alina Zapien: s/p CABGx4  1/11  Replacement Utility updated for latest IMO load   ? CVA (cerebral vascular accident) (H)    ? Depression    ? Diabetic Peripheral Neuropathy     Created by Conversion    ? DM (diabetes mellitus) (H)    ? Dysthymic disorder     Created by Conversion    ? End Stage Renal Disease     Created by Conversion    ? Epilepsy (H)    ? ESRD (end stage renal disease) (H)    ? Essential Thrombocytosis     Created by Conversion    ? History of renal transplant    ? Hyperlipidemia    ? Hypertension    ? Hypertension     Created by Conversion  Replacement Utility updated for latest IMO load   ? Insomnia     Created by Conversion    ? Ischemic Stroke     Created by Conversion  Replacement Utility updated for latest IMO load   ? Medullary Sponge Kidney Bilaterally     Created by Conversion    ? Mixed hyperlipidemia     Created by Conversion    ? Morbid obesity (H) 8/29/2018   ? Neuropathy    ? Nonintractable epilepsy without status epilepticus, unspecified epilepsy type (H) 8/29/2018   ? CULLEN on CPAP     Created by Conversion    ?  Osteoarthritis     Created by Conversion  Replacement Utility updated for latest IMO load   ? Renal Transplant Recipient     Created by Conversion    ? Sensorineural hearing loss     Created by Conversion  Replacement Utility updated for latest IMO load   ? Thrombophlebitis Of Deep Vessels Of The Lower Extremity     Created by Conversion    ? Type 2 diabetes mellitus (H)     Created by Conversion    ? Vertigo     Past Surgical History:   Procedure Laterality Date   ? CARDIAC PACEMAKER PLACEMENT     ? HYSTERECTOMY      Age 28   ? IR EXTREMITY ANGIOGRAM LEFT  1/30/2019   ? NEPHRECTOMY TRANSPLANTED ORGAN  06/2011   ? AR APPENDECTOMY      Description: Appendectomy;  Recorded: 11/13/2007;   ? AR CABG, VEIN, SINGLE      Description: CABG (CABG);  Proc Date: 01/26/2011;  Comments: x 4   ? AR INS NEW/RPLC PRM PACEMAKER W/TRANSV ELTRD VENTR      Description: Permanent Pacemaker Placement;  Recorded: 08/01/2012;   ? AR TOTAL ABDOM HYSTERECTOMY      Description: Total Abdominal Hysterectomy;  Recorded: 11/13/2007;   ? AR TOTAL KNEE ARTHROPLASTY      Description: Total Knee Arthroplasty;  Recorded: 11/13/2007;    no family history of premature coronary artery disease Social History     Socioeconomic History   ? Marital status:      Spouse name: Not on file   ? Number of children: Not on file   ? Years of education: Not on file   ? Highest education level: Not on file   Occupational History   ? Not on file   Social Needs   ? Financial resource strain: Not on file   ? Food insecurity:     Worry: Not on file     Inability: Not on file   ? Transportation needs:     Medical: Not on file     Non-medical: Not on file   Tobacco Use   ? Smoking status: Former Smoker     Packs/day: 1.50     Years: 20.00     Pack years: 30.00   ? Smokeless tobacco: Never Used   Substance and Sexual Activity   ? Alcohol use: Yes     Comment: occasional   ? Drug use: No   ? Sexual activity: Not on file   Lifestyle   ? Physical activity:     Days per  week: Not on file     Minutes per session: Not on file   ? Stress: Not on file   Relationships   ? Social connections:     Talks on phone: Not on file     Gets together: Not on file     Attends Mandaen service: Not on file     Active member of club or organization: Not on file     Attends meetings of clubs or organizations: Not on file     Relationship status: Not on file   ? Intimate partner violence:     Fear of current or ex partner: Not on file     Emotionally abused: Not on file     Physically abused: Not on file     Forced sexual activity: Not on file   Other Topics Concern   ? Not on file   Social History Narrative   ? Not on file          Medications  Allergies   Current Outpatient Medications   Medication Sig Dispense Refill   ? alendronate (FOSAMAX) 35 MG tablet Take 35 mg by mouth once a week. Weekly on Sundays. Take in the morning with a full glass of water, on an empty stomach, and do not take anything else by mouth or lie down for the next 30 min.  Sundays           ? amLODIPine (NORVASC) 10 MG tablet Take 1 tablet (10 mg) by mouth daily. 90 tablet 3   ? aspirin 81 MG EC tablet Take 1 tablet (81 mg total) by mouth daily.     ? azaTHIOprine (IMURAN) 50 mg tablet Take 100 mg by mouth bedtime.      ? bumetanide (BUMEX) 1 MG tablet Take 1 tablet (1 mg total) by mouth daily. 90 tablet 1   ? carvedilol (COREG) 12.5 MG tablet Take 1 tablet (12.5 mg) by mouth twice daily with meals. 180 tablet 3   ? cholecalciferol, vitamin D3, 2,000 unit Tab Take 2,000 Units by mouth daily.     ? cyanocobalamin, vitamin B-12, 2,500 mcg Tab Take 2,500 mcg by mouth every other day.     ? cycloSPORINE modified (GENGRAF) 25 MG capsule Take 50 mg by mouth 2 (two) times a day.      ? flash glucose scanning reader (FREESTYLE FELA 14 DAY READER) Misc Use 1 application As Directed daily. 1 each 0   ? flash glucose sensor (FREESTYLE FELA 14 DAY SENSOR) Kit Use 1 application As Directed every 14 (fourteen) days. 2 kit 11   ?  "hydrOXYzine pamoate (VISTARIL) 25 MG capsule Take 1 capsule (25 mg total) by mouth at bedtime as needed for anxiety or other (insomnia). 30 capsule 3   ? insulin NPH and regular human (NOVOLIN 70-30 FLEXPEN U-100) 100 unit/mL (70-30) pen Inject under the skin daily before breakfast. Inject 15 units with breakfast; 17 units at dinnertime           ? levETIRAcetam (KEPPRA) 500 MG tablet Take 500 mg by mouth daily. Take 1 tab in am; 1 tab in pm           ? pantoprazole (PROTONIX) 40 MG tablet Take 40 mg by mouth 2 (two) times a day.     ? pen needle, diabetic (BD ULTRA-FINE TWILA PEN NEEDLES) 32 gauge x 5/32\" Ndle Use 1 per day. 200 each 4   ? predniSONE (DELTASONE) 5 MG tablet Take 5 mg by mouth daily.     ? rosuvastatin (CRESTOR) 5 MG tablet Take 1 tablet (5 mg) by mouth at bedtime. 90 tablet 3   ? traZODone (DESYREL) 50 MG tablet Take 100 mg by mouth at bedtime.     ? venlafaxine (EFFEXOR-XR) 150 MG 24 hr capsule Take 1 capsule (150 mg) by mouth daily. 90 capsule 3     No current facility-administered medications for this visit.       Allergies   Allergen Reactions   ? Lisinopril Cough     Resolved after discontinuation   ? Mold/Mildew    ? Latex Rash         Lab Results    Chemistry/lipid CBC Cardiac Enzymes/BNP/TSH/INR   Lab Results   Component Value Date    CHOL 159 05/14/2019    HDL 48 (L) 05/14/2019    LDLCALC 76 05/14/2019    TRIG 175 (H) 05/14/2019    CREATININE 2.48 (H) 10/28/2019    BUN 32 (H) 10/28/2019    K 4.0 10/28/2019     10/28/2019     (H) 10/28/2019    CO2 20 (L) 10/28/2019    Lab Results   Component Value Date    WBC 10.0 10/28/2019    HGB 10.8 (L) 10/28/2019    HCT 36.2 10/28/2019    MCV 96 10/28/2019     10/28/2019    Lab Results   Component Value Date    CKTOTAL 76 04/06/2014    CKMB 1 02/01/2011    TROPONINI 0.08 05/12/2015     (H) 10/28/2019    TSH 2.45 08/17/2019    INR 1.02 08/17/2019                      "

## 2021-06-28 NOTE — PROGRESS NOTES
Progress Notes by Jagdish Cardenas NP at 2/26/2020  8:41 AM     Author: Jagdish Cardenas NP Service: -- Author Type: Nurse Practitioner    Filed: 2/26/2020  9:29 AM Encounter Date: 2/26/2020 Status: Attested    : Jagdish Cardenas NP (Nurse Practitioner)    Related Notes: Original Note by Jagdish Cardenas NP (Nurse Practitioner) filed at 2/26/2020  9:26 AM    Cosigner: Manju Fernandez MBBS at 2/26/2020  2:11 PM    Attestation signed by Manju Fernandez MBBS at 2/26/2020  2:11 PM    Agree with [facility]      Facility:    [facility] [[id number]]  Code Status: DNR      Chief Complaint/Reason for Visit:  007  Chief Complaint   Patient presents with   ? Discharge Summary       HPI:   Odalys is a 74 y.o. female who is a transfer from [facility] with an admission on 2/6/20 and discharge on 2/12/20. She has a PMH of renal transplant, CKD stage 4, CVA, CAD with CABG, DM2, seizure disorder, Htn, CULLEN on CPAP, chronic CHF, recent CHF exacerbation discharged on 1/28/20 with acute shortness of breath d/t CHF.  She improved with diuresis, BNP 2519 as it was 2275 the previous week. Echo showed EF 55%, resumed bumex 2mg two times a day, was on 1mg daily. She was weaned off O2. Baseline Cr 2-2.5, followed with nephrology. Her immunosuppression was restarted on Imuran, cyclosporine and prednisone.  Her BP was significantly elevated, she resumed on hydralazine 75 mg 3 times daily, carvedilol 25 mg twice daily, losartan 25 mg daily and terazosin 5mg daily.  She also has chronic A. fib with pacemaker in place, heart rate was stable.  Not on anticoagulation treatment.  Has history of ischemic stroke without residual weakness on aspirin and statin.  Glucose numbers improved with Lantus 10 units daily with sliding scale.  Resumed Elekta mental for seizure disorder.  Also developed iron deficiency anemia received IV replacement and resumed on oral Fe  replacement.  She was then discharged to TCU for additional rehab.    She has concluded her TCU stay and we discharged back to home with services without oxygen on 2/26/2020.    Past Medical History:  Past Medical History:   Diagnosis Date   ? Adrenal insufficiency (H)    ? Anemia    ? Anxiety    ? Arthritis    ? Ataxia 5/12/2015   ? CAD (coronary artery disease)    ? Cardiac pacemaker, dual, in situ 12/7/2016    [employer] Revo MRI DOI: 12/15/2011 Dr. Aishwarya Treviño   ? Chronic Diarrhea Of Unknown Origin     Created by Conversion    ? Chronic Gout     Created by Conversion  Replacement Utility updated for latest IMO load   ? Chronic Reflux Esophagitis     Created by Conversion    ? Congestive Heart Failure     Created by Conversion  Replacement Utility updated for latest IMO load   ? Coronary Artery Stenosis     Created by Conversion [facility] Annotation: Feb 27 2011 11:37PM - Alina Zapien: s/p CABGx4  1/11  Replacement Utility updated for latest IMO load   ? CVA (cerebral vascular accident) (H)    ? Depression    ? Diabetic Peripheral Neuropathy     Created by Conversion    ? DM (diabetes mellitus) (H)    ? Dysthymic disorder     Created by Conversion    ? Epilepsy (H)    ? ESRD (end stage renal disease) (H)    ? Essential Thrombocytosis     Created by Conversion    ? History of renal transplant    ? Hyperlipidemia    ? Hypertension    ? Insomnia     Created by Conversion    ? Ischemic Stroke     Created by Conversion  Replacement Utility updated for latest IMO load   ? Medullary Sponge Kidney Bilaterally     Created by Conversion    ? Morbid obesity (H) 8/29/2018   ? Neuropathy    ? Nonintractable epilepsy without status epilepticus, unspecified epilepsy type (H) 8/29/2018   ? CULLEN on CPAP     Created by Conversion    ? Osteoarthritis     Created by Conversion  Replacement Utility updated for latest IMO load   ? Renal Transplant Recipient     Created by Conversion    ? Sensorineural hearing loss     Created  by Conversion  Replacement Utility updated for latest IMO load   ? Thrombophlebitis Of Deep Vessels Of The Lower Extremity     Created by Conversion    ? Type 2 diabetes mellitus (H)     Created by Conversion    ? Vertigo            Surgical History:  Past Surgical History:   Procedure Laterality Date   ? APPENDECTOMY     ? CARDIAC PACEMAKER PLACEMENT     ? CORONARY ARTERY BYPASS GRAFT  01/26/2011    Comments: x 4   ? HYSTERECTOMY  1973   ? IR EXTREMITY ANGIOGRAM LEFT  1/30/2019   ? NEPHRECTOMY TRANSPLANTED ORGAN  06/2011   ? DC TOTAL KNEE ARTHROPLASTY Bilateral        Family History:   Family History   Problem Relation Age of Onset   ? Diabetes Sister    ? Breast cancer Sister 62   ? Cancer Father        Social History:    Social History     Socioeconomic History   ? Marital status:      Spouse name: Not on file   ? Number of children: Not on file   ? Years of education: Not on file   ? Highest education level: Not on file   Occupational History     Employer: RETIRED   Social Needs   ? Financial resource strain: Not on file   ? Food insecurity:     Worry: Not on file     Inability: Not on file   ? Transportation needs:     Medical: Not on file     Non-medical: Not on file   Tobacco Use   ? Smoking status: Former Smoker     Packs/day: 1.50     Years: 20.00     Pack years: 30.00     Types: Cigarettes   ? Smokeless tobacco: Never Used   Substance and Sexual Activity   ? Alcohol use: Yes     Comment: occasional   ? Drug use: No   ? Sexual activity: Not on file   Lifestyle   ? Physical activity:     Days per week: Not on file     Minutes per session: Not on file   ? Stress: Not on file   Relationships   ? Social connections:     Talks on phone: Not on file     Gets together: Not on file     Attends Spiritism service: Not on file     Active member of club or organization: Not on file     Attends meetings of clubs or organizations: Not on file     Relationship status: Not on file   ? Intimate partner violence:      "Fear of current or ex partner: Not on file     Emotionally abused: Not on file     Physically abused: Not on file     Forced sexual activity: Not on file   Other Topics Concern   ? Not on file   Social History Narrative   ? Not on file          Review of Systems   Constitutional: Positive for activity change and fatigue. Negative for appetite change, chills, diaphoresis and fever.        No concerns   HENT: Negative for congestion and hearing loss.    Eyes: Negative.    Respiratory: Positive for shortness of breath. Negative for wheezing.         Weaned off O2, RUFFIN   Cardiovascular: Negative.    Gastrointestinal: Negative for abdominal distention, abdominal pain, blood in stool, constipation, diarrhea and nausea.   Endocrine: Negative.    Genitourinary: Negative for difficulty urinating.   Musculoskeletal:        Denies pain   Skin:        intact   Allergic/Immunologic: Negative.    Neurological: Negative for dizziness, tremors, speech difficulty and light-headedness.   Hematological: Negative.    Psychiatric/Behavioral: Negative for agitation, confusion, hallucinations and sleep disturbance. The patient is not nervous/anxious.        Vitals:    02/26/20 0841   BP: 136/72   Pulse: 69   Resp: 18   Temp: 98  F (36.7  C)   SpO2: 92%   Weight: 196 lb (88.9 kg)   Height: 5' 4\" (1.626 m)       Physical Exam  Vitals signs reviewed.   Constitutional:       Appearance: She is obese.      Comments: Continue RUFFIN, weaned off O2   HENT:      Head: Normocephalic and atraumatic.      Right Ear: External ear normal.      Left Ear: External ear normal.      Nose: No congestion or rhinorrhea.      Mouth/Throat:      Mouth: Mucous membranes are dry.      Pharynx: No oropharyngeal exudate or posterior oropharyngeal erythema.   Eyes:      General:         Right eye: No discharge.         Left eye: No discharge.   Neck:      Musculoskeletal: Normal range of motion and neck supple.      Comments: Intact  Cardiovascular:      Rate and " Rhythm: Rhythm irregular.      Heart sounds: Murmur present.      Comments: IRR, 2/6 CYRUS RSB and LSB, PM  Pulmonary:      Effort: No respiratory distress.      Breath sounds: No wheezing or rales.      Comments: Dim, RUFFIN, weaned off O2  Abdominal:      General: Bowel sounds are normal. There is no distension.      Palpations: Abdomen is soft.      Tenderness: There is no abdominal tenderness. There is no guarding.      Comments: Denies constipation or diarrhea   Genitourinary:     Comments: No limitations  Musculoskeletal:      Right lower leg: No edema.      Left lower leg: No edema.      Comments: Denies pain   Skin:     General: Skin is warm and dry.      Comments: Intact   Neurological:      Mental Status: She is alert and oriented to person, place, and time. Mental status is at baseline.   Psychiatric:         Mood and Affect: Mood normal.      Comments: Has history of depression         Medication List:  Current Outpatient Medications   Medication Sig   ? acetaminophen (TYLENOL) 500 MG tablet Take 1 tablet (500 mg total) by mouth every 6 (six) hours as needed.   ? alendronate (FOSAMAX) 35 MG tablet Take 35 mg by mouth once a week. Weekly on Sundays. Take in the morning with a full glass of water, on an empty stomach, and do not take anything else by mouth or lie down for the next 30 min.  Sundays         ? aspirin 81 MG EC tablet Take 1 tablet (81 mg total) by mouth daily.   ? azaTHIOprine (IMURAN) 50 mg tablet Take 100 mg by mouth bedtime.    ? bumetanide (BUMEX) 2 MG tablet Take 1 tablet (2 mg total) by mouth 2 (two) times a day at 9am and 6pm. (Patient taking differently: Take 2 mg by mouth daily. )   ? carvediloL (COREG) 25 MG tablet Take 1 tablet (25 mg total) by mouth 2 (two) times a day with meals.   ? cholecalciferol, vitamin D3, 2,000 unit Tab Take 2,000 Units by mouth daily.   ? cyanocobalamin, vitamin B-12, 2,500 mcg Tab Take 2,500 mcg by mouth daily.    ? cycloSPORINE modified (GENGRAF) 25 MG  "capsule Take 50 mg by mouth 2 (two) times a day.    ? ferrous sulfate 325 (65 FE) MG tablet Take 1 tablet (325 mg total) by mouth every other day.   ? flash glucose scanning reader (FREESTYLE FELA 14 DAY READER) Misc Use 1 application As Directed daily.   ? flash glucose sensor (FREESTYLE FELA 14 DAY SENSOR) Kit Use 1 application As Directed every 14 (fourteen) days.   ? hydrALAZINE (APRESOLINE) 25 MG tablet Take 3 tablets (75 mg total) by mouth 3 (three) times a day.   ? isosorbide mononitrate (IMDUR) 30 MG 24 hr tablet Take 1 tablet (30 mg total) by mouth 2 (two) times a day.   ? lamoTRIgine (LAMICTAL) 25 MG tablet Take 25 mg by mouth see administration instructions. Titrate up to target dose of 100mg two times a day  2/1-2/7 25mg Daily  2/8-2/14 50mg Daily  2/15-2/21 50mg Twice daily  2/22-2/28 75mg Twice daily  2/29 100mg Twice daily   ? LANTUS U-100 INSULIN 100 unit/mL vial 10 unit daily at bedtime   ? NOVOLOG U-100 INSULIN ASPART 100 unit/mL injection Three times a day with meals  BG < 70 mg/dL: Treat, and call MD  BG  mg/dL: None - 0 Units  -180 mg/dL: 1 unit  -220 mg/dL: 2 units  -260 mg/dL: 3 units  -300 mg/dL: 4 units  -340 mg/dL: 5 units  -400 mg/dL: 6 units  BG > 400 mg/dL: 7 units and Call MD   ? pantoprazole (PROTONIX) 40 MG tablet Take 40 mg by mouth 2 (two) times a day.   ? pen needle, diabetic (BD ULTRA-FINE TWILA PEN NEEDLES) 32 gauge x 5/32\" Ndle Use 1 per day.   ? polyethylene glycol (MIRALAX) 17 gram packet Take 1 packet (17 g total) by mouth daily as needed.   ? predniSONE (DELTASONE) 5 MG tablet Take 5 mg by mouth daily.   ? rosuvastatin (CRESTOR) 5 MG tablet Take 1 tablet (5 mg) by mouth at bedtime.   ? terazosin (HYTRIN) 5 MG capsule Take 1 capsule (5 mg total) by mouth at bedtime.   ? traZODone (DESYREL) 50 MG tablet Take 100 mg by mouth at bedtime.   ? venlafaxine (EFFEXOR-XR) 150 MG 24 hr capsule Take 1 capsule (150 mg) by mouth daily. "       Labs:  Results for orders placed or performed in visit on 02/24/20   Basic Metabolic Panel   Result Value Ref Range    Sodium 144 136 - 145 mmol/L    Potassium 3.9 3.5 - 5.0 mmol/L    Chloride 105 98 - 107 mmol/L    CO2 28 22 - 31 mmol/L    Anion Gap, Calculation 11 5 - 18 mmol/L    Glucose 97 70 - 125 mg/dL    Calcium 9.0 8.5 - 10.5 mg/dL    BUN 61 (H) 8 - 28 mg/dL    Creatinine 3.16 (H) 0.60 - 1.10 mg/dL    GFR MDRD Af Amer 17 (L) >60 mL/min/1.73m2    GFR MDRD Non Af Amer 14 (L) >60 mL/min/1.73m2     Lab Results   Component Value Date    WBC 8.7 02/17/2020    HGB 8.9 (L) 02/17/2020    HCT 30.1 (L) 02/17/2020    MCV 98 02/17/2020     02/17/2020     Lab Results   Component Value Date    TSH 3.57 11/21/2019     Lab Results   Component Value Date    HGBA1C 6.3 (H) 01/26/2020     Vitamin D, Total (25-Hydroxy)   Date Value Ref Range Status   02/17/2020 37.5 30.0 - 80.0 ng/mL Final     Lab Results   Component Value Date    BNP 2,519 (H) 02/06/2020         Assessment/Plan:    HFrEF: last EF 55%, frequent exacerbations, last BNP +2500, following with cardiology and heart failure clinic.  Recently  Bumex decreased to 2 mg daily per hypotension, last weight 196lbs (possible 6lb weight loss). Now on a 2L/day FW restriction and plans to continue at home    HTN: Continue carvedilol 25 mg twice daily, hydralazine 75 mg 3 times daily, imdur 30mg two times a day and Hytrin 5 mg at bedtime.  Per monitoring systolic blood pressure <150    CULLEN: using CPAP well    Hx of renal transplant: continue Imuran 100mg at HS, cephalosporin 50 mg twice daily and prednisone 5 mg daily    CKD stage 4: last Cr 3.16, slight improvement, following with neurology on 2/26/20    DM2: continue lantus 10U at HS, SS three times a day     Pain control: Continue Tylenol 500 mg every 6 hours PRN, denies pain    Insomnia: Continue trazodone 100 mg at bedtime    Depression: Continue venlafaxine 150 mg daily    Osteopenia: Continue Fosamax 35 mg  weekly on Sunday    GERD: Continue pantoprazole 40 mg twice daily    Seizure disorder: Continue Lamictal per escalating dose    Morbid obesity: last 33.6    History of CVA: Continue aspirin 81 mg daily and statin    Vitamin B deficiency: Continue cyanocobalamin 2500mcg daily    Vitamin D deficiency: Continue D3 2000 units daily    Normocytic hypochromic anemia: Last hemoglobin 8.9 on 2/17/20, received IV iron in hospital, continues ferrous sulfate 325 mg daily    Disposition: She will be discharged back to her home, will seek DEE DEE placement after selling house, etc.    MEDICAL EQUIPMENT NEEDS:  na    DISCHARGE PLAN/FACE TO FACE:  I certify that services are/were furnished while this patient was under the care of a physician and that a physician or an allowed non-physician practitioner (NPP), had a face-to-face encounter that meets the physician face-to-face encounter requirements. The encounter was in whole, or in part, related to the primary reason for home health. The patient is confined to his/her home and needs intermittent skilled nursing, physical therapy, speech-language pathology, or the continued need for occupational therapy. A plan of care has been established by a physician and is periodically reviewed by a physician.  Date of Face-to-Face Encounter: 2/26/20    I certify that, based on my findings, the following services are medically necessary home health services: C RN/SW and PT/OT to evaluate and treat.    My clinical findings support the need for the above skilled services because: patient will be discharging to home.  She will need assistance with medication management, resource allocation and performing IADLs and ADLs effectively and safely at home.    Patient to re-establish plan of care with their PCP within 7 days after leaving TCU.       The care plan has been reviewed and all orders signed. Changes to care plan, if any, as noted. Otherwise, continue care plan of care.  The total time spent  with this patient was 31 minutes, with greater than 50% spent in counseling and coordination of care that included multiple issues per f/u with transplant hx, DM monitoring, continuing therapy, eventual retirement placment and discharge, which lasted 16 minutes.      Electronically signed by: Jagdish Cardenas NP

## 2021-06-28 NOTE — PROGRESS NOTES
Progress Notes by Char Negron CNP at 1/14/2020  3:30 PM     Author: Char Negron CNP Service: -- Author Type: Nurse Practitioner    Filed: 1/14/2020  4:20 PM Encounter Date: 1/14/2020 Status: Signed    : Char Negron CNP (Nurse Practitioner)             Assessment/Recommendations   Assessment:    1.  Heart failure with preserved ejection fraction, NYHA class II: Compensated.  She continues to have fatigue, dyspnea on exertion, and trace lower extremity edema.  She states her weights have remained stable.  She continues to follow a low-sodium diet.  I reviewed and discussed the results of her recent echocardiogram.  2.  Hypertension: Controlled.  Blood pressure 126/60  3.  Chronic kidney disease status post kidney transplant: She recently had her yearly appointment with her nephrologist Dr. Jacklyn Borrego.  She takes Bumex 1 mg and 0.5 mg alternating days  4.  Obstructive sleep apnea: She continue her as her CPAP nightly    Plan:  1.  Continue current medications  2.  Continue daily weights and low-sodium diet  3.  Encouraged regular exercise    Odalys Miles will follow up with Dr. Escamilla February 24 and in the heart failure clinic in 3 months.     History of Present Illness/Subjective    Ms. Odalys Miles is a 74 y.o. female seen at [facility] heart failure clinic today for continued follow-up.  Her friend accompanies her today.  She follows up for heart failure with preserved ejection fraction.  She had an echocardiogram January 9, 2020 which showed ejection fraction of 55% with mild to moderate tricuspid regurgitation. She has a past medical history significant for hypertension, coronary artery disease, dyslipidemia, TIA, obstructive sleep apnea on CPAP, diabetes, and kidney transplant in June 2011.  She also has a pacemaker placed December 2011.    Today, she comes in with continued fatigue, dyspnea exertion, and trace lower extremity edema.  She denies orthopnea, PND or  chest pain.  She denies bloating. She denies shortness of breath, orthopnea, PND, palpitations, chest pain and abdominal fullness/bloating.      She is monitoring home weights which are stable around 194 pounds.  She is following a low sodium diet.  She is in the process of possibly moving to assisted living.    ECHOCARDIOGRAM: 1/9/2020  Summary       Normal left ventricular size and systolic function. The estimated left ventricular ejection fraction is 55%. Unable to assess left ventricular diastolic function given mitral stenosis.    Normal right ventricular size and systolic function with mild hypertrophy. Pacer lead is present in the ventricle.    Left atrial volume is moderately increased.    Mild aortic stenosis.    Mild calcific mitral stenosis with mild mitral regurgitation.    Mild to moderate tricuspid valve regurgitation.    When compared to the previous study dated 4/5/2019, no significant change.          Physical Examination Review of Systems   Vitals:    01/14/20 1549   BP: 126/60   Pulse: 68   Resp: 16     Body mass index is 36.03 kg/m .  Wt Readings from Last 3 Encounters:   01/14/20 197 lb (89.4 kg)   01/09/20 195 lb (88.5 kg)   12/27/19 195 lb (88.5 kg)       General Appearance:   no distress, normal body habitus   ENT/Mouth: membranes moist, no oral lesions or bleeding gums.      EYES:  no scleral icterus, normal conjunctivae   Neck: no carotid bruits or thyromegaly   Chest/Lungs:   lungs are clear to auscultation, no rales or wheezing, equal chest wall expansion    Cardiovascular:   Regular. Normal first and second heart sounds with no murmurs, rubs, or gallops; Jugular venous pressure normal, trace edema bilaterally    Abdomen:  no organomegaly, masses, bruits, or tenderness; bowel sounds are present   Extremities: no cyanosis or clubbing   Skin: no xanthelasma, warm.    Neurologic: normal  bilateral, no tremors     Psychiatric: alert and oriented x3    General: WNL  Eyes:  WNL  Ears/Nose/Throat: WNL  Lungs: WNL  Heart: WNL  Stomach: WNL  Bladder: WNL  Muscle/Joints: Muscle Weakness  Skin: WNL  Nervous System: Falls, Dizziness, Loss of Balance  Mental Health: Confusion, Depression     Blood: WNL     Medical History  Surgical History Family History Social History   Past Medical History:   Diagnosis Date   ? Adrenal insufficiency (H)    ? Anemia    ? Anemia     Created by Conversion    ? Anxiety    ? Arthritis    ? Ataxia 5/12/2015   ? CAD (coronary artery disease)    ? Cardiac pacemaker, dual, in situ 12/7/2016    Medtronic Revo MRI DOI: 12/15/2011 Dr. Aishwarya Treviño   ? Chronic Diarrhea Of Unknown Origin     Created by Conversion    ? Chronic Gout     Created by Conversion  Replacement Utility updated for latest IMO load   ? Chronic Reflux Esophagitis     Created by Conversion    ? Congestive Heart Failure     Created by Conversion  Replacement Utility updated for latest IMO load   ? Coronary Artery Stenosis     Created by Conversion [facility] Annotation: Feb 27 2011 11:37PM - Alina Zapien: s/p CABGx4  1/11  Replacement Utility updated for latest IMO load   ? CVA (cerebral vascular accident) (H)    ? Depression    ? Diabetic Peripheral Neuropathy     Created by Conversion    ? DM (diabetes mellitus) (H)    ? Dysthymic disorder     Created by Conversion    ? End Stage Renal Disease     Created by Conversion    ? Epilepsy (H)    ? ESRD (end stage renal disease) (H)    ? Essential Thrombocytosis     Created by Conversion    ? History of renal transplant    ? Hyperlipidemia    ? Hypertension    ? Hypertension     Created by Conversion  Replacement Utility updated for latest IMO load   ? Insomnia     Created by Conversion    ? Ischemic Stroke     Created by Conversion  Replacement Utility updated for latest IMO load   ? Medullary Sponge Kidney Bilaterally     Created by Conversion    ? Mixed hyperlipidemia     Created by Conversion    ? Morbid obesity (H) 8/29/2018   ? Neuropathy    ?  Nonintractable epilepsy without status epilepticus, unspecified epilepsy type (H) 8/29/2018   ? CULLEN on CPAP     Created by Conversion    ? Osteoarthritis     Created by Conversion  Replacement Utility updated for latest IMO load   ? Renal Transplant Recipient     Created by Conversion    ? Sensorineural hearing loss     Created by Conversion  Replacement Utility updated for latest IMO load   ? Thrombophlebitis Of Deep Vessels Of The Lower Extremity     Created by Conversion    ? Type 2 diabetes mellitus (H)     Created by Conversion    ? Vertigo     Past Surgical History:   Procedure Laterality Date   ? APPENDECTOMY     ? CARDIAC PACEMAKER PLACEMENT     ? CORONARY ARTERY BYPASS GRAFT  01/26/2011    Comments: x 4   ? HYSTERECTOMY  1973   ? IR EXTREMITY ANGIOGRAM LEFT  1/30/2019   ? NEPHRECTOMY TRANSPLANTED ORGAN  06/2011   ? AR TOTAL KNEE ARTHROPLASTY      Family History   Problem Relation Age of Onset   ? Diabetes Sister    ? Breast cancer Sister 62   ? Cancer Father     Social History     Socioeconomic History   ? Marital status:      Spouse name: Not on file   ? Number of children: Not on file   ? Years of education: Not on file   ? Highest education level: Not on file   Occupational History     Employer: RETIRED   Social Needs   ? Financial resource strain: Not on file   ? Food insecurity:     Worry: Not on file     Inability: Not on file   ? Transportation needs:     Medical: Not on file     Non-medical: Not on file   Tobacco Use   ? Smoking status: Former Smoker     Packs/day: 1.50     Years: 20.00     Pack years: 30.00     Types: Cigarettes   ? Smokeless tobacco: Never Used   Substance and Sexual Activity   ? Alcohol use: Yes     Comment: occasional   ? Drug use: No   ? Sexual activity: Not on file   Lifestyle   ? Physical activity:     Days per week: Not on file     Minutes per session: Not on file   ? Stress: Not on file   Relationships   ? Social connections:     Talks on phone: Not on file     Gets  together: Not on file     Attends Confucianism service: Not on file     Active member of club or organization: Not on file     Attends meetings of clubs or organizations: Not on file     Relationship status: Not on file   ? Intimate partner violence:     Fear of current or ex partner: Not on file     Emotionally abused: Not on file     Physically abused: Not on file     Forced sexual activity: Not on file   Other Topics Concern   ? Not on file   Social History Narrative   ? Not on file          Medications  Allergies   Current Outpatient Medications   Medication Sig Dispense Refill   ? alendronate (FOSAMAX) 35 MG tablet Take 35 mg by mouth once a week. Weekly on Sundays. Take in the morning with a full glass of water, on an empty stomach, and do not take anything else by mouth or lie down for the next 30 min.  Sundays           ? amLODIPine (NORVASC) 10 MG tablet Take 1 tablet (10 mg total) by mouth daily. 90 tablet 3   ? aspirin 81 MG EC tablet Take 1 tablet (81 mg total) by mouth daily.     ? azaTHIOprine (IMURAN) 50 mg tablet Take 100 mg by mouth bedtime.      ? bumetanide (BUMEX) 1 MG tablet Take 1mg alternating with 0.5mg daily 90 tablet 1   ? carvedilol (COREG) 12.5 MG tablet Take 1.5 tablets (18.75 mg total) by mouth 2 (two) times a day with meals. 180 tablet 3   ? cholecalciferol, vitamin D3, 2,000 unit Tab Take 2,000 Units by mouth daily.     ? cyanocobalamin, vitamin B-12, 2,500 mcg Tab Take 2,500 mcg by mouth every other day.     ? cycloSPORINE modified (GENGRAF) 25 MG capsule Take 50 mg by mouth 2 (two) times a day. 2 in the am and 1 at night for a total of 75 MG a day     ? flash glucose scanning reader (FREESTYLE FELA 14 DAY READER) Misc Use 1 application As Directed daily. 1 each 0   ? flash glucose sensor (FREESTYLE FELA 14 DAY SENSOR) Kit Use 1 application As Directed every 14 (fourteen) days. 6 kit 3   ? hydrOXYzine pamoate (VISTARIL) 25 MG capsule Take 1 capsule (25 mg total) by mouth at bedtime as  "needed for anxiety or other (insomnia). 30 capsule 3   ? insulin NPH and regular human (NOVOLIN 70-30 FLEXPEN U-100) 100 unit/mL (70-30) pen Inject under the skin daily before breakfast. Inject 15 units with breakfast; 17 units at dinnertime           ? levETIRAcetam (KEPPRA) 500 MG tablet Take 1 tablet (500 mg total) by mouth daily. Take 1 tab in am; 2 tab in pm 90 tablet 4   ? pantoprazole (PROTONIX) 40 MG tablet Take 40 mg by mouth 2 (two) times a day.     ? pen needle, diabetic (BD ULTRA-FINE TWILA PEN NEEDLES) 32 gauge x 5/32\" Ndle Use 1 per day. 200 each 4   ? predniSONE (DELTASONE) 5 MG tablet Take 5 mg by mouth daily.     ? rosuvastatin (CRESTOR) 5 MG tablet Take 1 tablet (5 mg) by mouth at bedtime. 90 tablet 3   ? traZODone (DESYREL) 50 MG tablet Take 100 mg by mouth at bedtime.     ? venlafaxine (EFFEXOR-XR) 150 MG 24 hr capsule Take 1 capsule (150 mg) by mouth daily. 90 capsule 3     No current facility-administered medications for this visit.     Allergies   Allergen Reactions   ? Lisinopril Cough     Resolved after discontinuation   ? Mold/Mildew    ? Latex Rash         Lab Results    Chemistry/lipid CBC Cardiac Enzymes/BNP/TSH/INR   Lab Results   Component Value Date    CHOL 159 05/14/2019    HDL 48 (L) 05/14/2019    LDLCALC 76 05/14/2019    TRIG 175 (H) 05/14/2019    CREATININE 2.96 (H) 12/27/2019    BUN 41 (H) 12/27/2019    K 4.5 12/27/2019     12/27/2019     (H) 12/27/2019    CO2 24 12/27/2019    Lab Results   Component Value Date    WBC 10.0 10/28/2019    HGB 10.8 (L) 10/28/2019    HCT 36.2 10/28/2019    MCV 96 10/28/2019     10/28/2019    Lab Results   Component Value Date    CKTOTAL 76 04/06/2014    CKMB 1 02/01/2011    TROPONINI 0.08 05/12/2015    BNP 1,525 (H) 12/27/2019    TSH 3.57 11/21/2019    INR 1.02 08/17/2019             This note has been dictated using voice recognition software. Any grammatical, typographical, or context distortions are unintentional and inherent to " the software

## 2021-06-28 NOTE — PROGRESS NOTES
Progress Notes by Alina Escamilla MD at 12/27/2019 12:50 PM     Author: Alina Escamilla MD Service: -- Author Type: Physician    Filed: 12/27/2019  1:30 PM Encounter Date: 12/27/2019 Status: Signed    : Alina Escamilla MD (Physician)           Thank you, Dr. Ha, for asking us to see Odalys Miles at the [facility].      Assessment/Recommendations   Assessment:    1.  Coronary artery disease: Status post coronary artery bypass surgery.  No anginal complaints.  Continue daily aspirin.  2.  End-stage renal disease: Secondary to medullary sponge kidney status post kidney transplant on chronic immunosuppression therapy with CKD followed by Dr Borrego  3.  Sinus node dysfunction status post pacemaker placement  4.  Peripheral arterial disease: Left anterior tibial artery multiple areas of stenoses, occluded posterior tibial artery followed by Dr Au  5.  Hypertension  6.  CVA  7.  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  ID felt less likely to be vegetation. Repeat limited echo in 4/19 did not show evidence of mitral valve mass  8. Chronic heart failure with preserved ejection fraction    Plan:  1. Repeat complete echo to reevaluate valves / mass  2. BNP and BMP today  3. Daily weights, low sodium diet  Follow up in 3 months     History of Present Illness    Ms. Odalys Miles is a 74 y.o. female with history of end-stage renal disease secondary to medullary sponge kidney in 2011 status post kidney transplant on chronic immunosuppression therapy, diabetes mellitus type 2, coronary artery disease status post coronary artery bypass surgery, sinus node dysfunction status post pacemaker placement, chronic heart failure with preserved ejection fraction  and hypertension who I am seeing today in follow-up. Since I last saw her she has been treated for heart failure and has been followed in our heart failure clinic.  She has had  worsening issues with balance and has suffered recurrent falls.  She is considering assisted living.  Currently feeling more depressed due to grieving the loss of her  and issues with one of her children as well as being discouraged regarding her medical situation.  No chest pain.  Chronic dyspnea on exertion with < 1/2 block unchanged.  Stable lower extremity edema and she reports losing a few lbs since her last appointment.     Device check today shows NSR and no events        Physical Examination Review of Systems   Vitals:    12/27/19 1232   BP: 148/76   Pulse: 60   Resp: 16     Body mass index is 35.67 kg/m .  Wt Readings from Last 3 Encounters:   12/27/19 195 lb (88.5 kg)   12/16/19 204 lb (92.5 kg)   11/12/19 203 lb (92.1 kg)       General Appearance:   alert, no apparent distress   HEENT:  no scleral icterus; the mucous membranes are pink and moist                                  Neck: No jvd   Chest: the spine is straight and the chest is symmetric   Lungs:   respirations unlabored; the lungs are clear to auscultation   Cardiovascular:   regular rhythm with normal first and second heart sounds and II/VI systolic murmur   Abdomen:  no organomegaly, masses, bruits, or tenderness; bowel sounds are present   Extremities: ++ edema   Skin: no xanthelasma    General: WNL  Eyes: WNL  Ears/Nose/Throat: WNL  Lungs: WNL  Heart: Shortness of Breath with activity  Stomach: WNL  Bladder: WNL  Muscle/Joints: Muscle Weakness  Skin: WNL  Nervous System: Falls, Daytime Sleepiness, Dizziness, Loss of Balance  Mental Health: Confusion     Blood: Easy Bruising     Medical History  Surgical History Family History Social History   Past Medical History:   Diagnosis Date   ? Adrenal insufficiency (H)    ? Anemia    ? Anemia     Created by Conversion    ? Anxiety    ? Arthritis    ? Ataxia 5/12/2015   ? CAD (coronary artery disease)    ? Cardiac pacemaker, dual, in situ 12/7/2016    Medtronic Revo MRI DOI: 12/15/2011   Aishwarya Treviño   ? Chronic Diarrhea Of Unknown Origin     Created by Conversion    ? Chronic Gout     Created by Conversion  Replacement Utility updated for latest IMO load   ? Chronic Reflux Esophagitis     Created by Conversion    ? Congestive Heart Failure     Created by Conversion  Replacement Utility updated for latest IMO load   ? Coronary Artery Stenosis     Created by Conversion [facility] Annotation: Feb 27 2011 11:37PM - ShenAlina rodriguez: s/p CABGx4  1/11  Replacement Utility updated for latest IMO load   ? CVA (cerebral vascular accident) (H)    ? Depression    ? Diabetic Peripheral Neuropathy     Created by Conversion    ? DM (diabetes mellitus) (H)    ? Dysthymic disorder     Created by Conversion    ? End Stage Renal Disease     Created by Conversion    ? Epilepsy (H)    ? ESRD (end stage renal disease) (H)    ? Essential Thrombocytosis     Created by Conversion    ? History of renal transplant    ? Hyperlipidemia    ? Hypertension    ? Hypertension     Created by Conversion  Replacement Utility updated for latest IMO load   ? Insomnia     Created by Conversion    ? Ischemic Stroke     Created by Conversion  Replacement Utility updated for latest IMO load   ? Medullary Sponge Kidney Bilaterally     Created by Conversion    ? Mixed hyperlipidemia     Created by Conversion    ? Morbid obesity (H) 8/29/2018   ? Neuropathy    ? Nonintractable epilepsy without status epilepticus, unspecified epilepsy type (H) 8/29/2018   ? CULLEN on CPAP     Created by Conversion    ? Osteoarthritis     Created by Conversion  Replacement Utility updated for latest IMO load   ? Renal Transplant Recipient     Created by Conversion    ? Sensorineural hearing loss     Created by Conversion  Replacement Utility updated for latest IMO load   ? Thrombophlebitis Of Deep Vessels Of The Lower Extremity     Created by Conversion    ? Type 2 diabetes mellitus (H)     Created by Conversion    ? Vertigo     Past Surgical History:    Procedure Laterality Date   ? CARDIAC PACEMAKER PLACEMENT     ? HYSTERECTOMY      Age 28   ? IR EXTREMITY ANGIOGRAM LEFT  1/30/2019   ? NEPHRECTOMY TRANSPLANTED ORGAN  06/2011   ? NE APPENDECTOMY      Description: Appendectomy;  Recorded: 11/13/2007;   ? NE CABG, VEIN, SINGLE      Description: CABG (CABG);  Proc Date: 01/26/2011;  Comments: x 4   ? NE INS NEW/RPLC PRM PACEMAKER W/TRANSV ELTRD VENTR      Description: Permanent Pacemaker Placement;  Recorded: 08/01/2012;   ? NE TOTAL ABDOM HYSTERECTOMY      Description: Total Abdominal Hysterectomy;  Recorded: 11/13/2007;   ? NE TOTAL KNEE ARTHROPLASTY      Description: Total Knee Arthroplasty;  Recorded: 11/13/2007;    Family History   Problem Relation Age of Onset   ? Cancer Sister    ? Diabetes Sister    ? Breast cancer Sister 62   ? Cancer Father     Social History     Socioeconomic History   ? Marital status:      Spouse name: Not on file   ? Number of children: Not on file   ? Years of education: Not on file   ? Highest education level: Not on file   Occupational History   ? Not on file   Social Needs   ? Financial resource strain: Not on file   ? Food insecurity:     Worry: Not on file     Inability: Not on file   ? Transportation needs:     Medical: Not on file     Non-medical: Not on file   Tobacco Use   ? Smoking status: Former Smoker     Packs/day: 1.50     Years: 20.00     Pack years: 30.00   ? Smokeless tobacco: Never Used   Substance and Sexual Activity   ? Alcohol use: Yes     Comment: occasional   ? Drug use: No   ? Sexual activity: Not on file   Lifestyle   ? Physical activity:     Days per week: Not on file     Minutes per session: Not on file   ? Stress: Not on file   Relationships   ? Social connections:     Talks on phone: Not on file     Gets together: Not on file     Attends Synagogue service: Not on file     Active member of club or organization: Not on file     Attends meetings of clubs or organizations: Not on file     Relationship  status: Not on file   ? Intimate partner violence:     Fear of current or ex partner: Not on file     Emotionally abused: Not on file     Physically abused: Not on file     Forced sexual activity: Not on file   Other Topics Concern   ? Not on file   Social History Narrative   ? Not on file          Medications  Allergies   Current Outpatient Medications   Medication Sig Dispense Refill   ? alendronate (FOSAMAX) 35 MG tablet Take 35 mg by mouth once a week. Weekly on Sundays. Take in the morning with a full glass of water, on an empty stomach, and do not take anything else by mouth or lie down for the next 30 min.  Sundays           ? amLODIPine (NORVASC) 10 MG tablet Take 1 tablet (10 mg total) by mouth daily. 90 tablet 3   ? aspirin 81 MG EC tablet Take 1 tablet (81 mg total) by mouth daily.     ? azaTHIOprine (IMURAN) 50 mg tablet Take 100 mg by mouth bedtime.      ? bumetanide (BUMEX) 1 MG tablet Take 0.5 tablets (0.5 mg total) by mouth daily. 90 tablet 1   ? carvedilol (COREG) 12.5 MG tablet Take 1.5 tablets (18.75 mg total) by mouth 2 (two) times a day with meals. 180 tablet 3   ? cholecalciferol, vitamin D3, 2,000 unit Tab Take 2,000 Units by mouth daily.     ? cyanocobalamin, vitamin B-12, 2,500 mcg Tab Take 2,500 mcg by mouth every other day.     ? cycloSPORINE modified (GENGRAF) 25 MG capsule Take 50 mg by mouth 2 (two) times a day. 2 in the am and 1 at night for a total of 75 MG a day     ? flash glucose scanning reader (FREESTYLE FELA 14 DAY READER) Misc Use 1 application As Directed daily. 1 each 0   ? flash glucose sensor (FREESTYLE FELA 14 DAY SENSOR) Kit Use 1 application As Directed every 14 (fourteen) days. 6 kit 3   ? hydrOXYzine pamoate (VISTARIL) 25 MG capsule Take 1 capsule (25 mg total) by mouth at bedtime as needed for anxiety or other (insomnia). 30 capsule 3   ? insulin NPH and regular human (NOVOLIN 70-30 FLEXPEN U-100) 100 unit/mL (70-30) pen Inject under the skin daily before breakfast.  "Inject 15 units with breakfast; 17 units at dinnertime           ? levETIRAcetam (KEPPRA) 500 MG tablet Take 1 tablet (500 mg total) by mouth daily. Take 1 tab in am; 2 tab in pm 90 tablet 4   ? pantoprazole (PROTONIX) 40 MG tablet Take 40 mg by mouth 2 (two) times a day.     ? pen needle, diabetic (BD ULTRA-FINE TWILA PEN NEEDLES) 32 gauge x 5/32\" Ndle Use 1 per day. 200 each 4   ? predniSONE (DELTASONE) 5 MG tablet Take 5 mg by mouth daily.     ? rosuvastatin (CRESTOR) 5 MG tablet Take 1 tablet (5 mg) by mouth at bedtime. 90 tablet 3   ? traZODone (DESYREL) 50 MG tablet Take 100 mg by mouth at bedtime.     ? venlafaxine (EFFEXOR-XR) 150 MG 24 hr capsule Take 1 capsule (150 mg) by mouth daily. 90 capsule 3     No current facility-administered medications for this visit.       Allergies   Allergen Reactions   ? Lisinopril Cough     Resolved after discontinuation   ? Mold/Mildew    ? Latex Rash         Lab Results    Chemistry/lipid CBC Cardiac Enzymes/BNP/TSH/INR   Lab Results   Component Value Date    CHOL 159 05/14/2019    HDL 48 (L) 05/14/2019    LDLCALC 76 05/14/2019    TRIG 175 (H) 05/14/2019    CREATININE 2.54 (H) 11/26/2019    BUN 41 (H) 11/26/2019    K 4.3 11/26/2019     11/26/2019     11/26/2019    CO2 25 11/26/2019    Lab Results   Component Value Date    WBC 10.0 10/28/2019    HGB 10.8 (L) 10/28/2019    HCT 36.2 10/28/2019    MCV 96 10/28/2019     10/28/2019    Lab Results   Component Value Date    CKTOTAL 76 04/06/2014    CKMB 1 02/01/2011    TROPONINI 0.08 05/12/2015     (H) 10/28/2019    TSH 3.57 11/21/2019    INR 1.02 08/17/2019                         "

## 2021-06-28 NOTE — PROGRESS NOTES
Progress Notes by Char Negron CNP at 11/12/2019  1:30 PM     Author: Char Negron CNP Service: -- Author Type: Nurse Practitioner    Filed: 11/12/2019  3:11 PM Encounter Date: 11/12/2019 Status: Signed    : Char Negron CNP (Nurse Practitioner)             Assessment/Recommendations   Assessment:    1.  Heart failure with preserved ejection fraction, NYHA class II: Compensated.  Her symptoms of dyspnea on exertion, abdominal bloating, lower extremity edema have improved with restarting Bumex 1 mg daily.  We discussed monitoring symptoms, following a low-sodium diet, monitoring daily weights, and heart failure treatment.  She met with the nurse clinician for further heart failure education.    2.  Hypertension: Elevated.  Blood pressure 144/60.    3.  Chronic kidney disease status post kidney transplant: Her nephrologist is Jacklyn Borrego and follows with her this week.  BMP pending    Plan:  1.  Increase carvedilol to 18.75 mg twice a day for better blood pressure control  2.  BMP pending  3.  Continue daily weights and low-sodium diet  4.  Continue CPAP nightly    Odalys Miles will follow up with Dr. Escamilla in 6 weeks and in the heart failure clinic in 3 months.     History of Present Illness/Subjective    Ms. Odalys Miles is a 74 y.o. female seen at [facility] heart failure clinic today for continued follow-up.  Her friend accompanies her today.  She follows up for heart failure with preserved ejection fraction.  She did echocardiogram April 2019 which showed ejection fraction of 55%.  She has a past medical history significant for hypertension, coronary artery disease, dyslipidemia, TIA, obstructive sleep apnea on CPAP, diabetes, and kidney transplant in June 2011.  She also has a pacemaker placed December 2011.    During the last clinic visit, Dr. Way started her on Bumex 1 mg daily.  She states her dyspnea on exertion, lower extremity edema, and abdominal bloating  have improved.  He denies orthopnea or PND.  She has fatigue and dizziness.  She denies shortness of breath, orthopnea, PND, palpitations and chest pain.      She is monitoring home weights which are stable around 200 pounds.  She is following a low sodium diet.         Physical Examination Review of Systems   Vitals:    11/12/19 1335   BP: 144/60   Pulse: 62   Resp: 16     Body mass index is 37.13 kg/m .  Wt Readings from Last 3 Encounters:   11/12/19 203 lb (92.1 kg)   10/30/19 200 lb (90.7 kg)   10/28/19 204 lb (92.5 kg)       General Appearance:   no distress, normal body habitus   ENT/Mouth: membranes moist, no oral lesions or bleeding gums.      EYES:  no scleral icterus, normal conjunctivae   Neck: no carotid bruits or thyromegaly   Chest/Lungs:   lungs are clear to auscultation, no rales or wheezing, equal chest wall expansion    Cardiovascular:   Regular. Normal first and second heart sounds with no murmurs, rubs, or gallops; Jugular venous pressure normal, trace edema bilaterally    Abdomen:  no organomegaly, masses, bruits, or tenderness; bowel sounds are present   Extremities: no cyanosis or clubbing   Skin: no xanthelasma, warm.    Neurologic: normal  bilateral, no tremors     Psychiatric: alert and oriented x3    General: WNL  Eyes: WNL  Ears/Nose/Throat: WNL  Lungs: WNL  Heart: WNL  Stomach: WNL  Bladder: WNL  Muscle/Joints: WNL  Skin: WNL  Nervous System: Dizziness, Loss of Balance, Falls  Mental Health: WNL     Blood: WNL     Medical History  Surgical History Family History Social History   Past Medical History:   Diagnosis Date   ? Adrenal insufficiency (H)    ? Anemia    ? Anemia     Created by Conversion    ? Anxiety    ? Arthritis    ? Ataxia 5/12/2015   ? CAD (coronary artery disease)    ? Cardiac pacemaker, dual, in situ 12/7/2016    [employer] Revo MRI DOI: 12/15/2011 Dr. Aishwarya Treviño   ? Chronic Diarrhea Of Unknown Origin     Created by Conversion    ? Chronic Gout     Created by  Conversion  Replacement Utility updated for latest IMO load   ? Chronic Reflux Esophagitis     Created by Conversion    ? Congestive Heart Failure     Created by Conversion  Replacement Utility updated for latest IMO load   ? Coronary Artery Stenosis     Created by Conversion [facility] Annotation: Feb 27 2011 11:37PM - Alina Zapien: s/p CABGx4  1/11  Replacement Utility updated for latest IMO load   ? CVA (cerebral vascular accident) (H)    ? Depression    ? Diabetic Peripheral Neuropathy     Created by Conversion    ? DM (diabetes mellitus) (H)    ? Dysthymic disorder     Created by Conversion    ? End Stage Renal Disease     Created by Conversion    ? Epilepsy (H)    ? ESRD (end stage renal disease) (H)    ? Essential Thrombocytosis     Created by Conversion    ? History of renal transplant    ? Hyperlipidemia    ? Hypertension    ? Hypertension     Created by Conversion  Replacement Utility updated for latest IMO load   ? Insomnia     Created by Conversion    ? Ischemic Stroke     Created by Conversion  Replacement Utility updated for latest IMO load   ? Medullary Sponge Kidney Bilaterally     Created by Conversion    ? Mixed hyperlipidemia     Created by Conversion    ? Morbid obesity (H) 8/29/2018   ? Neuropathy    ? Nonintractable epilepsy without status epilepticus, unspecified epilepsy type (H) 8/29/2018   ? CULLEN on CPAP     Created by Conversion    ? Osteoarthritis     Created by Conversion  Replacement Utility updated for latest IMO load   ? Renal Transplant Recipient     Created by Conversion    ? Sensorineural hearing loss     Created by Conversion  Replacement Utility updated for latest IMO load   ? Thrombophlebitis Of Deep Vessels Of The Lower Extremity     Created by Conversion    ? Type 2 diabetes mellitus (H)     Created by Conversion    ? Vertigo     Past Surgical History:   Procedure Laterality Date   ? CARDIAC PACEMAKER PLACEMENT     ? HYSTERECTOMY      Age 28   ? IR EXTREMITY ANGIOGRAM  LEFT  1/30/2019   ? NEPHRECTOMY TRANSPLANTED ORGAN  06/2011   ? MN APPENDECTOMY      Description: Appendectomy;  Recorded: 11/13/2007;   ? MN CABG, VEIN, SINGLE      Description: CABG (CABG);  Proc Date: 01/26/2011;  Comments: x 4   ? MN INS NEW/RPLC PRM PACEMAKER W/TRANSV ELTRD VENTR      Description: Permanent Pacemaker Placement;  Recorded: 08/01/2012;   ? MN TOTAL ABDOM HYSTERECTOMY      Description: Total Abdominal Hysterectomy;  Recorded: 11/13/2007;   ? MN TOTAL KNEE ARTHROPLASTY      Description: Total Knee Arthroplasty;  Recorded: 11/13/2007;    Family History   Problem Relation Age of Onset   ? Cancer Sister    ? Diabetes Sister    ? Breast cancer Sister 62   ? Cancer Father     Social History     Socioeconomic History   ? Marital status:      Spouse name: Not on file   ? Number of children: Not on file   ? Years of education: Not on file   ? Highest education level: Not on file   Occupational History   ? Not on file   Social Needs   ? Financial resource strain: Not on file   ? Food insecurity:     Worry: Not on file     Inability: Not on file   ? Transportation needs:     Medical: Not on file     Non-medical: Not on file   Tobacco Use   ? Smoking status: Former Smoker     Packs/day: 1.50     Years: 20.00     Pack years: 30.00   ? Smokeless tobacco: Never Used   Substance and Sexual Activity   ? Alcohol use: Yes     Comment: occasional   ? Drug use: No   ? Sexual activity: Not on file   Lifestyle   ? Physical activity:     Days per week: Not on file     Minutes per session: Not on file   ? Stress: Not on file   Relationships   ? Social connections:     Talks on phone: Not on file     Gets together: Not on file     Attends Druze service: Not on file     Active member of club or organization: Not on file     Attends meetings of clubs or organizations: Not on file     Relationship status: Not on file   ? Intimate partner violence:     Fear of current or ex partner: Not on file     Emotionally  abused: Not on file     Physically abused: Not on file     Forced sexual activity: Not on file   Other Topics Concern   ? Not on file   Social History Narrative   ? Not on file          Medications  Allergies   Current Outpatient Medications   Medication Sig Dispense Refill   ? alendronate (FOSAMAX) 35 MG tablet Take 35 mg by mouth once a week. Weekly on Sundays. Take in the morning with a full glass of water, on an empty stomach, and do not take anything else by mouth or lie down for the next 30 min.  Sundays           ? amLODIPine (NORVASC) 10 MG tablet Take 1 tablet (10 mg) by mouth daily. 90 tablet 3   ? aspirin 81 MG EC tablet Take 1 tablet (81 mg total) by mouth daily.     ? azaTHIOprine (IMURAN) 50 mg tablet Take 100 mg by mouth bedtime.      ? bumetanide (BUMEX) 1 MG tablet Take 1 tablet (1 mg total) by mouth daily. 90 tablet 1   ? carvedilol (COREG) 12.5 MG tablet Take 1.5 tablets (18.75 mg total) by mouth 2 (two) times a day with meals. 180 tablet 3   ? cholecalciferol, vitamin D3, 2,000 unit Tab Take 2,000 Units by mouth daily.     ? cyanocobalamin, vitamin B-12, 2,500 mcg Tab Take 2,500 mcg by mouth every other day.     ? cycloSPORINE modified (GENGRAF) 25 MG capsule Take 50 mg by mouth 2 (two) times a day.      ? flash glucose scanning reader (FREESTYLE FELA 14 DAY READER) Misc Use 1 application As Directed daily. 1 each 0   ? flash glucose sensor (FREESTYLE FELA 14 DAY SENSOR) Kit Use 1 application As Directed every 14 (fourteen) days. 6 kit 3   ? hydrOXYzine pamoate (VISTARIL) 25 MG capsule Take 1 capsule (25 mg total) by mouth at bedtime as needed for anxiety or other (insomnia). 30 capsule 3   ? insulin NPH and regular human (NOVOLIN 70-30 FLEXPEN U-100) 100 unit/mL (70-30) pen Inject under the skin daily before breakfast. Inject 15 units with breakfast; 17 units at dinnertime           ? levETIRAcetam (KEPPRA) 500 MG tablet Take 500 mg by mouth daily. Take 1 tab in am; 1 tab in pm           ?  "pantoprazole (PROTONIX) 40 MG tablet Take 40 mg by mouth 2 (two) times a day.     ? pen needle, diabetic (BD ULTRA-FINE TWILA PEN NEEDLES) 32 gauge x 5/32\" Ndle Use 1 per day. 200 each 4   ? predniSONE (DELTASONE) 5 MG tablet Take 5 mg by mouth daily.     ? rosuvastatin (CRESTOR) 5 MG tablet Take 1 tablet (5 mg) by mouth at bedtime. 90 tablet 3   ? traZODone (DESYREL) 50 MG tablet Take 100 mg by mouth at bedtime.     ? venlafaxine (EFFEXOR-XR) 150 MG 24 hr capsule Take 1 capsule (150 mg) by mouth daily. 90 capsule 3     No current facility-administered medications for this visit.     Allergies   Allergen Reactions   ? Lisinopril Cough     Resolved after discontinuation   ? Mold/Mildew    ? Latex Rash         Lab Results    Chemistry/lipid CBC Cardiac Enzymes/BNP/TSH/INR   Lab Results   Component Value Date    CHOL 159 05/14/2019    HDL 48 (L) 05/14/2019    LDLCALC 76 05/14/2019    TRIG 175 (H) 05/14/2019    CREATININE 2.48 (H) 10/28/2019    BUN 32 (H) 10/28/2019    K 4.0 10/28/2019     10/28/2019     (H) 10/28/2019    CO2 20 (L) 10/28/2019    Lab Results   Component Value Date    WBC 10.0 10/28/2019    HGB 10.8 (L) 10/28/2019    HCT 36.2 10/28/2019    MCV 96 10/28/2019     10/28/2019    Lab Results   Component Value Date    CKTOTAL 76 04/06/2014    CKMB 1 02/01/2011    TROPONINI 0.08 05/12/2015     (H) 10/28/2019    TSH 2.45 08/17/2019    INR 1.02 08/17/2019             This note has been dictated using voice recognition software. Any grammatical, typographical, or context distortions are unintentional and inherent to the software               "

## 2021-06-28 NOTE — PROGRESS NOTES
Progress Notes by Jagdish Cardenas NP at 3/12/2020 12:03 PM     Author: Jagdish Cardenas NP Service: -- Author Type: Nurse Practitioner    Filed: 3/13/2020  8:55 AM Encounter Date: 3/12/2020 Status: Attested    : Jagdish Cardenas NP (Nurse Practitioner)    Related Notes: Original Note by Jagdish Cardenas NP (Nurse Practitioner) filed at 3/13/2020  8:31 AM    Cosigner: Manju Fernandez MBBS at 3/16/2020  9:10 AM    Attestation signed by Manju Fernandez MBBS at 3/16/2020  9:10 AM    Agree with discharge note                [facility]      Facility:    [facility] [[id number]]  Code Status: DNR      Chief Complaint/Reason for Visit:   Chief Complaint   Patient presents with   ? Discharge Summary       HPI:   Odalys is a 74 y.o. female who is a transfer from [facility] with an admission on 2/27/20 and discharge on 3/6/20. She has a PMH of DM, CKD stage 4, s/p renal transplant, anemia, PM, chronic CHF per EF 33%, htn who was in the TCU from 2/13/20 to 2/26/20. Apparently feeling more shortness of breath, again presented to the hospital with acute shortness of breath and CP. She was diagnosed with cardiogenic pulmonary edema with decreased EF, given medical management with diuresing.  Was not a candidate for angiogram given declining renal function, required BiPAP and then weaned off.  She developed acute on chronic renal failure with a baseline creatinine around 2.5 though increased to 3.5 per recent addition of losartan (which was decreased), patient not too keen on going through dialysis again as she had initially done in 2011.  Found to have osteomyelitis per MRU in the right great toe, seen by podiatry, started on Bactrim and Omnicef.  Also found to have Proteus UTI. Per anemia, she was given IV iron and started on epo as well. She was then discharged to the TCU again for additional rehab.    She has concluded her Tcu stay and will be discharged to  home with services on 3/13/20.    Past Medical History:  Past Medical History:   Diagnosis Date   ? Adrenal insufficiency (H)    ? Anemia    ? Anxiety    ? Arthritis    ? Ataxia 5/12/2015   ? CAD (coronary artery disease)    ? Cardiac pacemaker, dual, in situ 12/7/2016    [employer] Revo MRI DOI: 12/15/2011 Dr. Aishwarya Treviño   ? Chronic Diarrhea Of Unknown Origin     Created by Conversion    ? Chronic Gout     Created by Conversion  Replacement Utility updated for latest IMO load   ? Chronic Reflux Esophagitis     Created by Conversion    ? Congestive Heart Failure     Created by Conversion  Replacement Utility updated for latest IMO load   ? Coronary Artery Stenosis     Created by Conversion [facility] Annotation: Feb 27 2011 11:37PM - Alina Zapien: s/p CABGx4  1/11  Replacement Utility updated for latest IMO load   ? CVA (cerebral vascular accident) (H)    ? Depression    ? Diabetic Peripheral Neuropathy     Created by Conversion    ? DM (diabetes mellitus) (H)    ? Dysthymic disorder     Created by Conversion    ? Epilepsy (H)    ? ESRD (end stage renal disease) (H)    ? Essential Thrombocytosis     Created by Conversion    ? History of renal transplant    ? Hyperlipidemia    ? Hypertension    ? Insomnia     Created by Conversion    ? Ischemic Stroke     Created by Conversion  Replacement Utility updated for latest IMO load   ? Medullary Sponge Kidney Bilaterally     Created by Conversion    ? Morbid obesity (H) 8/29/2018   ? Neuropathy    ? Nonintractable epilepsy without status epilepticus, unspecified epilepsy type (H) 8/29/2018   ? CULLEN on CPAP     Created by Conversion    ? Osteoarthritis     Created by Conversion  Replacement Utility updated for latest IMO load   ? Renal Transplant Recipient     Created by Conversion    ? Sensorineural hearing loss     Created by Conversion  Replacement Utility updated for latest IMO load   ? Thrombophlebitis Of Deep Vessels Of The Lower Extremity     Created by  Conversion    ? Type 2 diabetes mellitus (H)     Created by Conversion    ? Vertigo            Surgical History:  Past Surgical History:   Procedure Laterality Date   ? APPENDECTOMY     ? CARDIAC PACEMAKER PLACEMENT     ? CORONARY ARTERY BYPASS GRAFT  01/26/2011    Comments: x 4   ? HYSTERECTOMY  1973   ? IR EXTREMITY ANGIOGRAM LEFT  1/30/2019   ? NEPHRECTOMY TRANSPLANTED ORGAN  06/2011   ? [place] TOTAL KNEE ARTHROPLASTY Bilateral        Family History:   Family History   Problem Relation Age of Onset   ? Diabetes Sister    ? Breast cancer Sister 62   ? Cancer Father        Social History:    Social History     Socioeconomic History   ? Marital status:      Spouse name: Not on file   ? Number of children: Not on file   ? Years of education: Not on file   ? Highest education level: Not on file   Occupational History     Employer: RETIRED   Social Needs   ? Financial resource strain: Not on file   ? Food insecurity     Worry: Not on file     Inability: Not on file   ? Transportation needs     Medical: Not on file     Non-medical: Not on file   Tobacco Use   ? Smoking status: Former Smoker     Packs/day: 1.50     Years: 20.00     Pack years: 30.00     Types: Cigarettes   ? Smokeless tobacco: Never Used   Substance and Sexual Activity   ? Alcohol use: Yes     Comment: occasional   ? Drug use: No   ? Sexual activity: Not on file   Lifestyle   ? Physical activity     Days per week: Not on file     Minutes per session: Not on file   ? Stress: Not on file   Relationships   ? Social connections     Talks on phone: Not on file     Gets together: Not on file     Attends Mosque service: Not on file     Active member of club or organization: Not on file     Attends meetings of clubs or organizations: Not on file     Relationship status: Not on file   ? Intimate partner violence     Fear of current or ex partner: Not on file     Emotionally abused: Not on file     Physically abused: Not on file     Forced sexual activity:  Not on file   Other Topics Concern   ? Not on file   Social History Narrative    .  Chas passed away 2009 from cancer. Lives independently in 1 level town home in [place]. 2 sons- Ben and Av. She has a dog-Isaura. Enjoys knitting.           Review of Systems   Constitutional: Positive for activity change, appetite change and fatigue. Negative for chills, diaphoresis and fever.        No concerns   HENT: Negative for congestion and hearing loss.    Eyes: Negative.    Respiratory: Positive for shortness of breath.         2L NC   Cardiovascular: Negative.    Gastrointestinal: Negative for abdominal distention, abdominal pain, blood in stool, constipation, diarrhea and nausea.   Endocrine: Negative.    Genitourinary: Negative for difficulty urinating.   Musculoskeletal:        Denies pain   Skin: Positive for wound.        R great toe    Allergic/Immunologic: Negative.    Neurological: Negative for dizziness, tremors, speech difficulty and light-headedness.   Hematological: Negative.    Psychiatric/Behavioral: Negative for agitation, confusion, hallucinations and sleep disturbance. The patient is not nervous/anxious.        Vitals:    03/12/20 1204   BP: 128/68   Pulse: 63   Resp: 16   Temp: 97  F (36.1  C)   SpO2: 91%   Weight: 190 lb (86.2 kg)       Physical Exam  Vitals signs reviewed.   Constitutional:       Comments: Unable to be weaned off O2, will f/u with nephrology   HENT:      Head: Normocephalic and atraumatic.      Right Ear: External ear normal.      Left Ear: External ear normal.      Nose: No congestion or rhinorrhea.      Mouth/Throat:      Pharynx: No oropharyngeal exudate or posterior oropharyngeal erythema.   Eyes:      General:         Right eye: No discharge.         Left eye: No discharge.   Neck:      Musculoskeletal: Normal range of motion and neck supple.   Cardiovascular:      Rate and Rhythm: Normal rate.      Heart sounds: No murmur. No friction rub. No gallop.        Comments: PM  Pulmonary:      Effort: No respiratory distress.      Breath sounds: No wheezing or rales.      Comments: Dim, 2L NC, RUFFIN  Abdominal:      General: There is distension.      Palpations: Abdomen is soft.      Tenderness: There is no abdominal tenderness. There is no guarding.      Comments: Denies constipation or diarrhea   Genitourinary:     Comments: No limitations  Musculoskeletal:      Right lower leg: No edema.      Left lower leg: No edema.      Comments: WBAT, denies pain   Skin:     General: Skin is warm.      Comments: R great toe wound   Neurological:      Mental Status: She is alert and oriented to person, place, and time.   Psychiatric:         Mood and Affect: Mood normal.      Comments: Denies depression or anxiety         Medication List:  Current Outpatient Medications   Medication Sig   ? acetaminophen (TYLENOL) 500 MG tablet Take 1 tablet (500 mg total) by mouth every 6 (six) hours as needed.   ? alendronate (FOSAMAX) 35 MG tablet Take 35 mg by mouth once a week. Weekly on Sundays. Take in the morning with a full glass of water, on an empty stomach, and do not take anything else by mouth or lie down for the next 30 min.  Sundays         ? amLODIPine (NORVASC) 5 MG tablet Take 1 tablet (5 mg total) by mouth daily.   ? aspirin 81 MG EC tablet Take 1 tablet (81 mg total) by mouth daily.   ? azaTHIOprine (IMURAN) 50 mg tablet Take 100 mg by mouth bedtime.    ? carvediloL (COREG) 25 MG tablet Take 2 tablets (50 mg total) by mouth 2 (two) times a day with meals.   ? cefdinir (OMNICEF) 300 MG capsule Take 1 capsule (300 mg total) by mouth every morning for 20 days. (or per podiatrist) for osteomyelitis toe   ? cholecalciferol, vitamin D3, 2,000 unit Tab Take 2,000 Units by mouth daily.   ? clopidogreL (PLAVIX) 75 mg tablet Take 1 tablet (75 mg total) by mouth daily.   ? cyanocobalamin, vitamin B-12, 2,500 mcg Tab Take 2,500 mcg by mouth daily.    ? cycloSPORINE modified (GENGRAF) 25 MG  "capsule Take 50 mg by mouth 2 (two) times a day.    ? flash glucose scanning reader (FREESTYLE FELA 14 DAY READER) Misc Use 1 application As Directed daily.   ? flash glucose sensor (FREESTYLE FELA 14 DAY SENSOR) Kit Use 1 application As Directed every 14 (fourteen) days.   ? hydrALAZINE (APRESOLINE) 100 MG tablet Take 1 tablet (100 mg total) by mouth 3 (three) times a day.   ? insulin NPH-insulin regular (NOVOLIN 70-30) 100 unit/mL (70-30) injection Inject 15 Units under the skin daily before breakfast.   ? insulin NPH-insulin regular (NOVOLIN 70-30) 100 unit/mL (70-30) injection Inject 14 Units under the skin daily before supper.    ? isosorbide mononitrate (IMDUR) 60 MG 24 hr tablet Take 1 tablet (60 mg total) by mouth 2 (two) times a day.   ? lamoTRIgine (LAMICTAL) 100 MG tablet Take 1 tablet (100 mg total) by mouth 2 (two) times a day.   ? miconazole (MICOTIN) 2 % powder Twice daily as needed for rash/dermatitis   ? NOVOLOG U-100 INSULIN ASPART 100 unit/mL injection Three times a day with meals  BG < 70 mg/dL: Treat, and call MD  BG  mg/dL: None - 0 Units  -180 mg/dL: 1 unit  -220 mg/dL: 2 units  -260 mg/dL: 3 units  -300 mg/dL: 4 units  -340 mg/dL: 5 units  -400 mg/dL: 6 units  BG > 400 mg/dL: 7 units and Call MD   ? pantoprazole (PROTONIX) 40 MG tablet Take 40 mg by mouth 2 (two) times a day.   ? pen needle, diabetic (BD ULTRA-FINE TWILA PEN NEEDLES) 32 gauge x 5/32\" Ndle Use 1 per day.   ? polyethylene glycol (MIRALAX) 17 gram packet Take 1 packet (17 g total) by mouth daily.   ? polyvinyl alcohol (LIQUIFILM TEARS) 1.4 % ophthalmic solution Use 4 times daily as needed for dry eyes   ? prazosin (MINIPRESS) 2 MG capsule Take 2 capsules (4 mg total) by mouth 3 (three) times a day.   ? predniSONE (DELTASONE) 5 MG tablet Take 5 mg by mouth daily.   ? rosuvastatin (CRESTOR) 5 MG tablet Take 5 mg by mouth at bedtime.   ? senna-docusate (PERICOLACE) 8.6-50 mg tablet Take 1 " tablet by mouth 2 (two) times a day.   ? spironolactone (ALDACTONE) 25 MG tablet Take 0.5 tablets (12.5 mg total) by mouth 2 (two) times a day at 9am and 6pm.   ? sulfamethoxazole-trimethoprim (SEPTRA) 400-80 mg per tablet Take 1 tablet by mouth 3 (three) times a week for 10 days.   ? torsemide (DEMADEX) 20 MG tablet Take 4 tablets (80 mg total) by mouth 2 (two) times a day at 9am and 6pm. (Patient taking differently: Take 60 mg by mouth 2 (two) times a day at 9am and 6pm. )   ? traZODone (DESYREL) 50 MG tablet Take 100 mg by mouth at bedtime.   ? venlafaxine (EFFEXOR-XR) 75 MG 24 hr capsule Take 1 capsule (75 mg total) by mouth daily with breakfast.       Labs:  Results for orders placed or performed in visit on 03/10/20   Basic Metabolic Panel   Result Value Ref Range    Sodium 136 136 - 145 mmol/L    Potassium 3.9 3.5 - 5.0 mmol/L    Chloride 96 (L) 98 - 107 mmol/L    CO2 27 22 - 31 mmol/L    Anion Gap, Calculation 13 5 - 18 mmol/L    Glucose 119 70 - 125 mg/dL    Calcium 8.6 8.5 - 10.5 mg/dL    BUN 82 (H) 8 - 28 mg/dL    Creatinine 4.02 (H) 0.60 - 1.10 mg/dL    GFR MDRD Af Amer 13 (L) >60 mL/min/1.73m2    GFR MDRD Non Af Amer 11 (L) >60 mL/min/1.73m2     Lab Results   Component Value Date    WBC 6.9 03/10/2020    HGB 8.6 (L) 03/10/2020    HCT 28.3 (L) 03/10/2020    MCV 99 03/10/2020     03/10/2020     Lab Results   Component Value Date    TSH 3.57 11/21/2019     Vitamin D, Total (25-Hydroxy)   Date Value Ref Range Status   02/17/2020 37.5 30.0 - 80.0 ng/mL Final     Lab Results   Component Value Date    HGBA1C 6.3 (H) 01/26/2020       Assessment/Plan:    NSTEMI: No angiogram performed per renal dysfunction, probably due to demand ischemia per cardiogenic overload with decreased EF of 33%, will wean O2 as tolerated. Currently 2L NC. Continue ASA and plavix daily. F/u with cardiology    HFrEF: last 33% (decreased), last 189lb, bumex increased to torsemide to 80mg two times a day and now decreased to 60mg  two times a day per worsening renal function. She also has spironolactone 12.5mg two times a day. Last 187lb    Osteomyelitis of right great toe: started on Omnicef, end 3/26 and bactrim, end 3/16. Drsg changes as ordered    CULLEN: using CPAP    IDDM: last A1c 6.3, continue 70/30 14U at HS and 15U in AM, with SS three times a day. Increased BS checks to QID    Poor PO intake: start glucerna     Hx of renal transplant with CKD stage 4: last Cr 4.02 with GFR 11 on 3/10/20 (worsened), continue gengraf 50mg two times a day and imuran 100mg at HS, f/u with neurology next wk    Pain control: continue tylenol 500mg q6h PRN, denies pain    Osteopenia: continue fosamax 35mg weekly    HTN: continue coreg 50mg two times a day, hydralazine 100mg three times a day, imdur 60mg two times a day, start on prazosin 4mg TID and amlodipine on 5mg daily per cardiology. SBP <130    Normocytic hypochromic anemia: Last hemoglobin 8.6, f/u with nephrology    Vit D def: continue D3 2000U daily    Vit B12 def: continue cyanocobalamin 2500mcg daily    Seizure disorder: continue lamotrigine 100mg two times a day    GERD: pantoprazole 40mg two times a day    Constipation: continue miralax daily and senna S 1 tab two times a day, denies issue    CAD: continue statin and ASA    Insomnia: continue trazodone 100mg at HS    Depression: continue Effexor 75mg daily    Disposition: plans to return home, wants to move to [facility] after selling house. She will be again be discharged to home with services.    MEDICAL EQUIPMENT NEEDS:  na    DISCHARGE PLAN/FACE TO FACE:  I certify that services are/were furnished while this patient was under the care of a physician and that a physician or an allowed non-physician practitioner (NPP), had a face-to-face encounter that meets the physician face-to-face encounter requirements. The encounter was in whole, or in part, related to the primary reason for home health. The patient is confined to his/her home and needs  intermittent skilled nursing, physical therapy, speech-language pathology, or the continued need for occupational therapy. A plan of care has been established by a physician and is periodically reviewed by a physician.  Date of Face-to-Face Encounter: 3/13/20    I certify that, based on my findings, the following services are medically necessary home health services: [facility] RN and PT/OT to evaluate and treat.    Odalys attended PT this morning 3/12/2020.  Her saturation on room air was at rest was 90%.  During embolization, exercise saturation without oxygen was 84%.  During ambulation, exercise saturation with oxygen was 87% on 2L NC.  Odalys requires oxygen due to acute on chronic diastolic congestive heart failure and acute pulmonary edema.  Alternate treatments such as deep breathing, medication regiment and daily weights were tried but oxygen is still required.  Patient is mobile in the home with the use of a walker and requires portability.    My clinical findings support the need for the above skilled services because: patient will be discharging to her home. Patient will need assistance with medication management and performing IADLs and ADLs effectively and safely.    Patient to re-establish plan of care with their PCP within 7 days after leaving TCU.     The care plan has been reviewed and all orders signed. Changes to care plan, if any, as noted. Otherwise, continue care plan of care.  The total time spent with this patient was 31 minutes, with greater than 50% spent in counseling and coordination of care that included multiple issues per ongoing renal and cardiology concerns, continuing therapy and discharge, which lasted 18 minutes.      Electronically signed by: Jagdish Cardenas NP

## 2021-06-29 NOTE — PROGRESS NOTES
Progress Notes by Rock Perez MD at 2020  8:00 AM     Author: Rock Perez MD Service: -- Author Type: Physician    Filed: 2020  3:59 PM Encounter Date: 2020 Status: Signed    : Rock Perez MD (Physician)                                 [facility] Consult.    Name: Odalys Miles  :   1945  MRN:   [id number]  PCP:    Mika Ha MD  DOS:    20      CC: Right second toe gangrene.    HPI/Interval History:  Odalys Miles is a 74 y.o. female with the history of kidney transplant on immunosuppression, cardiac pacemaker in place, chronic right great toe ulcer with osteomyelitis.  She was supposed to have amputations but has not happened.  Recently she was admitted to the hospital in 2020 and was found to have a severe anemia with unclear source.  At the same time she was diagnosed with C. difficile and was treated with 10 days of p.o. vancomycin from  through .  Infectious disease clinic was contacted recently for concern for osteomyelitis on x-ray as detailed above.  I recommended cultures with in clinic visit for assessment prior to initiation of therapy with antibiotic.  Cultures reviewed as detailed below.  In clinic today, the patient denies any fever, chills, night sweats.  Her right second toe findings are consistent with gangrene.  From a diarrhea perspective, the patient is still having loose stool but denies any abdominal pain.  Has not worsened since she went off vancomycin.    PMH:  Past Medical History:   Diagnosis Date   ? Adrenal insufficiency (H)    ? Anemia    ? Anxiety    ? Arthritis    ? Ataxia 2015   ? CAD (coronary artery disease)    ? Cardiac pacemaker, dual, in situ 2016    [employer] Revo MRI DOI: 12/15/2011 Dr. Aishwarya Treviño   ? Chronic Diarrhea Of Unknown Origin     Created by Conversion    ? Chronic Gout     Created by Conversion  Replacement Utility updated for latest IMO load   ? Chronic  Reflux Esophagitis     Created by Conversion    ? Congestive Heart Failure     Created by Conversion  Replacement Utility updated for latest IMO load   ? Coronary Artery Stenosis     Created by Conversion [facility] Annotation: Feb 27 2011 11:37PM - Alina Zapien: s/p CABGx4  1/11  Replacement Utility updated for latest IMO load   ? CVA (cerebral vascular accident) (H)    ? Depression    ? Diabetic Peripheral Neuropathy     Created by Conversion    ? DM (diabetes mellitus) (H)    ? Dysthymic disorder     Created by Conversion    ? Epilepsy (H)    ? ESRD (end stage renal disease) (H)    ? Essential Thrombocytosis     Created by Conversion    ? History of renal transplant    ? Hyperlipidemia    ? Hypertension    ? Insomnia     Created by Conversion    ? Ischemic Stroke     Created by Conversion  Replacement Utility updated for latest IMO load   ? Medullary Sponge Kidney Bilaterally     Created by Conversion    ? Morbid obesity (H) 8/29/2018   ? Neuropathy    ? Nonintractable epilepsy without status epilepticus, unspecified epilepsy type (H) 8/29/2018   ? CULLEN on CPAP     Created by Conversion    ? Osteoarthritis     Created by Conversion  Replacement Utility updated for latest IMO load   ? Renal Transplant Recipient     Created by Conversion    ? Sensorineural hearing loss     Created by Conversion  Replacement Utility updated for latest IMO load   ? Thrombophlebitis Of Deep Vessels Of The Lower Extremity     Created by Conversion    ? Type 2 diabetes mellitus (H)     Created by Conversion    ? Vertigo        PSH:  Past Surgical History:   Procedure Laterality Date   ? APPENDECTOMY     ? CARDIAC PACEMAKER PLACEMENT     ? CORONARY ARTERY BYPASS GRAFT  01/26/2011    Comments: x 4   ? HYSTERECTOMY  1973   ? IR EXTREMITY ANGIOGRAM LEFT  1/30/2019   ? IR MESENTERIC ANGIOGRAM  4/8/2020   ? IR TUNNELED CATHETER INSERT  4/6/2020   ? NEPHRECTOMY TRANSPLANTED ORGAN  06/2011    took out both kidneys and put in right sided  kidney   ? LA ESOPHAGOGASTRODUODENOSCOPY TRANSORAL DIAGNOSTIC N/A 4/7/2020    Procedure: ESOPHAGOGASTRODUODENOSCOPY (EGD);  Surgeon: Art Ashraf MD;  Location: [facility];  Service: Gastroenterology   ? LA TOTAL KNEE ARTHROPLASTY Bilateral        Social History/Family History:  Currently at a TCU.  No family history of recurrent infection.    Allergies:  Allergies   Allergen Reactions   ? Blood-Group Specific Substance      Anti-E present. Expect delay in transfusion. Draw 2 lavender tops for Type and Screen requests.   ? Lisinopril Cough     Resolved after discontinuation   ? Mold/Mildew    ? Latex Rash       Medications:  Current Outpatient Medications on File Prior to Visit   Medication Sig Dispense Refill   ? acetaminophen (TYLENOL) 500 MG tablet Take 1 tablet (500 mg total) by mouth every 6 (six) hours as needed.  0   ? aspirin 81 MG EC tablet Take 1 tablet (81 mg total) by mouth daily.     ? azaTHIOprine (IMURAN) 50 mg tablet Take 50 mg by mouth at bedtime.      ? carvediloL (COREG) 25 MG tablet Take 1 tablet (25 mg total) by mouth 2 (two) times a day with meals. 360 tablet 3   ? cholecalciferol, vitamin D3, 2,000 unit Tab Take 2,000 Units by mouth daily.     ? clopidogreL (PLAVIX) 75 mg tablet Take 1 tablet (75 mg total) by mouth daily. 90 tablet 3   ? cyanocobalamin, vitamin B-12, 2,500 mcg Tab Take 2,500 mcg by mouth daily.      ? cycloSPORINE modified (GENGRAF) 25 MG capsule Take 1 capsule (25 mg total) by mouth 2 (two) times a day.  0   ? flash glucose scanning reader (FREESTYLE FELA 14 DAY READER) Misc Use 1 application As Directed daily. 1 each 0   ? flash glucose sensor (FREESTYLE FELA 14 DAY SENSOR) Kit Use 1 application As Directed every 14 (fourteen) days. 6 kit 3   ? isosorbide mononitrate (IMDUR) 60 MG 24 hr tablet Take 1 tablet (60 mg total) by mouth 2 (two) times a day. 180 tablet 3   ? lamoTRIgine (LAMICTAL) 100 MG tablet Take 1 tablet (100 mg total) by mouth 2 (two) times a day.   "0   ? NOVOLOG U-100 INSULIN ASPART 100 unit/mL injection Three times a day with meals  BG < 70 mg/dL: Treat, and call MD  BG  mg/dL: None - 0 Units  -180 mg/dL: 1 unit  -220 mg/dL: 2 units  -260 mg/dL: 3 units  -300 mg/dL: 4 units  -340 mg/dL: 5 units  -400 mg/dL: 6 units  BG > 400 mg/dL: 7 units and Call MD 10 mL PRN   ? pantoprazole (PROTONIX) 40 MG tablet Take 40 mg by mouth 2 (two) times a day.     ? pen needle, diabetic (BD ULTRA-FINE TWILA PEN NEEDLES) 32 gauge x 5/32\" Ndle Use 1 per day. 200 each 4   ? predniSONE (DELTASONE) 5 MG tablet Take 5 mg by mouth daily.     ? rosuvastatin (CRESTOR) 5 MG tablet Take 5 mg by mouth at bedtime.     ? senna-docusate (PERICOLACE) 8.6-50 mg tablet Take 1 tablet by mouth 2 (two) times a day.  0   ? traZODone (DESYREL) 50 MG tablet Take 100 mg by mouth at bedtime.     ? venlafaxine (EFFEXOR-XR) 75 MG 24 hr capsule Take 1 capsule (75 mg total) by mouth daily with breakfast. 90 capsule 3     No current facility-administered medications on file prior to visit.        Review of System:  12 points review of system is negative except for findings in the HPI.    Exam  VS:   Vitals:    06/16/20 0821   BP: 105/50   Pulse: 76   Temp: 98.1  F (36.7  C)         Gen: Pleasant in no acute distress.  HEENT: NCAT. EOMI.  Neck: No LAD.  Lungs: Clear to auscultation bilaterally with no crackles or wheezes.   Card: RRR. No RMG. Peripheral pulses present and symmetrical. No edema.   Abd: Soft NT ND. No hepatomegaly or splenomegaly.  Ext: Chronic ulcer at the tip of the right great toe unchanged from before.  Gangrene of the right second toe as shown in the picture below.  Lymph: No cervical or supraclavicular adenopathy.  Skin: No rash  Neuro: Alert and oriented to place time and person. Cranial nerves grossly intact.           Labs:  Cultures from [facility] on 6/12/2020  Staph Lugdunensis (Cef-R, Levaquin-S, Oxa-S, Tetracycline-S, TMP-Sulf-S, Vanco-S (JAYCOB " 0.5)  Citrobacter Cloacae complex Cef-R, Cefe-S, CTX-S, Cipro-S, Leva-S, Imip-S, Zosyn-S, Tobra-S, TMP/Sulfa-S  Citrobacter Freundii  Cef-R, Cefe-S, CTX-S, Cipro-S, Leva-S, TMP/Sulfa-S, Imip-S, Zosyn-S, Tobra-S.   CRP 7.93 6/8  ESR 46 6/8  WBC 11.9, HGB 9.6, Platelet 281 on 6/8    Imaging:  Xray [facility] 6/8  Prominent soft tissue ulcer dorsal and medial great toe. On the lateral view there is resorption of the tuft suspicious for osteomyelitis.  Vascular calcification     Assessment:  Odalys Miles is a 74 y.o. female with  1.  Status post kidney transplant on immunosuppression now on hemodialysis.  2.  Chronic ulcer at the tip of the right great toe.  Unchanged.  3.  Recent history of C. difficile infection.  Completed treatment.  No worsening of diarrhea since completion of therapy.  4.  Gangrene of the right second toe.  This is vascular disease not an infectious disease.  Disease pathophysiology was discussed with the patient.  Antibiotics have no role at the current stage unless secondary infection.  Cultures from [facility] reviewed.  These most likely represent colonization as opposed to infection. It is in fact important to avoid unnecessary antibiotic in this patient given her history of C. difficile.  She needs vascular surgery.    Recommendations:  Vascular surgery consult.  Order placed.  Antibiotics have no indication here.  Watch for swelling and pain and redness on the back of the foot, fever, chills, night sweats.  These symptoms could indicate infection and very for antibiotics if they occur.  Follow up as needed.       ANNI Perez MD  [facility]  [facility]  Office Telephone [phone].  Fax [phone]  [facility] paging

## 2021-07-03 NOTE — ADDENDUM NOTE
Addendum Note by Luna Guerrero RN at 5/29/2019 10:20 AM     Author: Luna Guerrero RN Service: -- Author Type: --    Filed: 5/29/2019  1:01 PM Encounter Date: 5/29/2019 Status: Signed    : Luna Guerrero RN (Registered Nurse)    Addended by: LUNA GUERRERO on: 5/29/2019 01:01 PM        Modules accepted: Orders

## 2021-07-13 ENCOUNTER — RECORDS - HEALTHEAST (OUTPATIENT)
Dept: ADMINISTRATIVE | Facility: CLINIC | Age: 76
End: 2021-07-13

## 2021-07-14 PROBLEM — N17.9 AKI (ACUTE KIDNEY INJURY) (H): Status: RESOLVED | Noted: 2019-01-23 | Resolved: 2019-02-05

## 2021-07-21 ENCOUNTER — RECORDS - HEALTHEAST (OUTPATIENT)
Dept: ADMINISTRATIVE | Facility: CLINIC | Age: 76
End: 2021-07-21

## 2021-07-22 ENCOUNTER — RECORDS - HEALTHEAST (OUTPATIENT)
Dept: INTERNAL MEDICINE | Facility: CLINIC | Age: 76
End: 2021-07-22

## 2021-07-22 DIAGNOSIS — Z12.31 OTHER SCREENING MAMMOGRAM: ICD-10-CM

## 2021-11-13 NOTE — ADDENDUM NOTE
Addendum Note by Deven Auguste MD at 3/22/2019  2:10 PM     Author: Deven Auguste MD Service: -- Author Type: Physician    Filed: 3/22/2019  3:40 PM Encounter Date: 3/22/2019 Status: Signed    : Deven Auguste MD (Physician)    Addended by: DEVEN AUGUSTE on: 3/22/2019 03:40 PM        Modules accepted: Level of Service        
None known

## 2022-07-11 NOTE — TELEPHONE ENCOUNTER
Airway  Date/Time: 7/11/2022 9:44 AM  Urgency: elective      General Information and Staff    Patient location during procedure: OR  Anesthesiologist: Cordelia Amaral MD  Performed: anesthesiologist     Indications and Patient Condition  Indications for airway management: anesthesia  Sedation level: deep  Preoxygenated: yes  Patient position: sniffing  Mask difficulty assessment: 1 - vent by mask    Final Airway Details  Final airway type: endotracheal airway      Successful airway: ETT  Cuffed: yes   Successful intubation technique: direct laryngoscopy  Facilitating devices/methods: intubating stylet  Endotracheal tube insertion site: oral  Blade: Estela  Blade size: #3  ETT size (mm): 7.0    Cormack-Lehane Classification: grade IIA - partial view of glottis  Placement verified by: chest auscultation and capnometry   Measured from: lips  ETT to lips (cm): 20  Number of attempts at approach: 2    Additional Comments  Saini 2 with good view but floppy epiglottis -- switched to MAC3, 2A view, atraumatic intubation New Appointment Needed  What is the reason for the visit:    Inpatient/ED Follow Up Appt Request  At what hospital or facility were you seen?: [facility]  What is the reason you were seen?: Heart attack and congestive heart failure.  What date were you admitted?: date: 02-27-20  What date were you discharged?: date: 03-06-20  What was the recommended timeframe for your follow up appointment?: 7-10 days  Provider Preference: PCP only  How soon do you need to be seen?: next week  Waitlist offered?: No  Okay to leave a detailed message:  Yes